# Patient Record
Sex: MALE | Race: AMERICAN INDIAN OR ALASKA NATIVE | NOT HISPANIC OR LATINO | Employment: OTHER | URBAN - METROPOLITAN AREA
[De-identification: names, ages, dates, MRNs, and addresses within clinical notes are randomized per-mention and may not be internally consistent; named-entity substitution may affect disease eponyms.]

---

## 2017-05-01 ENCOUNTER — RECORDS - HEALTHEAST (OUTPATIENT)
Dept: LAB | Facility: CLINIC | Age: 67
End: 2017-05-01

## 2017-05-01 LAB
CHOLEST SERPL-MCNC: 160 MG/DL
FASTING STATUS PATIENT QL REPORTED: NORMAL
HDLC SERPL-MCNC: 50 MG/DL
LDLC SERPL CALC-MCNC: 90 MG/DL
TRIGL SERPL-MCNC: 101 MG/DL

## 2019-07-03 ENCOUNTER — RECORDS - HEALTHEAST (OUTPATIENT)
Dept: LAB | Facility: CLINIC | Age: 69
End: 2019-07-03

## 2019-07-03 LAB
ANION GAP SERPL CALCULATED.3IONS-SCNC: 9 MMOL/L (ref 5–18)
BUN SERPL-MCNC: 10 MG/DL (ref 8–22)
CALCIUM SERPL-MCNC: 9.8 MG/DL (ref 8.5–10.5)
CHLORIDE BLD-SCNC: 104 MMOL/L (ref 98–107)
CHOLEST SERPL-MCNC: 148 MG/DL
CO2 SERPL-SCNC: 23 MMOL/L (ref 22–31)
CREAT SERPL-MCNC: 0.77 MG/DL (ref 0.7–1.3)
FASTING STATUS PATIENT QL REPORTED: YES
GFR SERPL CREATININE-BSD FRML MDRD: >60 ML/MIN/1.73M2
GLUCOSE BLD-MCNC: 101 MG/DL (ref 70–125)
HDLC SERPL-MCNC: 47 MG/DL
LDLC SERPL CALC-MCNC: 86 MG/DL
POTASSIUM BLD-SCNC: 4.6 MMOL/L (ref 3.5–5)
PSA SERPL-MCNC: 1.5 NG/ML (ref 0–4.5)
SODIUM SERPL-SCNC: 136 MMOL/L (ref 136–145)
TRIGL SERPL-MCNC: 75 MG/DL

## 2020-08-24 ENCOUNTER — RECORDS - HEALTHEAST (OUTPATIENT)
Dept: LAB | Facility: CLINIC | Age: 70
End: 2020-08-24

## 2020-08-24 LAB
ANION GAP SERPL CALCULATED.3IONS-SCNC: 10 MMOL/L (ref 5–18)
BUN SERPL-MCNC: 15 MG/DL (ref 8–22)
CALCIUM SERPL-MCNC: 9.3 MG/DL (ref 8.5–10.5)
CHLORIDE BLD-SCNC: 104 MMOL/L (ref 98–107)
CHOLEST SERPL-MCNC: 183 MG/DL
CO2 SERPL-SCNC: 23 MMOL/L (ref 22–31)
CREAT SERPL-MCNC: 0.88 MG/DL (ref 0.7–1.3)
FASTING STATUS PATIENT QL REPORTED: YES
GFR SERPL CREATININE-BSD FRML MDRD: >60 ML/MIN/1.73M2
GLUCOSE BLD-MCNC: 78 MG/DL (ref 70–125)
HDLC SERPL-MCNC: 60 MG/DL
LDLC SERPL CALC-MCNC: 101 MG/DL
POTASSIUM BLD-SCNC: 4.4 MMOL/L (ref 3.5–5)
PSA SERPL-MCNC: 1.2 NG/ML (ref 0–4.5)
SODIUM SERPL-SCNC: 137 MMOL/L (ref 136–145)
TRIGL SERPL-MCNC: 111 MG/DL

## 2020-09-09 ENCOUNTER — PATIENT OUTREACH (OUTPATIENT)
Dept: CARE COORDINATION | Facility: CLINIC | Age: 70
End: 2020-09-09

## 2020-09-09 DIAGNOSIS — Z65.9 PSYCHOSOCIAL PROBLEM: Primary | ICD-10-CM

## 2021-04-19 ENCOUNTER — RECORDS - HEALTHEAST (OUTPATIENT)
Dept: LAB | Facility: CLINIC | Age: 71
End: 2021-04-19

## 2021-04-19 LAB
ANION GAP SERPL CALCULATED.3IONS-SCNC: 13 MMOL/L (ref 5–18)
BUN SERPL-MCNC: 15 MG/DL (ref 8–22)
CALCIUM SERPL-MCNC: 8.3 MG/DL (ref 8.5–10.5)
CHLORIDE BLD-SCNC: 101 MMOL/L (ref 98–107)
CHOLEST SERPL-MCNC: 170 MG/DL
CO2 SERPL-SCNC: 21 MMOL/L (ref 22–31)
CREAT SERPL-MCNC: 0.83 MG/DL (ref 0.7–1.3)
FASTING STATUS PATIENT QL REPORTED: YES
GFR SERPL CREATININE-BSD FRML MDRD: >60 ML/MIN/1.73M2
GLUCOSE BLD-MCNC: 96 MG/DL (ref 70–125)
HDLC SERPL-MCNC: 55 MG/DL
LDLC SERPL CALC-MCNC: 92 MG/DL
POTASSIUM BLD-SCNC: 4.1 MMOL/L (ref 3.5–5)
SODIUM SERPL-SCNC: 135 MMOL/L (ref 136–145)
TRIGL SERPL-MCNC: 115 MG/DL

## 2021-05-13 ENCOUNTER — COMMUNICATION - HEALTHEAST (OUTPATIENT)
Dept: SCHEDULING | Facility: CLINIC | Age: 71
End: 2021-05-13

## 2021-06-16 PROBLEM — F10.139 ALCOHOL ABUSE WITH WITHDRAWAL (H): Status: ACTIVE | Noted: 2021-05-12

## 2021-11-01 ENCOUNTER — TRANSFERRED RECORDS (OUTPATIENT)
Dept: HEALTH INFORMATION MANAGEMENT | Facility: CLINIC | Age: 71
End: 2021-11-01
Payer: MEDICARE

## 2022-01-25 ENCOUNTER — APPOINTMENT (OUTPATIENT)
Dept: CT IMAGING | Facility: CLINIC | Age: 72
DRG: 167 | End: 2022-01-25
Attending: EMERGENCY MEDICINE
Payer: MEDICARE

## 2022-01-25 ENCOUNTER — HOSPITAL ENCOUNTER (INPATIENT)
Facility: CLINIC | Age: 72
LOS: 3 days | Discharge: HOME HEALTH | DRG: 167 | End: 2022-01-29
Attending: EMERGENCY MEDICINE | Admitting: INTERNAL MEDICINE
Payer: MEDICARE

## 2022-01-25 DIAGNOSIS — D64.9 ANEMIA, UNSPECIFIED TYPE: ICD-10-CM

## 2022-01-25 DIAGNOSIS — E83.42 HYPOMAGNESEMIA: ICD-10-CM

## 2022-01-25 DIAGNOSIS — M79.10 MYALGIA: ICD-10-CM

## 2022-01-25 DIAGNOSIS — R91.8 LUNG MASS: ICD-10-CM

## 2022-01-25 DIAGNOSIS — E87.1 HYPONATREMIA: Primary | ICD-10-CM

## 2022-01-25 DIAGNOSIS — J98.4 CAVITATING MASS OF UPPER LOBE OF LEFT LUNG: ICD-10-CM

## 2022-01-25 LAB
ALBUMIN SERPL-MCNC: 2.3 G/DL (ref 3.5–5)
ALBUMIN UR-MCNC: 20 MG/DL
ALP SERPL-CCNC: 151 U/L (ref 45–120)
ALT SERPL W P-5'-P-CCNC: 75 U/L (ref 0–45)
ANION GAP SERPL CALCULATED.3IONS-SCNC: 12 MMOL/L (ref 5–18)
APPEARANCE UR: ABNORMAL
AST SERPL W P-5'-P-CCNC: 56 U/L (ref 0–40)
BILIRUB DIRECT SERPL-MCNC: 0.4 MG/DL
BILIRUB SERPL-MCNC: 0.8 MG/DL (ref 0–1)
BILIRUB UR QL STRIP: NEGATIVE
BNP SERPL-MCNC: 22 PG/ML (ref 0–67)
BUN SERPL-MCNC: 11 MG/DL (ref 8–28)
CALCIUM SERPL-MCNC: 9 MG/DL (ref 8.5–10.5)
CHLORIDE BLD-SCNC: 94 MMOL/L (ref 98–107)
CK SERPL-CCNC: 50 U/L (ref 30–190)
CO2 SERPL-SCNC: 20 MMOL/L (ref 22–31)
COLOR UR AUTO: YELLOW
CREAT SERPL-MCNC: 0.65 MG/DL (ref 0.7–1.3)
ERYTHROCYTE [DISTWIDTH] IN BLOOD BY AUTOMATED COUNT: 13.9 % (ref 10–15)
FLUAV RNA SPEC QL NAA+PROBE: NEGATIVE
FLUBV RNA RESP QL NAA+PROBE: NEGATIVE
GFR SERPL CREATININE-BSD FRML MDRD: >90 ML/MIN/1.73M2
GLUCOSE BLD-MCNC: 94 MG/DL (ref 70–125)
GLUCOSE UR STRIP-MCNC: NEGATIVE MG/DL
HCT VFR BLD AUTO: 27.1 % (ref 40–53)
HGB BLD-MCNC: 8.8 G/DL (ref 13.3–17.7)
HGB UR QL STRIP: NEGATIVE
HOLD SPECIMEN: NORMAL
HYALINE CASTS: 1 /LPF
INR PPP: 1.16 (ref 0.85–1.15)
KETONES UR STRIP-MCNC: NEGATIVE MG/DL
LEUKOCYTE ESTERASE UR QL STRIP: NEGATIVE
MAGNESIUM SERPL-MCNC: 1.7 MG/DL (ref 1.8–2.6)
MAGNESIUM SERPL-MCNC: 1.7 MG/DL (ref 1.8–2.6)
MCH RBC QN AUTO: 26.9 PG (ref 26.5–33)
MCHC RBC AUTO-ENTMCNC: 32.5 G/DL (ref 31.5–36.5)
MCV RBC AUTO: 83 FL (ref 78–100)
MUCOUS THREADS #/AREA URNS LPF: PRESENT /LPF
NITRATE UR QL: NEGATIVE
OSMOLALITY UR: 341 MOSM/KG (ref 300–900)
PH UR STRIP: 5.5 [PH] (ref 5–7)
PHOSPHATE SERPL-MCNC: 3.5 MG/DL (ref 2.5–4.5)
PLATELET # BLD AUTO: 520 10E3/UL (ref 150–450)
POTASSIUM BLD-SCNC: 4.4 MMOL/L (ref 3.5–5)
PROCALCITONIN SERPL-MCNC: 0.12 NG/ML (ref 0–0.49)
PROT SERPL-MCNC: 7.2 G/DL (ref 6–8)
RBC # BLD AUTO: 3.27 10E6/UL (ref 4.4–5.9)
RBC URINE: 2 /HPF
SARS-COV-2 RNA RESP QL NAA+PROBE: NEGATIVE
SODIUM SERPL-SCNC: 126 MMOL/L (ref 136–145)
SP GR UR STRIP: 1.01 (ref 1–1.03)
SQUAMOUS EPITHELIAL: <1 /HPF
TROPONIN I SERPL-MCNC: <0.01 NG/ML (ref 0–0.29)
TSH SERPL DL<=0.005 MIU/L-ACNC: 1.26 UIU/ML (ref 0.3–5)
UROBILINOGEN UR STRIP-MCNC: <2 MG/DL
WBC # BLD AUTO: 11.6 10E3/UL (ref 4–11)
WBC URINE: 2 /HPF

## 2022-01-25 PROCEDURE — 84443 ASSAY THYROID STIM HORMONE: CPT | Performed by: EMERGENCY MEDICINE

## 2022-01-25 PROCEDURE — 81001 URINALYSIS AUTO W/SCOPE: CPT | Performed by: EMERGENCY MEDICINE

## 2022-01-25 PROCEDURE — 99220 PR INITIAL OBSERVATION CARE,LEVEL III: CPT | Performed by: INTERNAL MEDICINE

## 2022-01-25 PROCEDURE — 94640 AIRWAY INHALATION TREATMENT: CPT

## 2022-01-25 PROCEDURE — 96365 THER/PROPH/DIAG IV INF INIT: CPT | Mod: 59

## 2022-01-25 PROCEDURE — 83880 ASSAY OF NATRIURETIC PEPTIDE: CPT | Performed by: EMERGENCY MEDICINE

## 2022-01-25 PROCEDURE — G0378 HOSPITAL OBSERVATION PER HR: HCPCS

## 2022-01-25 PROCEDURE — 250N000013 HC RX MED GY IP 250 OP 250 PS 637: Performed by: INTERNAL MEDICINE

## 2022-01-25 PROCEDURE — 82310 ASSAY OF CALCIUM: CPT | Performed by: EMERGENCY MEDICINE

## 2022-01-25 PROCEDURE — 83735 ASSAY OF MAGNESIUM: CPT | Performed by: EMERGENCY MEDICINE

## 2022-01-25 PROCEDURE — 74177 CT ABD & PELVIS W/CONTRAST: CPT

## 2022-01-25 PROCEDURE — 96367 TX/PROPH/DG ADDL SEQ IV INF: CPT

## 2022-01-25 PROCEDURE — 80076 HEPATIC FUNCTION PANEL: CPT | Performed by: EMERGENCY MEDICINE

## 2022-01-25 PROCEDURE — 83935 ASSAY OF URINE OSMOLALITY: CPT | Performed by: INTERNAL MEDICINE

## 2022-01-25 PROCEDURE — 85610 PROTHROMBIN TIME: CPT | Performed by: EMERGENCY MEDICINE

## 2022-01-25 PROCEDURE — 99285 EMERGENCY DEPT VISIT HI MDM: CPT | Mod: 25

## 2022-01-25 PROCEDURE — 96372 THER/PROPH/DIAG INJ SC/IM: CPT | Performed by: INTERNAL MEDICINE

## 2022-01-25 PROCEDURE — 93005 ELECTROCARDIOGRAM TRACING: CPT | Performed by: EMERGENCY MEDICINE

## 2022-01-25 PROCEDURE — 84484 ASSAY OF TROPONIN QUANT: CPT | Performed by: EMERGENCY MEDICINE

## 2022-01-25 PROCEDURE — 82550 ASSAY OF CK (CPK): CPT | Performed by: EMERGENCY MEDICINE

## 2022-01-25 PROCEDURE — 250N000011 HC RX IP 250 OP 636: Performed by: EMERGENCY MEDICINE

## 2022-01-25 PROCEDURE — 96361 HYDRATE IV INFUSION ADD-ON: CPT

## 2022-01-25 PROCEDURE — 83735 ASSAY OF MAGNESIUM: CPT | Performed by: INTERNAL MEDICINE

## 2022-01-25 PROCEDURE — 250N000009 HC RX 250

## 2022-01-25 PROCEDURE — 258N000003 HC RX IP 258 OP 636: Performed by: EMERGENCY MEDICINE

## 2022-01-25 PROCEDURE — 36415 COLL VENOUS BLD VENIPUNCTURE: CPT | Performed by: EMERGENCY MEDICINE

## 2022-01-25 PROCEDURE — 250N000011 HC RX IP 250 OP 636: Performed by: INTERNAL MEDICINE

## 2022-01-25 PROCEDURE — 71275 CT ANGIOGRAPHY CHEST: CPT

## 2022-01-25 PROCEDURE — 85027 COMPLETE CBC AUTOMATED: CPT | Performed by: EMERGENCY MEDICINE

## 2022-01-25 PROCEDURE — 84145 PROCALCITONIN (PCT): CPT | Performed by: EMERGENCY MEDICINE

## 2022-01-25 PROCEDURE — 84100 ASSAY OF PHOSPHORUS: CPT | Performed by: EMERGENCY MEDICINE

## 2022-01-25 PROCEDURE — C9803 HOPD COVID-19 SPEC COLLECT: HCPCS

## 2022-01-25 PROCEDURE — 87636 SARSCOV2 & INF A&B AMP PRB: CPT | Performed by: EMERGENCY MEDICINE

## 2022-01-25 RX ORDER — IPRATROPIUM BROMIDE AND ALBUTEROL SULFATE 2.5; .5 MG/3ML; MG/3ML
3 SOLUTION RESPIRATORY (INHALATION) ONCE
Status: COMPLETED | OUTPATIENT
Start: 2022-01-25 | End: 2022-01-25

## 2022-01-25 RX ORDER — ONDANSETRON 4 MG/1
4 TABLET, ORALLY DISINTEGRATING ORAL EVERY 6 HOURS PRN
Status: DISCONTINUED | OUTPATIENT
Start: 2022-01-25 | End: 2022-01-29 | Stop reason: HOSPADM

## 2022-01-25 RX ORDER — ACETAMINOPHEN 325 MG/1
650 TABLET ORAL EVERY 6 HOURS PRN
Status: DISCONTINUED | OUTPATIENT
Start: 2022-01-25 | End: 2022-01-27

## 2022-01-25 RX ORDER — TERAZOSIN 1 MG/1
2 CAPSULE ORAL AT BEDTIME
Status: DISCONTINUED | OUTPATIENT
Start: 2022-01-25 | End: 2022-01-29 | Stop reason: HOSPADM

## 2022-01-25 RX ORDER — MAGNESIUM OXIDE 400 MG/1
400 TABLET ORAL DAILY
Status: DISCONTINUED | OUTPATIENT
Start: 2022-01-26 | End: 2022-01-29 | Stop reason: HOSPADM

## 2022-01-25 RX ORDER — ATENOLOL 25 MG/1
100 TABLET ORAL DAILY
Status: DISCONTINUED | OUTPATIENT
Start: 2022-01-26 | End: 2022-01-29 | Stop reason: HOSPADM

## 2022-01-25 RX ORDER — VITAMIN B COMPLEX
25 TABLET ORAL DAILY
Status: DISCONTINUED | OUTPATIENT
Start: 2022-01-26 | End: 2022-01-29 | Stop reason: HOSPADM

## 2022-01-25 RX ORDER — OXYCODONE HYDROCHLORIDE 5 MG/1
5 TABLET ORAL EVERY 6 HOURS PRN
Status: DISCONTINUED | OUTPATIENT
Start: 2022-01-25 | End: 2022-01-26

## 2022-01-25 RX ORDER — VITAMIN B COMPLEX
25 TABLET ORAL DAILY
COMMUNITY

## 2022-01-25 RX ORDER — MAGNESIUM OXIDE 400 MG/1
400 TABLET ORAL DAILY
COMMUNITY
End: 2023-06-19

## 2022-01-25 RX ORDER — TRAZODONE HYDROCHLORIDE 50 MG/1
50 TABLET, FILM COATED ORAL
Status: DISCONTINUED | OUTPATIENT
Start: 2022-01-25 | End: 2022-01-29 | Stop reason: HOSPADM

## 2022-01-25 RX ORDER — ATORVASTATIN CALCIUM 40 MG/1
40 TABLET, FILM COATED ORAL AT BEDTIME
Status: DISCONTINUED | OUTPATIENT
Start: 2022-01-25 | End: 2022-01-29 | Stop reason: HOSPADM

## 2022-01-25 RX ORDER — LANOLIN ALCOHOL/MO/W.PET/CERES
1000 CREAM (GRAM) TOPICAL DAILY
COMMUNITY

## 2022-01-25 RX ORDER — TERAZOSIN 2 MG/1
2 CAPSULE ORAL AT BEDTIME
COMMUNITY
End: 2022-04-08

## 2022-01-25 RX ORDER — ATORVASTATIN CALCIUM 40 MG/1
40 TABLET, FILM COATED ORAL AT BEDTIME
COMMUNITY

## 2022-01-25 RX ORDER — PIPERACILLIN SODIUM, TAZOBACTAM SODIUM 3; .375 G/15ML; G/15ML
3.38 INJECTION, POWDER, LYOPHILIZED, FOR SOLUTION INTRAVENOUS EVERY 8 HOURS
Status: DISCONTINUED | OUTPATIENT
Start: 2022-01-26 | End: 2022-01-28

## 2022-01-25 RX ORDER — MAGNESIUM SULFATE HEPTAHYDRATE 40 MG/ML
2 INJECTION, SOLUTION INTRAVENOUS ONCE
Status: COMPLETED | OUTPATIENT
Start: 2022-01-25 | End: 2022-01-25

## 2022-01-25 RX ORDER — SODIUM CHLORIDE 9 MG/ML
INJECTION, SOLUTION INTRAVENOUS CONTINUOUS
Status: DISCONTINUED | OUTPATIENT
Start: 2022-01-25 | End: 2022-01-26

## 2022-01-25 RX ORDER — OXYCODONE HYDROCHLORIDE 10 MG/1
5 TABLET ORAL EVERY 6 HOURS PRN
Status: ON HOLD | COMMUNITY
End: 2022-01-29

## 2022-01-25 RX ORDER — PIPERACILLIN SODIUM, TAZOBACTAM SODIUM 3; .375 G/15ML; G/15ML
3.38 INJECTION, POWDER, LYOPHILIZED, FOR SOLUTION INTRAVENOUS ONCE
Status: COMPLETED | OUTPATIENT
Start: 2022-01-25 | End: 2022-01-25

## 2022-01-25 RX ORDER — ONDANSETRON 2 MG/ML
4 INJECTION INTRAMUSCULAR; INTRAVENOUS EVERY 6 HOURS PRN
Status: DISCONTINUED | OUTPATIENT
Start: 2022-01-25 | End: 2022-01-29 | Stop reason: HOSPADM

## 2022-01-25 RX ORDER — ACETAMINOPHEN 650 MG/1
650 SUPPOSITORY RECTAL EVERY 6 HOURS PRN
Status: DISCONTINUED | OUTPATIENT
Start: 2022-01-25 | End: 2022-01-27

## 2022-01-25 RX ORDER — ATENOLOL 100 MG/1
100 TABLET ORAL DAILY
COMMUNITY
End: 2022-04-08

## 2022-01-25 RX ORDER — HYDROXYZINE HYDROCHLORIDE 25 MG/1
50 TABLET, FILM COATED ORAL 4 TIMES DAILY PRN
Status: DISCONTINUED | OUTPATIENT
Start: 2022-01-25 | End: 2022-01-27

## 2022-01-25 RX ORDER — PIPERACILLIN SODIUM, TAZOBACTAM SODIUM 3; .375 G/15ML; G/15ML
3.38 INJECTION, POWDER, LYOPHILIZED, FOR SOLUTION INTRAVENOUS EVERY 8 HOURS
Status: DISCONTINUED | OUTPATIENT
Start: 2022-01-25 | End: 2022-01-25 | Stop reason: DRUGHIGH

## 2022-01-25 RX ORDER — LIDOCAINE 40 MG/G
CREAM TOPICAL
Status: DISCONTINUED | OUTPATIENT
Start: 2022-01-25 | End: 2022-01-29 | Stop reason: HOSPADM

## 2022-01-25 RX ORDER — IOPAMIDOL 755 MG/ML
100 INJECTION, SOLUTION INTRAVASCULAR ONCE
Status: COMPLETED | OUTPATIENT
Start: 2022-01-25 | End: 2022-01-25

## 2022-01-25 RX ORDER — SODIUM CHLORIDE 9 MG/ML
INJECTION, SOLUTION INTRAVENOUS ONCE
Status: COMPLETED | OUTPATIENT
Start: 2022-01-25 | End: 2022-01-25

## 2022-01-25 RX ORDER — LANOLIN ALCOHOL/MO/W.PET/CERES
1000 CREAM (GRAM) TOPICAL DAILY
Status: DISCONTINUED | OUTPATIENT
Start: 2022-01-26 | End: 2022-01-29 | Stop reason: HOSPADM

## 2022-01-25 RX ORDER — TRAZODONE HYDROCHLORIDE 50 MG/1
50 TABLET, FILM COATED ORAL
COMMUNITY
Start: 2022-11-28 | End: 2023-04-20

## 2022-01-25 RX ORDER — HYDROXYZINE PAMOATE 50 MG/1
50 CAPSULE ORAL 4 TIMES DAILY PRN
COMMUNITY
Start: 2022-11-28 | End: 2022-12-08

## 2022-01-25 RX ORDER — DOCUSATE SODIUM 100 MG/1
100 CAPSULE, LIQUID FILLED ORAL 2 TIMES DAILY
Status: DISCONTINUED | OUTPATIENT
Start: 2022-01-25 | End: 2022-01-27

## 2022-01-25 RX ADMIN — IOPAMIDOL 80 ML: 755 INJECTION, SOLUTION INTRAVENOUS at 16:57

## 2022-01-25 RX ADMIN — MAGNESIUM SULFATE HEPTAHYDRATE 2 G: 40 INJECTION, SOLUTION INTRAVENOUS at 15:20

## 2022-01-25 RX ADMIN — TERAZOSIN HYDROCHLORIDE 2 MG: 1 CAPSULE ORAL at 21:16

## 2022-01-25 RX ADMIN — ENOXAPARIN SODIUM 40 MG: 100 INJECTION SUBCUTANEOUS at 19:48

## 2022-01-25 RX ADMIN — PIPERACILLIN AND TAZOBACTAM 3.38 G: 3; .375 INJECTION, POWDER, LYOPHILIZED, FOR SOLUTION INTRAVENOUS at 19:48

## 2022-01-25 RX ADMIN — IPRATROPIUM BROMIDE AND ALBUTEROL SULFATE 3 ML: .5; 2.5 SOLUTION RESPIRATORY (INHALATION) at 15:23

## 2022-01-25 RX ADMIN — SODIUM CHLORIDE: 9 INJECTION, SOLUTION INTRAVENOUS at 15:23

## 2022-01-25 RX ADMIN — ATORVASTATIN CALCIUM 40 MG: 40 TABLET, FILM COATED ORAL at 21:16

## 2022-01-25 RX ADMIN — DOCUSATE SODIUM 100 MG: 100 CAPSULE, LIQUID FILLED ORAL at 21:16

## 2022-01-25 ASSESSMENT — ENCOUNTER SYMPTOMS
DIARRHEA: 0
DYSURIA: 0
NAUSEA: 0
ABDOMINAL PAIN: 0
SHORTNESS OF BREATH: 1
COUGH: 1
FEVER: 1
UNEXPECTED WEIGHT CHANGE: 1
VOMITING: 0
APPETITE CHANGE: 1

## 2022-01-25 ASSESSMENT — ACTIVITIES OF DAILY LIVING (ADL): DEPENDENT_IADLS:: INDEPENDENT

## 2022-01-25 ASSESSMENT — MIFFLIN-ST. JEOR: SCORE: 1316.01

## 2022-01-25 NOTE — ED PROVIDER NOTES
EMERGENCY DEPARTMENT ENCOUNTER      NAME: Familia Watson  AGE: 71 year old male  YOB: 1950  MRN: [id number]  EVALUATION DATE & TIME: 2022  2:14 PM    PCP: Daniel Martinez    ED PROVIDER: Cassandra Rosa M.D.        Chief Complaint   Patient presents with     Cough     Shortness of Breath     Fatigue         FINAL IMPRESSION:    1. Hyponatremia    2. Hypomagnesemia    3. Lung mass    4. Anemia, unspecified type            MEDICAL DECISION MAKIN year old male with history of tobacco abuse who presents to emergency department with ongoing subjective fevers, cough, fatigue, unexpected weight loss.  Covid test negative today.  Found to have hyponatremia and large cavitary lesion on CT scan concerning for malignancy.  Patient quite cachectic.  Plan at this time is admission to the hospitalist for management of the hyponatremia.  Ultimately patient will require evaluation for this presumed malignancy.  No signs for true infection at this time.  Patient accepted to the hospital by the hospitalist will go to [facility].  Patient aware of our concerns for lung malignancy.  He agrees with the plan for admission.        ED COURSE:  3:02 PM   I met with the patient to gather history and perform my exam. ED course and treatment discussed.  I expressed my concerns to the patient of possible lung cancer as a cause of his symptoms.  He is still a current smoker.  He understands that our work-up will include imaging to help see if there is a lung cancer.    5:54 PM   The resident updated the patient on all results, including the lung lesion concerning for malignancy..  Patient agrees with the plan for admission.    6:16 PM  Patient has been accepted to the hospital by the hospitalist, Dr. Sorensen, and will go to [facility].  No respiratory distress.  At this time I do suspect that all symptoms are related to this lung mass which will likely be malignancy causing hyponatremia, weight loss, overall  fatigue.  He is hemodynamically stable at this time.    I do not think that this represents rib fractures, allergic reaction, COPD exacerbation, asthma exacerbation, bacterial pneumonia, CHF, myocarditis, pericarditis, endocarditis, ACS, PE, ruptured AAA, pneumothorax, aortic dissection, bowel obstruction, or other such etiologies at this time.    COVID-19 PPE worn during patient evaluation:  Mask: n95 and homemade masks   Eye Protection: goggles   Gown: none  Hair cover: yes  Face shield: yes   Patient wearing a mask: yes      At the conclusion of the encounter the results of all of the tests and the disposition were discussed. Their questions were answered. The patient (and any family present) acknowledged understanding and were agreeable with the care plan.        CONSULTANTS:  none      MEDICATIONS GIVEN IN THE EMERGENCY:  Medications   ipratropium - albuterol 0.5 mg/2.5 mg/3 mL (DUONEB) neb solution 3 mL (3 mLs Nebulization Given 1/25/22 1523)   magnesium sulfate 2 g in water intermittent infusion (0 g Intravenous Stopped 1/25/22 1611)   sodium chloride 0.9% infusion ( Intravenous New Bag 1/25/22 1523)   iopamidol (ISOVUE-370) solution 100 mL (80 mLs Intravenous Given 1/25/22 1657)         NEW PRESCRIPTIONS STARTED AT TODAY'S ER VISIT     Medication List      There are no discharge medications for this visit.             CONDITION:  stable        DISPOSITION:  Med surg as accepted by Dr. Sorensen, hospitalist         =================================================================  =================================================================    I am seeing this patient along with Dr. Mary Loja MD Resident    I, Cassandra Rosa MD have reviewed the documentation, personally taken the patient's history, performed an exam and agree with the physical findings, diagnosis and management plan.      HPI    Patient information was obtained from: patient    Use of Suniblereter: N/A     Familia wesley a  71 year old male with history of tobacco abuse who presents to the ER with complaints of cough.     The patient reports fevers, muscle aches, cough, and fatigue since November (~3 months). Fever peaked at 102 F and has since resolved.  He reports that his last fever was several weeks ago.  He denies any recent Covid test and states his last Covid test was negative when it was checked in August. The patient continues to have a cough productive of milky white sputum improved with antihistamines. The patient has waxing and waning congestion with post-nasal drip. He also has constipation and unexpected weight loss of 23 lbs since November 1st. Patient has been sober from alcohol since October but continues to smoke.    Otherwise denies any chest pain, abdominal pain, vomiting or diarrhea.  He does have decreased appetite and really has not been eating or drinking much in general.    REVIEW OF SYSTEMS  Review of Systems   Constitutional: Positive for appetite change, fever (last fever several weeks ago) and unexpected weight change.   Respiratory: Positive for cough and shortness of breath.    Cardiovascular: Negative for chest pain.   Gastrointestinal: Negative for abdominal pain, diarrhea, nausea and vomiting.   Genitourinary: Negative for dysuria.   Allergic/Immunologic: Negative for immunocompromised state.   All other systems reviewed and are negative.          PAST MEDICAL HISTORY:  Past Medical History:   Diagnosis Date     Alcohol abuse      Hypertension          PAST SURGICAL HISTORY:  No past surgical history on file.      CURRENT MEDICATIONS:    Prior to Admission medications    Not on File         ALLERGIES:  No Known Allergies      FAMILY HISTORY:  No family history on file.      SOCIAL HISTORY:  Social History     Socioeconomic History     Marital status:      Spouse name: Not on file     Number of children: Not on file     Years of education: Not on file     Highest education level: Not on file    Occupational History     Not on file   Tobacco Use     Smoking status: Current Every Day Smoker     Packs/day: 1.00     Smokeless tobacco: Not on file   Substance and Sexual Activity     Alcohol use: No     Comment: Alcoholic Drinks/day: Sober for 2 months as of 09/2018.     Drug use: Not on file     Sexual activity: Not on file   Other Topics Concern     Not on file   Social History Narrative     Not on file     Social Determinants of Health     Financial Resource Strain: Not on file   Food Insecurity: Not on file   Transportation Needs: Not on file   Physical Activity: Not on file   Stress: Not on file   Social Connections: Not on file   Intimate Partner Violence: Not on file   Housing Stability: Not on file         VITALS:  Patient Vitals for the past 24 hrs:   BP Temp Temp src Pulse Resp SpO2 Weight   01/25/22 1815 122/67 -- -- 83 20 100 % --   01/25/22 1800 136/71 -- -- 82 30 99 % --   01/25/22 1745 123/67 -- -- 80 20 100 % --   01/25/22 1730 124/62 -- -- 80 20 100 % --   01/25/22 1715 129/60 -- -- 84 20 100 % --   01/25/22 1700 137/61 -- -- 88 20 100 % --   01/25/22 1630 113/60 -- -- 80 20 99 % --   01/25/22 1600 109/60 -- -- 86 20 99 % --   01/25/22 1545 113/55 -- -- 80 22 93 % --   01/25/22 1530 119/59 -- -- 78 22 100 % --   01/25/22 1500 119/67 -- -- 75 17 100 % --   01/25/22 1445 117/67 -- -- 77 19 100 % --   01/25/22 1430 124/61 -- -- 81 29 100 % --   01/25/22 1420 -- -- -- -- -- -- 60.8 kg (134 lb)   01/25/22 1417 130/71 98.3  F (36.8  C) Oral 79 24 100 % --       Wt Readings from Last 3 Encounters:   01/25/22 60.8 kg (134 lb)         PHYSICAL EXAM    Constitutional:  Well developed, cachectic, NAD, GCS 15  HENT:  Normocephalic, Atraumatic, Bilateral external ears normal, Oropharynx normal, mucous membranes moist, Nose normal. Neck- Normal range of motion, No tenderness, Supple, No stridor.   Eyes:  PERRL, EOMI, Conjunctiva normal, No discharge.  Respiratory:  Normal breath sounds, No respiratory  distress, No wheezing, Speaks full sentences easily. No cough.   Cardiovascular:  Normal heart rate, Regular rhythm, No murmurs, No rubs, No gallops. Chest wall nontender.   GI:  No excessive obesity.  Bowel sounds normal, Soft, No tenderness, No masses, No flank tenderness. No rebound or guarding.  : deferred  Musculoskeletal: No cyanosis, No clubbing. Good range of motion in all major joints. No major deformities noted.   Integument:  Warm, Dry, No erythema, No rash.  No petechiae.  Neurologic:  Alert & oriented x 3, Normal motor function, Normal sensory function, No focal deficits noted.   Psychiatric:  Affect normal, Cooperative         LAB:  All pertinent labs reviewed and interpreted.  Recent Results (from the past 24 hour(s))   CBC (+ platelets, no diff)    Collection Time: 01/25/22  2:29 PM   Result Value Ref Range    WBC Count 11.6 (H) 4.0 - 11.0 10e3/uL    RBC Count 3.27 (L) 4.40 - 5.90 10e6/uL    Hemoglobin 8.8 (L) 13.3 - 17.7 g/dL    Hematocrit 27.1 (L) 40.0 - 53.0 %    MCV 83 78 - 100 fL    MCH 26.9 26.5 - 33.0 pg    MCHC 32.5 31.5 - 36.5 g/dL    RDW 13.9 10.0 - 15.0 %    Platelet Count 520 (H) 150 - 450 10e3/uL   Basic metabolic panel    Collection Time: 01/25/22  2:29 PM   Result Value Ref Range    Sodium 126 (L) 136 - 145 mmol/L    Potassium 4.4 3.5 - 5.0 mmol/L    Chloride 94 (L) 98 - 107 mmol/L    Carbon Dioxide (CO2) 20 (L) 22 - 31 mmol/L    Anion Gap 12 5 - 18 mmol/L    Urea Nitrogen 11 8 - 28 mg/dL    Creatinine 0.65 (L) 0.70 - 1.30 mg/dL    Calcium 9.0 8.5 - 10.5 mg/dL    Glucose 94 70 - 125 mg/dL    GFR Estimate >90 >60 mL/min/1.73m2   Troponin I (now)    Collection Time: 01/25/22  2:29 PM   Result Value Ref Range    Troponin I <0.01 0.00 - 0.29 ng/mL   B-Type Natriuretic Peptide ([facility] Only)    Collection Time: 01/25/22  2:29 PM   Result Value Ref Range    BNP 22 0 - 67 pg/mL   Procalcitonin    Collection Time: 01/25/22  2:29 PM   Result Value Ref Range    Procalcitonin 0.12 0.00 - 0.49  ng/mL   Magnesium    Collection Time: 01/25/22  2:29 PM   Result Value Ref Range    Magnesium 1.7 (L) 1.8 - 2.6 mg/dL   TSH with free T4 reflex    Collection Time: 01/25/22  2:29 PM   Result Value Ref Range    TSH 1.26 0.30 - 5.00 uIU/mL   Extra Red Top Tube    Collection Time: 01/25/22  2:29 PM   Result Value Ref Range    Hold Specimen JIC    Extra Green Top (Lithium Heparin) Tube    Collection Time: 01/25/22  2:29 PM   Result Value Ref Range    Hold Specimen JIC    Extra Purple Top Tube    Collection Time: 01/25/22  2:29 PM   Result Value Ref Range    Hold Specimen JIC    Extra Blue Top Tube    Collection Time: 01/25/22  2:29 PM   Result Value Ref Range    Hold Specimen JIC    CK total    Collection Time: 01/25/22  2:29 PM   Result Value Ref Range    CK 50 30 - 190 U/L   Hepatic function panel    Collection Time: 01/25/22  2:29 PM   Result Value Ref Range    Bilirubin Total 0.8 0.0 - 1.0 mg/dL    Bilirubin Direct 0.4 <=0.5 mg/dL    Protein Total 7.2 6.0 - 8.0 g/dL    Albumin 2.3 (L) 3.5 - 5.0 g/dL    Alkaline Phosphatase 151 (H) 45 - 120 U/L    AST 56 (H) 0 - 40 U/L    ALT 75 (H) 0 - 45 U/L   INR    Collection Time: 01/25/22  2:29 PM   Result Value Ref Range    INR 1.16 (H) 0.85 - 1.15   Phosphorus    Collection Time: 01/25/22  2:29 PM   Result Value Ref Range    Phosphorus 3.5 2.5 - 4.5 mg/dL   Symptomatic; Unknown Influenza A/B & SARS-CoV2 (COVID-19) Virus PCR Multiplex Nasopharyngeal    Collection Time: 01/25/22  2:53 PM    Specimen: Nasopharyngeal; Swab   Result Value Ref Range    Influenza A PCR Negative Negative    Influenza B PCR Negative Negative    SARS CoV2 PCR Negative Negative   UA with Microscopic reflex to Culture    Collection Time: 01/25/22  3:25 PM    Specimen: Urine, Midstream   Result Value Ref Range    Color Urine Yellow Colorless, Straw, Light Yellow, Yellow    Appearance Urine Cloudy (A) Clear    Glucose Urine Negative Negative mg/dL    Bilirubin Urine Negative Negative    Ketones Urine  Negative Negative mg/dL    Specific Gravity Urine 1.013 1.001 - 1.030    Blood Urine Negative Negative    pH Urine 5.5 5.0 - 7.0    Protein Albumin Urine 20  (A) Negative mg/dL    Urobilinogen Urine <2.0 <2.0 mg/dL    Nitrite Urine Negative Negative    Leukocyte Esterase Urine Negative Negative    Mucus Urine Present (A) None Seen /LPF    RBC Urine 2 <=2 /HPF    WBC Urine 2 <=5 /HPF    Squamous Epithelials Urine <1 <=1 /HPF    Hyaline Casts Urine 1 <=2 /LPF       No results found for: ABORH        RADIOLOGY:  Reviewed all pertinent imaging. Please see official radiology report.    CT Abdomen Pelvis w Contrast   Final Result   IMPRESSION:    1.  Fibrotic appearing subpleural infiltrates in the visualized lower lobes.      2.  Vascular calcification.      3.  No appendicitis.      4.  Mild constipation.      CT Chest Pulmonary Embolism w Contrast   Final Result   IMPRESSION:   1.  No pulmonary embolism.      2.  Large subpleural 9.3 x 7.7 x 5.5 cm irregular thick-walled cavitary mass within the left upper lobe which is malignant until proven otherwise. AP window lymphadenopathy.      3.  Coronary artery calcification.      4.  Emphysematous change.      5.  Subpleural interstitial and cystic change in the lower lobes likely fibrosis.            EKG:    Performed at: 14:17p    Impression: Sinus rhythm at 78 bpm.  Flipped T waves noted in lead aVR, aVL, and V1-V2.  NE interval 176 ms, QRS 96 ms,  ms.  Nonspecific ST changes compared to EKG from May 12, 2021.        I have independently reviewed and interpreted the EKG(s) documented above.        PROCEDURES:  none          I personally saw the patient and performed a substantive portion of the visit including all aspects of the medical decision making.      I, Christine Sanderson, am serving as a scribe to document services personally performed by Dr. Cassandra Rosa based on my observation and the provider's statements to me. I, Dr. Cassandra Rosa MD attest that  Christine Sanderson is acting in a scribe capacity, has observed my performance of the services and has documented them in accordance with my direction.        Cassandra Rosa M.D. [facility]  Emergency Medicine and Medical Toxicology  Formerly [facility] EMERGENCY ROOM  [address] [address]  [phone]  Dept: [phone]         Cassandra Rosa MD  01/25/22 5451

## 2022-01-25 NOTE — PHARMACY-ADMISSION MEDICATION HISTORY
Pharmacy Note - Admission Medication History    Pertinent Provider Information:      ______________________________________________________________________    Prior To Admission (PTA) med list completed and updated in EMR.       PTA Med List   Medication Sig Last Dose     aspirin (ASA) 325 MG EC tablet Take 325 mg by mouth daily 1/25/2022 at Unknown time     atenolol (TENORMIN) 100 MG tablet Take 100 mg by mouth daily 1/25/2022 at Unknown time     atorvastatin (LIPITOR) 40 MG tablet Take 40 mg by mouth At Bedtime 1/24/2022 at Unknown time     cyanocobalamin (VITAMIN B-12) 1000 MCG tablet Take 1,000 mcg by mouth daily 1/25/2022 at Unknown time     hydrOXYzine (VISTARIL) 50 MG capsule Take 50 mg by mouth 4 times daily as needed for itching or anxiety Past Week at Unknown time     magnesium oxide (MAG-OX) 400 MG tablet Take 400 mg by mouth daily 1/25/2022 at Unknown time     oxyCODONE IR (ROXICODONE) 10 MG tablet Take 5 mg by mouth every 6 hours as needed for severe pain Past Week at Unknown time     terazosin (HYTRIN) 2 MG capsule Take 2 mg by mouth At Bedtime 1/24/2022 at Unknown time     traZODone (DESYREL) 50 MG tablet Take 50 mg by mouth nightly as needed for sleep Past Week at Unknown time     Vitamin D3 (CHOLECALCIFEROL) 25 mcg (1000 units) tablet Take 1 tablet by mouth daily 1/25/2022 at Unknown time       Information source(s): Patient and CareEverywhere/[facility]  Method of interview communication: phone    Summary of Changes to PTA Med List  All PTA medications are new    Patient was asked about OTC/herbal products specifically.  PTA med list reflects this.    In the past week, patient estimated taking medication this percent of the time:  greater than 90%.    Allergies were reviewed, assessed, and updated with the patient.      Patient does not use any multi-dose medications prior to admission.    The information provided in this note is only as accurate as the sources available at the time of the  update(s).    Thank you for the opportunity to participate in the care of this patient.    Fabricio Ott RPH  1/25/2022 4:06 PM

## 2022-01-25 NOTE — ED PROVIDER NOTES
"EMERGENCY DEPARTMENT ENCOUNTER      CHIEF COMPLAINT      Chief Complaint   Patient presents with     Cough     Shortness of Breath     Fatigue       RISHI Barbosa is a 71 year old male with a past medical history of alcohol abuse, tobacco dependence, hypertension, and hyperlipidemia. He presents to the [facility] emergency room for a cough, dyspnea on exertion, muscle aches, and fatigue.    Patient states that in November he developed a fever, muscle aches, cough, and fatigue. The fever reached a maximum of 102 but has since resolved. He was tested for COVID around this time and found to be negative. He has continued to have a productive cough with production of milky white sputum. He states if he takes an antihistamine, it improves. He has waxing and waning congestion which he will feel fall come down the back of his throat. He also continues to have muscle aches throughout his whole body. He has not started any new medications recently and has been on his cholesterol medication for \"a long time.\" He endorses constipation and weight loss. Since November 1, 2021 the patient has lost 23 pounds without trying. He denies chest pain and abdominal pain.    He states his last drink was in October. He has been smoking one pack a day of cigarettes since he was 15 years old (56 pack years).      REVIEW OF SYSTEMS      Constitutional: Negative for fever and chills. Endorses fatigue.     HENT: Negative for neck pain.      Eyes: Negative for visual disturbance.     Respiratory: Negative for chest tightness. Positive for shortness of breath which is worse with moving.      Cardiovascular: Negative for chest pain.     Gastrointestinal: Negative for nausea, vomiting, abdominal pain and diarrhea. Positive for constipation.    Genitourinary: Negative for dysuria.     Musculoskeletal: Negative for back pain. Positive for diffuse muscle pain.    Skin: Negative for color change.     Neurological: Negative for dizziness. Positive for " weakness.     All other systems reviewed and are negative.      PAST MEDICAL HISTORY      Past Medical History:   Diagnosis Date     Alcohol abuse      Hypertension     and   Patient Active Problem List   Diagnosis     Alcohol abuse with withdrawal (H)     Lactic acidosis     Essential hypertension, benign     Migraine without aura and without status migrainosus, not intractable         SURGICAL HISTORY    Non-contributory      CURRENT MEDICATIONS      Patient's Medications    No medications on file         ALLERGIES      No Known Allergies      FAMILY HISTORY      No family history on file.      SOCIAL HISTORY      Social History     Socioeconomic History     Marital status:      Spouse name: Not on file     Number of children: Not on file     Years of education: Not on file     Highest education level: Not on file   Occupational History     Not on file   Tobacco Use     Smoking status: Current Every Day Smoker     Packs/day: 1.00     Smokeless tobacco: Not on file   Substance and Sexual Activity     Alcohol use: No     Comment: Alcoholic Drinks/day: Sober for 2 months as of 09/2018.     Drug use: Not on file     Sexual activity: Not on file   Other Topics Concern     Not on file   Social History Narrative     Not on file     Social Determinants of Health     Financial Resource Strain: Not on file   Food Insecurity: Not on file   Transportation Needs: Not on file   Physical Activity: Not on file   Stress: Not on file   Social Connections: Not on file   Intimate Partner Violence: Not on file   Housing Stability: Not on file         PHYSICAL EXAM      VITAL SIGNS: /67   Pulse 77   Temp 98.3  F (36.8  C) (Oral)   Resp 19   Wt 60.8 kg (134 lb)   SpO2 100%   BMI 20.99 kg/m         Constitutional:  Thin/cachexic, no acute distress     EYES: Sclera hazy, EOMI    HENT:  Atraumatic, external ears normal, nose normal, oropharynx moist, poor dentition with multiple cavities    Respiratory:  Decreased breath  sound on right lower base, no use of accessory muscles, no wheezes, no crackles, no rhonchi     Cardiovascular:  Normal rate, normal rhythm, no murmurs.  No chest tenderness    GI:  Soft, nondistended, nontender, no palpable masses, no rebound, no guarding, normal bowel sounds    Musculoskeletal:  No edema.  Range of motion major extremities intact. No tenderness to palpation or major deformities noted. Strength 5/5 in upper and lower extremities bilaterally    Integument: Warm, Dry, No erythema, No rash. Tan skin.    Neurologic:  Alert & oriented, no focal deficits noted    Psych: Affect normal, Mood normal.    LABS:  UA with Microscopic reflex to Culture:  Color Urine Yellow   Appearance Urine Cloudy   Glucose Urine Negative   Bilirubin Urine Negative   Ketones Urine Negative   Specific Gravity Urine 1.013   Blood Urine Negative   pH Urine 5.5   Protein Albumin Urine 20    Urobilinogen mg/dL <2.0   Nitrite Urine Negative   Leukocyte Esterase Urine Negative   Mucus Urine Present   RBC Urine 2   WBC Urine 2   Squamous Epithelial /HPF Urine <1   Hyaline Casts 1     Symptomatic; Unknown Influenza A/B & SARS-CoV2 (COVID-19) Virus PCR Multiplex Nasopharyngeal   Influenza A and B: Negative  SARS CoV2 PCR: Negative    Procalcitonin: 0.12    Hepatic function panel   Bilirubin Total 0.8   Bilirubin Direct 0.4   Protein Total 7.2   Albumin 2.3   Alkaline Phosphatase 151   AST 56   ALT 75     Basic metabolic panel   Sodium 126   Potassium 4.4   Chloride 94   Carbon Dioxide 20   Anion Gap 12   Urea Nitrogen 11   Creatinine 0.65   Calcium 9.0   Glucose 94   GFR >90     [place] Draw   In process    Phosphorus 3.5     INR 1.16     Troponin I <0.01     Magnesium: 1.7    TSH: 1.26    CK: 50    BNP: 22    CBC (+ platelets, no diff)   WBC 11.6   RBC Count 3.27   Hemoglobin 8.8   Hematocrit 27.1   MCV 83   MCH 26.9   MCHC 32.5   RDW 13.9   Platelet Count 520       IMAGING:  CT Chest Pulmonary Embolism with Contrast  IMPRESSION:  1.   "No pulmonary embolism.  2.  Large subpleural 9.3 x 7.7 x 5.5 cm irregular thick-walled cavitary mass within the left upper lobe which is malignant until proven otherwise. AP window lymphadenopathy.  3.  Coronary artery calcification.  4.  Emphysematous change.  5.  Subpleural interstitial and cystic change in the lower lobes likely fibrosis.    CT Abdomen Pelvis with Contrast  IMPRESSION:   1.  Fibrotic appearing subpleural infiltrates in the visualized lower lobes.  2.  Vascular calcification.  3.  No appendicitis.  4.  Mild constipation.    EKG: Reviewed      ED COURSE & MEDICAL DECISION MAKING    1425: I met the patient and performed a history and physical.  1505: Dr. Rosa saw and evaluated patient. The discussion of admission for hyponatremia was discussed and patient is agreeable. COPD and possible cancer workup was discussed as well with patient.  1700: Discussed with the patient admission and high likelihood of cancer diagnosis. He states, \"I have smoked almost my whole life. I can't say I am surprised.\" His support system includes his wife.  1710: Discussed with admitting physician, Dr. Sorensen, about admission and she accepted.        Mary Loja MD PGY1  [facility] Family Medicine Resident       Mary Loja MD  Resident  01/25/22 1815    "

## 2022-01-25 NOTE — ED TRIAGE NOTES
Pt arrives by EMS with c/o cough, shortness of breath, fever weakness, fatigue, and rapid weight loss since about November. No fever here now. Denies chest pain. Alert and oriented. 100% on room air. Hx of hypertension.

## 2022-01-26 PROBLEM — J98.4 CAVITATING MASS OF UPPER LOBE OF LEFT LUNG: Status: ACTIVE | Noted: 2022-01-25

## 2022-01-26 LAB
ALBUMIN SERPL-MCNC: 1.9 G/DL (ref 3.5–5)
ALP SERPL-CCNC: 126 U/L (ref 45–120)
ALT SERPL W P-5'-P-CCNC: 57 U/L (ref 0–45)
ANION GAP SERPL CALCULATED.3IONS-SCNC: 9 MMOL/L (ref 5–18)
AST SERPL W P-5'-P-CCNC: 45 U/L (ref 0–40)
BILIRUB SERPL-MCNC: 0.6 MG/DL (ref 0–1)
BUN SERPL-MCNC: 7 MG/DL (ref 8–28)
CALCIUM SERPL-MCNC: 7.9 MG/DL (ref 8.5–10.5)
CHLORIDE BLD-SCNC: 99 MMOL/L (ref 98–107)
CO2 SERPL-SCNC: 18 MMOL/L (ref 22–31)
CREAT SERPL-MCNC: 0.67 MG/DL (ref 0.7–1.3)
ERYTHROCYTE [DISTWIDTH] IN BLOOD BY AUTOMATED COUNT: 14.1 % (ref 10–15)
FERRITIN SERPL-MCNC: 2474 NG/ML (ref 27–300)
GFR SERPL CREATININE-BSD FRML MDRD: >90 ML/MIN/1.73M2
GLUCOSE BLD-MCNC: 122 MG/DL (ref 70–125)
HCT VFR BLD AUTO: 22.8 % (ref 40–53)
HGB BLD-MCNC: 7.4 G/DL (ref 13.3–17.7)
IRON SATN MFR SERPL: 12 % (ref 20–50)
IRON SERPL-MCNC: 15 UG/DL (ref 42–175)
MAGNESIUM SERPL-MCNC: 1.8 MG/DL (ref 1.8–2.6)
MCH RBC QN AUTO: 27.3 PG (ref 26.5–33)
MCHC RBC AUTO-ENTMCNC: 32.5 G/DL (ref 31.5–36.5)
MCV RBC AUTO: 84 FL (ref 78–100)
PLATELET # BLD AUTO: 404 10E3/UL (ref 150–450)
POTASSIUM BLD-SCNC: 3.4 MMOL/L (ref 3.5–5)
PROT SERPL-MCNC: 6 G/DL (ref 6–8)
RBC # BLD AUTO: 2.71 10E6/UL (ref 4.4–5.9)
SODIUM SERPL-SCNC: 126 MMOL/L (ref 136–145)
TIBC SERPL-MCNC: 123 UG/DL (ref 313–563)
TRANSFERRIN SERPL-MCNC: 98 MG/DL (ref 212–360)
WBC # BLD AUTO: 9.8 10E3/UL (ref 4–11)

## 2022-01-26 PROCEDURE — G0378 HOSPITAL OBSERVATION PER HR: HCPCS

## 2022-01-26 PROCEDURE — 250N000013 HC RX MED GY IP 250 OP 250 PS 637: Performed by: INTERNAL MEDICINE

## 2022-01-26 PROCEDURE — 99205 OFFICE O/P NEW HI 60 MIN: CPT | Performed by: INTERNAL MEDICINE

## 2022-01-26 PROCEDURE — 36415 COLL VENOUS BLD VENIPUNCTURE: CPT | Performed by: INTERNAL MEDICINE

## 2022-01-26 PROCEDURE — 258N000003 HC RX IP 258 OP 636: Performed by: INTERNAL MEDICINE

## 2022-01-26 PROCEDURE — 83735 ASSAY OF MAGNESIUM: CPT | Performed by: INTERNAL MEDICINE

## 2022-01-26 PROCEDURE — 99207 PR CONSULT E&M CHANGED TO INITIAL LEVEL: CPT | Performed by: INTERNAL MEDICINE

## 2022-01-26 PROCEDURE — 96376 TX/PRO/DX INJ SAME DRUG ADON: CPT

## 2022-01-26 PROCEDURE — 99233 SBSQ HOSP IP/OBS HIGH 50: CPT | Mod: GC | Performed by: INTERNAL MEDICINE

## 2022-01-26 PROCEDURE — 250N000011 HC RX IP 250 OP 636: Performed by: FAMILY MEDICINE

## 2022-01-26 PROCEDURE — 85027 COMPLETE CBC AUTOMATED: CPT | Performed by: INTERNAL MEDICINE

## 2022-01-26 PROCEDURE — 83540 ASSAY OF IRON: CPT | Performed by: INTERNAL MEDICINE

## 2022-01-26 PROCEDURE — 250N000011 HC RX IP 250 OP 636: Performed by: INTERNAL MEDICINE

## 2022-01-26 PROCEDURE — 82728 ASSAY OF FERRITIN: CPT | Performed by: INTERNAL MEDICINE

## 2022-01-26 PROCEDURE — 120N000001 HC R&B MED SURG/OB

## 2022-01-26 PROCEDURE — 80053 COMPREHEN METABOLIC PANEL: CPT | Performed by: INTERNAL MEDICINE

## 2022-01-26 PROCEDURE — 99223 1ST HOSP IP/OBS HIGH 75: CPT | Performed by: INTERNAL MEDICINE

## 2022-01-26 RX ORDER — NALOXONE HYDROCHLORIDE 0.4 MG/ML
0.4 INJECTION, SOLUTION INTRAMUSCULAR; INTRAVENOUS; SUBCUTANEOUS
Status: DISCONTINUED | OUTPATIENT
Start: 2022-01-26 | End: 2022-01-29 | Stop reason: HOSPADM

## 2022-01-26 RX ORDER — BENZONATATE 100 MG/1
100 CAPSULE ORAL 3 TIMES DAILY PRN
Status: DISCONTINUED | OUTPATIENT
Start: 2022-01-26 | End: 2022-01-29 | Stop reason: HOSPADM

## 2022-01-26 RX ORDER — POTASSIUM CHLORIDE 1.5 G/1.58G
40 POWDER, FOR SOLUTION ORAL ONCE
Status: COMPLETED | OUTPATIENT
Start: 2022-01-26 | End: 2022-01-26

## 2022-01-26 RX ORDER — NALOXONE HYDROCHLORIDE 0.4 MG/ML
0.2 INJECTION, SOLUTION INTRAMUSCULAR; INTRAVENOUS; SUBCUTANEOUS
Status: DISCONTINUED | OUTPATIENT
Start: 2022-01-26 | End: 2022-01-29 | Stop reason: HOSPADM

## 2022-01-26 RX ORDER — SODIUM CHLORIDE AND POTASSIUM CHLORIDE 150; 900 MG/100ML; MG/100ML
INJECTION, SOLUTION INTRAVENOUS CONTINUOUS
Status: DISCONTINUED | OUTPATIENT
Start: 2022-01-26 | End: 2022-01-27

## 2022-01-26 RX ORDER — HYDROCODONE BITARTRATE AND ACETAMINOPHEN 5; 325 MG/1; MG/1
1-2 TABLET ORAL EVERY 4 HOURS PRN
Status: DISCONTINUED | OUTPATIENT
Start: 2022-01-26 | End: 2022-01-27

## 2022-01-26 RX ADMIN — ASPIRIN 325 MG: 325 TABLET ORAL at 08:54

## 2022-01-26 RX ADMIN — OXYCODONE HYDROCHLORIDE 5 MG: 5 TABLET ORAL at 00:25

## 2022-01-26 RX ADMIN — POTASSIUM CHLORIDE AND SODIUM CHLORIDE: 900; 150 INJECTION, SOLUTION INTRAVENOUS at 23:09

## 2022-01-26 RX ADMIN — POTASSIUM CHLORIDE 40 MEQ: 1.5 POWDER, FOR SOLUTION ORAL at 08:53

## 2022-01-26 RX ADMIN — PIPERACILLIN AND TAZOBACTAM 3.38 G: 3; .375 INJECTION, POWDER, LYOPHILIZED, FOR SOLUTION INTRAVENOUS at 00:30

## 2022-01-26 RX ADMIN — Medication 25 MCG: at 08:54

## 2022-01-26 RX ADMIN — OXYCODONE HYDROCHLORIDE 5 MG: 5 TABLET ORAL at 09:06

## 2022-01-26 RX ADMIN — GUAIFENESIN 10 ML: 100 SOLUTION ORAL at 17:22

## 2022-01-26 RX ADMIN — DOCUSATE SODIUM 100 MG: 100 CAPSULE, LIQUID FILLED ORAL at 08:54

## 2022-01-26 RX ADMIN — ATORVASTATIN CALCIUM 40 MG: 40 TABLET, FILM COATED ORAL at 21:49

## 2022-01-26 RX ADMIN — MAGNESIUM OXIDE TAB 400 MG (241.3 MG ELEMENTAL MG) 400 MG: 400 (241.3 MG) TAB at 08:54

## 2022-01-26 RX ADMIN — PIPERACILLIN AND TAZOBACTAM 3.38 G: 3; .375 INJECTION, POWDER, LYOPHILIZED, FOR SOLUTION INTRAVENOUS at 17:23

## 2022-01-26 RX ADMIN — ACETAMINOPHEN 650 MG: 325 TABLET, FILM COATED ORAL at 04:04

## 2022-01-26 RX ADMIN — HYDROCODONE BITARTRATE AND ACETAMINOPHEN 2 TABLET: 5; 325 TABLET ORAL at 17:22

## 2022-01-26 RX ADMIN — GUAIFENESIN 10 ML: 100 SOLUTION ORAL at 05:38

## 2022-01-26 RX ADMIN — HYDROCODONE BITARTRATE AND ACETAMINOPHEN 2 TABLET: 5; 325 TABLET ORAL at 13:06

## 2022-01-26 RX ADMIN — CYANOCOBALAMIN TAB 1000 MCG 1000 MCG: 1000 TAB at 09:07

## 2022-01-26 RX ADMIN — HYDROCODONE BITARTRATE AND ACETAMINOPHEN 2 TABLET: 5; 325 TABLET ORAL at 21:48

## 2022-01-26 RX ADMIN — DOCUSATE SODIUM 100 MG: 100 CAPSULE, LIQUID FILLED ORAL at 20:39

## 2022-01-26 RX ADMIN — BENZONATATE 100 MG: 100 CAPSULE ORAL at 09:06

## 2022-01-26 RX ADMIN — PIPERACILLIN AND TAZOBACTAM 3.38 G: 3; .375 INJECTION, POWDER, LYOPHILIZED, FOR SOLUTION INTRAVENOUS at 08:53

## 2022-01-26 RX ADMIN — TERAZOSIN HYDROCHLORIDE 2 MG: 1 CAPSULE ORAL at 21:49

## 2022-01-26 RX ADMIN — SODIUM CHLORIDE: 9 INJECTION, SOLUTION INTRAVENOUS at 07:26

## 2022-01-26 RX ADMIN — ATENOLOL 100 MG: 25 TABLET ORAL at 08:54

## 2022-01-26 RX ADMIN — BENZONATATE 100 MG: 100 CAPSULE ORAL at 04:04

## 2022-01-26 ASSESSMENT — ACTIVITIES OF DAILY LIVING (ADL)
EQUIPMENT_CURRENTLY_USED_AT_HOME: SHOWER CHAIR
WERE_AUXILIARY_AIDS_OFFERED?: NO
ADLS_ACUITY_SCORE: 13
ADLS_ACUITY_SCORE: 10
DIFFICULTY_EATING/SWALLOWING: NO
PATIENT'S_PREFERRED_MEANS_OF_COMMUNICATION: OTHER
ADLS_ACUITY_SCORE: 13
ADLS_ACUITY_SCORE: 13
ADLS_ACUITY_SCORE: 10
WEAR_GLASSES_OR_BLIND: NO
DIFFICULTY_COMMUNICATING: NO
ADLS_ACUITY_SCORE: 13
DESCRIBE_HEARING_LOSS: BILATERAL HEARING LOSS
ADLS_ACUITY_SCORE: 13
ADLS_ACUITY_SCORE: 13
DOING_ERRANDS_INDEPENDENTLY_DIFFICULTY: YES
TOILETING_ISSUES: OTHER (SEE COMMENTS)
WALKING_OR_CLIMBING_STAIRS_DIFFICULTY: OTHER (SEE COMMENTS)
ADLS_ACUITY_SCORE: 10
HEARING_DIFFICULTY_OR_DEAF: YES
ADLS_ACUITY_SCORE: 10
CONCENTRATING,_REMEMBERING_OR_MAKING_DECISIONS_DIFFICULTY: NO

## 2022-01-26 NOTE — PLAN OF CARE
PRIMARY DIAGNOSIS: ACUTE PAIN  OUTPATIENT/OBSERVATION GOALS TO BE MET BEFORE DISCHARGE:  1. Pain Status: No improvement noted. Consider adjustment in pain regimen.    2. Return to near baseline physical activity: No-refuses to get out of bed    3. Cleared for discharge by consultants (if involved): No    Discharge Planner Nurse   Safe discharge environment identified: Yes  Barriers to discharge: Yes-pain control and bronchoscopy tomorrow as well as Onc and Pulm consults.       Entered by: Jenn Finnegan 01/26/2022 1:16 PM     Please review provider order for any additional goals.   Nurse to notify provider when observation goals have been met and patient is ready for discharge.
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
## 2022-01-26 NOTE — PLAN OF CARE
"PRIMARY DIAGNOSIS: \"GENERIC\" NURSING  OUTPATIENT/OBSERVATION GOALS TO BE MET BEFORE DISCHARGE:  ADLs back to baseline: No    Activity and level of assistance: Up with standby assistance.    Pain status: Improved-controlled with oral pain medications.    Return to near baseline physical activity: Yes     Discharge Planner Nurse   Safe discharge environment identified: No  Barriers to discharge: Yes pending clinical progress.        Entered by: Lexx Lancaster 01/26/2022 2:29 AM     Please review provider order for any additional goals.   Nurse to notify provider when observation goals have been met and patient is ready for discharge.  "

## 2022-01-26 NOTE — CONSULTS
Care Management Initial Consult    General Information  Assessment completed with: Patient,    Type of CM/SW Visit: CM Role Introduction (Initial Assessment as well.)    Primary Care Provider verified and updated as needed: Yes   Readmission within the last 30 days: no previous admission in last 30 days      Reason for Consult: discharge planning,transportation,health care directive  Advance Care Planning: Advance Care Planning Reviewed: no concerns identified        Communication Assessment  Patient's communication style: spoken language (English or Bilingual)    Hearing Difficulty or Deaf: no   Wear Glasses or Blind: no    Cognitive  Cognitive/Neuro/Behavioral: orientation          Orientation: oriented x 4             Living Environment:   People in home: spouse     Current living Arrangements: apartment      Able to return to prior arrangements: yes     Family/Social Support:  Care provided by: self  Provides care for: spouse (As needed.)  Marital Status:   None,Other (specify) (Pt stated no support available.)          Description of Support System: Other (see comments) (Pt stated no support available.)    Support Assessment: Limited social contact and support    Current Resources:   Patient receiving home care services: No     Community Resources: None  Equipment currently used at home: shower chair  Supplies currently used at home: None    Employment/Financial:  Employment Status: retired        Financial Concerns: No concerns identified   Referral to Financial Counselor: No     Lifestyle & Psychosocial Needs:  Social Determinants of Health     Tobacco Use: High Risk     Smoking Tobacco Use: Current Every Day Smoker     Smokeless Tobacco Use: Unknown   Alcohol Use: Not on file   Financial Resource Strain: Not on file   Food Insecurity: Not on file   Transportation Needs: Not on file   Physical Activity: Not on file   Stress: Not on file   Social Connections: Not on file   Intimate Partner Violence: Not  on file   Depression: Not on file   Housing Stability: Not on file     Functional Status:  Prior to admission patient needed assistance:   Dependent ADLs:: Independent  Dependent IADLs:: Independent  Assesssment of Functional Status: Not at  functional baseline    Mental Health Status:  Mental Health Status: No Current Concerns       Chemical Dependency Status:  Chemical Dependency Status: No Current Concerns           Values/Beliefs:  Spiritual, Cultural Beliefs, Shinto Practices, Values that affect care: no             Additional Information:  Writer spoke to pt by phone to introduce Care Management service(CM) and obtain an initial assessment. Pt stated he is totally independent with I/ADLs and cares for his wife as well. No in-home services or DME utilized. Pt stated he intends on getting better and returning home at discharge.    71 year old old male with history of hypertension, hyperlipidemia, > 50 pack years tobacco abuse who presented with cough and fatigue, weight loss anorexia x3 months. admitted for hyponatremia 126, lung mass CT chest showing large cavitary lesion in left upper lobe, likely malignant. Also shows emphysematous changes.    Hem/Onc consult pending. Anticipate return home at discharge. May need in-home service and/or assistance with transport at discharge. No therapy evals ordered as of this time. CM to follow and assist with discharge service coordination as needed/able.    Morro Emerson RN
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
## 2022-01-26 NOTE — H&P
[facility] MEDICINE ADMISSION HISTORY AND PHYSICAL     Assessment & Plan       Familia Watson is a 71 year old old male with history of hypertension, hyperlipidemia, > 50 pack years tobacco abuse who presented with cough and fatigue, weight loss anorexia x3 months. admitted for hyponatremia 126, lung mass CT chest showing large cavitary lesion in left upper lobe, likely malignant. Also shows emphysematous changes.    Left upper lobe cavitary lesion, likely malignant  Emphysematous changes  Afebrile. Mild leukocytosis  Consult Oncology regarding further work-up.  Duonebs   Empiric Zosyn given fever, mild leukocytosis at home.    Anemia, thrombocytopenia  Check iron studies.    Hyponatremia: Check Urine Osm. IV normal saline. BMP in morning.  Hypertension: continue home medication  Hyperlipidemia    Weight loss  Malnutrition  Supplements    DVTP: Lovenox prophylactic dose   Code Status: DNR per patient   Disposition: Inpatient   Expected LOS: 1 day  Goals for the hospitalization: correct hyponatremia    Chief Complaint Muscle aches, cough x3 months      HISTORY     Familia Watson is a 71 year old old male with h/o hypertension, hyperlipidemia, alcohol abuse and tobacco abuse p/w cough and fatigue x3 months. He had increased fatigue and muscle aches in upper and lower extremities which caused him to present to ED today. Patient has had intermittent fevers, muscle aches, cough and fatigue since November. Fever max 102, resolved a few weeks ago. Patient has cough productive of phlegm. No hemoptysis. He attributes his cough to congestion and post-nasal drip. He has had approximately 25 lb weight loss since November, endorses decreased appetite and fluid intake. His wife has been very concerned about his weight loss. Endorses shortness of breath with activity, improved with rest. No chest pain, N/V, abdominal pain changes in urination. He does have intermittent constipation. Patient is a  current smoker, 1 ppd smoker for 56 years. Denies recent alcohol use.     Past Medical History     Past Medical History:  No date: Alcohol abuse  No date: Hypertension     Surgical History   No past surgical history on file.  Family History    Reviewed, and No family history on file.     Sister: Lung cancer  Social History      Social History     Tobacco Use     Smoking status: Current Every Day Smoker     Packs/day: 1.00     Smokeless tobacco: Not on file   Substance Use Topics     Alcohol use: No     Comment: Alcoholic Drinks/day: Sober for 2 months as of 09/2018.     Drug use: Not on file        Allergies   No Known Allergies  Prior to Admission Medications      Prior to Admission Medications   Prescriptions Last Dose Informant Patient Reported? Taking?   Vitamin D3 (CHOLECALCIFEROL) 25 mcg (1000 units) tablet 1/25/2022 at Unknown time  Yes Yes   Sig: Take 1 tablet by mouth daily   aspirin (ASA) 325 MG EC tablet 1/25/2022 at Unknown time  Yes Yes   Sig: Take 325 mg by mouth daily   atenolol (TENORMIN) 100 MG tablet 1/25/2022 at Unknown time  Yes Yes   Sig: Take 100 mg by mouth daily   atorvastatin (LIPITOR) 40 MG tablet 1/24/2022 at Unknown time  Yes Yes   Sig: Take 40 mg by mouth At Bedtime   cyanocobalamin (VITAMIN B-12) 1000 MCG tablet 1/25/2022 at Unknown time  Yes Yes   Sig: Take 1,000 mcg by mouth daily   hydrOXYzine (VISTARIL) 50 MG capsule Past Week at Unknown time  Yes Yes   Sig: Take 50 mg by mouth 4 times daily as needed for itching or anxiety   magnesium oxide (MAG-OX) 400 MG tablet 1/25/2022 at Unknown time  Yes Yes   Sig: Take 400 mg by mouth daily   oxyCODONE IR (ROXICODONE) 10 MG tablet Past Week at Unknown time  Yes Yes   Sig: Take 5 mg by mouth every 6 hours as needed for severe pain   terazosin (HYTRIN) 2 MG capsule 1/24/2022 at Unknown time  Yes Yes   Sig: Take 2 mg by mouth At Bedtime   traZODone (DESYREL) 50 MG tablet Past Week at Unknown time  Yes Yes   Sig: Take 50 mg by mouth nightly as  needed for sleep      Facility-Administered Medications: None      Review of Systems     A 12 point comprehensive review of systems was negative except as noted above in HPI.    PHYSICAL EXAMINATION       Vitals      Temp:  [98.3  F (36.8  C)] 98.3  F (36.8  C)  Pulse:  [75-88] 80  Resp:  [17-29] 20  BP: (109-137)/(55-71) 123/67  SpO2:  [93 %-100 %] 100 %    Examination     GENERAL:  Alert, appears comfortable, in no acute distress. Cachectic, frail gentleman.   HEAD:  Normocephalic, without obvious abnormality, atraumatic   EYES:  PERRL, conjunctiva/corneas clear, no scleral icterus, EOM's intact   NOSE: Nares normal, septum midline, mucosa normal, no drainage   NECK: Supple, symmetrical, trachea midline   BACK:   Symmetric, no curvature, ROM normal   LUNGS:   Crackles in bilateral lung bases, symmetric chest rise on inhalation, respirations unlabored    CHEST WALL:  No tenderness or deformity   HEART:  Regular rate and rhythm, S1 and S2 normal, no murmur, rub, or gallop    ABDOMEN:   Soft, non-tender, bowel sounds active all four quadrants, no masses, no organomegaly, no rebound or guarding   EXTREMITIES: Extremities normal, atraumatic, no cyanosis or edema    SKIN: Dry to touch, no exanthems in the visualized areas   NEURO: Alert, oriented x 4, moves all four extremities freely, non-focal   PSYCH: Cooperative, behavior is appropriate      Pertinent Lab     Most Recent 3 CBC's:Recent Labs   Lab Test 01/25/22  1429 05/15/21  0653 05/14/21  0635   WBC 11.6* 5.8 5.2   HGB 8.8* 11.7* 11.1*   MCV 83 92 91   * 222 195     Most Recent 3 BMP's:Recent Labs   Lab Test 01/25/22  1429 05/15/21  0653 05/14/21  0635 05/13/21  0610   *  --  137 136   POTASSIUM 4.4 3.8 3.7 4.0   CHLORIDE 94*  --  108* 106   CO2 20*  --  24 25   BUN 11  --  7* 13   CR 0.65*  --  0.74 0.77   ANIONGAP 12  --  5 5   MELLISA 9.0  --  7.8* 8.0*   GLC 94  --  136* 107     Most Recent 2 LFT's:Recent Labs   Lab Test 01/25/22  1429 05/14/21  0635    AST 56* 23   ALT 75* 22   ALKPHOS 151* 86   BILITOTAL 0.8 0.6         Pertinent Radiology     Radiology Results:   Recent Results (from the past 24 hour(s))   CT Chest Pulmonary Embolism w Contrast    Narrative    EXAM: CT CHEST PULMONARY EMBOLISM W CONTRAST  LOCATION: [facility]  DATE/TIME: 1/25/2022 4:50 PM    INDICATION: Fatigue with cough and weight loss.  COMPARISON: None.  TECHNIQUE: CT chest pulmonary angiogram during arterial phase injection of IV contrast. Multiplanar reformats and MIP reconstructions were performed. Dose reduction techniques were used.   CONTRAST: Isovue-370 80mL    FINDINGS:  ANGIOGRAM CHEST: Pulmonary arteries are normal caliber and negative for pulmonary emboli. Thoracic aorta is negative for dissection. No CT evidence of right heart strain.    LUNGS AND PLEURA: Large irregular thick-walled 9.3 x 7.7 x 5.5 cm cavitary mass within the left upper lobe extending to the pleural surface and abutting the mediastinum which is cavitary malignancy until proven otherwise. Multiple air debris levels   present within the mass and there is mild surrounding groundglass infiltrate. Subpleural regions of presumed fibrosis in the lower lobes. Emphysematous change.    MEDIASTINUM/AXILLAE: There is mild AP window lymphadenopathy.    CORONARY ARTERY CALCIFICATION: Severe.    UPPER ABDOMEN: Normal.    MUSCULOSKELETAL: Normal.      Impression    IMPRESSION:  1.  No pulmonary embolism.    2.  Large subpleural 9.3 x 7.7 x 5.5 cm irregular thick-walled cavitary mass within the left upper lobe which is malignant until proven otherwise. AP window lymphadenopathy.    3.  Coronary artery calcification.    4.  Emphysematous change.    5.  Subpleural interstitial and cystic change in the lower lobes likely fibrosis.   CT Abdomen Pelvis w Contrast    Narrative    EXAM: CT ABDOMEN PELVIS W CONTRAST  LOCATION: [facility]  DATE/TIME: 1/25/2022 4:50 PM    INDICATION:  Fatigue and weight loss.  COMPARISON: None.  TECHNIQUE: CT scan of the abdomen and pelvis was performed following injection of IV contrast. Multiplanar reformats were obtained. Dose reduction techniques were used.  CONTRAST: Isovue-370, 80 mL.    FINDINGS:   LOWER CHEST: Subpleural fibrotic changes in the visualized lower lungs.    HEPATOBILIARY: Normal.    PANCREAS: Normal.    SPLEEN: Normal.    ADRENAL GLANDS: Normal.    KIDNEYS/BLADDER: Normal.    BOWEL: Normal appendix. Mild constipation.    LYMPH NODES: Normal.    VASCULATURE: Unremarkable.    PELVIC ORGANS: Normal.    MUSCULOSKELETAL: Lumbar degenerative change.      Impression    IMPRESSION:   1.  Fibrotic appearing subpleural infiltrates in the visualized lower lobes.    2.  Vascular calcification.    3.  No appendicitis.    4.  Mild constipation.                CHAPARRO DraperS2  Hospitalist  Hospital Medicine  [facility]   Phone: #[phone]    I agree with the documentation and findings as written by Cesilia BATES and our discussed and formulated assessment and plan. I was present with her who participated in the service and documentation of the note. I have also personally verified the history and personally performed the physical exam and medical decision-making. I agree with the assessment and plan of care as documented in the note.     Shelli Sorensen MD  Internal Medicine  Hospitalist  [facility]  Phone: #[phone]

## 2022-01-26 NOTE — PLAN OF CARE
"PRIMARY DIAGNOSIS: \"GENERIC\" NURSING  OUTPATIENT/OBSERVATION GOALS TO BE MET BEFORE DISCHARGE:  1. ADLs back to baseline: No    2. Activity and level of assistance: Up with standby assistance.    3. Pain status: Improved-controlled with oral pain medications.    4. Return to near baseline physical activity: Yes     Discharge Planner Nurse   Safe discharge environment identified: No  Barriers to discharge: Yes       Entered by: Lexx Lancaster 01/26/2022 2:31 AM     Pt is stable at this time but still has some generalized weakness and is able to make needs known. Still having a frequent dry cough and prn Robitussin and Tessalon was given . C/o pain and prn Oxycodone given and was effective. Prn Tylenol was also given for slight fever. SBA with mobility and receiving IV NS at 100ml/hr and Zosyn for abx. C/o SOB with exertion but remains on r/a at this time and sating above 90%. Will continue to monitor.   "

## 2022-01-26 NOTE — UTILIZATION REVIEW
Admission Status; Secondary Review Determination   Under the authority of the Utilization Management Committee, the utilization review process indicated a secondary review on Familia Watson. The review outcome is based on review of the medical records, discussions with staff, and applying clinical experience noted on the date of the review.   (x) Inpatient Status Appropriate - This patient's medical care is consistent with medical management for inpatient care and reasonable inpatient medical practice.     RATIONALE FOR DETERMINATION   71 yr old male with tobacco and alcohol dependence, HTN, emphysema presented with cough, fatigue and anorexia.  Noted large cavitary left lung mass.  Unclear if abscess vs necrotizing cancer.  Pulmonary and Oncology consulting.  IV zosyn ongoing.  Working at replacing sodium--found to have hyponatremia.  Planning bronchoscopy in AM.  May need CT guided biopsy.  Not medically stable for discharge.      At the time of admission with the information available to the attending physician more than 2 nights Hospital complex care was anticipated, based on patient risk of adverse outcome if treated as outpatient and complex care required. Inpatient admission is appropriate based on the Medicare guidelines.   The information on this document is developed by the utilization review team in order for the business office to ensure compliance. This only denotes the appropriateness of proper admission status and does not reflect the quality of care rendered.   The definitions of Inpatient Status and Observation Status used in making the determination above are those provided in the CMS Coverage Manual, Chapter 1 and Chapter 6, section 70.4.   Sincerely,   Jo-Ann Castaneda MD  Utilization Review  Physician Advisor  [facility]

## 2022-01-26 NOTE — PROGRESS NOTES
[facility] MEDICINE PROGRESS NOTE      Identification/Summary: Familia Watson is a 71 year old male with history of hypertension, hyperlipidemia, > 50 pack years tobacco abuse who presented with cough and fatigue, weight loss anorexia x3 months. Admitted for hyponatremia 126, lung mass CT chest showing large cavitary lesion in left upper lobe, likely malignant. Also shows emphysematous changes. Hospital course is notable for continued hyponatremia, 126 and hypomagnesemia 1.8. Mg replacement, repeat lab drawn. Continue IV fluids for hyponatremia. Oncology consult, felt mass could represent lung abscess vs malignancy. Pulmonology consulted, bronchoscopy with biopsy scheduled 1/27. Pain team consult.    Assessment and Plan:    Left upper lobe cavitary lesion, likely malignant rule out lung abscess  Emphysematous changes  Afebrile. Mild leukocytosis.   Empiric Zosyn given fever at home, mild leukocytosis.  Consult Oncology - felt could be lung abscess vs malignancy  Consult Pulmonology - see note.  Hold anticoagulation, Aspirin for bronchoscopy with biopsy 1/27.   Duonebs.  Benzonatate, guiafenesin PRN for cough.  Pain team consult.    Anemia, thrombocytopenia  Low Iron, Tranferrin, TIBC - anemia of chronic disease?     Hyponatremia: Urine Osm 341, borderline - suspect ADH response. TSH 1.26.  S/p IV fluids BMP in morning.  Start fluid restriction    Hypertension: continue home medication  Hyperlipidemia    Tobacco dependence:  Patient denies need for nicotine replacement at this time. Will continue to monitor.     Weight loss  Malnutrition  Ensure supplement.  PT/OT eval for weakness.    Diet: Combination Diet Regular Diet Adult  Snacks/Supplements Adult: Ensure Enlive; Between Meals  Fluid restriction 1500 ML FLUID  NPO for Medical/Clinical Reasons Except for: Meds  DVT Prophylaxis:  Hold anticoagulation  Code Status: No CPR- Do NOT Intubate    Anticipated possible discharge in 1-2 days  "to home once hyponatremia resolved and plan from oncology/pulmonology.    RICKEY Draper-S2  [facility]  [facility]  Phone: #[phone]    Interval History/Subjective:  Patient asleep when I entered room. No events overnight, vitally stable. Continued cough, patient would like something as it's \"getting annoying\". Shortness of breath with movement, patient has not been out of bed since admission. He feels weak and endorses muscles aches in upper and lower extremities. Was agreeable to working with PT/OT to work on strength. No fever/chills, chest pain, n/v, abdominal pain. No appetite, ordered supplements. Adequate urine output. He is anticipating a visit from oncology today.     Physical Exam/Objective:  Temp:  [97.8  F (36.6  C)-99.1  F (37.3  C)] 98.1  F (36.7  C)  Pulse:  [70-94] 70  Resp:  [17-30] 17  BP: (109-137)/(55-71) 122/63  SpO2:  [93 %-100 %] 98 %  Body mass index is 19.49 kg/m .    GENERAL:  Alert, appears comfortable, in no acute distress, cachectic frail gentleman   HEAD:  Normocephalic, without obvious abnormality, atraumatic   EYES:  PERRL, conjunctiva/corneas clear, no scleral icterus, EOM's intact   NOSE: Nares normal, septum midline, mucosa normal, no drainage   NECK: Supple, symmetrical, trachea midline   BACK:   Symmetric, no curvature, ROM normal   LUNGS:   Crackles in bilateral lung bases, symmetric chest rise on inhalation, respirations unlabored   CHEST WALL:  No tenderness or deformity   HEART:  Regular rate and rhythm, S1 and S2 normal, no murmur, rub, or gallop    ABDOMEN:   Soft, non-tender, bowel sounds active all four quadrants, no masses, no organomegaly, no rebound or guarding   EXTREMITIES: Extremities normal, atraumatic, no cyanosis or edema    SKIN: Dry to touch, no exanthems in the visualized areas   NEURO: Alert, oriented x3, moves all four extremities freely   PSYCH: Cooperative, behavior is appropriate      Data reviewed " today: I personally reviewed all new medications, labs, imaging/diagnostics reports over the past 24 hours. Pertinent findings include:    Imaging:   Recent Results (from the past 24 hour(s))   CT Chest Pulmonary Embolism w Contrast    Narrative    EXAM: CT CHEST PULMONARY EMBOLISM W CONTRAST  LOCATION: [facility]  DATE/TIME: 1/25/2022 4:50 PM    INDICATION: Fatigue with cough and weight loss.  COMPARISON: None.  TECHNIQUE: CT chest pulmonary angiogram during arterial phase injection of IV contrast. Multiplanar reformats and MIP reconstructions were performed. Dose reduction techniques were used.   CONTRAST: Isovue-370 80mL    FINDINGS:  ANGIOGRAM CHEST: Pulmonary arteries are normal caliber and negative for pulmonary emboli. Thoracic aorta is negative for dissection. No CT evidence of right heart strain.    LUNGS AND PLEURA: Large irregular thick-walled 9.3 x 7.7 x 5.5 cm cavitary mass within the left upper lobe extending to the pleural surface and abutting the mediastinum which is cavitary malignancy until proven otherwise. Multiple air debris levels   present within the mass and there is mild surrounding groundglass infiltrate. Subpleural regions of presumed fibrosis in the lower lobes. Emphysematous change.    MEDIASTINUM/AXILLAE: There is mild AP window lymphadenopathy.    CORONARY ARTERY CALCIFICATION: Severe.    UPPER ABDOMEN: Normal.    MUSCULOSKELETAL: Normal.      Impression    IMPRESSION:  1.  No pulmonary embolism.    2.  Large subpleural 9.3 x 7.7 x 5.5 cm irregular thick-walled cavitary mass within the left upper lobe which is malignant until proven otherwise. AP window lymphadenopathy.    3.  Coronary artery calcification.    4.  Emphysematous change.    5.  Subpleural interstitial and cystic change in the lower lobes likely fibrosis.   CT Abdomen Pelvis w Contrast    Narrative    EXAM: CT ABDOMEN PELVIS W CONTRAST  LOCATION: [facility]  DATE/TIME:  1/25/2022 4:50 PM    INDICATION: Fatigue and weight loss.  COMPARISON: None.  TECHNIQUE: CT scan of the abdomen and pelvis was performed following injection of IV contrast. Multiplanar reformats were obtained. Dose reduction techniques were used.  CONTRAST: Isovue-370, 80 mL.    FINDINGS:   LOWER CHEST: Subpleural fibrotic changes in the visualized lower lungs.    HEPATOBILIARY: Normal.    PANCREAS: Normal.    SPLEEN: Normal.    ADRENAL GLANDS: Normal.    KIDNEYS/BLADDER: Normal.    BOWEL: Normal appendix. Mild constipation.    LYMPH NODES: Normal.    VASCULATURE: Unremarkable.    PELVIC ORGANS: Normal.    MUSCULOSKELETAL: Lumbar degenerative change.      Impression    IMPRESSION:   1.  Fibrotic appearing subpleural infiltrates in the visualized lower lobes.    2.  Vascular calcification.    3.  No appendicitis.    4.  Mild constipation.       Labs:  Most Recent 3 CBC's:  Recent Labs   Lab Test 01/26/22  0612 01/25/22  1429 05/15/21  0653   WBC 9.8 11.6* 5.8   HGB 7.4* 8.8* 11.7*   MCV 84 83 92    520* 222     Most Recent 3 BMP's:  Recent Labs   Lab Test 01/26/22  0612 01/25/22  1429 05/15/21  0653 05/14/21  0635   * 126*  --  137   POTASSIUM 3.4* 4.4 3.8 3.7   CHLORIDE 99 94*  --  108*   CO2 18* 20*  --  24   BUN 7* 11  --  7*   CR 0.67* 0.65*  --  0.74   ANIONGAP 9 12  --  5   MELLISA 7.9* 9.0  --  7.8*    94  --  136*     Most Recent 2 LFT's:  Recent Labs   Lab Test 01/26/22  0612 01/25/22  1429   AST 45* 56*   ALT 57* 75*   ALKPHOS 126* 151*   BILITOTAL 0.6 0.8       Medications:   Personally Reviewed.  Medications       [Held by provider] aspirin  325 mg Oral Daily     atenolol  100 mg Oral Daily     atorvastatin  40 mg Oral At Bedtime     cyanocobalamin  1,000 mcg Oral Daily     docusate sodium  100 mg Oral BID     [Held by provider] enoxaparin ANTICOAGULANT  40 mg Subcutaneous Q24H     magnesium oxide  400 mg Oral Daily     piperacillin-tazobactam  3.375 g Intravenous Q8H     sodium chloride  (PF)  3 mL Intracatheter Q8H     terazosin  2 mg Oral At Bedtime     Vitamin D3  25 mcg Oral Daily       I agree with the documentation and findings as written by Cesilia LANG-S2 and our discussed and formulated assessment and plan. I was present with her who participated in the service and documentation of the note. I have also personally verified the history and personally performed the physical exam and medical decision-making. I agree with the assessment and plan of care as documented in the note.     Shelli Sorensen MD  Internal Medicine  Hospitalist  [facility]  Phone: #[phone]

## 2022-01-26 NOTE — PLAN OF CARE
Pt eating, drinking (placed on a 1500 fluid restriction this afternoon), voiding and has bowel sounds.    Problem: Adult Inpatient Plan of Care  Goal: Plan of Care Review  Outcome: No Change   Pt seems to understand his care plan and why he is in the hospital.    Problem: Pain Acute  Goal: Acceptable Pain Control and Functional Ability  Intervention: Develop Pain Management Plan  Recent Flowsheet Documentation  Taken 1/26/2022 1306 by Jenn Finnegan RN  Pain Management Interventions: medication (see MAR)  Taken 1/26/2022 1200 by Jenn Finnegan RN  Pain Management Interventions: MD notified (comment)  Taken 1/26/2022 0906 by Jenn Finnegan RN  Pain Management Interventions: medication (see MAR)   Pt struggling with pain today.  He states it's a generalized arm and leg pain. 8/10      Problem: Adult Inpatient Plan of Care  Goal: Absence of Hospital-Acquired Illness or Injury  Intervention: Prevent and Manage VTE (Venous Thromboembolism) Risk  Recent Flowsheet Documentation  Taken 1/26/2022 1230 by Jenn Finnegan RN  VTE Prevention/Management: dorsiflexion/plantar flexion performed  Taken 1/26/2022 0830 by Jenn Finnegan RN  VTE Prevention/Management: dorsiflexion/plantar flexion performed   Pt does wiggle around in bed frequently but refuses to get up and cleaned up.  Skin currently intact at this time.    Continue to monitor VS, labs, skin integrity, respiratory status and activity tolerance.

## 2022-01-27 ENCOUNTER — APPOINTMENT (OUTPATIENT)
Dept: CARDIOLOGY | Facility: CLINIC | Age: 72
DRG: 167 | End: 2022-01-27
Attending: INTERNAL MEDICINE
Payer: MEDICARE

## 2022-01-27 ENCOUNTER — APPOINTMENT (OUTPATIENT)
Dept: RADIOLOGY | Facility: CLINIC | Age: 72
DRG: 167 | End: 2022-01-27
Attending: INTERNAL MEDICINE
Payer: MEDICARE

## 2022-01-27 LAB
ANION GAP SERPL CALCULATED.3IONS-SCNC: 5 MMOL/L (ref 5–18)
APPEARANCE FLD: ABNORMAL
BUN SERPL-MCNC: 7 MG/DL (ref 8–28)
CALCIUM SERPL-MCNC: 7.9 MG/DL (ref 8.5–10.5)
CHLORIDE BLD-SCNC: 98 MMOL/L (ref 98–107)
CO2 SERPL-SCNC: 24 MMOL/L (ref 22–31)
COLOR FLD: COLORLESS
CREAT SERPL-MCNC: 0.68 MG/DL (ref 0.7–1.3)
ERYTHROCYTE [DISTWIDTH] IN BLOOD BY AUTOMATED COUNT: 14.1 % (ref 10–15)
GFR SERPL CREATININE-BSD FRML MDRD: >90 ML/MIN/1.73M2
GLUCOSE BLD-MCNC: 121 MG/DL (ref 70–125)
GRAM STAIN RESULT: NORMAL
GRAM STAIN RESULT: NORMAL
HCT VFR BLD AUTO: 24.8 % (ref 40–53)
HGB BLD-MCNC: 7.9 G/DL (ref 13.3–17.7)
KOH PREPARATION: NORMAL
KOH PREPARATION: NORMAL
LYMPHOCYTES NFR FLD MANUAL: 4 %
MAGNESIUM SERPL-MCNC: 1.7 MG/DL (ref 1.8–2.6)
MCH RBC QN AUTO: 26.8 PG (ref 26.5–33)
MCHC RBC AUTO-ENTMCNC: 31.9 G/DL (ref 31.5–36.5)
MCV RBC AUTO: 84 FL (ref 78–100)
MONOS+MACROS NFR FLD MANUAL: 4 %
NEUTS BAND NFR FLD MANUAL: 92 %
PLATELET # BLD AUTO: 488 10E3/UL (ref 150–450)
POTASSIUM BLD-SCNC: 4.1 MMOL/L (ref 3.5–5)
RBC # BLD AUTO: 2.95 10E6/UL (ref 4.4–5.9)
SODIUM SERPL-SCNC: 127 MMOL/L (ref 136–145)
WBC # BLD AUTO: 9 10E3/UL (ref 4–11)
WBC # FLD AUTO: ABNORMAL /UL

## 2022-01-27 PROCEDURE — 71045 X-RAY EXAM CHEST 1 VIEW: CPT

## 2022-01-27 PROCEDURE — 99153 MOD SED SAME PHYS/QHP EA: CPT | Performed by: INTERNAL MEDICINE

## 2022-01-27 PROCEDURE — 31628 BRONCHOSCOPY/LUNG BX EACH: CPT | Performed by: INTERNAL MEDICINE

## 2022-01-27 PROCEDURE — 0BBG8ZX EXCISION OF LEFT UPPER LUNG LOBE, VIA NATURAL OR ARTIFICIAL OPENING ENDOSCOPIC, DIAGNOSTIC: ICD-10-PCS | Performed by: INTERNAL MEDICINE

## 2022-01-27 PROCEDURE — 31624 DX BRONCHOSCOPE/LAVAGE: CPT | Mod: 59 | Performed by: INTERNAL MEDICINE

## 2022-01-27 PROCEDURE — 85027 COMPLETE CBC AUTOMATED: CPT | Performed by: INTERNAL MEDICINE

## 2022-01-27 PROCEDURE — 87081 CULTURE SCREEN ONLY: CPT | Performed by: INTERNAL MEDICINE

## 2022-01-27 PROCEDURE — 99152 MOD SED SAME PHYS/QHP 5/>YRS: CPT | Performed by: INTERNAL MEDICINE

## 2022-01-27 PROCEDURE — 87206 SMEAR FLUORESCENT/ACID STAI: CPT | Performed by: INTERNAL MEDICINE

## 2022-01-27 PROCEDURE — 82310 ASSAY OF CALCIUM: CPT | Performed by: FAMILY MEDICINE

## 2022-01-27 PROCEDURE — 88312 SPECIAL STAINS GROUP 1: CPT | Mod: TC | Performed by: INTERNAL MEDICINE

## 2022-01-27 PROCEDURE — 120N000001 HC R&B MED SURG/OB

## 2022-01-27 PROCEDURE — 31624 DX BRONCHOSCOPE/LAVAGE: CPT

## 2022-01-27 PROCEDURE — 250N000013 HC RX MED GY IP 250 OP 250 PS 637: Performed by: INTERNAL MEDICINE

## 2022-01-27 PROCEDURE — 87106 FUNGI IDENTIFICATION YEAST: CPT | Performed by: INTERNAL MEDICINE

## 2022-01-27 PROCEDURE — 83735 ASSAY OF MAGNESIUM: CPT | Performed by: INTERNAL MEDICINE

## 2022-01-27 PROCEDURE — 36415 COLL VENOUS BLD VENIPUNCTURE: CPT | Performed by: INTERNAL MEDICINE

## 2022-01-27 PROCEDURE — 31628 BRONCHOSCOPY/LUNG BX EACH: CPT

## 2022-01-27 PROCEDURE — 87210 SMEAR WET MOUNT SALINE/INK: CPT | Performed by: INTERNAL MEDICINE

## 2022-01-27 PROCEDURE — 87070 CULTURE OTHR SPECIMN AEROBIC: CPT | Performed by: INTERNAL MEDICINE

## 2022-01-27 PROCEDURE — 99233 SBSQ HOSP IP/OBS HIGH 50: CPT | Mod: 25 | Performed by: INTERNAL MEDICINE

## 2022-01-27 PROCEDURE — 36415 COLL VENOUS BLD VENIPUNCTURE: CPT | Performed by: FAMILY MEDICINE

## 2022-01-27 PROCEDURE — 87205 SMEAR GRAM STAIN: CPT | Performed by: INTERNAL MEDICINE

## 2022-01-27 PROCEDURE — 0B9G8ZX DRAINAGE OF LEFT UPPER LUNG LOBE, VIA NATURAL OR ARTIFICIAL OPENING ENDOSCOPIC, DIAGNOSTIC: ICD-10-PCS | Performed by: INTERNAL MEDICINE

## 2022-01-27 PROCEDURE — 250N000009 HC RX 250: Performed by: INTERNAL MEDICINE

## 2022-01-27 PROCEDURE — 999N000180 XR SURGERY CARM FLUORO LESS THAN 5 MIN

## 2022-01-27 PROCEDURE — 999N000157 HC STATISTIC RCP TIME EA 10 MIN

## 2022-01-27 PROCEDURE — 88305 TISSUE EXAM BY PATHOLOGIST: CPT | Mod: TC | Performed by: INTERNAL MEDICINE

## 2022-01-27 PROCEDURE — 99207 PR CDG-CUT & PASTE-POTENTIAL IMPACT ON LEVEL: CPT | Performed by: INTERNAL MEDICINE

## 2022-01-27 PROCEDURE — 250N000011 HC RX IP 250 OP 636: Performed by: INTERNAL MEDICINE

## 2022-01-27 PROCEDURE — 87305 ASPERGILLUS AG IA: CPT | Performed by: INTERNAL MEDICINE

## 2022-01-27 PROCEDURE — 99233 SBSQ HOSP IP/OBS HIGH 50: CPT | Mod: GC | Performed by: INTERNAL MEDICINE

## 2022-01-27 PROCEDURE — 258N000003 HC RX IP 258 OP 636: Performed by: INTERNAL MEDICINE

## 2022-01-27 PROCEDURE — 99223 1ST HOSP IP/OBS HIGH 75: CPT | Performed by: PAIN MEDICINE

## 2022-01-27 PROCEDURE — 87102 FUNGUS ISOLATION CULTURE: CPT | Performed by: INTERNAL MEDICINE

## 2022-01-27 PROCEDURE — 31625 BRONCHOSCOPY W/BIOPSY(S): CPT

## 2022-01-27 PROCEDURE — 250N000013 HC RX MED GY IP 250 OP 250 PS 637: Performed by: PAIN MEDICINE

## 2022-01-27 PROCEDURE — 89051 BODY FLUID CELL COUNT: CPT | Performed by: INTERNAL MEDICINE

## 2022-01-27 PROCEDURE — 87116 MYCOBACTERIA CULTURE: CPT | Performed by: INTERNAL MEDICINE

## 2022-01-27 PROCEDURE — 0BDG8ZX EXTRACTION OF LEFT UPPER LUNG LOBE, VIA NATURAL OR ARTIFICIAL OPENING ENDOSCOPIC, DIAGNOSTIC: ICD-10-PCS | Performed by: INTERNAL MEDICINE

## 2022-01-27 PROCEDURE — 31622 DX BRONCHOSCOPE/WASH: CPT

## 2022-01-27 RX ORDER — AMOXICILLIN 250 MG
3 CAPSULE ORAL 2 TIMES DAILY
Status: DISCONTINUED | OUTPATIENT
Start: 2022-01-27 | End: 2022-01-29 | Stop reason: HOSPADM

## 2022-01-27 RX ORDER — ONDANSETRON 4 MG/1
4 TABLET, ORALLY DISINTEGRATING ORAL EVERY 6 HOURS PRN
Status: DISCONTINUED | OUTPATIENT
Start: 2022-01-27 | End: 2022-01-27

## 2022-01-27 RX ORDER — POLYETHYLENE GLYCOL 3350 17 G/17G
17 POWDER, FOR SOLUTION ORAL DAILY
Status: DISCONTINUED | OUTPATIENT
Start: 2022-01-27 | End: 2022-01-29 | Stop reason: HOSPADM

## 2022-01-27 RX ORDER — LIDOCAINE 50 MG/G
OINTMENT TOPICAL 2 TIMES DAILY
Status: DISCONTINUED | OUTPATIENT
Start: 2022-01-27 | End: 2022-01-29 | Stop reason: HOSPADM

## 2022-01-27 RX ORDER — FENTANYL CITRATE 50 UG/ML
50-250 INJECTION, SOLUTION INTRAMUSCULAR; INTRAVENOUS
Status: DISCONTINUED | OUTPATIENT
Start: 2022-01-27 | End: 2022-01-27

## 2022-01-27 RX ORDER — ONDANSETRON 2 MG/ML
4 INJECTION INTRAMUSCULAR; INTRAVENOUS EVERY 6 HOURS PRN
Status: DISCONTINUED | OUTPATIENT
Start: 2022-01-27 | End: 2022-01-27

## 2022-01-27 RX ORDER — SODIUM CHLORIDE 9 MG/ML
INJECTION, SOLUTION INTRAVENOUS CONTINUOUS PRN
Status: COMPLETED | OUTPATIENT
Start: 2022-01-27 | End: 2022-01-27

## 2022-01-27 RX ORDER — GABAPENTIN 300 MG/1
300 CAPSULE ORAL AT BEDTIME
Status: DISCONTINUED | OUTPATIENT
Start: 2022-01-27 | End: 2022-01-29 | Stop reason: HOSPADM

## 2022-01-27 RX ORDER — LIDOCAINE HYDROCHLORIDE 40 MG/ML
INJECTION, SOLUTION RETROBULBAR
Status: COMPLETED | OUTPATIENT
Start: 2022-01-27 | End: 2022-01-27

## 2022-01-27 RX ORDER — LIDOCAINE HYDROCHLORIDE 20 MG/ML
SOLUTION OROPHARYNGEAL
Status: COMPLETED | OUTPATIENT
Start: 2022-01-27 | End: 2022-01-27

## 2022-01-27 RX ORDER — LIDOCAINE 40 MG/G
CREAM TOPICAL
Status: DISCONTINUED | OUTPATIENT
Start: 2022-01-27 | End: 2022-01-29 | Stop reason: HOSPADM

## 2022-01-27 RX ORDER — OXYCODONE HYDROCHLORIDE 5 MG/1
10 TABLET ORAL EVERY 4 HOURS PRN
Status: DISCONTINUED | OUTPATIENT
Start: 2022-01-27 | End: 2022-01-29 | Stop reason: HOSPADM

## 2022-01-27 RX ORDER — FENTANYL CITRATE 50 UG/ML
INJECTION, SOLUTION INTRAMUSCULAR; INTRAVENOUS
Status: COMPLETED | OUTPATIENT
Start: 2022-01-27 | End: 2022-01-27

## 2022-01-27 RX ORDER — ACETAMINOPHEN 650 MG/20.3ML
650 LIQUID ORAL 3 TIMES DAILY
Status: DISCONTINUED | OUTPATIENT
Start: 2022-01-27 | End: 2022-01-29 | Stop reason: HOSPADM

## 2022-01-27 RX ORDER — FLUMAZENIL 0.1 MG/ML
0.2 INJECTION, SOLUTION INTRAVENOUS
Status: ACTIVE | OUTPATIENT
Start: 2022-01-27 | End: 2022-01-27

## 2022-01-27 RX ORDER — HYDROXYZINE HYDROCHLORIDE 25 MG/1
50 TABLET, FILM COATED ORAL EVERY 4 HOURS PRN
Status: DISCONTINUED | OUTPATIENT
Start: 2022-01-27 | End: 2022-01-29 | Stop reason: HOSPADM

## 2022-01-27 RX ADMIN — OXYCODONE HYDROCHLORIDE 10 MG: 5 TABLET ORAL at 22:25

## 2022-01-27 RX ADMIN — SENNOSIDES AND DOCUSATE SODIUM 3 TABLET: 50; 8.6 TABLET ORAL at 22:14

## 2022-01-27 RX ADMIN — ACETAMINOPHEN ORAL SOLUTION 650 MG: 650 SOLUTION ORAL at 22:13

## 2022-01-27 RX ADMIN — GABAPENTIN 300 MG: 300 CAPSULE ORAL at 22:13

## 2022-01-27 RX ADMIN — MIDAZOLAM 1 MG: 1 INJECTION INTRAMUSCULAR; INTRAVENOUS at 08:44

## 2022-01-27 RX ADMIN — LIDOCAINE HYDROCHLORIDE 2 ML: 10 INJECTION, SOLUTION INFILTRATION; PERINEURAL at 08:52

## 2022-01-27 RX ADMIN — BENZOCAINE 4 SPRAY: 220 SPRAY, METERED PERIODONTAL at 08:39

## 2022-01-27 RX ADMIN — LIDOCAINE HYDROCHLORIDE 2 ML: 10 INJECTION, SOLUTION INFILTRATION; PERINEURAL at 08:53

## 2022-01-27 RX ADMIN — DOCUSATE SODIUM 100 MG: 100 CAPSULE, LIQUID FILLED ORAL at 12:00

## 2022-01-27 RX ADMIN — Medication 1 MG: at 22:25

## 2022-01-27 RX ADMIN — TERAZOSIN HYDROCHLORIDE 2 MG: 1 CAPSULE ORAL at 22:12

## 2022-01-27 RX ADMIN — MIDAZOLAM 0.5 MG: 1 INJECTION INTRAMUSCULAR; INTRAVENOUS at 08:47

## 2022-01-27 RX ADMIN — PIPERACILLIN AND TAZOBACTAM 3.38 G: 3; .375 INJECTION, POWDER, LYOPHILIZED, FOR SOLUTION INTRAVENOUS at 01:13

## 2022-01-27 RX ADMIN — BENZONATATE 100 MG: 100 CAPSULE ORAL at 17:23

## 2022-01-27 RX ADMIN — ENOXAPARIN SODIUM 40 MG: 100 INJECTION SUBCUTANEOUS at 20:01

## 2022-01-27 RX ADMIN — MIDAZOLAM 1 MG: 1 INJECTION INTRAMUSCULAR; INTRAVENOUS at 08:41

## 2022-01-27 RX ADMIN — POLYETHYLENE GLYCOL 3350 17 G: 17 POWDER, FOR SOLUTION ORAL at 14:39

## 2022-01-27 RX ADMIN — FENTANYL CITRATE 25 MCG: 50 INJECTION, SOLUTION INTRAMUSCULAR; INTRAVENOUS at 08:55

## 2022-01-27 RX ADMIN — HYDROCODONE BITARTRATE AND ACETAMINOPHEN 2 TABLET: 5; 325 TABLET ORAL at 11:59

## 2022-01-27 RX ADMIN — FENTANYL CITRATE 50 MCG: 50 INJECTION, SOLUTION INTRAMUSCULAR; INTRAVENOUS at 08:42

## 2022-01-27 RX ADMIN — MAGNESIUM OXIDE TAB 400 MG (241.3 MG ELEMENTAL MG) 400 MG: 400 (241.3 MG) TAB at 12:01

## 2022-01-27 RX ADMIN — Medication 25 MCG: at 12:01

## 2022-01-27 RX ADMIN — SENNOSIDES AND DOCUSATE SODIUM 3 TABLET: 50; 8.6 TABLET ORAL at 14:39

## 2022-01-27 RX ADMIN — ATENOLOL 100 MG: 25 TABLET ORAL at 12:00

## 2022-01-27 RX ADMIN — LIDOCAINE HYDROCHLORIDE 2 ML: 10 INJECTION, SOLUTION INFILTRATION; PERINEURAL at 08:51

## 2022-01-27 RX ADMIN — ATORVASTATIN CALCIUM 40 MG: 40 TABLET, FILM COATED ORAL at 22:13

## 2022-01-27 RX ADMIN — HYDROXYZINE HYDROCHLORIDE 50 MG: 25 TABLET, FILM COATED ORAL at 12:01

## 2022-01-27 RX ADMIN — LIDOCAINE HYDROCHLORIDE 15 ML: 20 SOLUTION ORAL; TOPICAL at 08:35

## 2022-01-27 RX ADMIN — PIPERACILLIN AND TAZOBACTAM 3.38 G: 3; .375 INJECTION, POWDER, LYOPHILIZED, FOR SOLUTION INTRAVENOUS at 11:12

## 2022-01-27 RX ADMIN — BENZOCAINE 10 SPRAY: 220 SPRAY, METERED PERIODONTAL at 08:38

## 2022-01-27 RX ADMIN — LIDOCAINE HYDROCHLORIDE 2 ML: 40 INJECTION, SOLUTION RETROBULBAR; TOPICAL at 08:50

## 2022-01-27 RX ADMIN — BENZONATATE 100 MG: 100 CAPSULE ORAL at 12:00

## 2022-01-27 RX ADMIN — SODIUM CHLORIDE 100 ML/HR: 900 INJECTION, SOLUTION INTRAVENOUS at 08:19

## 2022-01-27 RX ADMIN — MIDAZOLAM 0.5 MG: 1 INJECTION INTRAMUSCULAR; INTRAVENOUS at 08:54

## 2022-01-27 RX ADMIN — MIDAZOLAM 0.5 MG: 1 INJECTION INTRAMUSCULAR; INTRAVENOUS at 08:59

## 2022-01-27 RX ADMIN — OXYCODONE HYDROCHLORIDE 10 MG: 5 TABLET ORAL at 17:24

## 2022-01-27 RX ADMIN — FENTANYL CITRATE 25 MCG: 50 INJECTION, SOLUTION INTRAMUSCULAR; INTRAVENOUS at 08:45

## 2022-01-27 RX ADMIN — FENTANYL CITRATE 25 MCG: 50 INJECTION, SOLUTION INTRAMUSCULAR; INTRAVENOUS at 08:58

## 2022-01-27 RX ADMIN — HYDROXYZINE HYDROCHLORIDE 50 MG: 25 TABLET ORAL at 22:25

## 2022-01-27 RX ADMIN — MIDAZOLAM 0.5 MG: 1 INJECTION INTRAMUSCULAR; INTRAVENOUS at 08:57

## 2022-01-27 RX ADMIN — LIDOCAINE HYDROCHLORIDE 2 ML: 10 INJECTION, SOLUTION INFILTRATION; PERINEURAL at 08:50

## 2022-01-27 RX ADMIN — HYDROCODONE BITARTRATE AND ACETAMINOPHEN 2 TABLET: 5; 325 TABLET ORAL at 02:47

## 2022-01-27 RX ADMIN — PIPERACILLIN AND TAZOBACTAM 3.38 G: 3; .375 INJECTION, POWDER, LYOPHILIZED, FOR SOLUTION INTRAVENOUS at 17:18

## 2022-01-27 RX ADMIN — LIDOCAINE HYDROCHLORIDE 15 ML: 20 SOLUTION ORAL; TOPICAL at 08:33

## 2022-01-27 RX ADMIN — CYANOCOBALAMIN TAB 1000 MCG 1000 MCG: 1000 TAB at 11:59

## 2022-01-27 RX ADMIN — GUAIFENESIN 10 ML: 100 SOLUTION ORAL at 20:01

## 2022-01-27 RX ADMIN — LIDOCAINE HYDROCHLORIDE 5 ML: 40 INJECTION, SOLUTION RETROBULBAR; TOPICAL at 08:20

## 2022-01-27 RX ADMIN — FENTANYL CITRATE 25 MCG: 50 INJECTION, SOLUTION INTRAMUSCULAR; INTRAVENOUS at 08:48

## 2022-01-27 ASSESSMENT — ACTIVITIES OF DAILY LIVING (ADL)
ADLS_ACUITY_SCORE: 13
ADLS_ACUITY_SCORE: 11
ADLS_ACUITY_SCORE: 13
ADLS_ACUITY_SCORE: 13
ADLS_ACUITY_SCORE: 11
ADLS_ACUITY_SCORE: 13
ADLS_ACUITY_SCORE: 11
ADLS_ACUITY_SCORE: 13
ADLS_ACUITY_SCORE: 11
ADLS_ACUITY_SCORE: 13
ADLS_ACUITY_SCORE: 11
ADLS_ACUITY_SCORE: 13
ADLS_ACUITY_SCORE: 11
ADLS_ACUITY_SCORE: 11

## 2022-01-27 NOTE — PROGRESS NOTES
[facility] MEDICINE PROGRESS NOTE      Identification/Summary: Familia Watson is a 71 year old male with a past medical history of hypertension, hyperlipidemia, > 50 pack years tobacco abuse who who was admitted on 1/25/2022 with hyponatremia, hypomagnesemia, and cavitating left upper lobe mass. Lung mass CT chest showing large cavitary lesion in left upper lobe, malignant vs lung abscess. Also shows emphysematous changes. Hospital course is notable for sodium 127. Magnesium replaced, now 1.7. Bronchoscopy with biopsy today, no complications. Waiting on biopsy results to guide care.    Assessment and Plan:  Left upper lobe cavitary lesion, likely malignant rule out lung abscess  Emphysematous changes  Afebrile. Mild leukocytosis.   Empiric Zosyn  Consult Oncology - felt could be lung abscess vs malignancy  Appreciate Pulmonology input.  Anticoagulation, Aspirin held  Duonebs.  Benzonatate, guiafenesin PRN for cough.  Pain team consulted .     Anemia, thrombocytopenia  Low Iron, Tranferrin, TIBC - anemia of chronic disease?     Hyponatremia: Urine Osm 341, borderline - suspect ADH response. TSH 1.26.  S/p IV fluids   BMP in morning.  Start fluid restriction     Hypertension: continue home medication  Hyperlipidemia     Tobacco dependence:  Patient denies need for nicotine replacement at this time. Will continue to monitor.      Weight loss  Malnutrition  Ensure supplement.  PT/OT eval for weakness.    Diet: Snacks/Supplements Adult: Ensure Enlive; Between Meals  Fluid restriction 1500 ML FLUID  NPO per Anesthesia Guidelines for Procedure/Surgery Except for: Meds  DVT Prophylaxis:   Code Status: No CPR- Do NOT Intubate    Anticipated possible discharge in 2 days to home once pending pathology results..     RICKEY Draper-S2  [facility]ist  [facility] Medicine  [facility]  Phone: #[phone]    Interval History/Subjective:  Saw patient after bronchoscopy, some  post-sedation confusion. Patient endorses constant achy pain in arms and legs. Chronic cough with phlegm. No fever/chills, chest pain, shortness of breath, n/v, or abdominal pain.    Physical Exam/Objective:  Temp:  [97.5  F (36.4  C)-98.1  F (36.7  C)] 97.7  F (36.5  C)  Pulse:  [70-85] 85  Resp:  [11-30] 30  BP: (120-149)/(62-82) 145/82  SpO2:  [81 %-100 %] 99 %  Body mass index is 19.49 kg/m .    GENERAL:  Alert, appears comfortable, in no acute distress, cachectic frail gentleman   HEAD:  Normocephalic, without obvious abnormality, atraumatic   EYES:  PERRL, conjunctiva/corneas clear, no scleral icterus, EOM's intact   NOSE: Nares normal, septum midline, mucosa normal, no drainage   THROAT: Lips, mucosa, and tongue normal; teeth and gums normal, mouth moist   NECK: Supple, symmetrical, trachea midline   LUNGS:   Crackles in bilateral lung bases, symmetric chest rise on inhalation, respirations unlabored   CHEST WALL:  No tenderness or deformity   HEART:  Regular rate and rhythm, S1 and S2 normal, no murmur, rub, or gallop    ABDOMEN:   Soft, non-tender, bowel sounds active all four quadrants, no masses, no organomegaly, no rebound or guarding   EXTREMITIES: Extremities normal, atraumatic, no cyanosis or edema    SKIN: Dry to touch, no exanthems in the visualized areas   NEURO: Alert, oriented x3, moves all four extremities freely   PSYCH: Cooperative, behavior is appropriate      Data reviewed today: I personally reviewed all new medications, labs, imaging/diagnostics reports over the past 24 hours. Pertinent findings include:    Imaging:   No results found for this or any previous visit (from the past 24 hour(s)).    Labs:  Most Recent 3 CBC's:Recent Labs   Lab Test 01/27/22  0717 01/26/22  0612 01/25/22  1429   WBC 9.0 9.8 11.6*   HGB 7.9* 7.4* 8.8*   MCV 84 84 83   * 404 520*     Most Recent 3 BMP's:Recent Labs   Lab Test 01/27/22  0457 01/26/22  0612 01/25/22  1429   * 126* 126*   POTASSIUM 4.1  3.4* 4.4   CHLORIDE 98 99 94*   CO2 24 18* 20*   BUN 7* 7* 11   CR 0.68* 0.67* 0.65*   ANIONGAP 5 9 12   MELLISA 7.9* 7.9* 9.0    122 94     Most Recent 2 LFT's:Recent Labs   Lab Test 01/26/22  0612 01/25/22  1429   AST 45* 56*   ALT 57* 75*   ALKPHOS 126* 151*   BILITOTAL 0.6 0.8     Most Recent 3 INR's:Recent Labs   Lab Test 01/25/22  1429 05/12/21  1121 05/30/18  1644   INR 1.16* 1.07 0.99       Medications:   Personally Reviewed.  Medications     0.9% sodium chloride + KCl 20 mEq/L 50 mL/hr at 01/26/22 2309     sodium chloride 100 mL/hr (01/27/22 0819)       [Held by provider] aspirin  325 mg Oral Daily     atenolol  100 mg Oral Daily     atorvastatin  40 mg Oral At Bedtime     cyanocobalamin  1,000 mcg Oral Daily     docusate sodium  100 mg Oral BID     [Held by provider] enoxaparin ANTICOAGULANT  40 mg Subcutaneous Q24H     magnesium oxide  400 mg Oral Daily     piperacillin-tazobactam  3.375 g Intravenous Q8H     sodium chloride (PF)  3 mL Intracatheter Q8H     sodium chloride (PF)  3 mL Intracatheter Q8H     terazosin  2 mg Oral At Bedtime     Vitamin D3  25 mcg Oral Daily     I agree with the documentation and findings as written by Cesilia BATES and our discussed and formulated assessment and plan. I was present with her who participated in the service and documentation of the note. I have also personally verified the history and personally performed the physical exam and medical decision-making. I agree with the assessment and plan of care as documented in the note.     Shelli Sorensen MD  Internal Medicine  Hospitalist  [facility]  Phone: #[phone]

## 2022-01-27 NOTE — PROGRESS NOTES
PULMONARY MEDICINE PROGRESS NOTE  1/27/2022      Admit Date: 1/25/2022  CODE: No CPR- Do NOT Intubate    Reason for Consult: cavitary lung mass     Assessment/Plan:   71 year old male with a history of tobacco dependence, alcohol dependence, poor dentition, dyslipidemia, HTN, migraine, h/o traumatic SDH, radiographic emphysema, admitted on 1/25 with subacute cough, fatigue, anorexia, weight loss, found to have large cavitary left upper lobe mass.     Cavitary left lung mass: DDx includes lung abscess and necrotizing lung cancer. High risk of malignancy. Elevated PLT seen with malignancy or abscess. Aching arms/legs for 1.5 months concerning for paraneoplastic pain. He also quit smoking suddenly when his symptoms started; malignancy can lead to abrupt tobacco cessaion. Also alcohol dependence and very poor dentition; risk of lung abscess. Constitutional symptoms and other history unable to differentiate between these possibilities. Unfortunately there is high likelihood of sampling error with either transbronchial or transthoracic biopsy. Underwent bronchoscopy today with anterior BUSTER BAL, transbronchial biopsies, and post-biopsy wash for additional cytology. If no malignancy, consider PET-CT followed by CT-guided transthoracic needle biopsy as outpatient. Continue antibiotics in the meantime.     Plan:  - follow up BUSTER BAL cell differential, microbiologic studies, cytology, transbronchial biopsy pathology, post-biopsy wash cytology  - if negative for malignancy, plan PET-CT, possible CT-guided transthoracic needle biopsy  - if no diagnosis aside from lung abscess, continue piperacillin-tazobactam; likely transition to amoxicillin-clavulanate tomorrow, then continue for 6 weeks followed by repeat chest CT and pulmonary clinic follow-up  - tobacco and alcohol cessation crucial  - DNR/DNI  - will follow with you; may be cruzito to discharge within 1-2 days with planned follow-up    Eris Mckeon MD  Pulmonary and Critical  "[facility]  Office [phone]  Pager [phone]  (he/him/his)                                                                                                                                                        SUBJECTIVE/INTERVAL HISTORY   Denies chest pain, dyspnea. Feels fatigued.                                                                                                                                                      Exam/Data:     Vitals  /58 (BP Location: Left arm)   Pulse 95   Temp 97.4  F (36.3  C) (Axillary)   Resp 20   Ht 1.727 m (5' 8\")   Wt 58.2 kg (128 lb 3.2 oz)   SpO2 95%   BMI 19.49 kg/m       I/O last 3 completed shifts:  In: 310 [P.O.:310]  Out: 1625 [Urine:1625]  Weight change:   [unfilled]  EXAM:  /58 (BP Location: Left arm)   Pulse 95   Temp 97.4  F (36.3  C) (Axillary)   Resp 20   Ht 1.727 m (5' 8\")   Wt 58.2 kg (128 lb 3.2 oz)   SpO2 95%   BMI 19.49 kg/m      Intake/Output last 3 shifts:  I/O last 3 completed shifts:  In: 310 [P.O.:310]  Out: 1625 [Urine:1625]  Intake/Output this shift:  I/O this shift:  In: -   Out: 200 [Urine:200]    Physical Exam  Gen: alert, oriented, no distress, appears fatigued  HEENT: NT, no TOVA, very poor dentition  CV: tachy, regular, no m/g/r  Resp: diminished on left  Abd: soft, nontender, BS+  Skin: no rashes or lesions  Ext: no edema  Neuro: PERRL, nonfocal exam    ROS: A complete 10-system review of systems was obtained and is negative with the exception of what is noted in the history of present illness.    Medications:       0.9% sodium chloride + KCl 20 mEq/L 50 mL/hr at 01/27/22 1346       acetaminophen  650 mg Oral TID     aspirin  325 mg Oral Daily     atenolol  100 mg Oral Daily     atorvastatin  40 mg Oral At Bedtime     cyanocobalamin  1,000 mcg Oral Daily     diclofenac  2 g Topical 4x Daily     enoxaparin ANTICOAGULANT  40 mg Subcutaneous Q24H     gabapentin  300 mg Oral At " Bedtime     lidocaine   Topical BID     magnesium oxide  400 mg Oral Daily     piperacillin-tazobactam  3.375 g Intravenous Q8H     polyethylene glycol  17 g Oral Daily     senna-docusate  3 tablet Oral BID     sodium chloride (PF)  3 mL Intracatheter Q8H     sodium chloride (PF)  3 mL Intracatheter Q8H     terazosin  2 mg Oral At Bedtime     Vitamin D3  25 mcg Oral Daily         DATA  All laboratory and radiology has been personally reviewed by myself today.  Recent Labs   Lab 01/27/22  0717   WBC 9.0   HGB 7.9*   HCT 24.8*   *     Recent Labs   Lab 01/27/22  0457 01/26/22  0612 01/25/22  1429   * 126* 126*   CO2 24 18* 20*   BUN 7* 7* 11   ALKPHOS  --  126* 151*   ALT  --  57* 75*   AST  --  45* 56*     IMAGING:  Chest CTA (1/25/22):  - images directly reviewed, formal interpretation follows:  FINDINGS:  ANGIOGRAM CHEST: Pulmonary arteries are normal caliber and negative for pulmonary emboli. Thoracic aorta is negative for dissection. No CT evidence of right heart strain.     LUNGS AND PLEURA: Large irregular thick-walled 9.3 x 7.7 x 5.5 cm cavitary mass within the left upper lobe extending to the pleural surface and abutting the mediastinum which is cavitary malignancy until proven otherwise. Multiple air debris levels   present within the mass and there is mild surrounding groundglass infiltrate. Subpleural regions of presumed fibrosis in the lower lobes. Emphysematous change.     MEDIASTINUM/AXILLAE: There is mild AP window lymphadenopathy.     CORONARY ARTERY CALCIFICATION: Severe.     UPPER ABDOMEN: Normal.     MUSCULOSKELETAL: Normal.                                                                      IMPRESSION:  1.  No pulmonary embolism.     2.  Large subpleural 9.3 x 7.7 x 5.5 cm irregular thick-walled cavitary mass within the left upper lobe which is malignant until proven otherwise. AP window lymphadenopathy.     3.  Coronary artery calcification.     4.  Emphysematous change.     5.   Subpleural interstitial and cystic change in the lower lobes likely fibrosis.

## 2022-01-27 NOTE — PROCEDURES
FIBEROPTIC BRONCHOSCOPY PROCEDURE NOTE (NON-OR)  Date of Procedure: 1/27/2022  Performing Physician: Eris Mckeon MD  Pre-Procedure Diagnosis: cavitary left upper lobe lung mass  Post-Procedure Diagnosis: same as pre-procedure diagnosis  Procedure: diagnostic flexible fiberoptic bronchoscopy   Indications: cavitary left upper lobe lung mass  Preop evaluation:  Procedural Sedation: intravenous sedation   Expected level: moderate sedation  ASA Class: ASA 4 - Patient with severe systemic disease that is a constant threat to life  Mallampati: I (soft palate, uvula, fauces, and tonsillar pillars visible)  Respiratory Precautions: The patient was evaluated for TB risk prior to the procedure. The risk was judged to be low, but special respiratory isolation was observed according to pandemic protocol.  Anesthesia:  Lidocaine 4%, 5 mL nebulized  viscous 2% lidocaine 30 mL oropharyngeal  midazolam 4 mg IV  fentanyl 150 mcg IV  lidocaine 4% 6 mL on the vocal cords  lidocaine 1% 12 mL on tracheobronchial mucosa    Specimen:  1. bronchoalveolar lavage, left upper lobe: 80 mL instilled, 34 mL opaque tan return  2. Transbronchial biopsies, left upper lobe, 3 passes, 4 tiny tissue samples obtained  3. Post-biopsy bronchial wash, left upper lobe, 20 mL wash, 13 mL dark pink, cloudy return  Estimated Blood Loss: minimal  Complications: none immediate     Procedure Details and Findings:   The patient was seen and the risks, benefits, complications, treatment options, and expected outcomes were discussed with the patient. The risks and potential complications of their problem and proposed treatment include but are not limited to infection, bleeding, pain, adverse drug reaction, pulmonary aspiration, the need for additional procedures, failure to diagnose a condition, creating a complication requiring transfusion or operation, complication secondary to the anesthetic, and death. The patient concurred with the proposed plan,  giving informed consent. The patient was identified as Familia Watson with date of birth 1950 and the procedure verified as diagnostic flexible fiberoptic bronchoscopy. A time out was held and the above information confirmed.    After application of anesthesia, the patient was placed in the supine position and the bronchoscope was passed through the mouth. The trachea bilateral bronchi showed normal anatomy. The anterior left upper lobe had a large a mount of purulent secretions. Bronchoalveolar lavage was performed in the anterior left upper lobe as detailed above. Fluoroscopy-guided transbronchial biopsies were performed in the anterior left upper lobe in the area of radiopaque large mass. Three passes were made, with 4 tiny pieces of tissue obtained. A post-biopsy wash of the left upper lobe was then performed, as detailed above. The procedure then concluded.    Treatment Plan Based on Findings:  - continue piperacillin-tazobactam  - await results of bronchoalveolar lavage microbiologic studies and cytology, transbronchial biopsy results, and post-biopsy washing cytology    Eris Mckeon MD  Pulmonary and Critical Care Medicine  [facility]  Office [phone]

## 2022-01-27 NOTE — PRE-PROCEDURE
GENERAL PRE-PROCEDURE:     Risks and benefits: Risks, benefits and alternatives were discussed    Patient states understanding of procedure being performed: Yes    Patient's understanding of procedure matches consent: Yes    Procedure consent matches procedure scheduled: Yes    Appropriately NPO:  Yes  Lungs:  Other (comment)  Lung exam comment:  Decreased bilaterally due to emphysema  Heart:  Normal heart sounds and rate  History & Physical reviewed:  History and physical reviewed and no updates needed  Statement of review:  I have reviewed the lab findings, diagnostic data, medications, and the plan for sedation    Eris Mckeon MD  Pulmonary and Critical Care Medicine  [facility]  Pager [phone]  Office [phone]  (he/him/his)

## 2022-01-27 NOTE — PROGRESS NOTES
Cross cover hospitalist note    RN paged because patient is now going to be n.p.o. at midnight and IV fluids have been stopped but patient was receiving IV normal saline at 50 mL/h    Patient will have bronchoscopy in the morning    Potassium low at 3.4    Plan: IV fluids with normal saline with 20 mEq of KCl at 50 mL overnight and then regular hospitalist can reevaluate in the morning    Roberto Yang MD  Cross cover hospitalist  [facility] Medicine Service
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
## 2022-01-27 NOTE — PROVIDER NOTIFICATION
MD notified:  pt having trouble voiding. bladder scan shows 513. Can we get an order for straight cath or thayer.

## 2022-01-27 NOTE — PLAN OF CARE
Problem: Pain Acute  Goal: Acceptable Pain Control and Functional Ability  Outcome: No Change     Alert and oriented x 4, on RA, able to make needs known, [place], uses urinal, generalized pain in the arms and legs controlled with NORCO x2, pulled out IV on left wrist, successfully inserted another IV on left hand, discharge plan depends on consults with pulm and onc

## 2022-01-27 NOTE — PROGRESS NOTES
Bronchoscopy with biopsy complete. Pt will be NPO until 1110am and no hot foods/liquids until 1510. Conscious sedation received, see MAR for doses. Patient tolerated procedure well, VSS. Report given to MECCA Barajas.

## 2022-01-27 NOTE — PROGRESS NOTES
Respiratory Therapy Procedure Note  Familia Watson is a 71 year old male     Pre-Procedure Vitals:  SpO2: 100% on RA  HR: 71 RR: 16 BS: diminished  BP:139/77 ETCO2: 32    Summary of Care during Procedure:   Bronchoscopy done today at 0830 am with regular bronchoscope  5 ml of 4% lido neb given  Viscous lido x 2 vials given  Benzocaine spray 20% 14 sprays given  2ml 4% lido given x 3 above cords  2ml 1% lido given x 6 below cords    Bronchoscopy procedure was done successfully without any complication.  BAL and biopsy were done from [facility], samples sent. PT also had a Post Bronchial Wash for cytology sample sent.  PT did not desaturate below 89%. Pt O2 did need to be increased to 6 lpm NC during bronch procedure.Pt had some coughing episodes but otherwise tolerated well. Patient on RA pre-procedure, patient left the procedure room on 2 lpm NC.    Post-Procedure Vitals:  SpO2: 95% on 2 lpm  HR: 105 RR: 20   BS: no change  BP:105/58 ETCO2: 27     Suzette James, RT 1/27/2022 10:01 AM

## 2022-01-28 ENCOUNTER — APPOINTMENT (OUTPATIENT)
Dept: PHYSICAL THERAPY | Facility: CLINIC | Age: 72
DRG: 167 | End: 2022-01-28
Attending: INTERNAL MEDICINE
Payer: MEDICARE

## 2022-01-28 DIAGNOSIS — J98.4 CAVITATING MASS OF UPPER LOBE OF LEFT LUNG: Primary | ICD-10-CM

## 2022-01-28 LAB
ANION GAP SERPL CALCULATED.3IONS-SCNC: 8 MMOL/L (ref 5–18)
BUN SERPL-MCNC: 6 MG/DL (ref 8–28)
CALCIUM SERPL-MCNC: 8.5 MG/DL (ref 8.5–10.5)
CHLORIDE BLD-SCNC: 98 MMOL/L (ref 98–107)
CO2 SERPL-SCNC: 22 MMOL/L (ref 22–31)
CREAT SERPL-MCNC: 0.63 MG/DL (ref 0.7–1.3)
ERYTHROCYTE [DISTWIDTH] IN BLOOD BY AUTOMATED COUNT: 14.1 % (ref 10–15)
GALACTOMANNAN AG BAL QL: NEGATIVE
GALACTOMANNAN AG SPEC IA-ACNC: 0.13
GFR SERPL CREATININE-BSD FRML MDRD: >90 ML/MIN/1.73M2
GLUCOSE BLD-MCNC: 104 MG/DL (ref 70–125)
HCT VFR BLD AUTO: 22.5 % (ref 40–53)
HGB BLD-MCNC: 7.3 G/DL (ref 13.3–17.7)
MAGNESIUM SERPL-MCNC: 1.6 MG/DL (ref 1.8–2.6)
MCH RBC QN AUTO: 27.2 PG (ref 26.5–33)
MCHC RBC AUTO-ENTMCNC: 32.4 G/DL (ref 31.5–36.5)
MCV RBC AUTO: 84 FL (ref 78–100)
PLATELET # BLD AUTO: 427 10E3/UL (ref 150–450)
POTASSIUM BLD-SCNC: 4 MMOL/L (ref 3.5–5)
RBC # BLD AUTO: 2.68 10E6/UL (ref 4.4–5.9)
SODIUM SERPL-SCNC: 128 MMOL/L (ref 136–145)
WBC # BLD AUTO: 8.8 10E3/UL (ref 4–11)

## 2022-01-28 PROCEDURE — 99232 SBSQ HOSP IP/OBS MODERATE 35: CPT | Mod: GC | Performed by: INTERNAL MEDICINE

## 2022-01-28 PROCEDURE — 250N000011 HC RX IP 250 OP 636: Performed by: INTERNAL MEDICINE

## 2022-01-28 PROCEDURE — 97162 PT EVAL MOD COMPLEX 30 MIN: CPT | Mod: GP

## 2022-01-28 PROCEDURE — 250N000013 HC RX MED GY IP 250 OP 250 PS 637: Performed by: INTERNAL MEDICINE

## 2022-01-28 PROCEDURE — 250N000013 HC RX MED GY IP 250 OP 250 PS 637: Performed by: PAIN MEDICINE

## 2022-01-28 PROCEDURE — 99232 SBSQ HOSP IP/OBS MODERATE 35: CPT | Performed by: PAIN MEDICINE

## 2022-01-28 PROCEDURE — 82310 ASSAY OF CALCIUM: CPT | Performed by: INTERNAL MEDICINE

## 2022-01-28 PROCEDURE — 36415 COLL VENOUS BLD VENIPUNCTURE: CPT | Performed by: INTERNAL MEDICINE

## 2022-01-28 PROCEDURE — 97116 GAIT TRAINING THERAPY: CPT | Mod: GP

## 2022-01-28 PROCEDURE — 120N000001 HC R&B MED SURG/OB

## 2022-01-28 PROCEDURE — 99233 SBSQ HOSP IP/OBS HIGH 50: CPT | Performed by: INTERNAL MEDICINE

## 2022-01-28 PROCEDURE — 99207 PR CDG-MDM COMPONENT: MEETS MODERATE - DOWN CODED: CPT | Performed by: INTERNAL MEDICINE

## 2022-01-28 PROCEDURE — 85027 COMPLETE CBC AUTOMATED: CPT | Performed by: INTERNAL MEDICINE

## 2022-01-28 PROCEDURE — 83735 ASSAY OF MAGNESIUM: CPT | Performed by: INTERNAL MEDICINE

## 2022-01-28 RX ORDER — FERROUS SULFATE 325(65) MG
325 TABLET ORAL DAILY
Status: DISCONTINUED | OUTPATIENT
Start: 2022-01-28 | End: 2022-01-29 | Stop reason: HOSPADM

## 2022-01-28 RX ADMIN — HYDROXYZINE HYDROCHLORIDE 50 MG: 25 TABLET ORAL at 09:41

## 2022-01-28 RX ADMIN — FERROUS SULFATE TAB 325 MG (65 MG ELEMENTAL FE) 325 MG: 325 (65 FE) TAB at 09:42

## 2022-01-28 RX ADMIN — HYDROXYZINE HYDROCHLORIDE 50 MG: 25 TABLET ORAL at 14:07

## 2022-01-28 RX ADMIN — ACETAMINOPHEN ORAL SOLUTION 650 MG: 650 SOLUTION ORAL at 21:17

## 2022-01-28 RX ADMIN — OXYCODONE HYDROCHLORIDE 10 MG: 5 TABLET ORAL at 05:29

## 2022-01-28 RX ADMIN — ACETAMINOPHEN ORAL SOLUTION 650 MG: 650 SOLUTION ORAL at 09:41

## 2022-01-28 RX ADMIN — ATORVASTATIN CALCIUM 40 MG: 40 TABLET, FILM COATED ORAL at 21:17

## 2022-01-28 RX ADMIN — POLYETHYLENE GLYCOL 3350 17 G: 17 POWDER, FOR SOLUTION ORAL at 09:41

## 2022-01-28 RX ADMIN — Medication 25 MCG: at 09:51

## 2022-01-28 RX ADMIN — SENNOSIDES AND DOCUSATE SODIUM 3 TABLET: 50; 8.6 TABLET ORAL at 20:18

## 2022-01-28 RX ADMIN — HYDROXYZINE HYDROCHLORIDE 50 MG: 25 TABLET ORAL at 22:38

## 2022-01-28 RX ADMIN — ENOXAPARIN SODIUM 40 MG: 100 INJECTION SUBCUTANEOUS at 20:17

## 2022-01-28 RX ADMIN — OXYCODONE HYDROCHLORIDE 10 MG: 5 TABLET ORAL at 09:41

## 2022-01-28 RX ADMIN — PIPERACILLIN AND TAZOBACTAM 3.38 G: 3; .375 INJECTION, POWDER, LYOPHILIZED, FOR SOLUTION INTRAVENOUS at 01:13

## 2022-01-28 RX ADMIN — PIPERACILLIN AND TAZOBACTAM 3.38 G: 3; .375 INJECTION, POWDER, LYOPHILIZED, FOR SOLUTION INTRAVENOUS at 10:48

## 2022-01-28 RX ADMIN — ASPIRIN 325 MG: 325 TABLET ORAL at 09:42

## 2022-01-28 RX ADMIN — ATENOLOL 100 MG: 25 TABLET ORAL at 09:42

## 2022-01-28 RX ADMIN — OXYCODONE HYDROCHLORIDE 10 MG: 5 TABLET ORAL at 14:07

## 2022-01-28 RX ADMIN — MAGNESIUM OXIDE TAB 400 MG (241.3 MG ELEMENTAL MG) 400 MG: 400 (241.3 MG) TAB at 09:42

## 2022-01-28 RX ADMIN — GABAPENTIN 300 MG: 300 CAPSULE ORAL at 20:18

## 2022-01-28 RX ADMIN — BENZONATATE 100 MG: 100 CAPSULE ORAL at 23:54

## 2022-01-28 RX ADMIN — AMOXICILLIN AND CLAVULANATE POTASSIUM 1 TABLET: 875; 125 TABLET, FILM COATED ORAL at 20:18

## 2022-01-28 RX ADMIN — HYDROXYZINE HYDROCHLORIDE 50 MG: 25 TABLET ORAL at 18:22

## 2022-01-28 RX ADMIN — HYDROXYZINE HYDROCHLORIDE 50 MG: 25 TABLET ORAL at 05:30

## 2022-01-28 RX ADMIN — SENNOSIDES AND DOCUSATE SODIUM 3 TABLET: 50; 8.6 TABLET ORAL at 09:42

## 2022-01-28 RX ADMIN — CYANOCOBALAMIN TAB 1000 MCG 1000 MCG: 1000 TAB at 09:43

## 2022-01-28 RX ADMIN — TERAZOSIN HYDROCHLORIDE 2 MG: 1 CAPSULE ORAL at 21:17

## 2022-01-28 RX ADMIN — OXYCODONE HYDROCHLORIDE 10 MG: 5 TABLET ORAL at 22:38

## 2022-01-28 RX ADMIN — OXYCODONE HYDROCHLORIDE 10 MG: 5 TABLET ORAL at 18:28

## 2022-01-28 ASSESSMENT — ACTIVITIES OF DAILY LIVING (ADL)
ADLS_ACUITY_SCORE: 13
ADLS_ACUITY_SCORE: 11
ADLS_ACUITY_SCORE: 13

## 2022-01-28 NOTE — PLAN OF CARE
Pt was very confused after his bronchoscopy and his mentation started to clear around 1800 but would still have brief episodes of confusion.    He ate dinner and has been drinking small amounts.  He has voided and has active bowel sounds.    Problem: Adult Inpatient Plan of Care  Goal: Plan of Care Review  Outcome: Improving   After mentally clearing from his fent/versed he was able to understand his plan of care.   Problem: Pain Acute  Goal: Acceptable Pain Control and Functional Ability  Outcome: Improving  Intervention: Develop Pain Management Plan  Recent Flowsheet Documentation  Taken 1/27/2022 1159 by Jenn Finnegan, RN  Pain Management Interventions: medication (see MAR)   Pain seems to much more tolerable to day then yesterday.  Norco, atarax, oxy given throughout this shift.    Pt receive a warm wipe bath and a shower cap and that seemed to make him feel better.     Pt had an episode after his bronchoscopy that he couldn't void.  Bladder scan showed 513.  RN obtained an order for a straight cath and before needing to cath patient he voided 200.  Order in place incase he has the problem again.     Continue to monitor VS, labs, pain level, activity tolerance, skin integrity and respiratory status.

## 2022-01-28 NOTE — PROGRESS NOTES
Care Management Follow Up    Length of Stay (days): 2    Expected Discharge Date: TBD   Concerns to be Addressed: waiting for pathology results, feels weak and would be interested in PT/OT at home    Patient plan of care discussed at interdisciplinary rounds: Yes    Anticipated Discharge Disposition: Home    Anticipated Discharge Services: None    Anticipated Discharge DME: None    Education Provided on the Discharge Plan:  CM met with patient. AVS will be per bedside RN.    Patient/Family in Agreement with the Plan: yes      Additional Information:  Chart reviewed. CM spoke with patient. Patient reports he was independent will all ADL's including driving at baseline. He currently feels weak and is interested in possible home PT/OT at home. CM requested PT and OT eval from MD. Patient states he may need a WC ride set up for discharge.CM will continue to follow.       Collette Osei RN

## 2022-01-28 NOTE — PROGRESS NOTES
[facility] MEDICINE PROGRESS NOTE      Identification/Summary: Familia Watson is a 71 year old male with a past medical history of  a past medical history of hypertension, hyperlipidemia, > 50 pack years tobacco abuse who who was admitted on 1/25/2022 with hyponatremia, hypomagnesemia, and cavitating left upper lobe mass. Lung mass CT chest showing large cavitary lesion in left upper lobe, malignant vs lung abscess. Also shows emphysematous changes. Hospital course is notable for sodium 128. Magnesium replaced, now 1.6. Bronchoscopy with biopsy today, no complications. Waiting on biopsy results to guide care. Anemia 7.3, starting iron supplements.    Assessment and Plan:  Left upper lobe cavitary lesion, likely malignant rule out lung abscess  Emphysematous changes  Afebrile. Mild leukocytosis.   Empiric Zosyn  Consult Oncology - felt could be lung abscess vs malignancy  Appreciate Pulmonology input.  Anticoagulation, Aspirin restarted.  Duonebs.  Benzonatate, guiafenesin PRN for cough.  Pain team consulted - added gabapentin diclofenac gel.     Anemia, thrombocytopenia  Low Iron, Tranferrin, TIBC  Iron supplementation.     Hyponatremia: Urine Osm 341, borderline - suspect ADH response. TSH 1.26.  S/p IV fluids   BMP.  fluid restriction - 1500ml     Hypertension: continue home medication  Hyperlipidemia     Tobacco dependence:  Patient denies need for nicotine replacement at this time. Will continue to monitor.      Weight loss  Malnutrition  Ensure supplement.  PT/OT eval for weakness.    Diet: Snacks/Supplements Adult: Ensure Enlive; Between Meals  Fluid restriction 1500 ML FLUID  Regular Diet Adult  DVT Prophylaxis:  DOAC  Code Status: No CPR- Do NOT Intubate    Anticipated possible discharge in 1-2 days to home once biopsy results.    RICKEY Draper-S2  Hospitalist  [facility]  [facility]  Phone: #[phone]    Interval  History/Subjective:  Helped patient to the bathroom, steady on his feet. Still with upper and lower extremity pain, pain team consult added gabapentin. Continued dry cough. Shortness of breath only with activity, recovers with rest. Was mildly confused after sedation yesterday, feels back to his baseline today. No fever/chills, chest pain, n/v, urine or bowel concerns. Decreased appetite, encouraged protein shakes. Bladder scanned for 500, patient was able to spontaneously void before straight cath needed. Good urine output.    Physical Exam/Objective:  Temp:  [97.4  F (36.3  C)-98.6  F (37  C)] 98.6  F (37  C)  Pulse:  [] 86  Resp:  [18-33] 18  BP: (105-129)/(56-69) 128/69  SpO2:  [91 %-98 %] 97 %  Body mass index is 19.49 kg/m .    GENERAL:  Alert, appears comfortable, in no acute distress, cachectic frail gentleman   HEAD:  Normocephalic, without obvious abnormality, atraumatic   EYES:  PERRL, conjunctiva/corneas clear, no scleral icterus, EOM's intact   NECK: Supple, symmetrical, trachea midline   BACK:   Symmetric, no curvature, ROM normal   LUNGS:   Crackle in bilateral lung bases, coughing with deep inhale, symmetric chest rise on inhalation, respirations unlabored   CHEST WALL:  No tenderness or deformity   HEART:  Regular rate and rhythm, S1 and S2 normal, no murmur, rub, or gallop    ABDOMEN:   Soft, non-tender, bowel sounds active all four quadrants, no masses, no organomegaly, no rebound or guarding   EXTREMITIES: Extremities normal, atraumatic, no cyanosis or edema    SKIN: Dry to touch, no exanthems in the visualized areas   NEURO: Alert, oriented x3, moves all four extremities freely   PSYCH: Cooperative, behavior is appropriate      Data reviewed today: I personally reviewed all new medications, labs, imaging/diagnostics reports over the past 24 hours. Pertinent findings include:    Imaging:   Recent Results (from the past 24 hour(s))   XR Chest Port 1 View    Narrative    EXAM: XR CHEST PORT 1  VIEW  LOCATION: [facility]  DATE/TIME: 1/27/2022 9:37 AM    INDICATION: Status post transbronchial biopsy. Assess for pneumothorax.  COMPARISON: CT 01/25/2022.      Impression    IMPRESSION: Heart size and vascularity are normal. Right upper lobe cavitary opacity with air-fluid level redemonstrated. Patchy bibasilar interstitial opacities unchanged. No pneumothorax nor pleural effusion.   XR Surgery THERESA Fluoro L/T 5 Min    Narrative    This exam was marked as non-reportable because it will not be read by a   radiologist or a [facility] non-radiologist provider.             Labs:  Most Recent 3 CBC's:Recent Labs   Lab Test 01/28/22  0637 01/27/22  0717 01/26/22  0612   WBC 8.8 9.0 9.8   HGB 7.3* 7.9* 7.4*   MCV 84 84 84    488* 404     Most Recent 3 BMP's:Recent Labs   Lab Test 01/28/22  0637 01/27/22  0457 01/26/22  0612   * 127* 126*   POTASSIUM 4.0 4.1 3.4*   CHLORIDE 98 98 99   CO2 22 24 18*   BUN 6* 7* 7*   CR 0.63* 0.68* 0.67*   ANIONGAP 8 5 9   MELLISA 8.5 7.9* 7.9*    121 122     Most Recent 2 LFT's:Recent Labs   Lab Test 01/26/22  0612 01/25/22  1429   AST 45* 56*   ALT 57* 75*   ALKPHOS 126* 151*   BILITOTAL 0.6 0.8       Medications:   Personally Reviewed.  Medications       acetaminophen  650 mg Oral TID     aspirin  325 mg Oral Daily     atenolol  100 mg Oral Daily     atorvastatin  40 mg Oral At Bedtime     cyanocobalamin  1,000 mcg Oral Daily     diclofenac  2 g Topical 4x Daily     enoxaparin ANTICOAGULANT  40 mg Subcutaneous Q24H     ferrous sulfate  325 mg Oral Daily     gabapentin  300 mg Oral At Bedtime     lidocaine   Topical BID     magnesium oxide  400 mg Oral Daily     piperacillin-tazobactam  3.375 g Intravenous Q8H     polyethylene glycol  17 g Oral Daily     senna-docusate  3 tablet Oral BID     sodium chloride (PF)  3 mL Intracatheter Q8H     sodium chloride (PF)  3 mL Intracatheter Q8H     terazosin  2 mg Oral At Bedtime     Vitamin D3  25 mcg Oral  Daily       I agree with the documentation and findings as written by Cesilia [email or web address] and our discussed and formulated assessment and plan. I was present with her who participated in the service and documentation of the note. I have also personally verified the history and personally performed the physical exam and medical decision-making. I agree with the assessment and plan of care as documented in the note.     Shelli Sorensen MD  Internal Medicine  Hospitalist  [facility]  Phone: #[phone]

## 2022-01-28 NOTE — PROGRESS NOTES
PULMONARY MEDICINE PROGRESS NOTE  1/27/2022      Admit Date: 1/25/2022  CODE: No CPR- Do NOT Intubate    Reason for Consult: cavitary lung mass     Assessment/Plan:   71 year old male with a history of tobacco dependence, alcohol dependence, poor dentition, dyslipidemia, HTN, migraine, h/o traumatic SDH, radiographic emphysema, admitted on 1/25 with subacute cough, fatigue, anorexia, weight loss, found to have large cavitary left upper lobe mass.     Cavitary left lung mass: DDx includes lung abscess and necrotizing lung cancer. High risk of malignancy. Elevated PLT seen with malignancy or abscess. Aching arms/legs for 1.5 months concerning for paraneoplastic pain. He also quit smoking suddenly when his symptoms started; malignancy can lead to abrupt tobacco cessaion. Also alcohol dependence and very poor dentition; risk of lung abscess. Constitutional symptoms and other history unable to differentiate between these possibilities. Unfortunately there is high likelihood of sampling error with either transbronchial or transthoracic biopsy. Underwent bronchoscopy on 1/27 with anterior BUSTER BAL, transbronchial biopsies, and post-biopsy wash for additional cytology. Highly cellular, neutrophilic lavage, as expected (the lavage had a frankly purulent appearance). If no malignancy, consider PET-CT followed by CT-guided transthoracic needle biopsy as outpatient. Continue antibiotics in the meantime.     Plan:  - follow up BUSTER BAL cell differential, microbiologic studies, cytology, transbronchial biopsy pathology, post-biopsy wash cytology  - if negative for malignancy, plan PET-CT, possible CT-guided transthoracic needle biopsy  - transitioned to amoxicillin-clavulanate for 6 weeks; encouraged patient to eat one serving of yogurt with active cultures daily to decrease risk of antibiotic-associated diarrhea  - tobacco and alcohol cessation crucial  - encouraged adequate protein and calorie intake; significant protein-calorie  "malnutrition brought on by pulmonary process  - DNR/DNI per patient preference  - ordered repeat chest CT in 4 weeks and sent a message to pulmonary clinic schedulers ([phone]) to arrange follow-up with me  - I will personally follow up on pathology/cytology and cultures results and call the patient with findings  - okay to discharge today or tomorrow from my standpoint  - will sign off but please call any time if needed    Eris Mckeon MD  Pulmonary and Critical Care Medicine  [facility]  Office [phone]  Pager [phone]  (he/him/his)                                                                                                                                                        SUBJECTIVE/INTERVAL HISTORY   Denies chest pain, dyspnea. Feels fatigued but energy and appetite improved.                                                                                                                                                      Exam/Data:     Vitals  /69 (BP Location: Left arm)   Pulse 86   Temp 98.6  F (37  C) (Oral)   Resp 18   Ht 1.727 m (5' 8\")   Wt 58.2 kg (128 lb 3.2 oz)   SpO2 97%   BMI 19.49 kg/m       I/O last 3 completed shifts:  In: 975 [P.O.:600; I.V.:375]  Out: 1250 [Urine:1250]  Weight change:   [unfilled]  EXAM:  /69 (BP Location: Left arm)   Pulse 86   Temp 98.6  F (37  C) (Oral)   Resp 18   Ht 1.727 m (5' 8\")   Wt 58.2 kg (128 lb 3.2 oz)   SpO2 97%   BMI 19.49 kg/m      Intake/Output last 3 shifts:  I/O last 3 completed shifts:  In: 975 [P.O.:600; I.V.:375]  Out: 1250 [Urine:1250]  Intake/Output this shift:  No intake/output data recorded.    Physical Exam  Gen: alert, oriented, no distress, appears to have a bit more energy today  HEENT: NT, no TOVA, very poor dentition  CV: RRR, no m/g/r  Resp: diminished on left  Abd: soft, nontender, BS+  Skin: no rashes or lesions, dirt-encrusted fingernails  Ext: no edema  Neuro: PERRL, " nonfocal exam    ROS: A complete 10-system review of systems was obtained and is negative with the exception of what is noted in the history of present illness.    Medications:         acetaminophen  650 mg Oral TID     amoxicillin-clavulanate  1 tablet Oral Q12H IGNACIA     aspirin  325 mg Oral Daily     atenolol  100 mg Oral Daily     atorvastatin  40 mg Oral At Bedtime     cyanocobalamin  1,000 mcg Oral Daily     diclofenac  2 g Topical 4x Daily     enoxaparin ANTICOAGULANT  40 mg Subcutaneous Q24H     ferrous sulfate  325 mg Oral Daily     gabapentin  300 mg Oral At Bedtime     lidocaine   Topical BID     magnesium oxide  400 mg Oral Daily     polyethylene glycol  17 g Oral Daily     senna-docusate  3 tablet Oral BID     sodium chloride (PF)  3 mL Intracatheter Q8H     sodium chloride (PF)  3 mL Intracatheter Q8H     terazosin  2 mg Oral At Bedtime     Vitamin D3  25 mcg Oral Daily         DATA    LABS:  BAL (1/27/22):  - WBC 38,472 cells/uL, 92% PMNs  Cultures in process  BAL and post-biopsy wash cytology in process  TBBx pathology in process    IMAGING:  Chest CTA (1/25/22):  - images directly reviewed, formal interpretation follows:  FINDINGS:  ANGIOGRAM CHEST: Pulmonary arteries are normal caliber and negative for pulmonary emboli. Thoracic aorta is negative for dissection. No CT evidence of right heart strain.     LUNGS AND PLEURA: Large irregular thick-walled 9.3 x 7.7 x 5.5 cm cavitary mass within the left upper lobe extending to the pleural surface and abutting the mediastinum which is cavitary malignancy until proven otherwise. Multiple air debris levels   present within the mass and there is mild surrounding groundglass infiltrate. Subpleural regions of presumed fibrosis in the lower lobes. Emphysematous change.     MEDIASTINUM/AXILLAE: There is mild AP window lymphadenopathy.     CORONARY ARTERY CALCIFICATION: Severe.     UPPER ABDOMEN: Normal.     MUSCULOSKELETAL:  Normal.                                                                      IMPRESSION:  1.  No pulmonary embolism.     2.  Large subpleural 9.3 x 7.7 x 5.5 cm irregular thick-walled cavitary mass within the left upper lobe which is malignant until proven otherwise. AP window lymphadenopathy.     3.  Coronary artery calcification.     4.  Emphysematous change.     5.  Subpleural interstitial and cystic change in the lower lobes likely fibrosis.

## 2022-01-28 NOTE — PROGRESS NOTES
[facility] ACUTE PAIN SERVICE    ([facility], [facility], [facility])  Daily PAIN Progress Note    Assessment/Plan:  Familia Watson is a 71 year old male who was admitted on 1/25/2022.  I was asked to see the patient for arm/leg pain. Admitted for cough and found to have left cavitary lung mass.  Underwent bronchoscopy yesterday.  Pain remains in the arms and legs.  He is opioid tolerant at baseline taking oxycodone for headaches chronically..  In the last 24 hours he has utilized 30 mg of oxycodone.    PLAN:   1) Upper and lower extremity myalgias secondary to current illness vs infection vs malignancy? .  Home MME is 40 mg.  Consider neurology referral at the time of discharge with EMG.  Added gabapentin yesterday for arm and leg pain but may also be beneficial for cough.  2)Multimodal Medication Therapy  Topical: add diclofenac and lidocaine to extremities   Adjuvants: Atarax 50mg qidprn, trazodone 50mg qhsprn & added gabapentin at HS 300mg increase to 600mg tomorrow   Antidepressants/anxiolytics: none (x 1 for bronch procedure)  Opioids: Cont  oxycodone at increase dose/freq  3)Non-medication interventions- Ice, Heat, physical therapy: not ordered yet  4)Constipation Prophylaxis- senna and miralax ; docusate BID   5) Follow up   -Opioid prescriber has been Daniel Martinez. PCP is Daniel Martinez  -Discharge Recommendations - We recommend prescribing the following at the time of discharge: small supply of oxycodone and gabapentin.               Subjective:    Patient notes that his pain in his arms and legs is the same.  We talked about different differentials for this arm and leg pain.  We discussed follow-up and consideration of nerve conduction study.  He denies lower extremity edema.  We talked about gabapentin in great detail including mechanism of action as well as side effects.     Discussed pain plan with Dr. Sorensen.  We reviewed differentials for this arm and leg pain and potential  "follow-up plans.      <principal problem not specified>   Patient Active Problem List   Diagnosis     Alcohol abuse with withdrawal (H)     Lactic acidosis     Essential hypertension, benign     Migraine without aura and without status migrainosus, not intractable     Hypomagnesemia     Hyponatremia     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     Anemia, unspecified type        History   Drug Use Unknown         Tobacco Use      Smoking status: Current Every Day Smoker        Packs/day: 1.00        Years: 57.00        Pack years: 57      Smokeless tobacco: Never Used          acetaminophen  650 mg Oral TID     aspirin  325 mg Oral Daily     atenolol  100 mg Oral Daily     atorvastatin  40 mg Oral At Bedtime     cyanocobalamin  1,000 mcg Oral Daily     diclofenac  2 g Topical 4x Daily     enoxaparin ANTICOAGULANT  40 mg Subcutaneous Q24H     ferrous sulfate  325 mg Oral Daily     gabapentin  300 mg Oral At Bedtime     lidocaine   Topical BID     magnesium oxide  400 mg Oral Daily     piperacillin-tazobactam  3.375 g Intravenous Q8H     polyethylene glycol  17 g Oral Daily     senna-docusate  3 tablet Oral BID     sodium chloride (PF)  3 mL Intracatheter Q8H     sodium chloride (PF)  3 mL Intracatheter Q8H     terazosin  2 mg Oral At Bedtime     Vitamin D3  25 mcg Oral Daily       Objective:  Vital signs in last 24 hours:  B/P: 128/69, T: 98.6, P: 86, R: 18   Blood pressure 128/69, pulse 86, temperature 98.6  F (37  C), temperature source Oral, resp. rate 18, height 1.727 m (5' 8\"), weight 58.2 kg (128 lb 3.2 oz), SpO2 97 %.      Weight:   Wt Readings from Last 2 Encounters:   01/25/22 58.2 kg (128 lb 3.2 oz)           Intake/Output:    Intake/Output Summary (Last 24 hours) at 1/28/2022 0853  Last data filed at 1/28/2022 0500  Gross per 24 hour   Intake 975 ml   Output 1250 ml   Net -275 ml        Review of Systems:   As per subjective, all others negative.    Physical Exam:     General Appearance:  Alert, " cooperative, no distress, appears stated age   Patient is disheveled    Head:  Normocephalic, without obvious abnormality, atraumatic   Eyes:  PERRL, conjunctiva/corneas clear, EOM's intact   ENT/Throat: Lips  Normal   Missing teeth     Lymph/Neck: Supple, symmetrical, trachea midline, no adenopathy, thyroid: not enlarged, symmetric    Lungs:   Clear to auscultation bilaterally, respirations unlabored  But severe dry cough present    Chest Wall:  No tenderness or deformity   Cardiovascular/Heart:  Regular rate and rhythm, S1, S2 normal,no murmur, rub or gallop.     Abdomen:   Soft, non-tender, bowel sounds active all four quadrants,  no masses, no organomegaly   Musculoskeletal: Extremities normal, atraumatic      Skin: Skin unclean    Neurologic: Alert and oriented X 3, Moves all 4 extremities        Psych: Affect is pleasant   Grooming is poor  Dirt under nail               Lab Results:  Personally Reviewed.   Last Comprehensive Metabolic Panel:  Sodium   Date Value Ref Range Status   01/28/2022 128 (L) 136 - 145 mmol/L Final     Potassium   Date Value Ref Range Status   01/28/2022 4.0 3.5 - 5.0 mmol/L Final     Chloride   Date Value Ref Range Status   01/28/2022 98 98 - 107 mmol/L Final     Carbon Dioxide (CO2)   Date Value Ref Range Status   01/28/2022 22 22 - 31 mmol/L Final     Anion Gap   Date Value Ref Range Status   01/28/2022 8 5 - 18 mmol/L Final     Glucose   Date Value Ref Range Status   01/28/2022 104 70 - 125 mg/dL Final     Urea Nitrogen   Date Value Ref Range Status   01/28/2022 6 (L) 8 - 28 mg/dL Final     Creatinine   Date Value Ref Range Status   01/28/2022 0.63 (L) 0.70 - 1.30 mg/dL Final     GFR Estimate   Date Value Ref Range Status   01/28/2022 >90 >60 mL/min/1.73m2 Final     Comment:     Effective December 21, 2021 eGFRcr in adults is calculated using the 2021 CKD-EPI creatinine equation which includes age and gender (Moo yee al., [place], DOI: 10.1056/GKUGum4159973)   05/14/2021 >60 >60  mL/min/1.73m2 Final     Calcium   Date Value Ref Range Status   01/28/2022 8.5 8.5 - 10.5 mg/dL Final        UA: No results found for: UAMP, UBARB, BENZODIAZEUR, UCANN, UCOC, OPIT, UPCP       Total unit/floor time 25  minutes, time consisted of the following, examination of patient, reviewing the record and lab results, and completing documentation. Coordination of care time with Dr. Sorensen.       Nay JEONG, CNS-BC, CNP, ACHPN  Acute Care Pain Management Program Hour 7a-1700  [facility] ([facility], [place], [place] [facility])   Page via [facility]- Click HERE to page Nay or call [phone]

## 2022-01-28 NOTE — PLAN OF CARE
Problem: Adult Inpatient Plan of Care  Goal: Optimal Comfort and Wellbeing  Outcome: Improving     Problem: Pain Acute  Goal: Acceptable Pain Control and Functional Ability  Outcome: Improving  Intervention: Develop Pain Management Plan  Recent Flowsheet Documentation  Taken 1/28/2022 0569 by Ghazal Haque RN  Pain Management Interventions: medication (see MAR)  Taken 1/27/2022 2225 by Ghazal Haque RN  Pain Management Interventions: medication (see MAR)     VSS. Voiding adequately. 600 mL oral fluid intake this shift. Pain decreased with Oxycodone. Saline locked. Patient was initially a little confused and forgetful, but was aware of where he is. Patient most recently is A&O x4    Ghazal Haque RN

## 2022-01-28 NOTE — PROGRESS NOTES
"   01/28/22 1345   Quick Adds   Type of Visit Initial PT Evaluation   Living Environment   People in home spouse   Current Living Arrangements apartment   Home Accessibility stairs to enter home   Number of Stairs, Main Entrance 6   Stair Railings, Main Entrance railings on both sides of stairs  (can't reach both at the same time)   Self-Care   Usual Activity Tolerance good   Current Activity Tolerance moderate   Disability/Function   Fall history within last six months no   General Information   Onset of Illness/Injury or Date of Surgery 01/25/22   Referring Physician Shelli Sorensen MD   Patient/Family Therapy Goals Statement (PT) home, \"I need to get stronger\"   Pertinent History of Current Problem (include personal factors and/or comorbidities that impact the POC) hypomagnesemia, hyponatremia, cavitating mass of upper L lung, anemia   Existing Precautions/Restrictions no known precautions/restrictions   Weight-Bearing Status - LLE full weight-bearing   Weight-Bearing Status - RLE full weight-bearing   Bed Mobility   Bed Mobility supine-sit;sit-supine   Supine-Sit [place] (Bed Mobility) supervision;verbal cues   Sit-Supine [place] (Bed Mobility) supervision;verbal cues   Transfers   Transfers sit-stand transfer   Sit-Stand Transfer   Sit-Stand [place] (Transfers) contact guard;verbal cues   Assistive Device (Sit-Stand Transfers) other (see comments)  (None)   Gait/Stairs (Locomotion)   [place] Level (Gait) contact guard;verbal cues   Assistive Device (Gait) other (see comments)  (None)   Distance in Feet (Required for LE Total Joints) 150   Pattern (Gait) step-through   Deviations/Abnormal Patterns (Gait) andrea decreased;gait speed decreased   Clinical Impression   Criteria for Skilled Therapeutic Intervention yes, treatment indicated   PT Diagnosis (PT) impaired functional mobility   Influenced by the following impairments LE pain, impaired balance, weakness   Functional limitations due to " impairments gait, stairs, transfers, endurance   Clinical Presentation Evolving/Changing   Clinical Presentation Rationale pt presents as diagnosed   Clinical Decision Making (Complexity) moderate complexity   Therapy Frequency (PT) Daily   Predicted Duration of Therapy Intervention (days/wks) 4 days   Planned Therapy Interventions (PT) balance training;gait training;home exercise program;patient/family education;stair training;strengthening;transfer training   Anticipated Equipment Needs at Discharge (PT) walker, rolling   Risk & Benefits of therapy have been explained care plan/treatment goals reviewed;patient   PT Discharge Planning    PT Discharge Recommendation (DC Rec) home with assist;home with home care physical therapy;home with outpatient physical therapy   PT Rationale for DC Rec home with assist as needed, home vs. OP PT depending on stair trial and pt progress   Total Evaluation Time   Total Evaluation Time (Minutes) 5

## 2022-01-28 NOTE — PLAN OF CARE
Problem: Pain Acute  Goal: Acceptable Pain Control and Functional Ability  Outcome: No Change  Intervention: Develop Pain Management Plan  Flowsheets (Taken 1/28/2022 8652)  Pain Management Interventions:   medication (see MAR)   ambulation/increased activity   repositioned   rest   emotional support     RANDA KOENIG RN

## 2022-01-29 ENCOUNTER — APPOINTMENT (OUTPATIENT)
Dept: PHYSICAL THERAPY | Facility: CLINIC | Age: 72
DRG: 167 | End: 2022-01-29
Payer: MEDICARE

## 2022-01-29 ENCOUNTER — APPOINTMENT (OUTPATIENT)
Dept: OCCUPATIONAL THERAPY | Facility: CLINIC | Age: 72
DRG: 167 | End: 2022-01-29
Attending: INTERNAL MEDICINE
Payer: MEDICARE

## 2022-01-29 VITALS
DIASTOLIC BLOOD PRESSURE: 72 MMHG | BODY MASS INDEX: 19.43 KG/M2 | HEIGHT: 68 IN | HEART RATE: 81 BPM | RESPIRATION RATE: 18 BRPM | WEIGHT: 128.2 LBS | TEMPERATURE: 98 F | SYSTOLIC BLOOD PRESSURE: 135 MMHG | OXYGEN SATURATION: 96 %

## 2022-01-29 LAB
BACTERIA BRONCH: NORMAL
ERYTHROCYTE [DISTWIDTH] IN BLOOD BY AUTOMATED COUNT: 14.4 % (ref 10–15)
HCT VFR BLD AUTO: 25.1 % (ref 40–53)
HGB BLD-MCNC: 7.9 G/DL (ref 13.3–17.7)
MAGNESIUM SERPL-MCNC: 1.6 MG/DL (ref 1.8–2.6)
MCH RBC QN AUTO: 26.9 PG (ref 26.5–33)
MCHC RBC AUTO-ENTMCNC: 31.5 G/DL (ref 31.5–36.5)
MCV RBC AUTO: 85 FL (ref 78–100)
PLATELET # BLD AUTO: 445 10E3/UL (ref 150–450)
RBC # BLD AUTO: 2.94 10E6/UL (ref 4.4–5.9)
SODIUM SERPL-SCNC: 127 MMOL/L (ref 136–145)
WBC # BLD AUTO: 9.8 10E3/UL (ref 4–11)

## 2022-01-29 PROCEDURE — 36415 COLL VENOUS BLD VENIPUNCTURE: CPT | Performed by: INTERNAL MEDICINE

## 2022-01-29 PROCEDURE — 250N000013 HC RX MED GY IP 250 OP 250 PS 637: Performed by: INTERNAL MEDICINE

## 2022-01-29 PROCEDURE — 97535 SELF CARE MNGMENT TRAINING: CPT | Mod: GO

## 2022-01-29 PROCEDURE — 85027 COMPLETE CBC AUTOMATED: CPT | Performed by: INTERNAL MEDICINE

## 2022-01-29 PROCEDURE — 84295 ASSAY OF SERUM SODIUM: CPT | Performed by: INTERNAL MEDICINE

## 2022-01-29 PROCEDURE — 97116 GAIT TRAINING THERAPY: CPT | Mod: GP

## 2022-01-29 PROCEDURE — 97166 OT EVAL MOD COMPLEX 45 MIN: CPT | Mod: GO

## 2022-01-29 PROCEDURE — 83735 ASSAY OF MAGNESIUM: CPT | Performed by: INTERNAL MEDICINE

## 2022-01-29 PROCEDURE — 250N000013 HC RX MED GY IP 250 OP 250 PS 637: Performed by: PAIN MEDICINE

## 2022-01-29 PROCEDURE — 99239 HOSP IP/OBS DSCHRG MGMT >30: CPT | Performed by: INTERNAL MEDICINE

## 2022-01-29 RX ORDER — FERROUS SULFATE 325(65) MG
325 TABLET ORAL DAILY
Qty: 60 TABLET | Refills: 0 | Status: SHIPPED | OUTPATIENT
Start: 2022-01-30 | End: 2022-04-08

## 2022-01-29 RX ORDER — GABAPENTIN 300 MG/1
300 CAPSULE ORAL AT BEDTIME
Qty: 30 CAPSULE | Refills: 0 | Status: SHIPPED | OUTPATIENT
Start: 2022-01-29 | End: 2022-04-08

## 2022-01-29 RX ORDER — OXYCODONE HYDROCHLORIDE 10 MG/1
5-10 TABLET ORAL EVERY 6 HOURS PRN
Qty: 15 TABLET | Refills: 0 | Status: SHIPPED | OUTPATIENT
Start: 2022-01-29 | End: 2022-04-08

## 2022-01-29 RX ORDER — L. ACIDOPHILUS/L.BULGARICUS 1MM CELL
1 TABLET ORAL DAILY
Qty: 50 TABLET | Refills: 0 | Status: ON HOLD | OUTPATIENT
Start: 2022-01-29 | End: 2022-11-20

## 2022-01-29 RX ADMIN — ASPIRIN 325 MG: 325 TABLET ORAL at 08:19

## 2022-01-29 RX ADMIN — HYDROXYZINE HYDROCHLORIDE 50 MG: 25 TABLET ORAL at 07:05

## 2022-01-29 RX ADMIN — POLYETHYLENE GLYCOL 3350 17 G: 17 POWDER, FOR SOLUTION ORAL at 08:18

## 2022-01-29 RX ADMIN — Medication 25 MCG: at 08:20

## 2022-01-29 RX ADMIN — MAGNESIUM OXIDE TAB 400 MG (241.3 MG ELEMENTAL MG) 400 MG: 400 (241.3 MG) TAB at 08:24

## 2022-01-29 RX ADMIN — SENNOSIDES AND DOCUSATE SODIUM 3 TABLET: 50; 8.6 TABLET ORAL at 08:19

## 2022-01-29 RX ADMIN — ATENOLOL 100 MG: 25 TABLET ORAL at 08:19

## 2022-01-29 RX ADMIN — HYDROXYZINE HYDROCHLORIDE 50 MG: 25 TABLET ORAL at 02:39

## 2022-01-29 RX ADMIN — HYDROXYZINE HYDROCHLORIDE 50 MG: 25 TABLET ORAL at 11:25

## 2022-01-29 RX ADMIN — CYANOCOBALAMIN TAB 1000 MCG 1000 MCG: 1000 TAB at 08:19

## 2022-01-29 RX ADMIN — OXYCODONE HYDROCHLORIDE 10 MG: 5 TABLET ORAL at 02:39

## 2022-01-29 RX ADMIN — FERROUS SULFATE TAB 325 MG (65 MG ELEMENTAL FE) 325 MG: 325 (65 FE) TAB at 08:20

## 2022-01-29 RX ADMIN — OXYCODONE HYDROCHLORIDE 10 MG: 5 TABLET ORAL at 11:25

## 2022-01-29 RX ADMIN — OXYCODONE HYDROCHLORIDE 10 MG: 5 TABLET ORAL at 07:05

## 2022-01-29 RX ADMIN — AMOXICILLIN AND CLAVULANATE POTASSIUM 1 TABLET: 875; 125 TABLET, FILM COATED ORAL at 08:20

## 2022-01-29 ASSESSMENT — ACTIVITIES OF DAILY LIVING (ADL)
ADLS_ACUITY_SCORE: 9
ADLS_ACUITY_SCORE: 11
ADLS_ACUITY_SCORE: 9
ADLS_ACUITY_SCORE: 11
ADLS_ACUITY_SCORE: 11
ADLS_ACUITY_SCORE: 9

## 2022-01-29 NOTE — DISCHARGE SUMMARY
[facility] MEDICINE  DISCHARGE SUMMARY     Primary Care Physician: Daniel Martinez  Admission Date: 1/25/2022   Discharge Provider: Shelli Sorensen MD Discharge Date: 1/29/2022   Diet: Orders Placed This Encounter      Regular Diet Adult      Diet      Code Status: No CPR- Do NOT Intubate   Activity: activity as tolerated   [facility]      Condition at Discharge: Stable      REASON FOR PRESENTATION(See Admission Note for Details)   Cough, body aches    PRINCIPAL & ACTIVE DISCHARGE DIAGNOSES     Active problems:  Cavitary left upper lobe lung mass/abscess status post bronchoscopy biopsy result pending  Hyponatremia likely excess ADH from pulmonary process  Emphysematous changes on CT  Tobacco abuse  History of alcohol dependence  Weight loss anorexia, hand and foot pain  Iron deficiency anemia  Hypomagnesemia    SIGNIFICANT FINDINGS (Imaging, labs):   CT chest PE run  IMPRESSION:  1.  No pulmonary embolism.   2.  Large subpleural 9.3 x 7.7 x 5.5 cm irregular thick-walled cavitary mass within the left upper lobe which is malignant until proven otherwise. AP window lymphadenopathy.   3.  Coronary artery calcification.   4.  Emphysematous change.   5.  Subpleural interstitial and cystic change in the lower lobes likely fibrosis.    Sodium on admission 126 at discharge 127  Hemoglobin at discharge 7.9    Iron studies serum iron 15, transferrin saturation 12%, TIBC 123, ferritin 2474    Alkaline phosphatase 151, ALT 75, AST 56    PENDING LABS         PROCEDURES ( this hospitalization only)      Bronchoscopy with fluoroscopy guided biopsies    RECOMMENDATION FOR F/U VISIT   Follow-up on biopsy results  Recheck hemoglobin, CMP follow-up on sodium, LFTs    DISPOSITION     Home    SUMMARY OF HOSPITAL COURSE:    71 year old male with a past medical history of hypertension, hyperlipidemia, > 50 pack year tobacco abuse, alcohol abuse, quit few months ago, poor dentition presented to the  emergency room with complaints of cough, weight loss, anorexia body aches for 3 months who was admitted on 1/25/2022 with hyponatremia, hypomagnesemia, and cavitating left upper lobe mass.  He was seen by oncology, pulmonary.  Started on Zosyn.  He had bronchoscopy with biopsies.  Biopsy result is pending at the time of discharge.  He has electrolyte disturbances hyponatremia felt most likely related to pulmonary process.  Sodium stable at 127 at discharge.  He has significant hand and foot pain was seen by pain team.  Started on gabapentin.  He was also given prescription of oxycodone.  Discharging on Augmentin for 6 weeks and pulmonary follow-up for repeat CT scan pending biopsy results.  He has anemia, iron studies were consistent with iron deficiency and started on iron supplements.    Discharge Medications with Med changes:        Review of your medicines      START taking      Dose / Directions   amoxicillin-clavulanate 875-125 MG tablet  Commonly known as: AUGMENTIN  Indication: Abscess  Notes to patient: Take until all tabs gone      Dose: 1 tablet  Take 1 tablet by mouth every 12 hours  Quantity: 84 tablet  Refills: 0     diclofenac 1 % topical gel  Commonly known as: VOLTAREN      Dose: 2 g  Apply 2 g topically 4 times daily  Quantity: 100 g  Refills: 0     ferrous sulfate 325 (65 Fe) MG tablet  Commonly known as: FEROSUL      Dose: 325 mg  Start taking on: January 30, 2022  Take 1 tablet (325 mg) by mouth daily  Quantity: 60 tablet  Refills: 0     gabapentin 300 MG capsule  Commonly known as: NEURONTIN      Dose: 300 mg  Take 1 capsule (300 mg) by mouth At Bedtime  Quantity: 30 capsule  Refills: 0     guaiFENesin 20 mg/mL Soln solution  Commonly known as: ROBITUSSIN      Dose: 10 mL  Take 10 mLs by mouth every 4 hours as needed for cough  Quantity: 180 mL  Refills: 0     Lactobacillus Probiotic Tabs      Dose: 1 tablet  Take 1 tablet by mouth daily  Quantity: 50 tablet  Refills: 0        CONTINUE these  medicines which may have CHANGED, or have new prescriptions. If we are uncertain of the size of tablets/capsules you have at home, strength may be listed as something that might have changed.      Dose / Directions   oxyCODONE IR 10 MG tablet  Commonly known as: ROXICODONE  This may have changed: how much to take      Dose: 5-10 mg  Take 0.5-1 tablets (5-10 mg) by mouth every 6 hours as needed for severe pain  Quantity: 15 tablet  Refills: 0        CONTINUE these medicines which have NOT CHANGED      Dose / Directions   aspirin 325 MG EC tablet  Commonly known as: ASA      Dose: 325 mg  Take 325 mg by mouth daily  Refills: 0     atenolol 100 MG tablet  Commonly known as: TENORMIN      Dose: 100 mg  Take 100 mg by mouth daily  Refills: 0     atorvastatin 40 MG tablet  Commonly known as: LIPITOR      Dose: 40 mg  Take 40 mg by mouth At Bedtime  Refills: 0     cyanocobalamin 1000 MCG tablet  Commonly known as: VITAMIN B-12      Dose: 1,000 mcg  Take 1,000 mcg by mouth daily  Refills: 0     hydrOXYzine 50 MG capsule  Commonly known as: VISTARIL      Dose: 50 mg  Take 50 mg by mouth 4 times daily as needed for itching or anxiety  Refills: 0     magnesium oxide 400 MG tablet  Commonly known as: MAG-OX      Dose: 400 mg  Take 400 mg by mouth daily  Refills: 0     terazosin 2 MG capsule  Commonly known as: HYTRIN      Dose: 2 mg  Take 2 mg by mouth At Bedtime  Refills: 0     traZODone 50 MG tablet  Commonly known as: DESYREL      Dose: 50 mg  Take 50 mg by mouth nightly as needed for sleep  Refills: 0     Vitamin D3 25 mcg (1000 units) tablet  Commonly known as: CHOLECALCIFEROL      Dose: 1 tablet  Take 1 tablet by mouth daily  Refills: 0           Where to get your medicines      These medications were sent to [facility] [id number] [facility] - [place], [place] - [address] [facility]  [address], [place] [address]    Phone: [phone]     amoxicillin-clavulanate 875-125 MG tablet    diclofenac 1 % topical gel    ferrous sulfate  325 (65 Fe) MG tablet    gabapentin 300 MG capsule    guaiFENesin 20 mg/mL Soln solution    Lactobacillus Probiotic Tabs    oxyCODONE IR 10 MG tablet              Rationale for medication changes:    Augmentin for lung mass lesion versus abscess  Iron for iron deficiency anemia  Oxycodone, diclofenac gel, gabapentin for pain        Consults   Oncology, pulmonary      Immunizations given this encounter         Discharge Procedure Orders   Adult Neurology  Referral   Standing Status: Future   Referral Priority: Routine: Next available opening Referral Type: Consultation   Number of Visits Requested: 1     Home Care PT Referral for Hospital Discharge   Referral Priority: Routine: Next available opening Referral Type: Home Health Therapies & Aides   Number of Visits Requested: 1     Home Care OT Referral for Hospital Discharge   Referral Priority: Routine: Next available opening Referral Type: Consultation   Number of Visits Requested: 1     Reason for your hospital stay   Order Comments: Lung mass s/p bronchoscopy     Follow-up and recommended labs and tests   Order Comments: Follow up with primary care provider, Daniel Martinez, within 7 days for hospital follow- up.  Follow up with pulmonary as scheduled  Follow up with oncology as needed with biopsy results     Activity   Order Comments: Your activity upon discharge: activity as tolerated     Order Specific Question Answer Comments   Is discharge order? Yes      MD face to face encounter   Order Comments: Documentation of Face to Face and Certification for Home Health Services    I certify that patient: Familia Watson is under my care and that I, or a nurse practitioner or physician's assistant working with me, had a face-to-face encounter that meets the physician face-to-face encounter requirements with this patient on: 1/29/2022.    This encounter with the patient was in whole, or in part, for the following medical condition, which is the primary  reason for home health care: Cavitary lung mass s/p bronchoscopy, weight loss, bilateral hand and leg pain.    I certify that, based on my findings, the following services are medically necessary home health services: Occupational Therapy and Physical Therapy.    My clinical findings support the need for the above services because: Occupational Therapy Services are needed to assess and treat cognitive ability and address ADL safety due to impairment in daily activities of living. and Physical Therapy Services are needed to assess and treat the following functional impairments: Strengthening, range of motion, improving gait,    Further, I certify that my clinical findings support that this patient is homebound (i.e. absences from home require considerable and taxing effort and are for medical reasons or [employer] services or infrequently or of short duration when for other reasons) because: Generalized weakness, gait instability.    Based on the above findings. I certify that this patient is confined to the home and needs intermittent skilled nursing care, physical therapy and/or speech therapy.  The patient is under my care, and I have initiated the establishment of the plan of care.  This patient will be followed by a physician who will periodically review the plan of care.  Physician/Provider to provide follow up care: Daniel Martinez    Attending hospital physician (the Medicare certified [place] provider): Shelli Sorensen MD  Physician Signature: See electronic signature associated with these discharge orders.  Date: 1/29/2022     Diet   Order Comments: Follow this diet upon discharge: Orders Placed This Encounter      Snacks/Supplements Adult: Ensure Enlive; Between Meals      Fluid restriction 1500 ML FLUID      Regular Diet Adult       Order Specific Question Answer Comments   Is discharge order? Yes      Examination     Vital Signs in last 24 hours:   Vital signs:  Temp: 98  F (36.7  C) Temp src: Oral BP:  "135/72 Pulse: 81   Resp: 18 SpO2: 96 % O2 Device: None (Room air) Oxygen Delivery: 2 LPM Height: 172.7 cm (5' 8\") Weight: 58.2 kg (128 lb 3.2 oz)  Estimated body mass index is 19.49 kg/m  as calculated from the following:    Height as of this encounter: 1.727 m (5' 8\").    Weight as of this encounter: 58.2 kg (128 lb 3.2 oz).       Pertinent positives on exam:  Lungs: Decreased breath sounds bilaterally    Please see EMR for more detailed significant labs, imaging, consultant notes etc.  Total time spent on discharge: > 35 minutes    Shelli Sorensen MD   [facility] Hospitalist Service: Ph:[phone]    CC:Daniel Martinez      "

## 2022-01-29 NOTE — DISCHARGE INSTRUCTIONS
Pain Plan:     Please continue on gabapentin this can be increased over time.  Please meet with your doctor at [facility] to review dosing of gabapentin..    Please follow-up with neurology regarding arm and leg pain.  Please obtain EMG study.            Home Care has been set up with:  [facility]  [phone]  For home PT and OT  Home Care will contact you prior to first visit

## 2022-01-29 NOTE — PROGRESS NOTES
01/29/22 0925   Quick Adds   Type of Visit Initial Occupational Therapy Evaluation   Living Environment   People in home spouse   Current Living Arrangements apartment   Self-Care   Usual Activity Tolerance good   Current Activity Tolerance fair   Activity/Exercise/Self-Care Comment Pt reports that he just doesn't have the stamina that he tpically does   Instrumental Activities of Daily Living (IADL)   IADL Comments Pt reports I with all IADLs   General Information   Onset of Illness/Injury or Date of Surgery 01/29/22   Referring Physician Franchesca   Patient/Family Therapy Goal Statement (OT) I just need to get my prescriptions and go home   Cognitive Status Examination   Orientation Status orientation to person, place and time   Follows Commands follows two-step commands   Cognitive Status Comments Recommend further cognitive testing upon discharge, pt declined SLUMs at this time.   Pain Assessment   Patient Currently in Pain Yes, see Vital Sign flowsheet   Strength Comprehensive (MMT)   Comment, General Manual Muscle Testing (MMT) Assessment Generalized weakness   Muscle Tone Assessment   Muscle Tone Comments Tends to flex elbows, hands and wrists in sitting. Maintaing different wrist/elbow flexion/extension patterns walking   Coordination   Upper Extremity Coordination No deficits were identified   Bed Mobility   Bed Mobility supine-sit   Supine-Sit [place] (Bed Mobility) modified independence   Transfers   Transfer Comments Pt SBA w/toilet, shower chair, bed and chair transfers   Activities of Daily Living   BADL Assessment lower body dressing   Lower Body Dressing Assessment   Comment (Lower Body Dressing) I w/doffing/donning socks   Clinical Impression   Criteria for Skilled Therapeutic Interventions Met (OT) yes   OT Diagnosis Decreased ADL independence   OT Problem List-Impairments impacting ADL activity tolerance impaired;cognition;fear & anxiety;hearing   Assessment of Occupational Performance 1-3  Performance Deficits   Planned Therapy Interventions (OT) ADL retraining   Clinical Decision Making Complexity (OT) moderate complexity   Therapy Frequency (OT) Daily   Predicted Duration of Therapy 3   Risk & Benefits of therapy have been explained evaluation/treatment results reviewed   OT Discharge Planning    OT Discharge Recommendation (DC Rec) home with home care occupational therapy   OT Rationale for DC Rec Further OT  for activity tolerance and assess cognition once rapport is built, pt declined today   OT Brief overview of current status  Rec assist w/medication management.   Total Evaluation Time (Minutes)   Total Evaluation Time (Minutes) 10

## 2022-01-29 NOTE — PLAN OF CARE
Nurse teaching given on 1/29/2022 and the patient expresses understanding and acceptance of instructions.     Marya Mclaughlin RN 1/29/2022 1:50 PM

## 2022-01-29 NOTE — PROGRESS NOTES
Care Management Discharge Note    Discharge Date: 01/29/2022       Discharge Disposition: Home Care    Discharge Services: None    Discharge DME: None    Discharge Transportation: agency    Private pay costs discussed: Not applicable    PAS Confirmation Code:    Patient/family educated on Medicare website which has current facility and service quality ratings: no    Education Provided on the Discharge Plan:    Persons Notified of Discharge Plans: Pt and intake at [facility]  Patient/Family in Agreement with the Plan: yes    Handoff Referral Completed: No    Additional Information:  Pt to discharge home today.  Discussed home care with pt and pt is agreeable.  Referral made and accepted by [facility].  Pt planning to arrange taxi for transport home.        DRAKE Young

## 2022-01-29 NOTE — PLAN OF CARE
Physical Therapy Discharge Summary    Reason for therapy discharge:    Discharged to home with home therapy.    Progress towards therapy goal(s). See goals on Care Plan in [facility] electronic health record for goal details.  Goals met    Therapy recommendation(s):    Continued therapy is recommended.  Rationale/Recommendations:  home safety assessment, strengthening/conditioning to dec fall risk.

## 2022-01-29 NOTE — PLAN OF CARE
Problem: Risk for Delirium  Goal: Improved Sleep  Outcome: No Change     Problem: Pain Acute  Goal: Acceptable Pain Control and Functional Ability  Outcome: No Change    Patient is A&Ox4 and is currently resting. Patient stated he is still experiencing pain at 7-9/10 throughout the overnight, and was given PRN medications to intervene when available. VSS and patient stated he is voiding/stooling/passing flatus well. Plan of care to continue today per providers and care teams, with discharge possible pending continued improvement and authorization.    Celsa Garduno RN

## 2022-01-29 NOTE — PLAN OF CARE
Problem: Pain Acute  Goal: Acceptable Pain Control and Functional Ability  Outcome: No Change  Intervention: Develop Pain Management Plan  Recent Flowsheet Documentation  Taken 1/28/2022 2202 by Karina Yoo, RN  Pain Management Interventions: repositioned  Taken 1/28/2022 2115 by Karina Yoo, RN  Pain Management Interventions: medication (see MAR)  Taken 1/28/2022 1913 by Karina Yoo, RN  Pain Management Interventions: rest  Taken 1/28/2022 1630 by Karina Yoo, RN  Pain Management Interventions: medication offered but refused       Pt c/o moderate/severe generalized pain throughout bilateral arms & legs. Managed w/ Atarax, Oxycodone & APAP. Frequent, non-productive cough noted w/ coarse crackles. Continuing on Mg+ protocol; recheck in the AM. Fluid restriction of 1500 ml per day in place; had 560 ml this shift. Up w/ SBA. Saline locked. Regular diet. Alarms activated for safety. Awaiting bronchoscopy culture results.

## 2022-01-29 NOTE — PROGRESS NOTES
[facility] ACUTE PAIN SERVICE    ([facility], [facility], [facility])  Daily PAIN Progress Note    Assessment/Plan:  Familia Watson is a 71 year old male who was admitted on 1/25/2022.  I was asked to see the patient for arm/leg pain. Admitted for cough and found to have left cavitary lung mass.  Underwent bronchoscopy yesterday.  Pain remains in the arms and legs.  He is opioid tolerant at baseline taking oxycodone for headaches chronically..  In the last 24 hours he has utilized 50 mg of oxycodone.  Patient to discharge today, 15 tab of oxycodone per Dr. Gandhi.    PLAN:   1) Upper and lower extremity myalgias secondary to current illness vs infection vs malignancy? .  Home MME is 40 mg.  Consider neurology referral at the time of discharge with EMG.  Added gabapentin yesterday for arm and leg pain but may also be beneficial for cough.  2)Multimodal Medication Therapy  Topical: add diclofenac and lidocaine to extremities   Adjuvants: Atarax 50mg qidprn, trazodone 50mg qhsprn & added gabapentin at HS 300mg increase to 600mg tomorrow   Antidepressants/anxiolytics: none (x 1 for bronch procedure)  Opioids: Cont  oxycodone at increase dose/freq  3)Non-medication interventions- Ice, Heat, physical therapy: not ordered yet  4)Constipation Prophylaxis- senna and miralax ; docusate BID   5) Follow up   -Opioid prescriber has been Daniel Al. PCP is Daniel Al  -Discharge Recommendations - We recommend prescribing the following at the time of discharge: small supply of oxycodone and gabapentin.         <principal problem not specified>   Patient Active Problem List   Diagnosis     Alcohol abuse with withdrawal (H)     Lactic acidosis     Essential hypertension, benign     Migraine without aura and without status migrainosus, not intractable     Hypomagnesemia     Hyponatremia     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     Anemia, unspecified type        History   Drug Use Unknown          Tobacco Use      Smoking status: Current Every Day Smoker        Packs/day: 1.00        Years: 57.00        Pack years: 57      Smokeless tobacco: Never Used      Nicole Sarmiento, PharmD  Acute Care Pain Management Program Hour 7a-1700  [facility] ([place] [place] [place])   Page via [employer]- Click HERE to page Nay or call [phone]

## 2022-01-31 PROCEDURE — 88342 IMHCHEM/IMCYTCHM 1ST ANTB: CPT | Mod: 26 | Performed by: PATHOLOGY

## 2022-01-31 PROCEDURE — 88305 TISSUE EXAM BY PATHOLOGIST: CPT | Mod: 26 | Performed by: PATHOLOGY

## 2022-01-31 PROCEDURE — 88341 IMHCHEM/IMCYTCHM EA ADD ANTB: CPT | Mod: 26 | Performed by: PATHOLOGY

## 2022-02-02 ENCOUNTER — LAB REQUISITION (OUTPATIENT)
Dept: LAB | Facility: CLINIC | Age: 72
End: 2022-02-02
Payer: MEDICARE

## 2022-02-02 ENCOUNTER — NURSE TRIAGE (OUTPATIENT)
Dept: NURSING | Facility: CLINIC | Age: 72
End: 2022-02-02
Payer: MEDICARE

## 2022-02-02 ENCOUNTER — MEDICAL CORRESPONDENCE (OUTPATIENT)
Dept: HEALTH INFORMATION MANAGEMENT | Facility: CLINIC | Age: 72
End: 2022-02-02

## 2022-02-02 ENCOUNTER — TRANSFERRED RECORDS (OUTPATIENT)
Dept: HEALTH INFORMATION MANAGEMENT | Facility: CLINIC | Age: 72
End: 2022-02-02

## 2022-02-02 DIAGNOSIS — R91.8 OTHER NONSPECIFIC ABNORMAL FINDING OF LUNG FIELD: ICD-10-CM

## 2022-02-02 DIAGNOSIS — D50.8 OTHER IRON DEFICIENCY ANEMIAS: ICD-10-CM

## 2022-02-02 LAB
ALBUMIN SERPL-MCNC: 2.6 G/DL (ref 3.5–5)
ALP SERPL-CCNC: 141 U/L (ref 45–120)
ALT SERPL W P-5'-P-CCNC: 60 U/L (ref 0–45)
ANION GAP SERPL CALCULATED.3IONS-SCNC: 13 MMOL/L (ref 5–18)
AST SERPL W P-5'-P-CCNC: 45 U/L (ref 0–40)
BASOPHILS # BLD MANUAL: 0 10E3/UL (ref 0–0.2)
BASOPHILS NFR BLD MANUAL: 0 %
BILIRUB SERPL-MCNC: 0.3 MG/DL (ref 0–1)
BUN SERPL-MCNC: 8 MG/DL (ref 8–28)
CALCIUM SERPL-MCNC: 9 MG/DL (ref 8.5–10.5)
CHLORIDE BLD-SCNC: 99 MMOL/L (ref 98–107)
CO2 SERPL-SCNC: 21 MMOL/L (ref 22–31)
CREAT SERPL-MCNC: 0.72 MG/DL (ref 0.7–1.3)
DACRYOCYTES BLD QL SMEAR: SLIGHT
ELLIPTOCYTES BLD QL SMEAR: SLIGHT
EOSINOPHIL # BLD MANUAL: 0.5 10E3/UL (ref 0–0.7)
EOSINOPHIL NFR BLD MANUAL: 4 %
ERYTHROCYTE [DISTWIDTH] IN BLOOD BY AUTOMATED COUNT: 15.3 % (ref 10–15)
GFR SERPL CREATININE-BSD FRML MDRD: >90 ML/MIN/1.73M2
GLUCOSE BLD-MCNC: 106 MG/DL (ref 70–125)
HCT VFR BLD AUTO: 28 % (ref 40–53)
HGB BLD-MCNC: 9.1 G/DL (ref 13.3–17.7)
LYMPHOCYTES # BLD MANUAL: 1.7 10E3/UL (ref 0.8–5.3)
LYMPHOCYTES NFR BLD MANUAL: 15 %
MCH RBC QN AUTO: 27.5 PG (ref 26.5–33)
MCHC RBC AUTO-ENTMCNC: 32.5 G/DL (ref 31.5–36.5)
MCV RBC AUTO: 85 FL (ref 78–100)
MONOCYTES # BLD MANUAL: 0.1 10E3/UL (ref 0–1.3)
MONOCYTES NFR BLD MANUAL: 1 %
MYELOCYTES # BLD MANUAL: 0.1 10E3/UL
MYELOCYTES NFR BLD MANUAL: 1 %
NEUTROPHILS # BLD MANUAL: 8.9 10E3/UL (ref 1.6–8.3)
NEUTROPHILS NFR BLD MANUAL: 79 %
PLAT MORPH BLD: ABNORMAL
PLATELET # BLD AUTO: 807 10E3/UL (ref 150–450)
POLYCHROMASIA BLD QL SMEAR: SLIGHT
POTASSIUM BLD-SCNC: 5 MMOL/L (ref 3.5–5)
PROT SERPL-MCNC: 6.8 G/DL (ref 6–8)
RBC # BLD AUTO: 3.31 10E6/UL (ref 4.4–5.9)
RBC MORPH BLD: ABNORMAL
SODIUM SERPL-SCNC: 133 MMOL/L (ref 136–145)
WBC # BLD AUTO: 11.3 10E3/UL (ref 4–11)

## 2022-02-02 PROCEDURE — 85027 COMPLETE CBC AUTOMATED: CPT | Mod: ORL | Performed by: STUDENT IN AN ORGANIZED HEALTH CARE EDUCATION/TRAINING PROGRAM

## 2022-02-02 PROCEDURE — 80053 COMPREHEN METABOLIC PANEL: CPT | Mod: ORL | Performed by: STUDENT IN AN ORGANIZED HEALTH CARE EDUCATION/TRAINING PROGRAM

## 2022-02-02 NOTE — TELEPHONE ENCOUNTER
Patient calling because he missed a phone call from the hospital and there was no message left.     Unable to see any notes or messages for him in the chart.     Sangeetha Duke RN  02/02/22 2:30 PM  [facility] Nurse Advisor    Additional Information    Information only question and nurse able to answer    Protocols used: INFORMATION ONLY CALL - NO TRIAGE-A-OH

## 2022-02-07 LAB
PATH REPORT.ADDENDUM SPEC: NORMAL
PATH REPORT.ADDENDUM SPEC: NORMAL
PATH REPORT.COMMENTS IMP SPEC: NORMAL
PATH REPORT.FINAL DX SPEC: NORMAL
PATH REPORT.GROSS SPEC: NORMAL
PATH REPORT.MICROSCOPIC SPEC OTHER STN: NORMAL
PATH REPORT.RELEVANT HX SPEC: NORMAL
PHOTO IMAGE: NORMAL

## 2022-02-09 ENCOUNTER — TELEPHONE (OUTPATIENT)
Dept: PULMONOLOGY | Facility: OTHER | Age: 72
End: 2022-02-09
Payer: MEDICARE

## 2022-02-09 NOTE — LETTER
2022    Dear Dr. Martinez,    See below for documentation regarding Familia Watson ( 1950). Please feel free to call me at any time if needed.    Sincerely,    Eris Mckeon MD  Pulmonary and Critical Care Medicine  [facility]  Cell [phone]  Office [phone]  Pager [phone]  (he/him/his)    Pulmonary Telephone Note    Path came back with acute inflammation, some necrosis but no malignancy. Likely lung abscess. Talked to Familia by telephone. Cough has resolved. Feeling a lot better. More energy. Appetite is improved, eating a lot. Emphasizing protein in his diet. Gained 4 lbs since hospitalization. Continues the amoxicillin-clavulanate. No diarrhea. Taking probiotic. Will see what the follow-up CT shows on  and I have follow-up with him on . He is in agreement.

## 2022-02-10 LAB — BACTERIA BRONCH: NORMAL

## 2022-02-10 NOTE — CONFIDENTIAL NOTE
Pulmonary Telephone Note    Path came back with acute inflammation, some necrosis but no malignancy. Likely lung abscess. Talked to Familia by telephone. Cough has resolved. Feeling a lot better. More energy. Appetite is improved, eating a lot. Emphasizing protein in his diet. Gained 4 lbs since hospitalization. Continues the amoxicillin-clavulanate. No diarrhea. Taking probiotic. Will see what the follow-up CT shows on February 25 and I have follow-up with him on March 2. He is in agreement.    Eris Mckeon MD  Pulmonary and Critical Care Medicine  [facility]  Office [phone]  Pager [phone]  (he/him/his)

## 2022-02-16 LAB
PATH REPORT.ADDENDUM SPEC: ABNORMAL
PATH REPORT.ADDENDUM SPEC: ABNORMAL
PATH REPORT.COMMENTS IMP SPEC: ABNORMAL
PATH REPORT.COMMENTS IMP SPEC: ABNORMAL
PATH REPORT.COMMENTS IMP SPEC: YES
PATH REPORT.COMMENTS IMP SPEC: YES
PATH REPORT.FINAL DX SPEC: ABNORMAL
PATH REPORT.GROSS SPEC: ABNORMAL
PATH REPORT.RELEVANT HX SPEC: ABNORMAL

## 2022-02-24 LAB
BACTERIA BRONCH: ABNORMAL
BACTERIA BRONCH: NO GROWTH

## 2022-02-25 ENCOUNTER — HOSPITAL ENCOUNTER (OUTPATIENT)
Dept: CT IMAGING | Facility: CLINIC | Age: 72
Discharge: HOME | End: 2022-02-25
Attending: INTERNAL MEDICINE | Admitting: INTERNAL MEDICINE
Payer: MEDICARE

## 2022-02-25 DIAGNOSIS — J98.4 CAVITATING MASS OF UPPER LOBE OF LEFT LUNG: ICD-10-CM

## 2022-02-25 PROCEDURE — 71260 CT THORAX DX C+: CPT

## 2022-02-25 PROCEDURE — 250N000011 HC RX IP 250 OP 636: Performed by: INTERNAL MEDICINE

## 2022-02-25 RX ORDER — IOPAMIDOL 755 MG/ML
100 INJECTION, SOLUTION INTRAVASCULAR ONCE
Status: COMPLETED | OUTPATIENT
Start: 2022-02-25 | End: 2022-02-25

## 2022-02-25 RX ADMIN — IOPAMIDOL 100 ML: 755 INJECTION, SOLUTION INTRAVENOUS at 12:24

## 2022-03-02 ENCOUNTER — OFFICE VISIT (OUTPATIENT)
Dept: PULMONOLOGY | Facility: OTHER | Age: 72
End: 2022-03-02
Payer: MEDICARE

## 2022-03-02 VITALS
SYSTOLIC BLOOD PRESSURE: 132 MMHG | WEIGHT: 137 LBS | RESPIRATION RATE: 16 BRPM | OXYGEN SATURATION: 98 % | DIASTOLIC BLOOD PRESSURE: 72 MMHG | BODY MASS INDEX: 20.83 KG/M2 | HEART RATE: 104 BPM

## 2022-03-02 DIAGNOSIS — J98.4 CAVITATING MASS OF UPPER LOBE OF LEFT LUNG: Primary | ICD-10-CM

## 2022-03-02 PROCEDURE — 99214 OFFICE O/P EST MOD 30 MIN: CPT | Performed by: INTERNAL MEDICINE

## 2022-03-02 NOTE — PATIENT INSTRUCTIONS
It was good to see you in clinic today. This is what we discussed:    1. The area of inflammation in the top of the left lung has dramatically improved, which is consistent with a lung abscess.  2. We still need to monitor it with a chest CT in 3 months to make sure that it continues to heal.  3. Finish the rest of the antibiotic.  4. I will call you with the repeat chest CT result.  5. Work on quitting smoking.  6. Contact me with questions or concerning symptoms.    Eris Mckeon MD  Pulmonary and Critical Care Medicine  [facility]  Office [phone]
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
## 2022-03-24 LAB
ACID FAST STAIN (ARUP): NORMAL
ACID FAST STAIN (ARUP): NORMAL

## 2022-04-07 ENCOUNTER — APPOINTMENT (OUTPATIENT)
Dept: RADIOLOGY | Facility: CLINIC | Age: 72
End: 2022-04-07
Attending: EMERGENCY MEDICINE
Payer: MEDICARE

## 2022-04-07 ENCOUNTER — HOSPITAL ENCOUNTER (OUTPATIENT)
Facility: CLINIC | Age: 72
Setting detail: OBSERVATION
Discharge: HOME | End: 2022-04-08
Attending: EMERGENCY MEDICINE | Admitting: INTERNAL MEDICINE
Payer: MEDICARE

## 2022-04-07 DIAGNOSIS — F10.930 ALCOHOL WITHDRAWAL SYNDROME WITHOUT COMPLICATION (H): ICD-10-CM

## 2022-04-07 DIAGNOSIS — F10.139 ALCOHOL ABUSE WITH WITHDRAWAL (H): ICD-10-CM

## 2022-04-07 DIAGNOSIS — R06.00 DYSPNEA, UNSPECIFIED TYPE: ICD-10-CM

## 2022-04-07 DIAGNOSIS — I10 ESSENTIAL HYPERTENSION, BENIGN: Primary | ICD-10-CM

## 2022-04-07 LAB
ALBUMIN SERPL-MCNC: 3.7 G/DL (ref 3.5–5)
ALP SERPL-CCNC: 68 U/L (ref 45–120)
ALT SERPL W P-5'-P-CCNC: 13 U/L (ref 0–45)
ANION GAP SERPL CALCULATED.3IONS-SCNC: 13 MMOL/L (ref 5–18)
AST SERPL W P-5'-P-CCNC: 19 U/L (ref 0–40)
BILIRUB SERPL-MCNC: 0.2 MG/DL (ref 0–1)
BUN SERPL-MCNC: 10 MG/DL (ref 8–28)
CALCIUM SERPL-MCNC: 9.1 MG/DL (ref 8.5–10.5)
CHLORIDE BLD-SCNC: 105 MMOL/L (ref 98–107)
CO2 SERPL-SCNC: 21 MMOL/L (ref 22–31)
CREAT SERPL-MCNC: 0.74 MG/DL (ref 0.7–1.3)
ERYTHROCYTE [DISTWIDTH] IN BLOOD BY AUTOMATED COUNT: 16.8 % (ref 10–15)
ETHANOL SERPL-MCNC: 155 MG/DL
GFR SERPL CREATININE-BSD FRML MDRD: >90 ML/MIN/1.73M2
GLUCOSE BLD-MCNC: 80 MG/DL (ref 70–125)
HCT VFR BLD AUTO: 38.3 % (ref 40–53)
HGB BLD-MCNC: 12.8 G/DL (ref 13.3–17.7)
MAGNESIUM SERPL-MCNC: 2.2 MG/DL (ref 1.8–2.6)
MCH RBC QN AUTO: 29.2 PG (ref 26.5–33)
MCHC RBC AUTO-ENTMCNC: 33.4 G/DL (ref 31.5–36.5)
MCV RBC AUTO: 87 FL (ref 78–100)
PHOSPHATE SERPL-MCNC: 3.9 MG/DL (ref 2.5–4.5)
PLATELET # BLD AUTO: 304 10E3/UL (ref 150–450)
POTASSIUM BLD-SCNC: 3.9 MMOL/L (ref 3.5–5)
PROT SERPL-MCNC: 7 G/DL (ref 6–8)
RBC # BLD AUTO: 4.38 10E6/UL (ref 4.4–5.9)
SODIUM SERPL-SCNC: 139 MMOL/L (ref 136–145)
WBC # BLD AUTO: 8.9 10E3/UL (ref 4–11)

## 2022-04-07 PROCEDURE — 36415 COLL VENOUS BLD VENIPUNCTURE: CPT | Performed by: EMERGENCY MEDICINE

## 2022-04-07 PROCEDURE — 99285 EMERGENCY DEPT VISIT HI MDM: CPT | Mod: 25

## 2022-04-07 PROCEDURE — 80053 COMPREHEN METABOLIC PANEL: CPT | Performed by: EMERGENCY MEDICINE

## 2022-04-07 PROCEDURE — 83735 ASSAY OF MAGNESIUM: CPT | Performed by: EMERGENCY MEDICINE

## 2022-04-07 PROCEDURE — 71046 X-RAY EXAM CHEST 2 VIEWS: CPT

## 2022-04-07 PROCEDURE — 84100 ASSAY OF PHOSPHORUS: CPT | Performed by: EMERGENCY MEDICINE

## 2022-04-07 PROCEDURE — 85027 COMPLETE CBC AUTOMATED: CPT | Performed by: EMERGENCY MEDICINE

## 2022-04-07 PROCEDURE — 82077 ASSAY SPEC XCP UR&BREATH IA: CPT | Performed by: EMERGENCY MEDICINE

## 2022-04-07 RX ORDER — DIAZEPAM 5 MG
10 TABLET ORAL EVERY 30 MIN PRN
Status: DISCONTINUED | OUTPATIENT
Start: 2022-04-07 | End: 2022-04-08

## 2022-04-07 RX ORDER — FOLIC ACID 1 MG/1
1 TABLET ORAL DAILY
Status: DISCONTINUED | OUTPATIENT
Start: 2022-04-08 | End: 2022-04-08

## 2022-04-07 RX ORDER — MULTIPLE VITAMINS W/ MINERALS TAB 9MG-400MCG
1 TAB ORAL DAILY
Status: DISCONTINUED | OUTPATIENT
Start: 2022-04-08 | End: 2022-04-08

## 2022-04-07 RX ORDER — DIAZEPAM 10 MG/2ML
5-10 INJECTION, SOLUTION INTRAMUSCULAR; INTRAVENOUS EVERY 30 MIN PRN
Status: DISCONTINUED | OUTPATIENT
Start: 2022-04-07 | End: 2022-04-08

## 2022-04-07 RX ORDER — HALOPERIDOL 5 MG/ML
2.5-5 INJECTION INTRAMUSCULAR EVERY 6 HOURS PRN
Status: DISCONTINUED | OUTPATIENT
Start: 2022-04-07 | End: 2022-04-08

## 2022-04-07 RX ORDER — OLANZAPINE 5 MG/1
5-10 TABLET, ORALLY DISINTEGRATING ORAL EVERY 6 HOURS PRN
Status: DISCONTINUED | OUTPATIENT
Start: 2022-04-07 | End: 2022-04-08

## 2022-04-07 RX ORDER — FLUMAZENIL 0.1 MG/ML
0.2 INJECTION, SOLUTION INTRAVENOUS
Status: DISCONTINUED | OUTPATIENT
Start: 2022-04-07 | End: 2022-04-08

## 2022-04-07 ASSESSMENT — ENCOUNTER SYMPTOMS
SHORTNESS OF BREATH: 0
FEVER: 0
VOMITING: 0
ABDOMINAL PAIN: 0
TREMORS: 1

## 2022-04-08 ENCOUNTER — APPOINTMENT (OUTPATIENT)
Dept: CT IMAGING | Facility: CLINIC | Age: 72
End: 2022-04-08
Attending: INTERNAL MEDICINE
Payer: MEDICARE

## 2022-04-08 VITALS
BODY MASS INDEX: 19.77 KG/M2 | TEMPERATURE: 97.6 F | DIASTOLIC BLOOD PRESSURE: 91 MMHG | WEIGHT: 130 LBS | SYSTOLIC BLOOD PRESSURE: 182 MMHG | OXYGEN SATURATION: 100 % | HEART RATE: 78 BPM | RESPIRATION RATE: 18 BRPM

## 2022-04-08 PROBLEM — R06.00 DYSPNEA, UNSPECIFIED TYPE: Status: ACTIVE | Noted: 2022-04-08

## 2022-04-08 PROBLEM — F10.930 ALCOHOL WITHDRAWAL SYNDROME WITHOUT COMPLICATION (H): Status: ACTIVE | Noted: 2022-04-08

## 2022-04-08 LAB
ALBUMIN SERPL-MCNC: 3.5 G/DL (ref 3.5–5)
ALP SERPL-CCNC: 68 U/L (ref 45–120)
ALT SERPL W P-5'-P-CCNC: 13 U/L (ref 0–45)
ANION GAP SERPL CALCULATED.3IONS-SCNC: 11 MMOL/L (ref 5–18)
AST SERPL W P-5'-P-CCNC: 19 U/L (ref 0–40)
BASOPHILS # BLD AUTO: 0.1 10E3/UL (ref 0–0.2)
BASOPHILS NFR BLD AUTO: 1 %
BILIRUB SERPL-MCNC: 0.3 MG/DL (ref 0–1)
BUN SERPL-MCNC: 9 MG/DL (ref 8–28)
CALCIUM SERPL-MCNC: 8.9 MG/DL (ref 8.5–10.5)
CHLORIDE BLD-SCNC: 105 MMOL/L (ref 98–107)
CO2 SERPL-SCNC: 22 MMOL/L (ref 22–31)
CREAT SERPL-MCNC: 0.72 MG/DL (ref 0.7–1.3)
EOSINOPHIL # BLD AUTO: 0.8 10E3/UL (ref 0–0.7)
EOSINOPHIL NFR BLD AUTO: 12 %
ERYTHROCYTE [DISTWIDTH] IN BLOOD BY AUTOMATED COUNT: 16.7 % (ref 10–15)
GFR SERPL CREATININE-BSD FRML MDRD: >90 ML/MIN/1.73M2
GLUCOSE BLD-MCNC: 81 MG/DL (ref 70–125)
HCT VFR BLD AUTO: 37.5 % (ref 40–53)
HGB BLD-MCNC: 12.4 G/DL (ref 13.3–17.7)
IMM GRANULOCYTES # BLD: 0 10E3/UL
IMM GRANULOCYTES NFR BLD: 0 %
LYMPHOCYTES # BLD AUTO: 2.4 10E3/UL (ref 0.8–5.3)
LYMPHOCYTES NFR BLD AUTO: 34 %
MAGNESIUM SERPL-MCNC: 2 MG/DL (ref 1.8–2.6)
MCH RBC QN AUTO: 29.7 PG (ref 26.5–33)
MCHC RBC AUTO-ENTMCNC: 33.1 G/DL (ref 31.5–36.5)
MCV RBC AUTO: 90 FL (ref 78–100)
MONOCYTES # BLD AUTO: 0.5 10E3/UL (ref 0–1.3)
MONOCYTES NFR BLD AUTO: 8 %
NEUTROPHILS # BLD AUTO: 3.1 10E3/UL (ref 1.6–8.3)
NEUTROPHILS NFR BLD AUTO: 45 %
NRBC # BLD AUTO: 0 10E3/UL
NRBC BLD AUTO-RTO: 0 /100
PHOSPHATE SERPL-MCNC: 3.6 MG/DL (ref 2.5–4.5)
PLATELET # BLD AUTO: 284 10E3/UL (ref 150–450)
POTASSIUM BLD-SCNC: 4.3 MMOL/L (ref 3.5–5)
PROT SERPL-MCNC: 6.9 G/DL (ref 6–8)
RBC # BLD AUTO: 4.18 10E6/UL (ref 4.4–5.9)
SARS-COV-2 RNA RESP QL NAA+PROBE: NEGATIVE
SODIUM SERPL-SCNC: 138 MMOL/L (ref 136–145)
WBC # BLD AUTO: 7 10E3/UL (ref 4–11)

## 2022-04-08 PROCEDURE — 96361 HYDRATE IV INFUSION ADD-ON: CPT

## 2022-04-08 PROCEDURE — 250N000013 HC RX MED GY IP 250 OP 250 PS 637: Performed by: INTERNAL MEDICINE

## 2022-04-08 PROCEDURE — 99220 PR INITIAL OBSERVATION CARE,LEVEL III: CPT | Performed by: INTERNAL MEDICINE

## 2022-04-08 PROCEDURE — 85025 COMPLETE CBC W/AUTO DIFF WBC: CPT | Performed by: INTERNAL MEDICINE

## 2022-04-08 PROCEDURE — G0378 HOSPITAL OBSERVATION PER HR: HCPCS

## 2022-04-08 PROCEDURE — 83735 ASSAY OF MAGNESIUM: CPT | Performed by: INTERNAL MEDICINE

## 2022-04-08 PROCEDURE — 84100 ASSAY OF PHOSPHORUS: CPT | Performed by: INTERNAL MEDICINE

## 2022-04-08 PROCEDURE — 87635 SARS-COV-2 COVID-19 AMP PRB: CPT | Performed by: EMERGENCY MEDICINE

## 2022-04-08 PROCEDURE — 96360 HYDRATION IV INFUSION INIT: CPT

## 2022-04-08 PROCEDURE — 70450 CT HEAD/BRAIN W/O DYE: CPT

## 2022-04-08 PROCEDURE — 80053 COMPREHEN METABOLIC PANEL: CPT | Performed by: INTERNAL MEDICINE

## 2022-04-08 PROCEDURE — 36415 COLL VENOUS BLD VENIPUNCTURE: CPT | Performed by: INTERNAL MEDICINE

## 2022-04-08 PROCEDURE — C9803 HOPD COVID-19 SPEC COLLECT: HCPCS

## 2022-04-08 PROCEDURE — 250N000013 HC RX MED GY IP 250 OP 250 PS 637: Performed by: EMERGENCY MEDICINE

## 2022-04-08 PROCEDURE — 258N000003 HC RX IP 258 OP 636: Performed by: INTERNAL MEDICINE

## 2022-04-08 RX ORDER — ATORVASTATIN CALCIUM 40 MG/1
40 TABLET, FILM COATED ORAL AT BEDTIME
Status: DISCONTINUED | OUTPATIENT
Start: 2022-04-08 | End: 2022-04-08 | Stop reason: HOSPADM

## 2022-04-08 RX ORDER — HYDROCODONE BITARTRATE AND ACETAMINOPHEN 5; 325 MG/1; MG/1
1 TABLET ORAL EVERY 4 HOURS PRN
Status: DISCONTINUED | OUTPATIENT
Start: 2022-04-08 | End: 2022-04-08 | Stop reason: HOSPADM

## 2022-04-08 RX ORDER — GABAPENTIN 100 MG/1
100 CAPSULE ORAL 2 TIMES DAILY
Qty: 60 CAPSULE | Refills: 0 | Status: ON HOLD | OUTPATIENT
Start: 2022-04-08 | End: 2022-11-20

## 2022-04-08 RX ORDER — GABAPENTIN 100 MG/1
100 CAPSULE ORAL EVERY 8 HOURS
Status: DISCONTINUED | OUTPATIENT
Start: 2022-04-10 | End: 2022-04-08 | Stop reason: HOSPADM

## 2022-04-08 RX ORDER — LIDOCAINE 40 MG/G
CREAM TOPICAL
Status: DISCONTINUED | OUTPATIENT
Start: 2022-04-08 | End: 2022-04-08 | Stop reason: HOSPADM

## 2022-04-08 RX ORDER — SODIUM CHLORIDE 9 MG/ML
INJECTION, SOLUTION INTRAVENOUS CONTINUOUS
Status: DISCONTINUED | OUTPATIENT
Start: 2022-04-08 | End: 2022-04-08

## 2022-04-08 RX ORDER — OLANZAPINE 5 MG/1
5-10 TABLET, ORALLY DISINTEGRATING ORAL EVERY 6 HOURS PRN
Status: DISCONTINUED | OUTPATIENT
Start: 2022-04-08 | End: 2022-04-08 | Stop reason: HOSPADM

## 2022-04-08 RX ORDER — ACETAMINOPHEN 325 MG/1
650 TABLET ORAL EVERY 4 HOURS PRN
Status: DISCONTINUED | OUTPATIENT
Start: 2022-04-08 | End: 2022-04-08 | Stop reason: HOSPADM

## 2022-04-08 RX ORDER — GABAPENTIN 300 MG/1
300 CAPSULE ORAL EVERY 8 HOURS
Status: DISCONTINUED | OUTPATIENT
Start: 2022-04-13 | End: 2022-04-08

## 2022-04-08 RX ORDER — FLUMAZENIL 0.1 MG/ML
0.2 INJECTION, SOLUTION INTRAVENOUS
Status: DISCONTINUED | OUTPATIENT
Start: 2022-04-08 | End: 2022-04-08 | Stop reason: HOSPADM

## 2022-04-08 RX ORDER — GABAPENTIN 100 MG/1
200 CAPSULE ORAL EVERY 8 HOURS
Status: DISCONTINUED | OUTPATIENT
Start: 2022-04-08 | End: 2022-04-08

## 2022-04-08 RX ORDER — LANOLIN ALCOHOL/MO/W.PET/CERES
100 CREAM (GRAM) TOPICAL DAILY
Qty: 30 TABLET | Refills: 0 | Status: SHIPPED | OUTPATIENT
Start: 2022-04-08 | End: 2022-05-08

## 2022-04-08 RX ORDER — LANOLIN ALCOHOL/MO/W.PET/CERES
1000 CREAM (GRAM) TOPICAL DAILY
Status: DISCONTINUED | OUTPATIENT
Start: 2022-04-08 | End: 2022-04-08 | Stop reason: HOSPADM

## 2022-04-08 RX ORDER — LORAZEPAM 2 MG/ML
1-2 INJECTION INTRAMUSCULAR EVERY 30 MIN PRN
Status: DISCONTINUED | OUTPATIENT
Start: 2022-04-08 | End: 2022-04-08 | Stop reason: HOSPADM

## 2022-04-08 RX ORDER — PROCHLORPERAZINE MALEATE 10 MG
10 TABLET ORAL 2 TIMES DAILY PRN
COMMUNITY
Start: 2022-11-28 | End: 2022-12-12

## 2022-04-08 RX ORDER — AMLODIPINE BESYLATE 10 MG/1
10 TABLET ORAL DAILY
Qty: 30 TABLET | Refills: 0 | Status: ON HOLD | OUTPATIENT
Start: 2022-04-08 | End: 2022-11-20

## 2022-04-08 RX ORDER — MULTIPLE VITAMINS W/ MINERALS TAB 9MG-400MCG
1 TAB ORAL DAILY
Status: DISCONTINUED | OUTPATIENT
Start: 2022-04-08 | End: 2022-04-08 | Stop reason: HOSPADM

## 2022-04-08 RX ORDER — LORAZEPAM 1 MG/1
1-2 TABLET ORAL EVERY 30 MIN PRN
Status: DISCONTINUED | OUTPATIENT
Start: 2022-04-08 | End: 2022-04-08 | Stop reason: HOSPADM

## 2022-04-08 RX ORDER — CLONIDINE HYDROCHLORIDE 0.1 MG/1
0.1 TABLET ORAL EVERY 8 HOURS
Status: DISCONTINUED | OUTPATIENT
Start: 2022-04-08 | End: 2022-04-08 | Stop reason: HOSPADM

## 2022-04-08 RX ORDER — FOLIC ACID 1 MG/1
1 TABLET ORAL DAILY
Status: DISCONTINUED | OUTPATIENT
Start: 2022-04-08 | End: 2022-04-08 | Stop reason: HOSPADM

## 2022-04-08 RX ORDER — HALOPERIDOL 5 MG/ML
2.5-5 INJECTION INTRAMUSCULAR EVERY 6 HOURS PRN
Status: DISCONTINUED | OUTPATIENT
Start: 2022-04-08 | End: 2022-04-08 | Stop reason: HOSPADM

## 2022-04-08 RX ORDER — HYDROCODONE BITARTRATE AND ACETAMINOPHEN 5; 325 MG/1; MG/1
1 TABLET ORAL EVERY 4 HOURS PRN
Status: COMPLETED | OUTPATIENT
Start: 2022-04-08 | End: 2022-04-08

## 2022-04-08 RX ADMIN — DIAZEPAM 10 MG: 5 TABLET ORAL at 00:16

## 2022-04-08 RX ADMIN — HYDROCODONE BITARTRATE AND ACETAMINOPHEN 1 TABLET: 5; 325 TABLET ORAL at 08:57

## 2022-04-08 RX ADMIN — Medication 100 MG: at 08:56

## 2022-04-08 RX ADMIN — DIAZEPAM 10 MG: 5 TABLET ORAL at 01:36

## 2022-04-08 RX ADMIN — LORAZEPAM 1 MG: 1 TABLET ORAL at 04:57

## 2022-04-08 RX ADMIN — HYDROCODONE BITARTRATE AND ACETAMINOPHEN 1 TABLET: 5; 325 TABLET ORAL at 02:51

## 2022-04-08 RX ADMIN — CLONIDINE HYDROCHLORIDE 0.1 MG: 0.1 TABLET ORAL at 10:48

## 2022-04-08 RX ADMIN — GABAPENTIN 200 MG: 100 CAPSULE ORAL at 02:18

## 2022-04-08 RX ADMIN — LORAZEPAM 1 MG: 1 TABLET ORAL at 02:18

## 2022-04-08 RX ADMIN — GABAPENTIN 100 MG: 100 CAPSULE ORAL at 10:48

## 2022-04-08 RX ADMIN — SODIUM CHLORIDE: 9 INJECTION, SOLUTION INTRAVENOUS at 04:48

## 2022-04-08 RX ADMIN — MULTIPLE VITAMINS W/ MINERALS TAB 1 TABLET: TAB at 08:56

## 2022-04-08 RX ADMIN — FOLIC ACID 1 MG: 1 TABLET ORAL at 08:56

## 2022-04-08 RX ADMIN — CLONIDINE HYDROCHLORIDE 0.1 MG: 0.1 TABLET ORAL at 02:18

## 2022-04-08 RX ADMIN — CYANOCOBALAMIN TAB 1000 MCG 1000 MCG: 1000 TAB at 12:26

## 2022-04-08 ASSESSMENT — ACTIVITIES OF DAILY LIVING (ADL)
ADLS_ACUITY_SCORE: 9
ADLS_ACUITY_SCORE: 7
ADLS_ACUITY_SCORE: 9
ADLS_ACUITY_SCORE: 9
ADLS_ACUITY_SCORE: 7
ADLS_ACUITY_SCORE: 9
ADLS_ACUITY_SCORE: 7

## 2022-04-08 NOTE — PROVIDER NOTIFICATION
Notified provider (Dr. Rose) pt continues to complain of headache rated 7-8/10. Provider ordered head CT w/out contrast; if head CT clear then provider stated prn pain medication will be ordered.

## 2022-04-08 NOTE — DISCHARGE SUMMARY
[facility]  Hospitalist Discharge Summary      Date of Admission:  4/7/2022  Date of Discharge:  4/8/2022  Discharging Provider: Rosangela Isbell MD, [facility]  Discharge Service: Hospitalist Service    Discharge Diagnoses   Chronic alcohol abuse  Untreated hypertension  Chronic tobacco abuse  Failure to thrive  Concerns with medication compliance    Follow-ups Needed After Discharge   Follow-up Appointments     Follow-up and recommended labs and tests       Follow up with primary care provider, Daniel Martinez, within 7 days   for hypertension.  No follow up labs or test are needed.  It should be helpful to have advanced care directives in place for this patient    Please go to the [facility] for enrolling in addiction recovery   services. These are free for you.             Discharge Disposition   Discharged to home  Condition at discharge: Good      Hospital Course   71-year-old man with past medical history of chronic alcohol and tobacco dependence, no alcohol withdrawal seizures or DTs, poor dental hygiene, pulmonary abscess in February 2022, presented to the emergency department by private transportation with concerns of alcohol withdrawal despite patient having consumed alcohol about 10 hours prior to presentation his blood alcohol was elevated at 155, there was no evidence of PALAK, acidosis, electrolyte derangement or elevated LFTs.  Patient was admitted was treated with IV fluids and CIWA protocol did not require any significant amount of benzodiazepines.  The morning following his admission the patient expressed desire to get outpatient treatment.  On further questioning he told me he has never really been through detox and does not want to go to a community detox.  He told me that he is a   and [facility]  At that point in time we advised the patient to [facility] [place] which [facility] to [facility].  Patient was not  desirous of staying in the hospital anymore, he was not interested in seeing  or addiction medicine.  In light of all of the above with no acute medical care issues remaining and patient having a safe disposition which is his private home he is being discharged  Patient does have untreated hypertension likely related to his chronic alcohol abuse hence in light of the above he is being started on amlodipine which can be titrated by his primary care provider as deemed appropriate    Consultations This Hospital Stay   PHARMACY IP CONSULT  PHYSICAL THERAPY ADULT IP CONSULT  OCCUPATIONAL THERAPY ADULT IP CONSULT    Code Status   Full Code    Time Spent on this Encounter   I, Rosangela Isbell MD, MARIANNEA, personally saw the patient today and spent greater than 30 minutes discharging this patient.       Rosangela Isbell MD, LILLIE  [facility] EMERGENCY ROOM  [address] [place] [id number]  Phone: [phone]  Fax: [phone]  ______________________________________________________________________    Physical Exam   Vital Signs: Temp: 97.6  F (36.4  C) Temp src: Oral BP: (!) 182/91 (pt tremulous) Pulse: 78   Resp: 18 SpO2: 100 % O2 Device: None (Room air)    Weight: 130 lbs 0 oz  General Appearance: Alert awake oriented x3 appears older than stated age smelling of tobacco  Respiratory: Occasional expiratory wheezing  Cardiovascular: S1-S2 regular rate and rhythm  GI: Soft nontender nondistended bowel sounds are present  Skin: No bruises or lesions  Other: Tremors are noted but according to the patient these are constant  No obvious neurological deficits       Primary Care Physician   Daniel Martinez    Discharge Orders      Reason for your hospital stay    Alcohol intoxication     Follow-up and recommended labs and tests     Follow up with primary care provider, Daniel Martinez, within 7 days for hypertension.  No follow up labs or test are needed.    Please go to the  [facility] for enrolling in addiction recovery services. These are free for you.     Activity    Your activity upon discharge: activity as tolerated     Full Code     Diet    Follow this diet upon discharge: Orders Placed This Encounter      Regular Diet Adult       Significant Results and Procedures   Most Recent 3 CBC's:Recent Labs   Lab Test 04/08/22  0608 04/07/22  2311 02/02/22  1212   WBC 7.0 8.9 11.3*   HGB 12.4* 12.8* 9.1*   MCV 90 87 85    304 807*     Most Recent 3 BMP's:Recent Labs   Lab Test 04/08/22  0608 04/07/22  2311 02/02/22  1212    139 133*   POTASSIUM 4.3 3.9 5.0   CHLORIDE 105 105 99   CO2 22 21* 21*   BUN 9 10 8   CR 0.72 0.74 0.72   ANIONGAP 11 13 13   MELLISA 8.9 9.1 9.0   GLC 81 80 106     Most Recent 2 LFT's:Recent Labs   Lab Test 04/08/22 0608 04/07/22  2311   AST 19 19   ALT 13 13   ALKPHOS 68 68   BILITOTAL 0.3 0.2   ,   Results for orders placed or performed during the hospital encounter of 04/07/22   Chest XR,  PA & LAT    Narrative    EXAM: XR CHEST 2 VW  LOCATION: [facility]  DATE/TIME: 4/7/2022 11:35 PM    INDICATION: hx of lung mass, dyspnea  COMPARISON: 02/25/2022      Impression    IMPRESSION: Stable cardiomediastinal silhouette. Persistent infiltrate left upper lobe with lucency medial likely residual cavitary process seen on prior CT. Follow-up CT can be obtained to assess for interval change. No vascular congestion or pleural   effusion.   CT Head w/o Contrast    Narrative    EXAM: CT HEAD W/O CONTRAST  LOCATION: [facility]  DATE/TIME: 4/8/2022 5:25 AM    INDICATION: Headache, chronic, new features or increased frequency  COMPARISON: CT head 05/13/2021  TECHNIQUE: Routine CT Head without IV contrast. Multiplanar reformats. Dose reduction techniques were used.    FINDINGS:  INTRACRANIAL CONTENTS: Left anterior craniotomy. No intracranial hemorrhage, extraaxial collection, or mass effect.  No CT evidence of acute  infarct. Mild to moderate presumed chronic small vessel ischemic changes. Mild to moderate generalized volume   loss. No hydrocephalus. 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    VISUALIZED ORBITS/SINUSES/MASTOIDS: No intraorbital abnormality. Mild to moderate mucosal thickening scattered about the paranasal sinuses. Left sphenoid sinus air-fluid level. Please correlate for acute sinusitis. No middle ear or mastoid effusion.    BONES/SOFT TISSUES: No acute abnormality. Vascular calcifications.      Impression    IMPRESSION:  1.  No acute intracranial process.  2.  Age-related changes described above.  3.  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  4.  Left sphenoid sinus air-fluid level. Please correlate for acute sinusitis.       Discharge Medications   Current Discharge Medication List      START taking these medications    Details   amLODIPine (NORVASC) 10 MG tablet Take 1 tablet (10 mg) by mouth daily  Qty: 30 tablet, Refills: 0    Associated Diagnoses: Essential hypertension, benign      gabapentin (NEURONTIN) 100 MG capsule Take 1 capsule (100 mg) by mouth 2 times daily  Qty: 60 capsule, Refills: 0    Associated Diagnoses: Alcohol abuse with withdrawal (H)      thiamine (B-1) 100 MG tablet Take 1 tablet (100 mg) by mouth daily  Qty: 30 tablet, Refills: 0    Associated Diagnoses: Alcohol abuse with withdrawal (H)         CONTINUE these medications which have NOT CHANGED    Details   atorvastatin (LIPITOR) 40 MG tablet Take 40 mg by mouth At Bedtime      cyanocobalamin (VITAMIN B-12) 1000 MCG tablet Take 1,000 mcg by mouth daily      guaiFENesin (ROBITUSSIN) 20 mg/mL SOLN solution Take 10 mLs by mouth every 4 hours as needed for cough  Qty: 180 mL, Refills: 0    Associated Diagnoses: 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      hydrOXYzine (VISTARIL) 50 MG capsule Take 50 mg by mouth 4 times daily as needed for itching  or anxiety      Lactobacillus Probiotic TABS Take 1 tablet by mouth daily  Qty: 50 tablet, Refills: 0    Associated Diagnoses: 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

## 2022-04-08 NOTE — PROGRESS NOTES
Reviewed medications, appointments, and discharge instructions with patient and patient verbalized understanding. Patient left with all personal belongings. Pt states will take a cab home.

## 2022-04-08 NOTE — PLAN OF CARE
Received report on pt around 0130.     Problem: Alcohol Withdrawal  Goal: Alcohol Withdrawal Symptom Control  Outcome: Ongoing, Progressing          Alert and oriented but forgetful at times. Hard of hearing. CIWA scored between 7-13; prn medication given per orders. C/o headache rated 7-8/10; heat CT obtained per orders and prn Norco administered x1. IV fluids infusing. Up to bathroom with SBA. Call light within reach.

## 2022-04-08 NOTE — PHARMACY-ADMISSION MEDICATION HISTORY
"Pharmacy Note - Admission Medication History    Pertinent Provider Information: Patient reported taking \"only one pill a day\" which may be the atorvastatin, but also has several as-needed meds that he takes every few days. He reported taking the vitamins every morning.    ______________________________________________________________________    Prior To Admission (PTA) med list completed and updated in EMR.       PTA Med List   Medication Sig Last Dose     atorvastatin (LIPITOR) 40 MG tablet Take 40 mg by mouth At Bedtime Past Week at Unknown time     cyanocobalamin (VITAMIN B-12) 1000 MCG tablet Take 1,000 mcg by mouth daily 4/7/2022 at Unknown time     guaiFENesin (ROBITUSSIN) 20 mg/mL SOLN solution Take 10 mLs by mouth every 4 hours as needed for cough Past Week at Unknown time     hydrOXYzine (VISTARIL) 50 MG capsule Take 50 mg by mouth 4 times daily as needed for itching or anxiety Past Week at Unknown time     Lactobacillus Probiotic TABS Take 1 tablet by mouth daily Past Month at Unknown time     magnesium oxide (MAG-OX) 400 MG tablet Take 400 mg by mouth daily 4/7/2022 at Unknown time     oxyCODONE IR (ROXICODONE) 10 MG tablet Take 0.5-1 tablets (5-10 mg) by mouth every 6 hours as needed for severe pain 4/7/2022 at Unknown time     prochlorperazine (COMPAZINE) 10 MG tablet Take 10 mg by mouth 2 times daily as needed for nausea or vomiting Past Month at Unknown time     traZODone (DESYREL) 50 MG tablet Take 50 mg by mouth nightly as needed for sleep Past Week at Unknown time     Vitamin D3 (CHOLECALCIFEROL) 25 mcg (1000 units) tablet Take 1 tablet by mouth daily 4/7/2022 at Unknown time       Information source(s): Patient and CareEverywhere/SureScripts  Method of interview communication: in-person    Summary of Changes to PTA Med List  New: prochlorperazine  Discontinued: aspirin, atenolol, diclofenac, ferrous sulfate, gabapentin, terazosin  Changed: none    Patient was asked about OTC/herbal products " specifically.  PTA med list reflects this.    Allergies were reviewed, assessed, and updated with the patient.      Patient does not use any multi-dose medications prior to admission.    The information provided in this note is only as accurate as the sources available at the time of the update(s).    Thank you for the opportunity to participate in the care of this patient.    HERMANN PAEZ  4/8/2022 8:10 AM
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
## 2022-04-08 NOTE — ED TRIAGE NOTES
Patient reports that he has been on a 6 day binge of drinking about one pint of alcohol/day, last drink @2000 tonight.  He wants to detox.  Lauren Tillman RN.......4/7/2022 10:08 PM

## 2022-04-09 DIAGNOSIS — Z71.89 OTHER SPECIFIED COUNSELING: ICD-10-CM

## 2022-04-10 ENCOUNTER — PATIENT OUTREACH (OUTPATIENT)
Dept: CARE COORDINATION | Facility: CLINIC | Age: 72
End: 2022-04-10
Payer: MEDICARE

## 2022-04-12 ENCOUNTER — PATIENT OUTREACH (OUTPATIENT)
Dept: CARE COORDINATION | Facility: CLINIC | Age: 72
End: 2022-04-12
Payer: MEDICARE

## 2022-04-12 NOTE — PROGRESS NOTES
Clinic Care Coordination Contact  Care Team Conversations    Patient identified for care management outreach, however patient is not on a value based contract so cannot complete outreach. Will escalate to clinic staff if specific needs or resources are indicated.    DRAKE Jara  Social Work Care Coordinator - [facility]  Care Coordination  [email or web address]  Cell Phone: [phone]  Gender pronouns: she/her  Employed by [employer]

## 2022-05-12 ENCOUNTER — HOSPITAL ENCOUNTER (EMERGENCY)
Facility: CLINIC | Age: 72
Discharge: HOME | End: 2022-05-12
Attending: EMERGENCY MEDICINE | Admitting: EMERGENCY MEDICINE
Payer: MEDICARE

## 2022-05-12 VITALS
SYSTOLIC BLOOD PRESSURE: 112 MMHG | TEMPERATURE: 98.1 F | OXYGEN SATURATION: 97 % | HEART RATE: 92 BPM | HEIGHT: 67 IN | WEIGHT: 140 LBS | BODY MASS INDEX: 21.97 KG/M2 | DIASTOLIC BLOOD PRESSURE: 76 MMHG | RESPIRATION RATE: 20 BRPM

## 2022-05-12 DIAGNOSIS — G43.009 MIGRAINE WITHOUT AURA AND WITHOUT STATUS MIGRAINOSUS, NOT INTRACTABLE: ICD-10-CM

## 2022-05-12 PROCEDURE — 258N000003 HC RX IP 258 OP 636: Performed by: EMERGENCY MEDICINE

## 2022-05-12 PROCEDURE — 250N000013 HC RX MED GY IP 250 OP 250 PS 637: Performed by: EMERGENCY MEDICINE

## 2022-05-12 PROCEDURE — 96375 TX/PRO/DX INJ NEW DRUG ADDON: CPT

## 2022-05-12 PROCEDURE — 96374 THER/PROPH/DIAG INJ IV PUSH: CPT

## 2022-05-12 PROCEDURE — 96361 HYDRATE IV INFUSION ADD-ON: CPT

## 2022-05-12 PROCEDURE — 250N000011 HC RX IP 250 OP 636: Performed by: EMERGENCY MEDICINE

## 2022-05-12 PROCEDURE — 99284 EMERGENCY DEPT VISIT MOD MDM: CPT | Mod: 25

## 2022-05-12 RX ORDER — DIPHENHYDRAMINE HYDROCHLORIDE 50 MG/ML
12.5 INJECTION INTRAMUSCULAR; INTRAVENOUS ONCE
Status: COMPLETED | OUTPATIENT
Start: 2022-05-12 | End: 2022-05-12

## 2022-05-12 RX ORDER — OXYCODONE HYDROCHLORIDE 5 MG/1
5 TABLET ORAL ONCE
Status: COMPLETED | OUTPATIENT
Start: 2022-05-12 | End: 2022-05-12

## 2022-05-12 RX ORDER — METOCLOPRAMIDE HYDROCHLORIDE 5 MG/ML
10 INJECTION INTRAMUSCULAR; INTRAVENOUS ONCE
Status: COMPLETED | OUTPATIENT
Start: 2022-05-12 | End: 2022-05-12

## 2022-05-12 RX ORDER — METOCLOPRAMIDE 5 MG/1
5 TABLET ORAL 3 TIMES DAILY PRN
Qty: 9 TABLET | Refills: 0 | Status: SHIPPED | OUTPATIENT
Start: 2022-05-12 | End: 2022-05-15

## 2022-05-12 RX ADMIN — DIPHENHYDRAMINE HYDROCHLORIDE 12.5 MG: 50 INJECTION, SOLUTION INTRAMUSCULAR; INTRAVENOUS at 19:54

## 2022-05-12 RX ADMIN — SODIUM CHLORIDE 1000 ML: 9 INJECTION, SOLUTION INTRAVENOUS at 19:51

## 2022-05-12 RX ADMIN — METOCLOPRAMIDE HYDROCHLORIDE 10 MG: 5 INJECTION INTRAMUSCULAR; INTRAVENOUS at 19:51

## 2022-05-12 RX ADMIN — OXYCODONE HYDROCHLORIDE 5 MG: 5 TABLET ORAL at 19:47

## 2022-05-12 NOTE — ED TRIAGE NOTES
2-3 hour onset of migraine.  States usually takes oxycodone but has been out of medication. Has tried aspirin, tylenol and ibuprofen without relief. nausea     Triage Assessment     Row Name 05/12/22 6650       Triage Assessment (Adult)    Airway WDL WDL       Respiratory WDL    Respiratory WDL WDL       Skin Circulation/Temperature WDL    Skin Circulation/Temperature WDL WDL       Cardiac WDL    Cardiac WDL WDL       Peripheral/Neurovascular WDL    Peripheral Neurovascular WDL WDL       Cognitive/Neuro/Behavioral WDL    Cognitive/Neuro/Behavioral WDL WDL
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
## 2022-07-17 ENCOUNTER — HOSPITAL ENCOUNTER (EMERGENCY)
Facility: CLINIC | Age: 72
Discharge: HOME | End: 2022-07-17
Attending: FAMILY MEDICINE | Admitting: FAMILY MEDICINE
Payer: MEDICARE

## 2022-07-17 VITALS
OXYGEN SATURATION: 98 % | RESPIRATION RATE: 16 BRPM | WEIGHT: 130 LBS | DIASTOLIC BLOOD PRESSURE: 101 MMHG | SYSTOLIC BLOOD PRESSURE: 165 MMHG | HEART RATE: 80 BPM | BODY MASS INDEX: 20.36 KG/M2 | TEMPERATURE: 98.1 F

## 2022-07-17 DIAGNOSIS — G43.009 MIGRAINE WITHOUT AURA AND WITHOUT STATUS MIGRAINOSUS, NOT INTRACTABLE: ICD-10-CM

## 2022-07-17 PROCEDURE — 250N000011 HC RX IP 250 OP 636: Performed by: FAMILY MEDICINE

## 2022-07-17 PROCEDURE — 258N000003 HC RX IP 258 OP 636: Performed by: FAMILY MEDICINE

## 2022-07-17 PROCEDURE — 96374 THER/PROPH/DIAG INJ IV PUSH: CPT

## 2022-07-17 PROCEDURE — C9803 HOPD COVID-19 SPEC COLLECT: HCPCS

## 2022-07-17 PROCEDURE — 96375 TX/PRO/DX INJ NEW DRUG ADDON: CPT

## 2022-07-17 PROCEDURE — 96376 TX/PRO/DX INJ SAME DRUG ADON: CPT

## 2022-07-17 PROCEDURE — 99285 EMERGENCY DEPT VISIT HI MDM: CPT | Mod: 25

## 2022-07-17 RX ORDER — ONDANSETRON 2 MG/ML
4 INJECTION INTRAMUSCULAR; INTRAVENOUS ONCE
Status: COMPLETED | OUTPATIENT
Start: 2022-07-17 | End: 2022-07-17

## 2022-07-17 RX ORDER — HYDROMORPHONE HYDROCHLORIDE 1 MG/ML
0.5 INJECTION, SOLUTION INTRAMUSCULAR; INTRAVENOUS; SUBCUTANEOUS ONCE
Status: COMPLETED | OUTPATIENT
Start: 2022-07-17 | End: 2022-07-17

## 2022-07-17 RX ORDER — KETOROLAC TROMETHAMINE 15 MG/ML
15 INJECTION, SOLUTION INTRAMUSCULAR; INTRAVENOUS ONCE
Status: COMPLETED | OUTPATIENT
Start: 2022-07-17 | End: 2022-07-17

## 2022-07-17 RX ORDER — DEXAMETHASONE SODIUM PHOSPHATE 10 MG/ML
6 INJECTION, SOLUTION INTRAMUSCULAR; INTRAVENOUS ONCE
Status: COMPLETED | OUTPATIENT
Start: 2022-07-17 | End: 2022-07-17

## 2022-07-17 RX ADMIN — DEXAMETHASONE SODIUM PHOSPHATE 6 MG: 10 INJECTION INTRAMUSCULAR; INTRAVENOUS at 11:05

## 2022-07-17 RX ADMIN — HYDROMORPHONE HYDROCHLORIDE 0.5 MG: 1 INJECTION, SOLUTION INTRAMUSCULAR; INTRAVENOUS; SUBCUTANEOUS at 13:06

## 2022-07-17 RX ADMIN — SODIUM CHLORIDE 1000 ML: 9 INJECTION, SOLUTION INTRAVENOUS at 10:56

## 2022-07-17 RX ADMIN — ONDANSETRON 4 MG: 2 INJECTION INTRAMUSCULAR; INTRAVENOUS at 11:08

## 2022-07-17 RX ADMIN — HYDROMORPHONE HYDROCHLORIDE 0.5 MG: 1 INJECTION, SOLUTION INTRAMUSCULAR; INTRAVENOUS; SUBCUTANEOUS at 11:58

## 2022-07-17 RX ADMIN — KETOROLAC TROMETHAMINE 15 MG: 15 INJECTION, SOLUTION INTRAMUSCULAR; INTRAVENOUS at 11:13

## 2022-07-17 ASSESSMENT — ENCOUNTER SYMPTOMS
HEADACHES: 1
NAUSEA: 1
VOMITING: 1
PHOTOPHOBIA: 1

## 2022-07-17 NOTE — ED TRIAGE NOTES
Patient reports that he has had a L sided headache for the past 5 days, taking Tylenol, Ibuprofen and Compazine without relief.  Lauren Tillman RN.......7/17/2022 10:24 AM     Triage Assessment     Row Name 07/17/22 1023       Triage Assessment (Adult)    Airway WDL WDL       Respiratory WDL    Respiratory WDL WDL       Skin Circulation/Temperature WDL    Skin Circulation/Temperature WDL WDL       Cardiac WDL    Cardiac WDL WDL       Peripheral/Neurovascular WDL    Peripheral Neurovascular WDL WDL       Cognitive/Neuro/Behavioral WDL    Cognitive/Neuro/Behavioral WDL WDL       [place] Coma Scale    Best Eye Response 4-->(E4) spontaneous    Best Motor Response 6-->(M6) obeys commands    Best Verbal Response 5-->(V5) oriented    [place] Coma Scale Score 15
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
## 2022-08-09 ENCOUNTER — HOSPITAL ENCOUNTER (EMERGENCY)
Facility: CLINIC | Age: 72
Discharge: HOME | End: 2022-08-10
Attending: EMERGENCY MEDICINE | Admitting: EMERGENCY MEDICINE
Payer: MEDICARE

## 2022-08-09 DIAGNOSIS — H10.31 ACUTE CONJUNCTIVITIS OF RIGHT EYE, UNSPECIFIED ACUTE CONJUNCTIVITIS TYPE: ICD-10-CM

## 2022-08-09 DIAGNOSIS — G43.909 MIGRAINE WITHOUT STATUS MIGRAINOSUS, NOT INTRACTABLE, UNSPECIFIED MIGRAINE TYPE: ICD-10-CM

## 2022-08-09 PROCEDURE — 96361 HYDRATE IV INFUSION ADD-ON: CPT

## 2022-08-09 PROCEDURE — 99284 EMERGENCY DEPT VISIT MOD MDM: CPT | Mod: 25

## 2022-08-09 PROCEDURE — 96375 TX/PRO/DX INJ NEW DRUG ADDON: CPT

## 2022-08-09 PROCEDURE — 250N000009 HC RX 250: Performed by: EMERGENCY MEDICINE

## 2022-08-09 PROCEDURE — 250N000011 HC RX IP 250 OP 636: Performed by: EMERGENCY MEDICINE

## 2022-08-09 PROCEDURE — 258N000003 HC RX IP 258 OP 636: Performed by: EMERGENCY MEDICINE

## 2022-08-09 PROCEDURE — 96374 THER/PROPH/DIAG INJ IV PUSH: CPT

## 2022-08-09 RX ORDER — SODIUM CHLORIDE 9 MG/ML
INJECTION, SOLUTION INTRAVENOUS CONTINUOUS
Status: DISCONTINUED | OUTPATIENT
Start: 2022-08-09 | End: 2022-08-10 | Stop reason: HOSPADM

## 2022-08-09 RX ORDER — KETOROLAC TROMETHAMINE 15 MG/ML
15 INJECTION, SOLUTION INTRAMUSCULAR; INTRAVENOUS ONCE
Status: COMPLETED | OUTPATIENT
Start: 2022-08-09 | End: 2022-08-09

## 2022-08-09 RX ORDER — TETRACAINE HYDROCHLORIDE 5 MG/ML
1-2 SOLUTION OPHTHALMIC ONCE
Status: COMPLETED | OUTPATIENT
Start: 2022-08-09 | End: 2022-08-09

## 2022-08-09 RX ADMIN — KETOROLAC TROMETHAMINE 15 MG: 15 INJECTION, SOLUTION INTRAMUSCULAR; INTRAVENOUS at 23:25

## 2022-08-09 RX ADMIN — SODIUM CHLORIDE 1000 ML: 9 INJECTION, SOLUTION INTRAVENOUS at 23:25

## 2022-08-09 RX ADMIN — TETRACAINE HYDROCHLORIDE 1 DROP: 5 SOLUTION OPHTHALMIC at 23:26

## 2022-08-09 RX ADMIN — FLUORESCEIN SODIUM 1 STRIP: 1 STRIP OPHTHALMIC at 23:25

## 2022-08-09 RX ADMIN — PROCHLORPERAZINE EDISYLATE 5 MG: 5 INJECTION INTRAMUSCULAR; INTRAVENOUS at 23:25

## 2022-08-09 ASSESSMENT — ACTIVITIES OF DAILY LIVING (ADL): ADLS_ACUITY_SCORE: 35

## 2022-08-10 VITALS
SYSTOLIC BLOOD PRESSURE: 135 MMHG | DIASTOLIC BLOOD PRESSURE: 78 MMHG | RESPIRATION RATE: 18 BRPM | WEIGHT: 135 LBS | BODY MASS INDEX: 21.19 KG/M2 | HEIGHT: 67 IN | OXYGEN SATURATION: 96 % | HEART RATE: 79 BPM | TEMPERATURE: 98.1 F

## 2022-08-10 RX ORDER — ERYTHROMYCIN 5 MG/G
0.5 OINTMENT OPHTHALMIC AT BEDTIME
Qty: 1 G | Refills: 0 | Status: ON HOLD | OUTPATIENT
Start: 2022-08-10 | End: 2022-11-20

## 2022-08-10 ASSESSMENT — ACTIVITIES OF DAILY LIVING (ADL): ADLS_ACUITY_SCORE: 35

## 2022-08-10 NOTE — ED TRIAGE NOTES
B/B EMS from home for rt eye pain, bp was elevated on ems arrival at 206/110, eye was irrigated by ems  Reports rt eye pain/irritation after waking up at 7 pm tonight, eye was normal at 5 pm when layed down  Reporting change in vision but no double vision  Headache x 3 days  Denies any weakness  Alert and oriented in triage, following commands and moving all extremities  Evaluated for stroke code based on eye symptoms & reported change in vision  Evaluated by Dr. Lopez, no stroke code called       Triage Assessment     Row Name 08/09/22 2110       Triage Assessment (Adult)    Airway WDL WDL       Respiratory WDL    Respiratory WDL WDL       Skin Circulation/Temperature WDL    Skin Circulation/Temperature WDL WDL       Cardiac WDL    Cardiac WDL WDL       Peripheral/Neurovascular WDL    Peripheral Neurovascular WDL WDL       Cognitive/Neuro/Behavioral WDL    Cognitive/Neuro/Behavioral WDL WDL
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
## 2022-09-15 ENCOUNTER — HOSPITAL ENCOUNTER (EMERGENCY)
Facility: CLINIC | Age: 72
Discharge: HOME | End: 2022-09-15
Attending: STUDENT IN AN ORGANIZED HEALTH CARE EDUCATION/TRAINING PROGRAM | Admitting: STUDENT IN AN ORGANIZED HEALTH CARE EDUCATION/TRAINING PROGRAM
Payer: MEDICARE

## 2022-09-15 VITALS
RESPIRATION RATE: 20 BRPM | DIASTOLIC BLOOD PRESSURE: 73 MMHG | HEART RATE: 101 BPM | TEMPERATURE: 97.7 F | BODY MASS INDEX: 21.19 KG/M2 | WEIGHT: 135 LBS | OXYGEN SATURATION: 96 % | HEIGHT: 67 IN | SYSTOLIC BLOOD PRESSURE: 97 MMHG

## 2022-09-15 DIAGNOSIS — F10.10 ETOH ABUSE: ICD-10-CM

## 2022-09-15 PROCEDURE — 99283 EMERGENCY DEPT VISIT LOW MDM: CPT

## 2022-09-15 NOTE — ED PROVIDER NOTES
"  Emergency Department Encounter         FINAL IMPRESSION:  etoh abuse        ED COURSE AND MEDICAL DECISION MAKING   1:40 PM I met with the patient to gather history and to perform my initial exam. We discussed plans for the ED course, including diagnostic testing and treatment.    1:45 PM I discussed plans for discharge with the patient, which they were agreeable to. We discussed supportive cares at home and reasons for return to the ER including new or worsening symptoms. All questions and concerns were addressed. Patient to be discharged by RN.        Patient is a 71-year-old male with history of EtOH abuse remotely, here stating that he would like to be seen for detox.   has been drinking consistently after his wife was admitted here at [facility].  Patient states he would like to be here and would like to \"be seen for detox.\"  I offered patient transfer to a detox in the [facility] area and he declined.  Has no other complaints at including falls or head injury, chest pain or trouble breathing.  No hematemesis or abdominal pain.    Denies any alcohol withdrawal symptoms.  Vitals are stable on arrival.  He is calm and cooperative.  Steady on his feet.  Looks well clinically.  Plan for discharge home via taxi.  He is steady on his feet I do not think he is a fall risk at this time.            At the conclusion of the encounter I discussed the results of all the tests and the disposition. The questions were answered. The patient or family acknowledged understanding and was agreeable with the care plan.              MEDICATIONS GIVEN IN THE EMERGENCY DEPARTMENT:  Medications - No data to display    NEW PRESCRIPTIONS STARTED AT TODAY'S ED VISIT:  New Prescriptions    No medications on file       HPI       Patient is a 71-year-old male here from home requesting detox.   has been drinking.  States he not been sleeping well since his wife has been admitted here.  Denies any black or bloody stools.  No " hematemesis.  No chest pain trouble breathing.  No headache or head injury.                REVIEW OF SYSTEMS:  Review of Systems   Constitutional: Negative for fever, malaise  HEENT: Negative runny nose, sore throat, ear pain, neck pain  Respiratory: Negative for shortness of breath, cough, congestion  Cardiovascular: Negative for chest pain, leg edema  Gastrointestinal: Negative for abdominal distention, abdominal pain, constipation, vomiting, nausea, diarrhea  Genitourinary: Negative for dysuria and hematuria.   Integument: Negative for rash, skin breakdown  Neurological: Negative for paresthesias, weakness, headache.  Musculoskeletal: Negative for joint pain, joint swelling      All other systems reviewed and are negative.          MEDICAL HISTORY     Past Medical History:   Diagnosis Date     Alcohol abuse      Emphysema lung (H) 05/12/2021     Hyperlipidemia      Hypertension      Migraine      Subdural hematoma (H) 1989       Past Surgical History:   Procedure Laterality Date     CATARACT EXTRACTION Left      LEFT VITRECTOMY POSTERIOR 25 GAUGE SYSTEM, MEMBRANE PEEL, AIR FLUID EXCHANGE  Left 08/31/2016     SUBDURAL HEMATOMA EVACUATION VIA CRANIOTOMY  1989       Social History     Tobacco Use     Smoking status: Current Every Day Smoker     Packs/day: 1.00     Years: 57.00     Pack years: 57.00     Smokeless tobacco: Never Used   Substance Use Topics     Alcohol use: Not Currently     Comment: Np alcohol since October 2021.     Drug use: Never       amLODIPine (NORVASC) 10 MG tablet  atorvastatin (LIPITOR) 40 MG tablet  cyanocobalamin (VITAMIN B-12) 1000 MCG tablet  erythromycin (ROMYCIN) 5 MG/GM ophthalmic ointment  gabapentin (NEURONTIN) 100 MG capsule  guaiFENesin (ROBITUSSIN) 20 mg/mL SOLN solution  hydrOXYzine (VISTARIL) 50 MG capsule  Lactobacillus Probiotic TABS  magnesium oxide (MAG-OX) 400 MG tablet  prochlorperazine (COMPAZINE) 10 MG tablet  traZODone (DESYREL) 50 MG tablet  Vitamin D3  "(CHOLECALCIFEROL) 25 mcg (1000 units) tablet        PHYSICAL EXAM     BP 97/73   Pulse 101   Temp 97.7  F (36.5  C) (Oral)   Resp 20   Ht 1.702 m (5' 7\")   Wt 61.2 kg (135 lb)   SpO2 96%   BMI 21.14 kg/m        PHYSICAL EXAM:     General: Patient appears well, nontoxic, comfortable  HEENT: Moist mucous membranes,  No head trauma.  No midline neck pain.  Cardiovascular: Normal rate, normal rhythm, no extremity edema.  No appreciable murmur.  Respiratory: No signs of respiratory distress, lungs are clear to auscultation bilaterally with no wheezes rhonchi or rales.  Abdominal: Soft, nontender, nondistended, no palpable masses, no guarding, no rebound  Musculoskeletal: Full range of motion of joints, no deformities appreciated.  Neurological: Alert and oriented, grossly neurologically intact.  Psychological: Normal affect and mood.  Integument: No rashes appreciated        RESULTS       Labs Ordered and Resulted from Time of ED Arrival to Time of ED Departure - No data to display    No orders to display           PROCEDURES:  Procedures:  Procedures       I, Jhoan Rosenbaum am serving as a scribe to document services personally performed by Barrett Traylor DO, based on my observations and the provider's statements to me.  I, Barrett Traylor DO, attest that Jhoan Rosenbaum is acting in a scribe capacity, has observed my performance of the services and has documented them in accordance with my direction.    Barrett Traylor DO  Emergency Medicine  [facility] EMERGENCY ROOM       Barrett Traylor DO  09/15/22 1349    "

## 2022-09-15 NOTE — ED TRIAGE NOTES
Pt arrives via EMS for ETOH intoxication and refusal of detox.  Pt's wife is here at [facility] and pt wants to be close to her.  Pt has been drinking 100 proof vodka with last drink 5 minutes prior to arrival.    EMS vitals: 112/60, HR 74, blood sugar 74

## 2022-10-06 NOTE — ED NOTES
"Patient is very concerned about his wife and states began drinking 3 days ago because \"I am very worried about her\"  Has been sober 10 years now. Called to [facility] and spoke with primary RN and relayed that she is doing fine upstairs and that he was here in hospital  "
Wants to go home and would like a cab called. ED provider to triage to evaluate patient. Agreeable to discharge home. Cab called  
Female

## 2022-11-20 ENCOUNTER — HOSPITAL ENCOUNTER (INPATIENT)
Facility: CLINIC | Age: 72
LOS: 3 days | Discharge: NURSING, REHAB OR LONG-TERM CARE | DRG: 643 | End: 2022-11-23
Attending: EMERGENCY MEDICINE | Admitting: STUDENT IN AN ORGANIZED HEALTH CARE EDUCATION/TRAINING PROGRAM
Payer: MEDICARE

## 2022-11-20 ENCOUNTER — APPOINTMENT (OUTPATIENT)
Dept: CT IMAGING | Facility: CLINIC | Age: 72
DRG: 643 | End: 2022-11-20
Attending: EMERGENCY MEDICINE
Payer: MEDICARE

## 2022-11-20 DIAGNOSIS — F10.20 ALCOHOL DEPENDENCE, UNCOMPLICATED (H): ICD-10-CM

## 2022-11-20 DIAGNOSIS — G44.209 TENSION HEADACHE: Primary | ICD-10-CM

## 2022-11-20 DIAGNOSIS — M62.81 GENERALIZED MUSCLE WEAKNESS: ICD-10-CM

## 2022-11-20 DIAGNOSIS — E87.1 HYPONATREMIA: ICD-10-CM

## 2022-11-20 PROBLEM — F32.A DEPRESSION, UNSPECIFIED: Status: ACTIVE | Noted: 2022-11-20

## 2022-11-20 LAB
ALBUMIN SERPL-MCNC: 3.8 G/DL (ref 3.5–5)
ALP SERPL-CCNC: 119 U/L (ref 45–120)
ALT SERPL W P-5'-P-CCNC: 111 U/L (ref 0–45)
ANION GAP SERPL CALCULATED.3IONS-SCNC: 23 MMOL/L (ref 5–18)
AST SERPL W P-5'-P-CCNC: 134 U/L (ref 0–40)
ATRIAL RATE - MUSE: 79 BPM
BILIRUB DIRECT SERPL-MCNC: 0.4 MG/DL
BILIRUB SERPL-MCNC: 1 MG/DL (ref 0–1)
BUN SERPL-MCNC: 11 MG/DL (ref 8–28)
CALCIUM SERPL-MCNC: 8.4 MG/DL (ref 8.5–10.5)
CHLORIDE BLD-SCNC: 93 MMOL/L (ref 98–107)
CO2 SERPL-SCNC: 11 MMOL/L (ref 22–31)
CREAT SERPL-MCNC: 0.78 MG/DL (ref 0.7–1.3)
D DIMER PPP FEU-MCNC: 2.01 UG/ML FEU (ref 0–0.5)
DIASTOLIC BLOOD PRESSURE - MUSE: 75 MMHG
ERYTHROCYTE [DISTWIDTH] IN BLOOD BY AUTOMATED COUNT: 15.4 % (ref 10–15)
ETHANOL SERPL-MCNC: 88 MG/DL
FLUAV RNA SPEC QL NAA+PROBE: NEGATIVE
FLUBV RNA RESP QL NAA+PROBE: NEGATIVE
GFR SERPL CREATININE-BSD FRML MDRD: >90 ML/MIN/1.73M2
GLUCOSE BLD-MCNC: 123 MG/DL (ref 70–125)
GLUCOSE BLDC GLUCOMTR-MCNC: 196 MG/DL (ref 70–99)
GLUCOSE BLDC GLUCOMTR-MCNC: 199 MG/DL (ref 70–99)
GLUCOSE BLDC GLUCOMTR-MCNC: 52 MG/DL (ref 70–99)
GLUCOSE BLDC GLUCOMTR-MCNC: 57 MG/DL (ref 70–99)
HCT VFR BLD AUTO: 37 % (ref 40–53)
HGB BLD-MCNC: 12.7 G/DL (ref 13.3–17.7)
HOLD SPECIMEN: NORMAL
INTERPRETATION ECG - MUSE: NORMAL
LACTATE SERPL-SCNC: 1.3 MMOL/L (ref 0.7–2)
LACTATE SERPL-SCNC: 4.2 MMOL/L (ref 0.7–2)
LIPASE SERPL-CCNC: 173 U/L (ref 0–52)
MAGNESIUM SERPL-MCNC: 1.8 MG/DL (ref 1.8–2.6)
MAGNESIUM SERPL-MCNC: 1.8 MG/DL (ref 1.8–2.6)
MCH RBC QN AUTO: 31.9 PG (ref 26.5–33)
MCHC RBC AUTO-ENTMCNC: 34.3 G/DL (ref 31.5–36.5)
MCV RBC AUTO: 93 FL (ref 78–100)
P AXIS - MUSE: 78 DEGREES
PHOSPHATE SERPL-MCNC: 2.3 MG/DL (ref 2.5–4.5)
PLATELET # BLD AUTO: 135 10E3/UL (ref 150–450)
POTASSIUM BLD-SCNC: 3.9 MMOL/L (ref 3.5–5)
PR INTERVAL - MUSE: 174 MS
PROT SERPL-MCNC: 7.1 G/DL (ref 6–8)
QRS DURATION - MUSE: 92 MS
QT - MUSE: 382 MS
QTC - MUSE: 438 MS
R AXIS - MUSE: 94 DEGREES
RBC # BLD AUTO: 3.98 10E6/UL (ref 4.4–5.9)
RSV RNA SPEC NAA+PROBE: NEGATIVE
SARS-COV-2 RNA RESP QL NAA+PROBE: NEGATIVE
SODIUM SERPL-SCNC: 127 MMOL/L (ref 136–145)
SYSTOLIC BLOOD PRESSURE - MUSE: 125 MMHG
T AXIS - MUSE: 85 DEGREES
TROPONIN I SERPL-MCNC: 0.02 NG/ML (ref 0–0.29)
VENTRICULAR RATE- MUSE: 79 BPM
WBC # BLD AUTO: 4.9 10E3/UL (ref 4–11)

## 2022-11-20 PROCEDURE — 83735 ASSAY OF MAGNESIUM: CPT | Performed by: PHYSICIAN ASSISTANT

## 2022-11-20 PROCEDURE — G1010 CDSM STANSON: HCPCS

## 2022-11-20 PROCEDURE — 250N000011 HC RX IP 250 OP 636: Performed by: STUDENT IN AN ORGANIZED HEALTH CARE EDUCATION/TRAINING PROGRAM

## 2022-11-20 PROCEDURE — 36415 COLL VENOUS BLD VENIPUNCTURE: CPT | Performed by: EMERGENCY MEDICINE

## 2022-11-20 PROCEDURE — 250N000013 HC RX MED GY IP 250 OP 250 PS 637: Performed by: INTERNAL MEDICINE

## 2022-11-20 PROCEDURE — 250N000013 HC RX MED GY IP 250 OP 250 PS 637: Performed by: STUDENT IN AN ORGANIZED HEALTH CARE EDUCATION/TRAINING PROGRAM

## 2022-11-20 PROCEDURE — 93005 ELECTROCARDIOGRAM TRACING: CPT | Performed by: PHYSICIAN ASSISTANT

## 2022-11-20 PROCEDURE — 83930 ASSAY OF BLOOD OSMOLALITY: CPT | Performed by: STUDENT IN AN ORGANIZED HEALTH CARE EDUCATION/TRAINING PROGRAM

## 2022-11-20 PROCEDURE — 250N000011 HC RX IP 250 OP 636: Performed by: EMERGENCY MEDICINE

## 2022-11-20 PROCEDURE — 87637 SARSCOV2&INF A&B&RSV AMP PRB: CPT | Performed by: PHYSICIAN ASSISTANT

## 2022-11-20 PROCEDURE — 99223 1ST HOSP IP/OBS HIGH 75: CPT | Performed by: STUDENT IN AN ORGANIZED HEALTH CARE EDUCATION/TRAINING PROGRAM

## 2022-11-20 PROCEDURE — 83690 ASSAY OF LIPASE: CPT | Performed by: EMERGENCY MEDICINE

## 2022-11-20 PROCEDURE — 82077 ASSAY SPEC XCP UR&BREATH IA: CPT | Performed by: EMERGENCY MEDICINE

## 2022-11-20 PROCEDURE — 36415 COLL VENOUS BLD VENIPUNCTURE: CPT | Performed by: STUDENT IN AN ORGANIZED HEALTH CARE EDUCATION/TRAINING PROGRAM

## 2022-11-20 PROCEDURE — HZ2ZZZZ DETOXIFICATION SERVICES FOR SUBSTANCE ABUSE TREATMENT: ICD-10-PCS | Performed by: EMERGENCY MEDICINE

## 2022-11-20 PROCEDURE — 84100 ASSAY OF PHOSPHORUS: CPT | Performed by: STUDENT IN AN ORGANIZED HEALTH CARE EDUCATION/TRAINING PROGRAM

## 2022-11-20 PROCEDURE — 83735 ASSAY OF MAGNESIUM: CPT | Performed by: STUDENT IN AN ORGANIZED HEALTH CARE EDUCATION/TRAINING PROGRAM

## 2022-11-20 PROCEDURE — 258N000003 HC RX IP 258 OP 636: Performed by: STUDENT IN AN ORGANIZED HEALTH CARE EDUCATION/TRAINING PROGRAM

## 2022-11-20 PROCEDURE — 120N000001 HC R&B MED SURG/OB

## 2022-11-20 PROCEDURE — 84484 ASSAY OF TROPONIN QUANT: CPT | Performed by: PHYSICIAN ASSISTANT

## 2022-11-20 PROCEDURE — 85379 FIBRIN DEGRADATION QUANT: CPT | Performed by: PHYSICIAN ASSISTANT

## 2022-11-20 PROCEDURE — 258N000001 HC RX 258: Performed by: STUDENT IN AN ORGANIZED HEALTH CARE EDUCATION/TRAINING PROGRAM

## 2022-11-20 PROCEDURE — 80053 COMPREHEN METABOLIC PANEL: CPT | Performed by: PHYSICIAN ASSISTANT

## 2022-11-20 PROCEDURE — 99285 EMERGENCY DEPT VISIT HI MDM: CPT | Mod: 25

## 2022-11-20 PROCEDURE — 85027 COMPLETE CBC AUTOMATED: CPT | Performed by: PHYSICIAN ASSISTANT

## 2022-11-20 PROCEDURE — 82248 BILIRUBIN DIRECT: CPT | Performed by: EMERGENCY MEDICINE

## 2022-11-20 PROCEDURE — C9803 HOPD COVID-19 SPEC COLLECT: HCPCS

## 2022-11-20 PROCEDURE — 83605 ASSAY OF LACTIC ACID: CPT | Performed by: STUDENT IN AN ORGANIZED HEALTH CARE EDUCATION/TRAINING PROGRAM

## 2022-11-20 RX ORDER — FOLIC ACID 1 MG/1
1 TABLET ORAL DAILY
Status: DISCONTINUED | OUTPATIENT
Start: 2022-11-20 | End: 2022-11-23 | Stop reason: HOSPADM

## 2022-11-20 RX ORDER — HALOPERIDOL 5 MG/ML
2.5-5 INJECTION INTRAMUSCULAR EVERY 6 HOURS PRN
Status: DISCONTINUED | OUTPATIENT
Start: 2022-11-20 | End: 2022-11-23 | Stop reason: HOSPADM

## 2022-11-20 RX ORDER — DEXTROSE MONOHYDRATE 25 G/50ML
25 INJECTION, SOLUTION INTRAVENOUS ONCE
Status: COMPLETED | OUTPATIENT
Start: 2022-11-20 | End: 2022-11-20

## 2022-11-20 RX ORDER — MAGNESIUM OXIDE 400 MG/1
400 TABLET ORAL DAILY
Status: DISCONTINUED | OUTPATIENT
Start: 2022-11-20 | End: 2022-11-23 | Stop reason: HOSPADM

## 2022-11-20 RX ORDER — LANOLIN ALCOHOL/MO/W.PET/CERES
1000 CREAM (GRAM) TOPICAL DAILY
Status: DISCONTINUED | OUTPATIENT
Start: 2022-11-21 | End: 2022-11-23 | Stop reason: HOSPADM

## 2022-11-20 RX ORDER — GABAPENTIN 100 MG/1
100 CAPSULE ORAL EVERY 8 HOURS
Status: DISCONTINUED | OUTPATIENT
Start: 2022-11-27 | End: 2022-11-22

## 2022-11-20 RX ORDER — ONDANSETRON 2 MG/ML
4 INJECTION INTRAMUSCULAR; INTRAVENOUS EVERY 6 HOURS PRN
Status: DISCONTINUED | OUTPATIENT
Start: 2022-11-20 | End: 2022-11-23 | Stop reason: HOSPADM

## 2022-11-20 RX ORDER — DIAZEPAM 5 MG
10 TABLET ORAL EVERY 30 MIN PRN
Status: DISCONTINUED | OUTPATIENT
Start: 2022-11-20 | End: 2022-11-23

## 2022-11-20 RX ORDER — IOPAMIDOL 755 MG/ML
100 INJECTION, SOLUTION INTRAVASCULAR ONCE
Status: COMPLETED | OUTPATIENT
Start: 2022-11-20 | End: 2022-11-20

## 2022-11-20 RX ORDER — LIDOCAINE 40 MG/G
CREAM TOPICAL
Status: DISCONTINUED | OUTPATIENT
Start: 2022-11-20 | End: 2022-11-23 | Stop reason: HOSPADM

## 2022-11-20 RX ORDER — MULTIPLE VITAMINS W/ MINERALS TAB 9MG-400MCG
1 TAB ORAL DAILY
Status: DISCONTINUED | OUTPATIENT
Start: 2022-11-20 | End: 2022-11-23 | Stop reason: HOSPADM

## 2022-11-20 RX ORDER — TRAZODONE HYDROCHLORIDE 50 MG/1
50 TABLET, FILM COATED ORAL
Status: DISCONTINUED | OUTPATIENT
Start: 2022-11-20 | End: 2022-11-23 | Stop reason: HOSPADM

## 2022-11-20 RX ORDER — DIAZEPAM 10 MG/2ML
5-10 INJECTION, SOLUTION INTRAMUSCULAR; INTRAVENOUS EVERY 30 MIN PRN
Status: DISCONTINUED | OUTPATIENT
Start: 2022-11-20 | End: 2022-11-23

## 2022-11-20 RX ORDER — GABAPENTIN 300 MG/1
900 CAPSULE ORAL EVERY 8 HOURS
Status: DISCONTINUED | OUTPATIENT
Start: 2022-11-20 | End: 2022-11-22

## 2022-11-20 RX ORDER — GABAPENTIN 600 MG/1
1200 TABLET ORAL ONCE
Status: COMPLETED | OUTPATIENT
Start: 2022-11-20 | End: 2022-11-20

## 2022-11-20 RX ORDER — SODIUM CHLORIDE 9 MG/ML
INJECTION, SOLUTION INTRAVENOUS CONTINUOUS
Status: DISCONTINUED | OUTPATIENT
Start: 2022-11-20 | End: 2022-11-20

## 2022-11-20 RX ORDER — CLONIDINE HYDROCHLORIDE 0.1 MG/1
0.1 TABLET ORAL EVERY 8 HOURS
Status: DISCONTINUED | OUTPATIENT
Start: 2022-11-20 | End: 2022-11-20

## 2022-11-20 RX ORDER — GABAPENTIN 300 MG/1
300 CAPSULE ORAL 2 TIMES DAILY
COMMUNITY
Start: 2022-07-12 | End: 2022-12-08

## 2022-11-20 RX ORDER — GABAPENTIN 300 MG/1
300 CAPSULE ORAL EVERY 8 HOURS
Status: DISCONTINUED | OUTPATIENT
Start: 2022-11-25 | End: 2022-11-22

## 2022-11-20 RX ORDER — ENOXAPARIN SODIUM 100 MG/ML
40 INJECTION SUBCUTANEOUS EVERY 24 HOURS
Status: DISCONTINUED | OUTPATIENT
Start: 2022-11-20 | End: 2022-11-23 | Stop reason: HOSPADM

## 2022-11-20 RX ORDER — AMLODIPINE BESYLATE 10 MG/1
10 TABLET ORAL DAILY
Status: DISCONTINUED | OUTPATIENT
Start: 2022-11-20 | End: 2022-11-20

## 2022-11-20 RX ORDER — NICOTINE 21 MG/24HR
1 PATCH, TRANSDERMAL 24 HOURS TRANSDERMAL DAILY
Status: DISCONTINUED | OUTPATIENT
Start: 2022-11-20 | End: 2022-11-23 | Stop reason: HOSPADM

## 2022-11-20 RX ORDER — FLUMAZENIL 0.1 MG/ML
0.2 INJECTION, SOLUTION INTRAVENOUS
Status: DISCONTINUED | OUTPATIENT
Start: 2022-11-20 | End: 2022-11-23 | Stop reason: HOSPADM

## 2022-11-20 RX ORDER — HYDROXYZINE HYDROCHLORIDE 25 MG/1
50 TABLET, FILM COATED ORAL 4 TIMES DAILY PRN
Status: DISCONTINUED | OUTPATIENT
Start: 2022-11-20 | End: 2022-11-23 | Stop reason: HOSPADM

## 2022-11-20 RX ORDER — ATORVASTATIN CALCIUM 40 MG/1
40 TABLET, FILM COATED ORAL AT BEDTIME
Status: DISCONTINUED | OUTPATIENT
Start: 2022-11-20 | End: 2022-11-23 | Stop reason: HOSPADM

## 2022-11-20 RX ORDER — L. ACIDOPHILUS/L.BULGARICUS 1MM CELL
1 TABLET ORAL DAILY
Status: DISCONTINUED | OUTPATIENT
Start: 2022-11-20 | End: 2022-11-20

## 2022-11-20 RX ORDER — GABAPENTIN 300 MG/1
600 CAPSULE ORAL EVERY 8 HOURS
Status: DISCONTINUED | OUTPATIENT
Start: 2022-11-23 | End: 2022-11-22

## 2022-11-20 RX ORDER — METOPROLOL TARTRATE 25 MG/1
25 TABLET, FILM COATED ORAL 2 TIMES DAILY
Status: DISCONTINUED | OUTPATIENT
Start: 2022-11-20 | End: 2022-11-23 | Stop reason: HOSPADM

## 2022-11-20 RX ORDER — OLANZAPINE 5 MG/1
5-10 TABLET, ORALLY DISINTEGRATING ORAL EVERY 6 HOURS PRN
Status: DISCONTINUED | OUTPATIENT
Start: 2022-11-20 | End: 2022-11-23 | Stop reason: HOSPADM

## 2022-11-20 RX ADMIN — DIAZEPAM 5 MG: 5 INJECTION, SOLUTION INTRAMUSCULAR; INTRAVENOUS at 19:11

## 2022-11-20 RX ADMIN — ONDANSETRON 4 MG: 2 INJECTION INTRAMUSCULAR; INTRAVENOUS at 16:13

## 2022-11-20 RX ADMIN — GABAPENTIN 1200 MG: 600 TABLET, FILM COATED ORAL at 14:59

## 2022-11-20 RX ADMIN — FOLIC ACID 1 MG: 1 TABLET ORAL at 14:59

## 2022-11-20 RX ADMIN — GABAPENTIN 900 MG: 300 CAPSULE ORAL at 22:28

## 2022-11-20 RX ADMIN — ENOXAPARIN SODIUM 40 MG: 100 INJECTION SUBCUTANEOUS at 17:05

## 2022-11-20 RX ADMIN — DIAZEPAM 5 MG: 5 INJECTION, SOLUTION INTRAMUSCULAR; INTRAVENOUS at 16:13

## 2022-11-20 RX ADMIN — METOPROLOL TARTRATE 25 MG: 25 TABLET, FILM COATED ORAL at 20:42

## 2022-11-20 RX ADMIN — DEXTROSE AND SODIUM CHLORIDE: 5; 900 INJECTION, SOLUTION INTRAVENOUS at 15:46

## 2022-11-20 RX ADMIN — MULTIPLE VITAMINS W/ MINERALS TAB 1 TABLET: TAB at 14:59

## 2022-11-20 RX ADMIN — ATORVASTATIN CALCIUM 40 MG: 40 TABLET, FILM COATED ORAL at 20:42

## 2022-11-20 RX ADMIN — Medication 100 MG: at 14:59

## 2022-11-20 RX ADMIN — IOPAMIDOL 100 ML: 755 INJECTION, SOLUTION INTRAVENOUS at 12:31

## 2022-11-20 RX ADMIN — DIAZEPAM 5 MG: 5 INJECTION, SOLUTION INTRAMUSCULAR; INTRAVENOUS at 17:05

## 2022-11-20 RX ADMIN — MAGNESIUM OXIDE TAB 400 MG (241.3 MG ELEMENTAL MG) 400 MG: 400 (241.3 MG) TAB at 20:42

## 2022-11-20 RX ADMIN — DIAZEPAM 5 MG: 5 INJECTION, SOLUTION INTRAMUSCULAR; INTRAVENOUS at 15:48

## 2022-11-20 RX ADMIN — SODIUM CHLORIDE, PRESERVATIVE FREE: 5 INJECTION INTRAVENOUS at 14:58

## 2022-11-20 RX ADMIN — NICOTINE 1 PATCH: 21 PATCH, EXTENDED RELEASE TRANSDERMAL at 14:59

## 2022-11-20 RX ADMIN — DEXTROSE MONOHYDRATE 25 ML: 25 INJECTION, SOLUTION INTRAVENOUS at 15:55

## 2022-11-20 ASSESSMENT — ACTIVITIES OF DAILY LIVING (ADL)
FALL_HISTORY_WITHIN_LAST_SIX_MONTHS: NO
DIFFICULTY_COMMUNICATING: NO
CHANGE_IN_FUNCTIONAL_STATUS_SINCE_ONSET_OF_CURRENT_ILLNESS/INJURY: YES
TOILETING_MANAGEMENT: CONTINENT
WALKING_OR_CLIMBING_STAIRS_DIFFICULTY: YES
DOING_ERRANDS_INDEPENDENTLY_DIFFICULTY: YES
DRESS: 1-->ASSISTANCE (EQUIPMENT/PERSON) NEEDED
ADLS_ACUITY_SCORE: 35
TOILETING_ISSUES: NO
ADLS_ACUITY_SCORE: 37
WALKING_OR_CLIMBING_STAIRS: OTHER (SEE COMMENTS)
ADLS_ACUITY_SCORE: 37
DRESS: 1-->ASSISTANCE (EQUIPMENT/PERSON) NEEDED (NOT DEVELOPMENTALLY APPROPRIATE)
ADLS_ACUITY_SCORE: 35
DRESSING/BATHING: BATHING DIFFICULTY, REQUIRES EQUIPMENT
DIFFICULTY_EATING/SWALLOWING: NO
HEARING_DIFFICULTY_OR_DEAF: NO
DRESSING/BATHING_DIFFICULTY: YES
CONCENTRATING,_REMEMBERING_OR_MAKING_DECISIONS_DIFFICULTY: NO
DEPENDENT_IADLS:: INDEPENDENT
WEAR_GLASSES_OR_BLIND: NO
ADLS_ACUITY_SCORE: 35
DRESSING/BATHING_MANAGEMENT: ASSIST OF 1
ADLS_ACUITY_SCORE: 37
BATHING: 1-->ASSISTANCE NEEDED

## 2022-11-20 NOTE — CONSULTS
"Care Management Initial Consult      General Information  Assessment completed with: Patient, Spouse or significant other,    Type of CM/SW Visit: Initial Assessment  Primary Care Provider verified and updated as needed: Yes   Readmission within the last 30 days: no previous admission in last 30 days   Reason for Consult: discharge planning, health care directive, transportation  Advance Care Planning: Advance Care Planning Reviewed: no concerns identified        Communication Assessment  Patient's communication style: spoken language (English or Bilingual)        Cognitive  Cognitive/Neuro/Behavioral: WDL                      Living Environment:   People in home: spouse     Current living Arrangements: apartment      Able to return to prior arrangements: no - Insufficient support currently available    Family/Social Support:  Care provided by: self, spouse/significant other (Pt and wife reportedly assist each other stefano needed to maintain independence when at baseline)  Provides care for: spouse, other (see comments) (Intermittently. Currently unable to do so.)  Marital Status:   Wife  Catharina \"Akilah\"       Description of Support System: Supportive, Involved (At baseline. Currently unable to assist as needing assist from pt.)    Support Assessment: Lacks adequate physical care, Lacks necessary supervision and assistance    Current Resources:   Patient receiving home care services: No  Community Resources: None  Equipment currently used at home: grab bar, tub/shower  Supplies currently used at home: None    Employment/Financial:  Employment Status: retired     Financial Concerns: other (see comments) (Pt does not care for himself appropriately in the abscence of his wife.)   Referral to Financial Worker: No     Lifestyle & Psychosocial Needs:  Social Determinants of Health     Tobacco Use: High Risk     Smoking Tobacco Use: Every Day     Smokeless Tobacco Use: Never     Passive Exposure: Not on file   Alcohol " Use: Not on file   Financial Resource Strain: Not on file   Food Insecurity: Not on file   Transportation Needs: Not on file   Physical Activity: Not on file   Stress: Not on file   Social Connections: Not on file   Intimate Partner Violence: Not on file   Depression: Not on file   Housing Stability: Not on file     Functional Status:  Prior to admission patient needed assistance:   Dependent ADLs:: Independent (Pt states independence at baseline. Pt stated he could have cared for himself better over the last couple of weeks if he wanted to. Pt states he just didn't feel like it.)  Dependent IADLs:: Independent (Pt states independence at baseline. Pt stated he could have fed himself over the last couple of weeks if he wanted to. Pt states he just didn't feel like it.)  Assesssment of Functional Status: Not at  functional baseline    Mental Health Status:  Mental Health Status: No Current Concerns       Chemical Dependency Status:  Chemical Dependency Status: Current Concern  Chemical Dependency Management: Other (see comment) (Undetermined)        Values/Beliefs:  Spiritual, Cultural Beliefs, Methodist Practices, Values that affect care: no (None identified)             Additional Information:  Writer met with pt and his wife separately to introduce Care Management(CM) and obtain an initial assessment. Pt shares an apartment with his wife Akilah and they provide each other any needed assistance to maintain living in their home independently. Pt has just been home alone for a little over two weeks. Pt has not been caring for himself appropriately in that time. Akilah keeps him from overindulging in alcohol when she is at baseline and in the home. In her absence, pt states he didn't feel like eating yet, could have fed himself if he wanted to. Pt endorses operating a motor vehicle during this time.    Pt states he is agreeable to TCU placement if needed. Being able to smoke cigarettes at the facility is important to pt.  "Writer has placed TCU referrals to facilities known to allow smoking per pt direction. Medicare website declined. Ultimately, pt and his wife would like to be placed in the same TCU if possible. They separately stated they have hardly seen each other since the beginning of September.    11/20 per JAMAL Boo.-\"72 year old male with a pertinent history of hypertension, hyperlipidemia, anemia, emphysema, and alcohol abuse, who presents to this ED via EMS for evaluation of shortness of breath and weakness. The patient reports he has been generally weak short of breath for about two weeks. He notes he is unable to walk more than five steps before he needs to sit down. He also reports bilateral leg soreness and some mild left-sided chest discomfort. He's had a bit of a cough recently as well. The patient states he has not eaten anything for over a week because he has no appetite. However, he has been drinking about 1 pint of alcohol daily, most recently earlier today. No other reported complaints or concerns at this time. Of note, his wife is currently in the ED as well for weakness and failure to thrive.\"     PT, OT, and NUTR Consults pending. Anticipate TCU placement vs. Home care arrangement at time of discharge. [facility] is arranged at the home for pt's wife currently. CM to follow-up on consult results and proceed with service coordination needs as appropriate. Transport will need to be coordinated by CM as pt and wife state they have no contacts to utilize or place on their facesheet.    Morro Emerson RN        "
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
## 2022-11-20 NOTE — PHARMACY-ADMISSION MEDICATION HISTORY
Pharmacy Note - Admission Medication History    Pertinent Provider Information: patient has not been taking his medications for at least a week due to illness.      ______________________________________________________________________    Prior To Admission (PTA) med list completed and updated in EMR.       PTA Med List   Medication Sig Last Dose     atorvastatin (LIPITOR) 40 MG tablet Take 40 mg by mouth At Bedtime Past Week     cyanocobalamin (VITAMIN B-12) 1000 MCG tablet Take 1,000 mcg by mouth daily Past Week     gabapentin (NEURONTIN) 300 MG capsule Take 300 mg by mouth 2 times daily Past Week     guaiFENesin (ROBITUSSIN) 20 mg/mL SOLN solution Take 10 mLs by mouth every 4 hours as needed for cough Unknown at prn     hydrOXYzine (VISTARIL) 50 MG capsule Take 50 mg by mouth 4 times daily as needed for itching or anxiety Unknown at prn     magnesium oxide (MAG-OX) 400 MG tablet Take 400 mg by mouth daily Past Week     prochlorperazine (COMPAZINE) 10 MG tablet Take 10 mg by mouth 2 times daily as needed for nausea or vomiting Unknown at prn     traZODone (DESYREL) 50 MG tablet Take 50 mg by mouth nightly as needed for sleep Unknown at prn     Vitamin D3 (CHOLECALCIFEROL) 25 mcg (1000 units) tablet Take 1 tablet by mouth daily Past Week       [facility](s[facility]  Method of interview communication: in-person    Summary of Changes to PTA Med List  New: none  Discontinued: probiotic, amlodipine, erythromycin eye oint  Changed: gabapentin     Patient was asked about OTC/herbal products specifically.  PTA med list reflects this.    In the past week, patient estimated taking medication this percent of the time:  less than 50% due to illness.    Allergies were reviewed, assessed, and updated with the patient.      Patient does not use any multi-dose medications prior to admission.    The information provided in this note is only as accurate as the sources available at the time of  the update(s).    Thank you for the opportunity to participate in the care of this patient.    Judy Jameson RPH  11/20/2022 2:34 PM

## 2022-11-20 NOTE — ED TRIAGE NOTES
Patient present to the ED with complaints of shortness of breat, fatigue, and body aches. He reports that his symptoms started about 2 weeks ago and he finally decided to come to the ED for help. EMS  Reports that the patient has lung cancer but the patient denies this, patient is still smoking cigarettes. EMS report BS=40, IV started and given 1 amp of D10w with good response, EMS report OO=[id number] upon arrival to ED.

## 2022-11-21 ENCOUNTER — APPOINTMENT (OUTPATIENT)
Dept: PHYSICAL THERAPY | Facility: CLINIC | Age: 72
DRG: 643 | End: 2022-11-21
Attending: STUDENT IN AN ORGANIZED HEALTH CARE EDUCATION/TRAINING PROGRAM
Payer: MEDICARE

## 2022-11-21 ENCOUNTER — APPOINTMENT (OUTPATIENT)
Dept: OCCUPATIONAL THERAPY | Facility: CLINIC | Age: 72
DRG: 643 | End: 2022-11-21
Attending: STUDENT IN AN ORGANIZED HEALTH CARE EDUCATION/TRAINING PROGRAM
Payer: MEDICARE

## 2022-11-21 PROBLEM — E83.39 HYPOPHOSPHATEMIA: Status: ACTIVE | Noted: 2022-11-21

## 2022-11-21 LAB
ALBUMIN SERPL-MCNC: 3.2 G/DL (ref 3.5–5)
ALBUMIN UR-MCNC: 50 MG/DL
ALP SERPL-CCNC: 96 U/L (ref 45–120)
ALT SERPL W P-5'-P-CCNC: 72 U/L (ref 0–45)
ANION GAP SERPL CALCULATED.3IONS-SCNC: 9 MMOL/L (ref 5–18)
APPEARANCE UR: CLEAR
AST SERPL W P-5'-P-CCNC: 88 U/L (ref 0–40)
BASOPHILS # BLD AUTO: 0 10E3/UL (ref 0–0.2)
BASOPHILS NFR BLD AUTO: 1 %
BILIRUB SERPL-MCNC: 1.2 MG/DL (ref 0–1)
BILIRUB UR QL STRIP: NEGATIVE
BUN SERPL-MCNC: 10 MG/DL (ref 8–28)
CALCIUM SERPL-MCNC: 8.2 MG/DL (ref 8.5–10.5)
CHLORIDE BLD-SCNC: 100 MMOL/L (ref 98–107)
CO2 SERPL-SCNC: 20 MMOL/L (ref 22–31)
COLOR UR AUTO: ABNORMAL
CREAT SERPL-MCNC: 0.69 MG/DL (ref 0.7–1.3)
EOSINOPHIL # BLD AUTO: 0.1 10E3/UL (ref 0–0.7)
EOSINOPHIL NFR BLD AUTO: 2 %
ERYTHROCYTE [DISTWIDTH] IN BLOOD BY AUTOMATED COUNT: 15.2 % (ref 10–15)
GFR SERPL CREATININE-BSD FRML MDRD: >90 ML/MIN/1.73M2
GLUCOSE BLD-MCNC: 171 MG/DL (ref 70–125)
GLUCOSE BLDC GLUCOMTR-MCNC: 169 MG/DL (ref 70–99)
GLUCOSE BLDC GLUCOMTR-MCNC: 174 MG/DL (ref 70–99)
GLUCOSE UR STRIP-MCNC: 1000 MG/DL
HCT VFR BLD AUTO: 33.7 % (ref 40–53)
HGB BLD-MCNC: 11.7 G/DL (ref 13.3–17.7)
HGB UR QL STRIP: NEGATIVE
IMM GRANULOCYTES # BLD: 0 10E3/UL
IMM GRANULOCYTES NFR BLD: 0 %
KETONES UR STRIP-MCNC: 100 MG/DL
LACTATE SERPL-SCNC: 1.9 MMOL/L (ref 0.7–2)
LEUKOCYTE ESTERASE UR QL STRIP: NEGATIVE
LYMPHOCYTES # BLD AUTO: 1 10E3/UL (ref 0.8–5.3)
LYMPHOCYTES NFR BLD AUTO: 26 %
MAGNESIUM SERPL-MCNC: 1.7 MG/DL (ref 1.8–2.6)
MCH RBC QN AUTO: 31.9 PG (ref 26.5–33)
MCHC RBC AUTO-ENTMCNC: 34.7 G/DL (ref 31.5–36.5)
MCV RBC AUTO: 92 FL (ref 78–100)
MONOCYTES # BLD AUTO: 0.5 10E3/UL (ref 0–1.3)
MONOCYTES NFR BLD AUTO: 12 %
MUCOUS THREADS #/AREA URNS LPF: PRESENT /LPF
NEUTROPHILS # BLD AUTO: 2.2 10E3/UL (ref 1.6–8.3)
NEUTROPHILS NFR BLD AUTO: 59 %
NITRATE UR QL: NEGATIVE
NRBC # BLD AUTO: 0 10E3/UL
NRBC BLD AUTO-RTO: 0 /100
OSMOLALITY SERPL: 292 MMOL/KG (ref 280–301)
OSMOLALITY UR: 667 MMOL/KG (ref 100–1200)
PH UR STRIP: 6.5 [PH] (ref 5–7)
PLATELET # BLD AUTO: 102 10E3/UL (ref 150–450)
POTASSIUM BLD-SCNC: 3.5 MMOL/L (ref 3.5–5)
PROT SERPL-MCNC: 5.8 G/DL (ref 6–8)
RBC # BLD AUTO: 3.67 10E6/UL (ref 4.4–5.9)
RBC URINE: 1 /HPF
SODIUM SERPL-SCNC: 129 MMOL/L (ref 136–145)
SODIUM SERPL-SCNC: 132 MMOL/L (ref 136–145)
SODIUM UR-SCNC: 33 MMOL/L
SP GR UR STRIP: 1.05 (ref 1–1.03)
UROBILINOGEN UR STRIP-MCNC: 3 MG/DL
WBC # BLD AUTO: 3.8 10E3/UL (ref 4–11)
WBC URINE: 1 /HPF

## 2022-11-21 PROCEDURE — 84300 ASSAY OF URINE SODIUM: CPT | Performed by: STUDENT IN AN ORGANIZED HEALTH CARE EDUCATION/TRAINING PROGRAM

## 2022-11-21 PROCEDURE — 97116 GAIT TRAINING THERAPY: CPT | Mod: GP

## 2022-11-21 PROCEDURE — 80053 COMPREHEN METABOLIC PANEL: CPT | Performed by: STUDENT IN AN ORGANIZED HEALTH CARE EDUCATION/TRAINING PROGRAM

## 2022-11-21 PROCEDURE — 250N000013 HC RX MED GY IP 250 OP 250 PS 637: Performed by: INTERNAL MEDICINE

## 2022-11-21 PROCEDURE — 81001 URINALYSIS AUTO W/SCOPE: CPT | Performed by: STUDENT IN AN ORGANIZED HEALTH CARE EDUCATION/TRAINING PROGRAM

## 2022-11-21 PROCEDURE — 36415 COLL VENOUS BLD VENIPUNCTURE: CPT | Performed by: STUDENT IN AN ORGANIZED HEALTH CARE EDUCATION/TRAINING PROGRAM

## 2022-11-21 PROCEDURE — 250N000013 HC RX MED GY IP 250 OP 250 PS 637: Performed by: STUDENT IN AN ORGANIZED HEALTH CARE EDUCATION/TRAINING PROGRAM

## 2022-11-21 PROCEDURE — 120N000001 HC R&B MED SURG/OB

## 2022-11-21 PROCEDURE — 250N000011 HC RX IP 250 OP 636: Performed by: STUDENT IN AN ORGANIZED HEALTH CARE EDUCATION/TRAINING PROGRAM

## 2022-11-21 PROCEDURE — 97166 OT EVAL MOD COMPLEX 45 MIN: CPT | Mod: GO

## 2022-11-21 PROCEDURE — 97162 PT EVAL MOD COMPLEX 30 MIN: CPT | Mod: GP

## 2022-11-21 PROCEDURE — 83735 ASSAY OF MAGNESIUM: CPT | Performed by: STUDENT IN AN ORGANIZED HEALTH CARE EDUCATION/TRAINING PROGRAM

## 2022-11-21 PROCEDURE — 84295 ASSAY OF SERUM SODIUM: CPT | Performed by: STUDENT IN AN ORGANIZED HEALTH CARE EDUCATION/TRAINING PROGRAM

## 2022-11-21 PROCEDURE — 85025 COMPLETE CBC W/AUTO DIFF WBC: CPT | Performed by: STUDENT IN AN ORGANIZED HEALTH CARE EDUCATION/TRAINING PROGRAM

## 2022-11-21 PROCEDURE — 83605 ASSAY OF LACTIC ACID: CPT | Performed by: STUDENT IN AN ORGANIZED HEALTH CARE EDUCATION/TRAINING PROGRAM

## 2022-11-21 PROCEDURE — 83935 ASSAY OF URINE OSMOLALITY: CPT | Performed by: STUDENT IN AN ORGANIZED HEALTH CARE EDUCATION/TRAINING PROGRAM

## 2022-11-21 PROCEDURE — 258N000003 HC RX IP 258 OP 636: Performed by: STUDENT IN AN ORGANIZED HEALTH CARE EDUCATION/TRAINING PROGRAM

## 2022-11-21 PROCEDURE — 97535 SELF CARE MNGMENT TRAINING: CPT | Mod: GO

## 2022-11-21 PROCEDURE — 99233 SBSQ HOSP IP/OBS HIGH 50: CPT | Performed by: STUDENT IN AN ORGANIZED HEALTH CARE EDUCATION/TRAINING PROGRAM

## 2022-11-21 RX ADMIN — MULTIPLE VITAMINS W/ MINERALS TAB 1 TABLET: TAB at 09:09

## 2022-11-21 RX ADMIN — GABAPENTIN 900 MG: 300 CAPSULE ORAL at 06:37

## 2022-11-21 RX ADMIN — CYANOCOBALAMIN TAB 1000 MCG 1000 MCG: 1000 TAB at 09:09

## 2022-11-21 RX ADMIN — Medication 100 MG: at 09:08

## 2022-11-21 RX ADMIN — DEXTROSE AND SODIUM CHLORIDE: 5; 900 INJECTION, SOLUTION INTRAVENOUS at 11:24

## 2022-11-21 RX ADMIN — DEXTROSE AND SODIUM CHLORIDE: 5; 900 INJECTION, SOLUTION INTRAVENOUS at 01:00

## 2022-11-21 RX ADMIN — ONDANSETRON 4 MG: 2 INJECTION INTRAMUSCULAR; INTRAVENOUS at 01:00

## 2022-11-21 RX ADMIN — HYDROXYZINE HYDROCHLORIDE 50 MG: 25 TABLET, FILM COATED ORAL at 00:53

## 2022-11-21 RX ADMIN — ENOXAPARIN SODIUM 40 MG: 100 INJECTION SUBCUTANEOUS at 16:17

## 2022-11-21 RX ADMIN — METOPROLOL TARTRATE 25 MG: 25 TABLET, FILM COATED ORAL at 20:28

## 2022-11-21 RX ADMIN — DIAZEPAM 10 MG: 5 TABLET ORAL at 22:11

## 2022-11-21 RX ADMIN — GABAPENTIN 900 MG: 300 CAPSULE ORAL at 13:59

## 2022-11-21 RX ADMIN — DIAZEPAM 5 MG: 5 INJECTION, SOLUTION INTRAMUSCULAR; INTRAVENOUS at 00:53

## 2022-11-21 RX ADMIN — DIAZEPAM 10 MG: 5 TABLET ORAL at 10:15

## 2022-11-21 RX ADMIN — FOLIC ACID 1 MG: 1 TABLET ORAL at 09:08

## 2022-11-21 RX ADMIN — DIAZEPAM 10 MG: 5 TABLET ORAL at 16:17

## 2022-11-21 RX ADMIN — HYDROXYZINE HYDROCHLORIDE 50 MG: 25 TABLET, FILM COATED ORAL at 09:09

## 2022-11-21 RX ADMIN — NICOTINE 1 PATCH: 21 PATCH, EXTENDED RELEASE TRANSDERMAL at 09:11

## 2022-11-21 RX ADMIN — ATORVASTATIN CALCIUM 40 MG: 40 TABLET, FILM COATED ORAL at 20:28

## 2022-11-21 RX ADMIN — GABAPENTIN 900 MG: 300 CAPSULE ORAL at 22:10

## 2022-11-21 RX ADMIN — METOPROLOL TARTRATE 25 MG: 25 TABLET, FILM COATED ORAL at 09:09

## 2022-11-21 RX ADMIN — MAGNESIUM OXIDE TAB 400 MG (241.3 MG ELEMENTAL MG) 400 MG: 400 (241.3 MG) TAB at 09:09

## 2022-11-21 ASSESSMENT — ACTIVITIES OF DAILY LIVING (ADL)
ADLS_ACUITY_SCORE: 34

## 2022-11-21 NOTE — PROGRESS NOTES
Care Management Follow Up    Length of Stay (days): 1    Expected Discharge Date: 11/22/2022     Concerns to be Addressed: care coordination/care conferences, discharge planning, home safety     Patient plan of care discussed at interdisciplinary rounds: Yes    Anticipated Discharge Disposition: Transitional Care     Anticipated Discharge Services: None  Anticipated Discharge DME: None    Patient/family educated on Medicare website which has current facility and service quality ratings: yes (Declined)  Education Provided on the Discharge Plan:    Patient/Family in Agreement with the Plan: yes    Referrals Placed by CM/SAMARIA: Post Acute Facilities  Private pay costs discussed: transportation costs    Additional Information:    PAM talked with Cassandra in admissions at [facility]  [phone] and is assessing both Pt and his wife for possible admission tomorrow.       DRAKE Spencer

## 2022-11-21 NOTE — PLAN OF CARE
Report at 1520 that BGL was 57. Familia had significant bilateral UE tremors and nausea. 120mL OJ given, BGL rechecked 1535 it was 52. Nausea continued. MD paged for orders as Familia felt he would vomit. IV Dextrose 5% NS started, dextrose 50% 25mL injection given. Score 13 CIWA, gave 5mg Valium IV. VSS at this time. BGL rechecked at 1600, 199. CIWA repeat after Valium and score 12, another 5mg Valium IV given. Tremor continued. CIWA 30 minutes later score 10, 5mg Valium IV given and IV Zofran given. CIWA repeated at 1900 (2 hours later), score 9, no PRNs given.   He ate bites of grilled cheese, 2 bowls of soup. Drank broth and juice. Wife visited room, brightened his spirits. BGL at 2200, 196.   CIWA 2300, score 5, no PRN given. He is looking forward to getting cleaned up.     Problem: Plan of Care - These are the overarching goals to be used throughout the patient stay.    Goal: Optimal Comfort and Wellbeing  Outcome: Progressing     Problem: Risk for Delirium  Goal: Improved Sleep  Outcome: Progressing     Problem: Alcohol Withdrawal  Goal: Alcohol Withdrawal Symptom Control  Outcome: Progressing  Intervention: Minimize or Manage Alcohol Withdrawal Symptoms  Recent Flowsheet Documentation  Taken 11/20/2022 1652 by Ness Solorzano RN  Sensory Stimulation Regulation: television on  Goal: Optimal Neurologic Function  Outcome: Progressing  Intervention: Minimize or Manage Acute Neurologic Symptoms  Recent Flowsheet Documentation  Taken 11/20/2022 1652 by Ness Solorzano, RN  Sensory Stimulation Regulation: television on     Problem: Oral Intake Inadequate  Goal: Improved Oral Intake  Outcome: Progressing   Goal Outcome Evaluation:

## 2022-11-21 NOTE — UTILIZATION REVIEW
Admission Status; Secondary Review Determination   Under the authority of the Utilization Management Committee, the utilization review process indicated a secondary review on Familia Watson. The review outcome is based on review of the medical records, discussions with staff, and applying clinical experience noted on the date of the review.   (x) Inpatient Status Appropriate - This patient's medical care is consistent with medical management for inpatient care and reasonable inpatient medical practice.     RATIONALE FOR DETERMINATION   72 yr old male with HTN, HLD, tobacco use presented with weakness and fatigue.  Na 127 and after IVF only 129.   Alcohol use with intoxication on presentation and now developing withdrawal with need for valium.   High AG metabolic acidosis with either starvation or alcoholic ketosis.  IVF continue with close monitoring of electrolytes for refeeding syndrome.    At the time of admission with the information available to the attending physician more than 2 nights Hospital complex care was anticipated, based on patient risk of adverse outcome if treated as outpatient and complex care required. Inpatient admission is appropriate based on the Medicare guidelines.   The information on this document is developed by the utilization review team in order for the business office to ensure compliance. This only denotes the appropriateness of proper admission status and does not reflect the quality of care rendered.   The definitions of Inpatient Status and Observation Status used in making the determination above are those provided in the CMS Coverage Manual, Chapter 1 and Chapter 6, section 70.4.   Sincerely,   Jo-Ann Castaneda MD  Utilization Review  Physician Advisor  [facility]

## 2022-11-21 NOTE — PROGRESS NOTES
[facility]    Medicine Progress Note - Hospitalist Service    Date of Admission:  11/20/2022    Assessment & Plan      Familia Watson is a 72 year old man with past medical history significant for hypertension, dyslipidemia, tobacco use, alcohol use disorder, poor living condition who presented to the hospital on 11/20/2022 with a few weeks of decreased p.o. intake, generalized weakness and fatigue. Treating alcoholic withdrawal and monitoring for potential refeeding syndrome. Therapies recommend TCU or HHC.     #Failure to thrive  #Poor living condition.  Living with his wife who was recently diagnosed with cancer. Unable to take care of himself. Has not been eating for over a week PTA.  Plan:  - following  -Watch for refeeding syndrome during this hospitalization.  -11/21- pt is now agreeable or at least open to possibly a facility (on admit wanted to go home)    #Alcohol withdrawal  Drinks around half pints of alcohol a day. Last alcohol drink AM of admit on 11/20. Reported alcohol withdrawal symptoms in the past but no seizures. HD1 with tremors.   Plan:  -CIWA protocol  -Folic acid and thiamine.  Thiamine before any glucose.  -Gabapentin tapering dose.    #Hyponatremia   Sodium on presentation 127. No clear neurological symptoms. HD1 improving slowly appropriately at 129.  - ddx of hypovolemic hyponatremia in the setting of decreased p.o. intake, vomiting vsdecreased solute intake, beer proteinemia. Reducing rate of fluids HD1.  Plan:  -REDUCE rate of NS from 100cc/hr to 75cc/hr.   -recheck sodium PM and in AM  -Continue to monitor.  -pending urine sodium and urine osmolarity (regarding siadh)     # High anion gap metabolic acidosis  # Hypophosphatemia  # hypomagnesemia  Anion gap 23; Possible etiologies include starvation ketoacidosis versus alcoholic ketoacidosis. Based on labs, more likely starvation ketoacidosis.  Plan:  - lactic acid 4.2 on admit to 1.3 night of  admit  -Urinalysis demonstrated ketones  - replace phosphorus, magnesium     #Hypertension  -At home on amlodipine 10 mg daily.  Plan:  -Hold antihypertensive for now. Can likely resume 11/22    #Insomnia  -Trazodone as needed at night.  Plan:  -Continue trazodone as needed.    #Dyslipidemia  -Mild transaminitis  Plan:  -Resume home atorvastatin on discharge.    #Tobacco use  Plan:  -Nicotine patch.  -encourage cessation     DVTP: Lovenox prophylactic dose   Code Status: Prior  Disposition: Inpatient   Goals for the hospitalization: Resolution of alcohol withdrawal, resolution of metabolic acidosis, safe discharge planning (likely TCU or HHC)        Diet: Combination Diet Regular Diet Adult    DVT Prophylaxis: Enoxaparin (Lovenox) SQ  Alberto Catheter: Not present  Central Lines: None  Cardiac Monitoring: None  Code Status: Full Code      Disposition Plan      Expected Discharge Date: 11/22/2022      Destination: inpatient rehabilitation facility;other (comment) (Transitional care)  Discharge Comments: pending [facility]        The patient's care was discussed with the Bedside Nurse and Patient.    Amaury Berger MD  Hospitalist Service  [facility]  Securely message with the Vocera Web Console (learn more here)  Text page via [employer] Paging/Directory     Clinically Significant Risk Factors Present on Admission          # Hyponatremia: Lowest Na = 127 mmol/L (Ref range: 136-145) in last 2 days, will monitor as appropriate  # Hypocalcemia: Lowest Ca = 8.2 mg/dL (Ref range: 8.5 - 10.1 mg/dL) in last 2 days, will monitor and replace as appropriate    # Anion Gap Metabolic Acidosis: Highest Anion Gap = 23 mmol/L (Ref range: 7-15) in last 2 days, will monitor and treat as appropriate  # Hypoalbuminemia: Lowest albumin = 3.2 g/dL (Ref range: 3.5-5.2) at 11/21/2022  8:15 AM, will monitor as appropriate   # Thrombocytopenia: Lowest platelets = 102 (Ref range: 150-450) in last 2 days, will monitor for bleeding   "     # Cachexia: Estimated body mass index is 17.94 kg/m  as calculated from the following:    Height as of this encounter: 1.727 m (5' 8\").    Weight as of this encounter: 53.5 kg (118 lb).           ______________________________________________________________________    Interval History   - no acute interval events  - this AM pt is feeling tremulous and his leg pain is bothering him  - no new swelling or breathing issues  - we discussed CIWA protocol and medications (he was asking for prn valium, which discussed not appropriate)  - he has no new concerns other than the tremors; confirmed no prior seizure hx from etoh withdrawal  - he is open to TCU or Home cares today after we discussed    Data reviewed today: I reviewed all medications, new labs and imaging results over the last 24 hours. I personally reviewed no images or EKG's today.    Physical Exam   Vital Signs: Temp: 98.2  F (36.8  C) Temp src: Oral BP: 104/56 Pulse: 78   Resp: 18 SpO2: 97 % O2 Device: None (Room air)    Weight: 118 lbs 0 oz    General: alert, oriented, and in no acute distress, tremulous  Pulmonary: clear to auscultation bilaterally, normal respiratory effort, on room air, no rales or wheezes or evidence of accessory muscle use  CVS: regular rate and rhythm, no murmurs, rubs, or gallops; no blatant jugular venous distention; no extremity edema and extremities are warm to the touch  GI: soft, nontender, BS+, no rebound or guarding, no conspicuous organomegaly   Neuro: tremulous throughout, moves all extremities of own volition; demonstrates some asterixis but is oriented x4  Psych: appropriate and cooperative     Data   Recent Labs   Lab 11/21/22  0815 11/20/22  2236 11/20/22  1606 11/20/22  1517 11/20/22  1149   WBC 3.8*  --   --   --  4.9   HGB 11.7*  --   --   --  12.7*   MCV 92  --   --   --  93   *  --   --   --  135*   *  --   --   --  127*   POTASSIUM 3.5  --   --   --  3.9   CHLORIDE 100  --   --   --  93*   CO2 20*  " --   --   --  11*   BUN 10  --   --   --  11   CR 0.69*  --   --   --  0.78   ANIONGAP 9  --   --   --  23*   MELLISA 8.2*  --   --   --  8.4*   * 196* 199*   < > 123   ALBUMIN 3.2*  --   --   --  3.8   PROTTOTAL 5.8*  --   --   --  7.1   BILITOTAL 1.2*  --   --   --  1.0   ALKPHOS 96  --   --   --  119   ALT 72*  --   --   --  111*   AST 88*  --   --   --  134*   LIPASE  --   --   --   --  173*    < > = values in this interval not displayed.     No results found for this or any previous visit (from the past 24 hour(s)).  Medications     dextrose 5% and 0.9% NaCl 100 mL/hr at 11/21/22 1124     - MEDICATION INSTRUCTIONS -         atorvastatin  40 mg Oral At Bedtime     cyanocobalamin  1,000 mcg Oral Daily     enoxaparin ANTICOAGULANT  40 mg Subcutaneous Q24H     folic acid  1 mg Oral Daily     [START ON 11/27/2022] gabapentin  100 mg Oral Q8H     [START ON 11/25/2022] gabapentin  300 mg Oral Q8H     [START ON 11/23/2022] gabapentin  600 mg Oral Q8H     gabapentin  900 mg Oral Q8H     magnesium oxide  400 mg Oral Daily     metoprolol tartrate  25 mg Oral BID     multivitamin w/minerals  1 tablet Oral Daily     nicotine  1 patch Transdermal Daily     nicotine   Transdermal Q8H     sodium chloride (PF)  3 mL Intracatheter Q8H     thiamine  100 mg Oral Daily

## 2022-11-21 NOTE — PROGRESS NOTES
11/21/22 1033   Appointment Info   Signing Clinician's Name / Credentials (PT) Wang Knox   Living Environment   People in Home spouse   Current Living Arrangements apartment   Home Accessibility stairs to enter home;stairs within home   Number of Stairs, Main Entrance 2   Stair Railings, Main Entrance railings safe and in good condition   Number of Stairs, Within Home, Primary six   Stair Railings, Within Home, Primary railings safe and in good condition   Transportation Anticipated agency   Living Environment Comments tub shower, reports recently he's only been able to walk 5 steps before being exhaused   Self-Care   Usual Activity Tolerance moderate   Current Activity Tolerance fair   Equipment Currently Used at Home shower chair;walker, rolling   Fall history within last six months no   Activity/Exercise/Self-Care Comment Ind for BADLs , but has not been able to take care of self when spouse was in hospital   General Information   Onset of Illness/Injury or Date of Surgery 11/20/22   Patient/Family Therapy Goals Statement (PT) thinks he needs TCU at this time   Pertinent History of Current Problem (include personal factors and/or comorbidities that impact the POC) Hyponatremia 1/25/2022    Generalized muscle weakness   Existing Precautions/Restrictions fall   Cognition   Affect/Mental Status (Cognition) WFL   Orientation Status (Cognition) oriented x 4   Follows Commands (Cognition) follows one-step commands   Pain Assessment   Patient Currently in Pain Yes, see Vital Sign flowsheet   Posture    Posture Not impaired   Range of Motion (ROM)   Range of Motion ROM is WFL   Strength (Manual Muscle Testing)   Strength Comments general weakness   Transfers   Transfers sit-stand transfer   Sit-Stand Transfer   Sit-Stand [place] (Transfers) contact guard   Assistive Device (Sit-Stand Transfers) walker, front-wheeled   Comment, (Sit-Stand Transfer) slightly unsteady with initial standing   Gait/Stairs  (Locomotion)   [place] Level (Gait) contact guard   Assistive Device (Gait) walker, front-wheeled   Distance in Feet (Required for LE Total Joints) 6   Distance in Feet (Gait) 20   Pattern (Gait) step-through   Deviations/Abnormal Patterns (Gait) andrea decreased   Comment, (Gait/Stairs) Tremors noted with all gait,   Balance   Balance Comments tremors noticed , no loss of balance   Sensory Examination   Sensory Perception patient reports no sensory changes   Clinical Impression   Criteria for Skilled Therapeutic Intervention Yes, treatment indicated   PT Diagnosis (PT) impaired functional mobility   Influenced by the following impairments weakness, pain   Functional limitations due to impairments transfers, gait   Clinical Presentation (PT Evaluation Complexity) Evolving/Changing   Clinical Presentation Rationale Pt. presents as medically diagnosed   Clinical Decision Making (Complexity) moderate complexity   Planned Therapy Interventions (PT) balance training;gait training;stair training;transfer training;patient/family education   Anticipated Equipment Needs at Discharge (PT) walker, rolling   Risk & Benefits of therapy have been explained care plan/treatment goals reviewed   PT Total Evaluation Time   PT Eval, Moderate Complexity Minutes (56275) 15   Physical Therapy Goals   PT Frequency Daily   PT Predicted Duration/Target Date for Goal Attainment 11/28/22   PT Goals Transfers;Gait;Stairs;PT Goal 1   PT: Transfers Modified independent;Sit to/from stand;Assistive device   PT: Gait Modified independent;Rolling walker;Assistive device;50 feet   PT: Stairs 8 stairs;Supervision/stand-by assist   PT: Goal 1 I with HEP   Interventions   Interventions Quick Adds Gait Training;Therapeutic Activity;Therapeutic Procedure   Gait Training   Gait Training Minutes (88184) 8   Symptoms Noted During/After Treatment (Gait Training) fatigue   Treatment Detail/Skilled Intervention slow shuffling gait, tremors noted in both  arms/leg. Declined to walk any further due to fatigue   [place] Level (Gait Training) contact guard   Physical Assistance Level (Gait Training) 1 person assist;supervision   Weight Bearing (Gait Training) full weight-bearing   Assistive Device (Gait Training) rolling walker   Pattern Analysis (Gait Training) swing-to gait   Gait Analysis Deviations decreased andrea;decreased step length;decreased stride length;decreased toe-to-floor clearance   Impairments (Gait Analysis/Training) balance impaired;strength decreased   PT Discharge Planning   PT Plan Progress with gait/transfers as able. LE standing HEP   PT Discharge Recommendation (DC Rec) home with assist;home with home care physical therapy;Transitional Care Facility   PT Rationale for DC Rec Patient could return to home if they have at least contact guard  assist of one with all gait/transfers and FWW.  Patient noted to be fall risk with initial standing.   Patient is only able to ambulate short distances and would have 8-10 steps to go up to get into home..   Patient would need  PT for further strengthening, balance, mobility training.   Noted that patient s wife is currently in hospital.     If family can not provide this level of care would recommend TCU for for further strengthening, balance, mobility training to be able to safely return to home.   PT Brief overview of current status Patient needing cga with all mobility currently   Total Session Time   Timed Code Treatment Minutes 8   Total Session Time (sum of timed and untimed services) 23

## 2022-11-21 NOTE — PROGRESS NOTES
11/21/22 0825   Appointment Info   Signing Clinician's Name / Credentials (OT) Moon Verdin, OTR/L   Living Environment   People in Home spouse   Current Living Arrangements apartment   Living Environment Comments tub shower, reports recently he's only been able to walk 5 steps before being exhaused   Self-Care   Equipment Currently Used at Home shower chair   Activity/Exercise/Self-Care Comment ind for BADLs   Instrumental Activities of Daily Living (IADL)   IADL Comments ind for IADLs - drives, does meds for him and spouse   General Information   Onset of Illness/Injury or Date of Surgery 11/20/22   Referring Physician Pascual Beyer MD   Patient/Family Therapy Goal Statement (OT) go to TCU with spouse   Additional Occupational Profile Info/Pertinent History of Current Problem admitted with hyponatremia, generalized muscle weakness, failure to thrive, Alcohol withdrawal   Existing Precautions/Restrictions fall   Limitations/Impairments safety/cognitive   Cognitive Status Examination   Orientation Status orientation to person, place and time   Affect/Mental Status (Cognitive) flat/blunted affect   Follows Commands repetition of directions required   Safety Deficit moderate deficit;insight into deficits/self-awareness;safety precautions awareness;safety precautions follow-through/compliance   Memory Deficit minimal deficit;short-term memory  (2/3 short-term recall, unable to recall 3rd word with cues)   Cognitive Status Comments needing mod VC for safety during activity   Range of Motion Comprehensive   General Range of Motion bilateral upper extremity ROM WFL   Comment, General Range of Motion shoulder limitations   Strength Comprehensive (MMT)   Comment, General Manual Muscle Testing (MMT) Assessment generalized weakness   Coordination   Coordination Comments tremors present BUE   Bed Mobility   Bed Mobility supine-sit;sit-supine   Supine-Sit [place] (Bed Mobility) contact guard   Sit-Supine [place]  (Bed Mobility) contact guard   Assistive Device (Bed Mobility) bed rails   Transfers   Transfers sit-stand transfer   Sit-Stand Transfer   Sit-Stand [place] (Transfers) minimum assist (75% patient effort)   Balance   Balance Comments min/mod A to maintain balance intermittently during short transfers   Activities of Daily Living   BADL Assessment/Intervention lower body dressing;toileting   Lower Body Dressing Assessment/Training   Comment, (Lower Body Dressing) intermittent balance assist   [place] Level (Lower Body Dressing) minimum assist (75% patient effort)   Toileting   Comment, (Toileting) intermittent balance assist   [place] Level (Toileting) minimum assist (75% patient effort);contact guard assist   Clinical Impression   Criteria for Skilled Therapeutic Interventions Met (OT) Yes, treatment indicated   OT Diagnosis dec ADL indep   OT Problem List-Impairments impacting ADL problems related to;activity tolerance impaired;cognition;mobility;strength;coordination   Assessment of Occupational Performance 3-5 Performance Deficits   Identified Performance Deficits dressing, toileting, functional transfers, household mobility, safety awareness   Planned Therapy Interventions (OT) ADL retraining;balance training;bed mobility training;cognition;strengthening;transfer training;home program guidelines;progressive activity/exercise;risk factor education   Clinical Decision Making Complexity (OT) moderate complexity   Risk & Benefits of therapy have been explained evaluation/treatment results reviewed;participants included;patient   OT Total Evaluation Time   OT Eval, Moderate Complexity Minutes (69435) 10   OT Goals   Therapy Frequency (OT) Daily   OT Predicted Duration/Target Date for Goal Attainment 11/28/22   OT Goals Hygiene/Grooming;Lower Body Dressing;Bed Mobility;Toilet Transfer/Toileting;Cognition;OT Goal 1   OT: Hygiene/Grooming modified independent;while standing   OT: Lower Body Dressing  Modified independent   OT: Bed Mobility Independent;supine to/from sitting   OT: Toilet Transfer/Toileting Modified independent;toilet transfer;cleaning and garment management   OT: Cognitive Patient/caregiver will verbalize understanding of cognitive assessment results/recommendations as needed for safe discharge planning   OT: Goal 1 mod I for lateral tub transfer   Interventions   Interventions Quick Adds Self-Care/Home Management   Self-Care/Home Management   Self-Care/Home Mgmt/ADL, Compensatory, Meal Prep Minutes (73783) 15   Symptoms Noted During/After Treatment (Meal Preparation/Planning Training) fatigue   Treatment Detail/Skilled Intervention Educ pt on need to use FWW d/t balance deficits. CGA/min to stand to maintain balance 2x w VC needed for all transfers for hand placement. min/mod A intermittently for balance during 5 ft mobility with FWW 2x - needing mulitple direct VC to maintain hold on FWW.   OT Discharge Planning   OT Plan 11/28 goals, SLUMs, functional balance during ADLs   OT Discharge Recommendation (DC Rec) (S)  Transitional Care Facility;home with home care occupational therapy;home with assist   OT Rationale for DC Rec rec TCU to address strength and balance deficits. would require 24/7 hands on physical assist for all ADLs and household mobility d/t fall risk at this time.   OT Brief overview of current status min/mod A for balance during mobility with FWW, min A for balance during standing ADLs   Total Session Time   Timed Code Treatment Minutes 15   Total Session Time (sum of timed and untimed services) 25

## 2022-11-21 NOTE — PLAN OF CARE
Problem: Plan of Care - These are the overarching goals to be used throughout the patient stay.    Goal: Optimal Comfort and Wellbeing  Outcome: Progressing  Intervention: Monitor Pain and Promote Comfort  Recent Flowsheet Documentation  Taken 11/21/2022 0032 by Danette Walters RN  Pain Management Interventions: medication (see MAR)     Problem: Risk for Delirium  Goal: Improved Sleep  Outcome: Progressing; patient appears to sleep between cares after receiving medications to help address CIWA symptoms.     Patient has CIWA orders for alcohol withdrawal; 0053 patient scored #9 and valium 5 mg IV administered, Hydroxyzine 50 mg given due to muscle tightness, Zofran. CIWA at 0214 #0, at 0640 #5.    Paged on-call provider to notify UA results were flagged due to out or range.

## 2022-11-21 NOTE — PROGRESS NOTES
"CLINICAL NUTRITION SERVICES - ASSESSMENT NOTE     Nutrition Prescription    RECOMMENDATIONS FOR MDs/PROVIDERS TO ORDER:  Rec care mgt see regarding at home meal services like MOW    Malnutrition Status:    % Weight Loss:  > 7.5% in 3 months (severe malnutrition)  % Intake:  <75% for >/= 1 month (moderate malnutrition)  Subcutaneous Fat Loss:  Orbital region severe depletion and Thoracic region severe depletion  Muscle Loss:  Temporal region severe depletion and Clavicle bone region severe depletion  Fluid Retention:  None noted    Malnutrition Diagnosis: Severe malnutrition  In Context of:  Chronic illness or disease    NUTRITION DIAGNOSIS  Malnutrition related to chronic/situational as evidenced by severe wt loss, fat and muscle loss, reduced po       Recommendations already ordered by Registered Dietitian (RD):  Rec care mgt see regarding at home meal services like MOW  Start Ensure bid, chocolate     Future/Additional Recommendations:  Will monitor po, wt      REASON FOR ASSESSMENT  Familia CHARLI Cruzle is a/an 72 year old male assessed by the dietitian for Provider Order - alcohol withdrawal     Pt admitted with failure to thrive, reduced po, etoh abuse    NUTRITION HISTORY  NKFA  He feels he is eating well now  He said years ago he was drinking Ensure bid to gain wt   Wife with cancer and pt has not been able to care for himself       CURRENT NUTRITION ORDERS  Diet: Regular  Intake/Tolerance: 100%    LABS  Labs reviewed; Na-129, Mg-1.7, Gluc-171    MEDICATIONS  Medications reviewed, Vit B12, Folic acid, Mag-ox, MVI,     ANTHROPOMETRICS  Height: 172.7 cm (5' 8\")  Most Recent Weight: 53.5 kg (118 lb)    IBW: 70 kg  BMI: Underweight BMI <18.5  Weight History:   Wt Readings from Last 15 Encounters:   11/21/22 53.5 kg (118 lb)   09/15/22 61.2 kg (135 lb)   08/09/22 61.2 kg (135 lb)   07/17/22 59 kg (130 lb)   05/12/22 63.5 kg (140 lb)   04/07/22 59 kg (130 lb)   03/02/22 62.1 kg (137 lb)   01/25/22 58.2 kg (128 lb 3.2 " oz)   Pt down 17# in 2 months (12%)       Dosing Weight: 70 kg    ASSESSED NUTRITION NEEDS  Estimated Energy Needs: 8663-7444 kcals/day (25 - 30 kcals/kg)  Justification: Maintenance  Estimated Protein Needs: 78-84 grams protein/day (1 - 1.2 grams of pro/kg)  Justification: Maintenance  Estimated Fluid Needs: 5074-3804 mL/day (25 - 30 mL/kg)   Justification: Maintenance    PHYSICAL FINDINGS  See malnutrition section below.  GI: WDL   Skin: no breakdown        MALNUTRITION:  % Weight Loss:  > 7.5% in 3 months (severe malnutrition)  % Intake:  <75% for >/= 1 month (moderate malnutrition)  Subcutaneous Fat Loss:  Orbital region severe depletion and Thoracic region severe depletion  Muscle Loss:  Temporal region severe depletion and Clavicle bone region severe depletion  Fluid Retention:  None noted    Malnutrition Diagnosis: Severe malnutrition  In Context of:  Chronic illness or disease    NUTRITION DIAGNOSIS  Malnutrition related to chronic/situational as evidenced by severe wt loss, fat and muscle loss, reduced po       INTERVENTIONS  Implementation  Rec care mgt see regarding at home meal services like MOW  Start Ensure bid, chocolate     Goals  Patient to consume % of nutritionally adequate meals three times per day, or the equivalent with supplements/snacks.  Promote wt gain      Monitoring/Evaluation  Progress toward goals will be monitored and evaluated per protocol.

## 2022-11-22 ENCOUNTER — APPOINTMENT (OUTPATIENT)
Dept: PHYSICAL THERAPY | Facility: CLINIC | Age: 72
DRG: 643 | End: 2022-11-22
Payer: MEDICARE

## 2022-11-22 ENCOUNTER — APPOINTMENT (OUTPATIENT)
Dept: OCCUPATIONAL THERAPY | Facility: CLINIC | Age: 72
DRG: 643 | End: 2022-11-22
Payer: MEDICARE

## 2022-11-22 LAB
ALBUMIN SERPL-MCNC: 2.9 G/DL (ref 3.5–5)
ALP SERPL-CCNC: 91 U/L (ref 45–120)
ALT SERPL W P-5'-P-CCNC: 68 U/L (ref 0–45)
ANION GAP SERPL CALCULATED.3IONS-SCNC: 6 MMOL/L (ref 5–18)
AST SERPL W P-5'-P-CCNC: 90 U/L (ref 0–40)
BILIRUB SERPL-MCNC: 0.7 MG/DL (ref 0–1)
BUN SERPL-MCNC: 8 MG/DL (ref 8–28)
CALCIUM SERPL-MCNC: 8.1 MG/DL (ref 8.5–10.5)
CHLORIDE BLD-SCNC: 107 MMOL/L (ref 98–107)
CO2 SERPL-SCNC: 22 MMOL/L (ref 22–31)
CREAT SERPL-MCNC: 0.53 MG/DL (ref 0.7–1.3)
ERYTHROCYTE [DISTWIDTH] IN BLOOD BY AUTOMATED COUNT: 15.6 % (ref 10–15)
GFR SERPL CREATININE-BSD FRML MDRD: >90 ML/MIN/1.73M2
GLUCOSE BLD-MCNC: 136 MG/DL (ref 70–125)
GLUCOSE BLDC GLUCOMTR-MCNC: 135 MG/DL (ref 70–99)
GLUCOSE BLDC GLUCOMTR-MCNC: 140 MG/DL (ref 70–99)
GLUCOSE BLDC GLUCOMTR-MCNC: 141 MG/DL (ref 70–99)
HCT VFR BLD AUTO: 32.1 % (ref 40–53)
HGB BLD-MCNC: 11 G/DL (ref 13.3–17.7)
LACTATE SERPL-SCNC: 1.8 MMOL/L (ref 0.7–2)
MAGNESIUM SERPL-MCNC: 1.7 MG/DL (ref 1.8–2.6)
MCH RBC QN AUTO: 31.6 PG (ref 26.5–33)
MCHC RBC AUTO-ENTMCNC: 34.3 G/DL (ref 31.5–36.5)
MCV RBC AUTO: 92 FL (ref 78–100)
PLATELET # BLD AUTO: 85 10E3/UL (ref 150–450)
POTASSIUM BLD-SCNC: 3.6 MMOL/L (ref 3.5–5)
PROT SERPL-MCNC: 5.6 G/DL (ref 6–8)
RBC # BLD AUTO: 3.48 10E6/UL (ref 4.4–5.9)
SODIUM SERPL-SCNC: 135 MMOL/L (ref 136–145)
WBC # BLD AUTO: 3.7 10E3/UL (ref 4–11)

## 2022-11-22 PROCEDURE — 250N000013 HC RX MED GY IP 250 OP 250 PS 637: Performed by: STUDENT IN AN ORGANIZED HEALTH CARE EDUCATION/TRAINING PROGRAM

## 2022-11-22 PROCEDURE — 250N000011 HC RX IP 250 OP 636: Performed by: STUDENT IN AN ORGANIZED HEALTH CARE EDUCATION/TRAINING PROGRAM

## 2022-11-22 PROCEDURE — 258N000003 HC RX IP 258 OP 636: Performed by: STUDENT IN AN ORGANIZED HEALTH CARE EDUCATION/TRAINING PROGRAM

## 2022-11-22 PROCEDURE — 85027 COMPLETE CBC AUTOMATED: CPT | Performed by: STUDENT IN AN ORGANIZED HEALTH CARE EDUCATION/TRAINING PROGRAM

## 2022-11-22 PROCEDURE — 36415 COLL VENOUS BLD VENIPUNCTURE: CPT | Performed by: INTERNAL MEDICINE

## 2022-11-22 PROCEDURE — 97535 SELF CARE MNGMENT TRAINING: CPT | Mod: GO

## 2022-11-22 PROCEDURE — 36415 COLL VENOUS BLD VENIPUNCTURE: CPT | Performed by: STUDENT IN AN ORGANIZED HEALTH CARE EDUCATION/TRAINING PROGRAM

## 2022-11-22 PROCEDURE — 83605 ASSAY OF LACTIC ACID: CPT | Performed by: INTERNAL MEDICINE

## 2022-11-22 PROCEDURE — 80053 COMPREHEN METABOLIC PANEL: CPT | Performed by: STUDENT IN AN ORGANIZED HEALTH CARE EDUCATION/TRAINING PROGRAM

## 2022-11-22 PROCEDURE — 120N000001 HC R&B MED SURG/OB

## 2022-11-22 PROCEDURE — 83735 ASSAY OF MAGNESIUM: CPT | Performed by: STUDENT IN AN ORGANIZED HEALTH CARE EDUCATION/TRAINING PROGRAM

## 2022-11-22 PROCEDURE — 97116 GAIT TRAINING THERAPY: CPT | Mod: GP

## 2022-11-22 PROCEDURE — 99232 SBSQ HOSP IP/OBS MODERATE 35: CPT | Performed by: STUDENT IN AN ORGANIZED HEALTH CARE EDUCATION/TRAINING PROGRAM

## 2022-11-22 PROCEDURE — 250N000013 HC RX MED GY IP 250 OP 250 PS 637: Performed by: INTERNAL MEDICINE

## 2022-11-22 RX ORDER — GABAPENTIN 300 MG/1
300 CAPSULE ORAL 2 TIMES DAILY
Status: DISCONTINUED | OUTPATIENT
Start: 2022-11-22 | End: 2022-11-22

## 2022-11-22 RX ORDER — GABAPENTIN 300 MG/1
600 CAPSULE ORAL 2 TIMES DAILY
Status: DISCONTINUED | OUTPATIENT
Start: 2022-11-22 | End: 2022-11-23 | Stop reason: HOSPADM

## 2022-11-22 RX ADMIN — CYANOCOBALAMIN TAB 1000 MCG 1000 MCG: 1000 TAB at 08:01

## 2022-11-22 RX ADMIN — DIAZEPAM 10 MG: 5 TABLET ORAL at 11:26

## 2022-11-22 RX ADMIN — GABAPENTIN 600 MG: 300 CAPSULE ORAL at 20:12

## 2022-11-22 RX ADMIN — METOPROLOL TARTRATE 25 MG: 25 TABLET, FILM COATED ORAL at 20:12

## 2022-11-22 RX ADMIN — ONDANSETRON 4 MG: 2 INJECTION INTRAMUSCULAR; INTRAVENOUS at 16:42

## 2022-11-22 RX ADMIN — DIAZEPAM 10 MG: 5 TABLET ORAL at 14:18

## 2022-11-22 RX ADMIN — DIAZEPAM 10 MG: 5 TABLET ORAL at 20:52

## 2022-11-22 RX ADMIN — DIAZEPAM 10 MG: 5 TABLET ORAL at 16:33

## 2022-11-22 RX ADMIN — METOPROLOL TARTRATE 25 MG: 25 TABLET, FILM COATED ORAL at 08:01

## 2022-11-22 RX ADMIN — NICOTINE 1 PATCH: 21 PATCH, EXTENDED RELEASE TRANSDERMAL at 08:01

## 2022-11-22 RX ADMIN — GABAPENTIN 900 MG: 300 CAPSULE ORAL at 06:13

## 2022-11-22 RX ADMIN — DIAZEPAM 10 MG: 5 TABLET ORAL at 05:30

## 2022-11-22 RX ADMIN — MULTIPLE VITAMINS W/ MINERALS TAB 1 TABLET: TAB at 08:01

## 2022-11-22 RX ADMIN — ENOXAPARIN SODIUM 40 MG: 100 INJECTION SUBCUTANEOUS at 16:33

## 2022-11-22 RX ADMIN — ATORVASTATIN CALCIUM 40 MG: 40 TABLET, FILM COATED ORAL at 20:12

## 2022-11-22 RX ADMIN — DEXTROSE AND SODIUM CHLORIDE: 5; 900 INJECTION, SOLUTION INTRAVENOUS at 01:03

## 2022-11-22 RX ADMIN — DIAZEPAM 10 MG: 5 TABLET ORAL at 12:20

## 2022-11-22 RX ADMIN — Medication 100 MG: at 08:01

## 2022-11-22 RX ADMIN — MAGNESIUM OXIDE TAB 400 MG (241.3 MG ELEMENTAL MG) 400 MG: 400 (241.3 MG) TAB at 08:01

## 2022-11-22 RX ADMIN — FOLIC ACID 1 MG: 1 TABLET ORAL at 08:01

## 2022-11-22 RX ADMIN — DIAZEPAM 10 MG: 5 TABLET ORAL at 18:56

## 2022-11-22 ASSESSMENT — ACTIVITIES OF DAILY LIVING (ADL)
ADLS_ACUITY_SCORE: 36
ADLS_ACUITY_SCORE: 42
ADLS_ACUITY_SCORE: 34
ADLS_ACUITY_SCORE: 44
ADLS_ACUITY_SCORE: 42
ADLS_ACUITY_SCORE: 36
ADLS_ACUITY_SCORE: 42
ADLS_ACUITY_SCORE: 36
ADLS_ACUITY_SCORE: 42
ADLS_ACUITY_SCORE: 44
ADLS_ACUITY_SCORE: 34
ADLS_ACUITY_SCORE: 34

## 2022-11-22 NOTE — PLAN OF CARE
Problem: Alcohol Withdrawal  Goal: Alcohol Withdrawal Symptom Control  Outcome: Progressing   Goal Outcome Evaluation:  Patient is alert and oriented. Scored between 1-8 on CIWA. PRN Valium administered. Denied pain. IV infusing. Voided. Call light within reach. Bed alarms activated for safety.

## 2022-11-22 NOTE — PROGRESS NOTES
[facility]    Medicine Progress Note - Hospitalist Service    Date of Admission:  11/20/2022    Assessment & Plan      Familia Watson is a 72 year old man with past medical history significant for hypertension, dyslipidemia, tobacco use, alcohol use disorder, poor living condition who presented to the hospital on 11/20/2022 with a few weeks of decreased p.o. intake, generalized weakness and fatigue. Treating alcoholic withdrawal and monitoring for potential refeeding syndrome. Therapies recommend TCU or C. Plan to discharge to the SAME TCU as his wife (discuss with ).    Barriers to discharge- still having alcoholic withdrawal. (optimistic goal 11/23 or 1-2 days)  ------------    Failure to thrive  Poor living condition.  Living with his wife who was recently diagnosed with cancer. Unable to take care of himself. Has not been eating for over a week PTA.  Plan:  - following  -Watch for refeeding syndrome during this hospitalization.  -11/21- pt is now agreeable or at least open to possibly a facility (on admit wanted to go home)   - now 11/22 plan for discharge to TCU, same place with his wife. SAMARIA Rogers discussed/aware    Alcohol withdrawal  Drinks around half pints of alcohol a day. Last alcohol drink AM of admit on 11/20. Reported alcohol withdrawal symptoms in the past but no seizures. HD1 with tremors. HD2 with CIWA scoring 8-10 range overnight and on-exam still quite tremulous. Optimistic goal discharge as early as 11/23.  Plan:  -CIWA protocol  -Folic acid and thiamine.  Thiamine before any glucose.  -Gabapentin tapering dose.    Hyponatremia   Sodium on presentation 127. No clear neurological symptoms. HD1 improving slowly appropriately at 129.  - ddx of hypovolemic hyponatremia in the setting of decreased p.o. intake, vomiting vsdecreased solute intake, beer proteinemia. Reducing rate of fluids HD1 and stopped HD2 given labs; Na mildly low at 135.    Plan:  -On admit was on MIVFs 100cc/hr to 75cc/hr and now discontinued 11/22  -Continue to monitor  -urine studies consistent with siadh - fluid restrict 1.5L started    normocytic anemia  Thrombocytopenia  Likely in setting of substance use; hgb is relatively stable in 11 range but platelets are trending down  - trend in AM   - clinically monitor  - transfuse if hgb <7 or plts < 20     High anion gap metabolic acidosis  Hypophosphatemia  hypomagnesemia  Anion gap 23; Possible etiologies include starvation ketoacidosis versus alcoholic ketoacidosis. Based on labs, more likely starvation ketoacidosis.  Plan:  - lactic acid 4.2 on admit to 1.3 night of admit  -Urinalysis demonstrated ketones  - replace phosphorus, magnesium     Hypertension  -At home on amlodipine 10 mg daily.  Plan:  -Hold antihypertensive for now. Can likely resume 11/22    Insomnia  -Trazodone as needed at night.  Plan:  -Continue trazodone as needed.    Dyslipidemia  -Mild transaminitis  Plan:  -Resume home atorvastatin on discharge.    Tobacco use  Plan:  -Nicotine patch.  -encourage cessation     DVTP: Lovenox prophylactic dose   Code Status: Prior  Disposition: Inpatient   Goals for the hospitalization: Resolution of alcohol withdrawal, resolution of metabolic acidosis, safe discharge planning (plan for TCU where his wife will go, discuss with SW)      Diet: Combination Diet Regular Diet Adult  Snacks/Supplements Adult: Ensure Enlive; Between Meals  Fluid restriction 1500 ML FLUID    DVT Prophylaxis: Enoxaparin (Lovenox) SQ  Alberto Catheter: Not present  Central Lines: None  Cardiac Monitoring: None  Code Status: Full Code      Disposition Plan      Expected Discharge Date: 11/23/2022      Destination: inpatient rehabilitation facility;other (comment) (Transitional care)  Discharge Comments: pending TCU    [facility] to assess pt and wife for admission        The patient's care was discussed with the Bedside Nurse and Patient and with SW  (Ritu S).     Amaury Berger MD  Hospitalist Service  [facility]  Securely message with the [employer] Web Console (learn more here)  Text page via [employer] Paging/Directory     Clinically Significant Risk Factors          # Hyponatremia: Lowest Na = 129 mmol/L (Ref range: 136-145) in last 2 days, will monitor as appropriate      # Hypoalbuminemia: Lowest albumin = 2.9 g/dL (Ref range: 3.5-5.2) at 11/22/2022  5:07 AM, will monitor as appropriate   # Thrombocytopenia: Lowest platelets = 85 (Ref range: 150-450) in last 2 days, will monitor for bleeding          # Severe Malnutrition: based on nutrition assessment, PRESENT ON ADMISSION     ______________________________________________________________________    Interval History   - no acute interval events; CIWA scoring 8-10 range overnight and received AM medication  - this AM pt is feeling relatively better but is still tremulous and his leg pain is bothering him  - no new swelling or breathing issues  - we discussed CIWA protocol yesterday and reviewed today and goal of possibly tomorrow   - again discussed timing with his wife's discharge; he is not willing to discharge anywhere without her   - he has no new concerns other than the tremors; confirmed no prior seizure hx from etoh withdrawal    Data reviewed today: I reviewed all medications, new labs and imaging results over the last 24 hours. I personally reviewed no images or EKG's today.    Physical Exam   Vital Signs: Temp: 97.1  F (36.2  C) Temp src: Oral BP: (!) 143/85 Pulse: 77   Resp: 18 SpO2: 98 % O2 Device: None (Room air)    Weight: 119 lbs 7.83 oz    General: alert, oriented, and in no acute distress, less tremulous than 24 hrs ago  Pulmonary: clear to auscultation bilaterally, normal respiratory effort, on room air, no rales or wheezes or evidence of accessory muscle use  CVS: regular rate and rhythm, no murmurs, rubs, or gallops; no blatant jugular venous distention; no extremity  edema and extremities are warm to the touch  GI: soft, nontender, BS+, no rebound or guarding, no conspicuous organomegaly   Neuro: tremulous throughout, moves all extremities of own volition; demonstrates some asterixis but is oriented x4, remains stable and unchanged  Psych: appropriate and cooperative     Data   Recent Labs   Lab 11/22/22  0738 11/22/22  0507 11/21/22  2101 11/21/22  1413 11/21/22  0815 11/20/22  1517 11/20/22  1149   WBC  --  3.7*  --   --  3.8*  --  4.9   HGB  --  11.0*  --   --  11.7*  --  12.7*   MCV  --  92  --   --  92  --  93   PLT  --  85*  --   --  102*  --  135*   NA  --  135*  --  132* 129*  --  127*   POTASSIUM  --  3.6  --   --  3.5  --  3.9   CHLORIDE  --  107  --   --  100  --  93*   CO2  --  22  --   --  20*  --  11*   BUN  --  8  --   --  10  --  11   CR  --  0.53*  --   --  0.69*  --  0.78   ANIONGAP  --  6  --   --  9  --  23*   MELLISA  --  8.1*  --   --  8.2*  --  8.4*   * 136* 169*  --  171*   < > 123   ALBUMIN  --  2.9*  --   --  3.2*  --  3.8   PROTTOTAL  --  5.6*  --   --  5.8*  --  7.1   BILITOTAL  --  0.7  --   --  1.2*  --  1.0   ALKPHOS  --  91  --   --  96  --  119   ALT  --  68*  --   --  72*  --  111*   AST  --  90*  --   --  88*  --  134*   LIPASE  --   --   --   --   --   --  173*    < > = values in this interval not displayed.     No results found for this or any previous visit (from the past 24 hour(s)).  Medications     - MEDICATION INSTRUCTIONS -         atorvastatin  40 mg Oral At Bedtime     cyanocobalamin  1,000 mcg Oral Daily     enoxaparin ANTICOAGULANT  40 mg Subcutaneous Q24H     folic acid  1 mg Oral Daily     gabapentin  600 mg Oral BID     magnesium oxide  400 mg Oral Daily     metoprolol tartrate  25 mg Oral BID     multivitamin w/minerals  1 tablet Oral Daily     nicotine  1 patch Transdermal Daily     nicotine   Transdermal Q8H     sodium chloride (PF)  3 mL Intracatheter Q8H     thiamine  100 mg Oral Daily

## 2022-11-22 NOTE — PROVIDER NOTIFICATION
Dr. Pulido notified of complaints of double vision with no other neuro symptoms noted. Drowsy after Valium. Recommend to monitor.

## 2022-11-22 NOTE — PLAN OF CARE
Goal Outcome Evaluation:       Patient scored 8-9/10 on CIWA scores this evening. Most notable CIWA symptoms was tremors, received Valium with resolution of tremors. Sepsis protocol BPA fired and was negative.       Problem: Alcohol Withdrawal  Goal: Alcohol Withdrawal Symptom Control  Outcome: Progressing

## 2022-11-22 NOTE — PLAN OF CARE
Problem: Plan of Care - These are the overarching goals to be used throughout the patient stay.    Goal: Absence of Hospital-Acquired Illness or Injury  Outcome: Progressing  Intervention: Identify and Manage Fall Risk  Recent Flowsheet Documentation  Taken 11/22/2022 0800 by Sadie Lu RN  Safety Promotion/Fall Prevention:   activity supervised   assistive device/personal items within reach   bed alarm on   clutter free environment maintained   fall prevention program maintained   mobility aid in reach   lighting adjusted   room door open   room near nurse's station     Problem: Alcohol Withdrawal  Goal: Alcohol Withdrawal Symptom Control  Outcome: Progressing  Goal: Optimal Neurologic Function  Outcome: Progressing     Problem: Malnutrition  Goal: Improved Nutritional Intake  Outcome: Progressing     Patient is alert and oriented x 4. CIWA scores this shift were 3, 8, 10, and 8. Patient required PRN valium PO 3 times this shift per protocol. Scoring items were tremors and anxiety. VSS on room air and voiding adequately using the urinal at bedside.     IVF discontinued by MD and 1500 mL fluid restriction started.

## 2022-11-22 NOTE — PROGRESS NOTES
Care Management Follow Up    Length of Stay (days): 2    Expected Discharge Date: 11/23/2022     Concerns to be Addressed: care coordination/care conferences, discharge planning, home safety     Patient plan of care discussed at interdisciplinary rounds: Yes    Anticipated Discharge Disposition: Transitional Care     Anticipated Discharge Services: None  Anticipated Discharge DME: None    Patient/family educated on Medicare website which has current facility and service quality ratings: yes (Declined)  Education Provided on the Discharge Plan:  Yes Patient/Family in Agreement with the Plan: yes    Referrals Placed by CM/SW: Post Acute Facilities  Private pay costs discussed: transportation costs    Additional Information:  [facility] sent out more referrals as Pt and wife want to be together in same TCU.  PAS needed.    11:59 AM    Pt has been accepted at [facility] when medically ready.  [facility] met with Pt and is happy that he and is wife can be together.  [facility] transport needed.    2:12 PM  [facility] set up [facility] transport ride for 2:00 Pm tomorrow to go to [facility].  PAS completed.     DRAKE Spencer

## 2022-11-23 VITALS
DIASTOLIC BLOOD PRESSURE: 78 MMHG | BODY MASS INDEX: 18.75 KG/M2 | TEMPERATURE: 97.5 F | HEART RATE: 95 BPM | OXYGEN SATURATION: 99 % | HEIGHT: 68 IN | RESPIRATION RATE: 17 BRPM | WEIGHT: 123.7 LBS | SYSTOLIC BLOOD PRESSURE: 135 MMHG

## 2022-11-23 LAB
ANION GAP SERPL CALCULATED.3IONS-SCNC: 9 MMOL/L (ref 5–18)
BUN SERPL-MCNC: 11 MG/DL (ref 8–28)
CALCIUM SERPL-MCNC: 8.4 MG/DL (ref 8.5–10.5)
CHLORIDE BLD-SCNC: 102 MMOL/L (ref 98–107)
CO2 SERPL-SCNC: 22 MMOL/L (ref 22–31)
CREAT SERPL-MCNC: 0.55 MG/DL (ref 0.7–1.3)
ERYTHROCYTE [DISTWIDTH] IN BLOOD BY AUTOMATED COUNT: 15.9 % (ref 10–15)
GFR SERPL CREATININE-BSD FRML MDRD: >90 ML/MIN/1.73M2
GLUCOSE BLD-MCNC: 117 MG/DL (ref 70–125)
GLUCOSE BLDC GLUCOMTR-MCNC: 117 MG/DL (ref 70–99)
HCT VFR BLD AUTO: 31.8 % (ref 40–53)
HGB BLD-MCNC: 10.7 G/DL (ref 13.3–17.7)
MAGNESIUM SERPL-MCNC: 1.7 MG/DL (ref 1.8–2.6)
MCH RBC QN AUTO: 31.8 PG (ref 26.5–33)
MCHC RBC AUTO-ENTMCNC: 33.6 G/DL (ref 31.5–36.5)
MCV RBC AUTO: 95 FL (ref 78–100)
PLATELET # BLD AUTO: 110 10E3/UL (ref 150–450)
POTASSIUM BLD-SCNC: 3.8 MMOL/L (ref 3.5–5)
RBC # BLD AUTO: 3.36 10E6/UL (ref 4.4–5.9)
SODIUM SERPL-SCNC: 133 MMOL/L (ref 136–145)
WBC # BLD AUTO: 3.4 10E3/UL (ref 4–11)

## 2022-11-23 PROCEDURE — 36415 COLL VENOUS BLD VENIPUNCTURE: CPT | Performed by: STUDENT IN AN ORGANIZED HEALTH CARE EDUCATION/TRAINING PROGRAM

## 2022-11-23 PROCEDURE — 250N000013 HC RX MED GY IP 250 OP 250 PS 637: Performed by: STUDENT IN AN ORGANIZED HEALTH CARE EDUCATION/TRAINING PROGRAM

## 2022-11-23 PROCEDURE — 85027 COMPLETE CBC AUTOMATED: CPT | Performed by: STUDENT IN AN ORGANIZED HEALTH CARE EDUCATION/TRAINING PROGRAM

## 2022-11-23 PROCEDURE — 250N000013 HC RX MED GY IP 250 OP 250 PS 637: Performed by: INTERNAL MEDICINE

## 2022-11-23 PROCEDURE — 83735 ASSAY OF MAGNESIUM: CPT | Performed by: STUDENT IN AN ORGANIZED HEALTH CARE EDUCATION/TRAINING PROGRAM

## 2022-11-23 PROCEDURE — 250N000011 HC RX IP 250 OP 636: Performed by: STUDENT IN AN ORGANIZED HEALTH CARE EDUCATION/TRAINING PROGRAM

## 2022-11-23 PROCEDURE — 99239 HOSP IP/OBS DSCHRG MGMT >30: CPT | Performed by: INTERNAL MEDICINE

## 2022-11-23 PROCEDURE — 80048 BASIC METABOLIC PNL TOTAL CA: CPT | Performed by: STUDENT IN AN ORGANIZED HEALTH CARE EDUCATION/TRAINING PROGRAM

## 2022-11-23 RX ORDER — FOLIC ACID 1 MG/1
1 TABLET ORAL DAILY
Qty: 10 TABLET | Refills: 0 | Status: SHIPPED | OUTPATIENT
Start: 2022-11-23 | End: 2023-04-20

## 2022-11-23 RX ORDER — LANOLIN ALCOHOL/MO/W.PET/CERES
100 CREAM (GRAM) TOPICAL DAILY
Qty: 10 TABLET | Refills: 0 | Status: ON HOLD | OUTPATIENT
Start: 2022-11-23 | End: 2023-05-08

## 2022-11-23 RX ADMIN — FOLIC ACID 1 MG: 1 TABLET ORAL at 08:45

## 2022-11-23 RX ADMIN — GABAPENTIN 600 MG: 300 CAPSULE ORAL at 08:45

## 2022-11-23 RX ADMIN — NICOTINE 1 PATCH: 21 PATCH, EXTENDED RELEASE TRANSDERMAL at 08:48

## 2022-11-23 RX ADMIN — ONDANSETRON 4 MG: 2 INJECTION INTRAMUSCULAR; INTRAVENOUS at 05:16

## 2022-11-23 RX ADMIN — MAGNESIUM OXIDE TAB 400 MG (241.3 MG ELEMENTAL MG) 400 MG: 400 (241.3 MG) TAB at 08:45

## 2022-11-23 RX ADMIN — ACETAMINOPHEN, ASPIRIN, CAFFEINE 1 TABLET: 250; 250; 65 TABLET, FILM COATED ORAL at 12:23

## 2022-11-23 RX ADMIN — HYDROXYZINE HYDROCHLORIDE 50 MG: 25 TABLET, FILM COATED ORAL at 11:55

## 2022-11-23 RX ADMIN — ENOXAPARIN SODIUM 40 MG: 100 INJECTION SUBCUTANEOUS at 16:09

## 2022-11-23 RX ADMIN — Medication 100 MG: at 08:45

## 2022-11-23 RX ADMIN — CYANOCOBALAMIN TAB 1000 MCG 1000 MCG: 1000 TAB at 08:45

## 2022-11-23 RX ADMIN — METOPROLOL TARTRATE 25 MG: 25 TABLET, FILM COATED ORAL at 08:45

## 2022-11-23 RX ADMIN — MULTIPLE VITAMINS W/ MINERALS TAB 1 TABLET: TAB at 08:45

## 2022-11-23 ASSESSMENT — ACTIVITIES OF DAILY LIVING (ADL)
ADLS_ACUITY_SCORE: 44
ADLS_ACUITY_SCORE: 47
ADLS_ACUITY_SCORE: 44
ADLS_ACUITY_SCORE: 47
ADLS_ACUITY_SCORE: 44
ADLS_ACUITY_SCORE: 44
ADLS_ACUITY_SCORE: 47
ADLS_ACUITY_SCORE: 47
ADLS_ACUITY_SCORE: 44

## 2022-11-23 NOTE — PLAN OF CARE
Problem: Alcohol Withdrawal  Goal: Alcohol Withdrawal Symptom Control  Outcome: Progressing  Intervention: Minimize or Manage Alcohol Withdrawal Symptoms  Recent Flowsheet Documentation  Taken 11/23/2022 0845 by Karina Yoo, RN  Sensory Stimulation Regulation: care clustered  Goal: Optimal Neurologic Function  Outcome: Progressing  Intervention: Minimize or Manage Acute Neurologic Symptoms  Recent Flowsheet Documentation  Taken 11/23/2022 0845 by Karina Yoo, RN  Sensory Stimulation Regulation: care clustered     Pt is A & O x 4. Pt endorses a mild headache this AM w/ observable tremors & CIWA score of 7. CMS intact x 4. Frequent dry cough noted. Continuing on the Mg+ & K+ protocols which will both be redraws in the AM. VSS, saline locked & up w/ an assist of 1, walker & gait belt. Tolerating a regular diet w/ a 1500 ml fluid restriction & Ensure supplements but needs encouragement for PO intake. Monitoring BG w/ a breakfast reading of 117. Anticipating discharge to TCU w/ wife today. Continuing to monitor.    PEPPER Mckenna  Shift: 0700 - 1100

## 2022-11-23 NOTE — PROGRESS NOTES
Care Management Discharge Note    Discharge Date: 11/23/2022       Discharge Disposition: Transitional Care    Discharge Services: None    Discharge DME: None    Discharge Transportation: agency    Private pay costs discussed: Not applicable    PAS Confirmation Code:  ([id number])  Patient/family educated on Medicare website which has current facility and service quality ratings: yes    Education Provided on the Discharge Plan:  yes  Persons Notified of Discharge Plans: patient   Patient/Family in Agreement with the Plan: yes    Handoff Referral Completed: sending orders when available     Additional Information:  SW reviewed chart and discussed updates with MD.  Pt discharging to [facility] at 1400 via [facility] wheel chair transport.  PAS completed and on chart.         DRAKE Anthony

## 2022-11-23 NOTE — PLAN OF CARE
Goal Outcome Evaluation:    Problem: Alcohol Withdrawal  Goal: Alcohol Withdrawal Symptom Control  Intervention: Minimize or Manage Alcohol Withdrawal Symptoms  Recent Flowsheet Documentation  Taken 11/23/2022 0204 by Toña De, RN  Sensory Stimulation Regulation: television on     Problem: Plan of Care - These are the overarching goals to be used throughout the patient stay.    Goal: Absence of Hospital-Acquired Illness or Injury  Intervention: Identify and Manage Fall Risk  Recent Flowsheet Documentation  Taken 11/23/2022 0204 by Toña De, RN  Safety Promotion/Fall Prevention:    activity supervised    assistive device/personal items within reach    bed alarm on    clutter free environment maintained    fall prevention program maintained    mobility aid in reach    lighting adjusted    room door open    room near nurse's station  Pt is Alert and oriented. Pt sleep between cares. Pt denies any pain. CIWA score were 0 and 2, repeated assessment every 4 hours as needed. VSS. Discharge plans for TCU on 11/23/22. Pt stated that he was a little nausea. PRN Zofran was administered, pt stated that it was effective.

## 2022-11-23 NOTE — PLAN OF CARE
Patient is medically stable to transfer to [facility]. All personal belongings sent with patient along with packet of discharge information.

## 2022-11-23 NOTE — PLAN OF CARE
Occupational Therapy Discharge Summary    Reason for therapy discharge:    Discharged to transitional care facility.    Progress towards therapy goal(s). See goals on Care Plan in [facility] electronic health record for goal details.  Goals partially met.  Barriers to achieving goals:   discharge from facility.    Therapy recommendation(s):    Continued therapy is recommended.  Rationale/Recommendations:  continue therapy at TCU to return to PLOF.

## 2022-11-23 NOTE — PLAN OF CARE
Patient is medically stable for transfer to [facility]. All personal belongings sent with patient along with packet of discharge paperwork.

## 2022-11-23 NOTE — PROGRESS NOTES
Physical Therapy Discharge Summary    Reason for therapy discharge:    Discharged to transitional care facility.    Progress towards therapy goal(s). See goals on Care Plan in [facility] electronic health record for goal details.  Goals not met.  Barriers to achieving goals:   limited tolerance for therapy.    Therapy recommendation(s):    Continued therapy is recommended.  Rationale/Recommendations:  Patient not at functional mobility baseline.

## 2022-11-23 NOTE — DISCHARGE SUMMARY
[facility] MEDICINE  DISCHARGE SUMMARY     Primary Care Physician: Daniel Martinez  Admission Date: 11/20/2022   Discharge Provider: Luis Manuel Vegas DO Discharge Date: 11/23/2022   Diet:   Active Diet and Nourishment Order   Procedures     Snacks/Supplements Adult: Ensure Enlive; Between Meals     Fluid restriction 1500 ML FLUID     Combination Diet Regular Diet Adult     Diet       Code Status: Full Code   Activity: DCACTIVITY: Activity as tolerated        Condition at Discharge: Stable     REASON FOR PRESENTATION(See Admission Note for Details)   Failure to thrive, alcohol withdrawal, hyponatremia.     PRINCIPAL & ACTIVE DISCHARGE DIAGNOSES     Principal Problem:    Hyponatremia  Active Problems:    Alcohol abuse with withdrawal (H)    Hypomagnesemia    Generalized muscle weakness    Hypophosphatemia    Elevated D-dimer    PENDING LABS     Unresulted Labs Ordered in the Past 30 Days of this Admission     No orders found from 10/21/2022 to 11/21/2022.            PROCEDURES ( this hospitalization only)          RECOMMENDATIONS TO OUTPATIENT PROVIDER FOR F/U VISIT     Follow-up Appointments     Follow Up and recommended labs and tests      Follow up with residential physician.  The following labs/tests are   recommended: BMP, magnesium level.             DISPOSITION     Skilled Nursing Facility    SUMMARY OF HOSPITAL COURSE:      Familia Watson is a 72 year old man with past medical history significant for hypertension, dyslipidemia, tobacco use, alcohol use disorder, poor living condition who presented to the hospital on 11/20/2022 with a few weeks of decreased p.o. intake, generalized weakness and fatigue. Elevated d-dimer prompted CTA chest which was negative for pulmonary embolism.  Treated for acute alcoholic withdrawal, with normal CIWA scores for 24h prior to discharge and CIWA protocol was discontinued.  Was monitored for potential refeeding syndrome and  electrolytes replaced. Diagnosed with hypovolemic hyponatremia and improved with IV fluids.  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  Therapies recommend TCU.    Discharge Medications with Med changes:     Current Discharge Medication List      START taking these medications    Details   aspirin-acetaminophen-caffeine (EXCEDRIN MIGRAINE) 250-250-65 MG tablet Take 1 tablet by mouth daily as needed for headaches  Qty: 7 tablet, Refills: 0    Associated Diagnoses: Tension headache         CONTINUE these medications which have NOT CHANGED    Details   atorvastatin (LIPITOR) 40 MG tablet Take 40 mg by mouth At Bedtime      cyanocobalamin (VITAMIN B-12) 1000 MCG tablet Take 1,000 mcg by mouth daily      gabapentin (NEURONTIN) 300 MG capsule Take 300 mg by mouth 2 times daily      guaiFENesin (ROBITUSSIN) 20 mg/mL SOLN solution Take 10 mLs by mouth every 4 hours as needed for cough  Qty: 180 mL, Refills: 0    Associated Diagnoses: 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        Consults     NUTRITION SERVICES ADULT IP CONSULT  CARE MANAGEMENT / SOCIAL WORK IP CONSULT  PHYSICAL THERAPY ADULT IP CONSULT  OCCUPATIONAL THERAPY ADULT IP CONSULT  PHYSICAL THERAPY ADULT IP CONSULT  OCCUPATIONAL THERAPY ADULT IP CONSULT    Immunizations given this encounter     Most Recent Immunizations   Administered Date(s)  Administered     COVID-19 Vaccine 18+ (Moderna) 05/22/2021     Influenza (intradermal) 10/11/2017     Pneumo Conj 13-V (2010&after) 10/11/2017     Pneumococcal 23 valent 07/03/2019     TDAP Vaccine (Boostrix) 12/18/2009           Anticoagulation Information      Recent INR results: No results for input(s): INR in the last 168 hours.  Warfarin doses (if applicable) or name of other anticoagulant: enoxaparin.      SIGNIFICANT IMAGING FINDINGS     Results for orders placed or performed during the hospital encounter of 11/20/22   CT Chest Pulmonary Embolism w Contrast    Impression    IMPRESSION:    1.  No acute pulmonary embolism or aortopathy.  2.  Decreased size of inflammatory cystic spaces in the anterior left upper lobe compared to February 2022 and increased cicatrization along the pleura.  3.  Unchanged emphysema and basilar fibrosis.  4.  Diffuse hepatic steatosis.       SIGNIFICANT LABORATORY FINDINGS     Most Recent 3 CBC's:Recent Labs   Lab Test 11/23/22  0752 11/22/22  0507 11/21/22  0815   WBC 3.4* 3.7* 3.8*   HGB 10.7* 11.0* 11.7*   MCV 95 92 92   * 85* 102*     Most Recent 3 BMP's:Recent Labs   Lab Test 11/23/22  0828 11/23/22  0752 11/22/22 2003 11/22/22  0738 11/22/22  0507 11/21/22  2101 11/21/22  1413 11/21/22  0815   NA  --  133*  --   --  135*  --  132* 129*   POTASSIUM  --  3.8  --   --  3.6  --   --  3.5   CHLORIDE  --  102  --   --  107  --   --  100   CO2  --  22  --   --  22  --   --  20*   BUN  --  11  --   --  8  --   --  10   CR  --  0.55*  --   --  0.53*  --   --  0.69*   ANIONGAP  --  9  --   --  6  --   --  9   MELLISA  --  8.4*  --   --  8.1*  --   --  8.2*   * 117 140*   < > 136*   < >  --  171*    < > = values in this interval not displayed.     Most Recent 2 LFT's:Recent Labs   Lab Test 11/22/22  0507 11/21/22  0815   AST 90* 88*   ALT 68* 72*   ALKPHOS 91 96   BILITOTAL 0.7 1.2*     Most Recent 3 INR's:Recent Labs   Lab Test 01/25/22  1429 05/12/21  1121 05/30/18  1644    INR 1.16* 1.07 0.99       Most Recent D-dimer:Recent Labs   Lab Test 11/20/22  1149   DD 2.01*     Most Recent Hemoglobin A1c:No lab results found.  Most Recent Urinalysis:Recent Labs   Lab Test 11/21/22  0031 01/25/22  1525 05/12/21  1325   COLOR Jennifer*   < > Light Yellow   APPEARANCE Clear   < > Clear   URINEGLC 1000*   < > Negative   URINEBILI Negative   < > Negative   URINEKETONE 100*   < > Negative   SG 1.047*   < > 1.009   UBLD Negative   < > Negative   URINEPH 6.5   < > 5.0   PROTEIN 50*   < > Negative   UROBILINOGEN  --   --  <2.0 mg/dL   NITRITE Negative   < > Negative   LEUKEST Negative   < > Negative   RBCU 1   < >  --    WBCU 1   < >  --     < > = values in this interval not displayed.     Discharge Orders        General info for SNF    Length of Stay Estimate: Short Term Care: Estimated # of Days <30  Condition at Discharge: Improving  Level of care:skilled   Rehabilitation Potential: Good  Admission H&P remains valid and up-to-date: Yes  Recent Chemotherapy: N/A  Use Nursing Home Standing Orders: Yes     Mantoux instructions    Give two-step Mantoux (PPD) Per Facility Policy Yes     Follow Up and recommended labs and tests    Follow up with correction physician.  The following labs/tests are recommended: BMP, magnesium level.     Reason for your hospital stay    Failure to thrive, acute alcohol withdrawal, hyponatremia.     Activity - Up with nursing assistance     Full Code     Physical Therapy Adult Consult    Evaluate and treat as clinically indicated.    Reason:  Patient will need strengthening exercise to rebuild mobility.     Occupational Therapy Adult Consult    Evaluate and treat as clinically indicated.    Reason:  Patient will need strengthening exercise to rebuild mobility.     Fall precautions     Diet    Follow this diet upon discharge: Orders Placed This Encounter      Snacks/Supplements Adult: Ensure Enlive; Between Meals      Fluid restriction 1500 ML FLUID      Combination Diet  Regular Diet Adult       Examination   Physical Exam   Temp:  [97.1  F (36.2  C)-98.5  F (36.9  C)] 97.9  F (36.6  C)  Pulse:  [74-94] 81  Resp:  [16-22] 18  BP: ()/(40-84) 118/69  SpO2:  [98 %-100 %] 99 %  Wt Readings from Last 1 Encounters:   11/23/22 56.1 kg (123 lb 11.2 oz)   General: alert, oriented, and in no acute distress, less tremulous than 24 hrs ago  Pulmonary: clear to auscultation bilaterally, normal respiratory effort, on room air, no rales or wheezes or evidence of accessory muscle use  CVS: regular rate and rhythm, no murmurs, rubs, or gallops; no blatant jugular venous distention; no extremity edema and extremities are warm to the touch  GI: soft, nontender, BS+, no rebound or guarding, no conspicuous organomegaly   Neuro: tremulous throughout, moves all extremities of own volition; demonstrates some asterixis but is oriented x4, remains stable and unchanged  Psych: appropriate and cooperative    Please see EMR for more detailed significant labs, imaging, consultant notes etc.    ILuis Manuel DO, personally saw the patient today and spent greater than 30 minutes discharging this patient.    Luis Manuel Vegas DO  [facility]    CC:Daniel Martinez

## 2022-11-23 NOTE — PLAN OF CARE
Goal Outcome Evaluation:       Patient has been scoring a 8-9 on CIWA. Valium given about every 2 hours. Most notable are tremors. Patient on fluid restriction and states he has more of an appetite this evening. Bedtime snack provided.   Problem: Alcohol Withdrawal  Goal: Alcohol Withdrawal Symptom Control  Outcome: Progressing     Problem: Oral Intake Inadequate  Goal: Improved Oral Intake  Outcome: Progressing     Karina Babcock RN

## 2022-11-24 ENCOUNTER — LAB REQUISITION (OUTPATIENT)
Dept: LAB | Facility: CLINIC | Age: 72
End: 2022-11-24
Payer: MEDICARE

## 2022-11-24 DIAGNOSIS — E83.42 HYPOMAGNESEMIA: ICD-10-CM

## 2022-11-24 DIAGNOSIS — M62.81 MUSCLE WEAKNESS (GENERALIZED): ICD-10-CM

## 2022-11-25 LAB
ANION GAP SERPL CALCULATED.3IONS-SCNC: 10 MMOL/L (ref 7–15)
BUN SERPL-MCNC: 15 MG/DL (ref 8–23)
CALCIUM SERPL-MCNC: 8.8 MG/DL (ref 8.8–10.2)
CHLORIDE SERPL-SCNC: 100 MMOL/L (ref 98–107)
CREAT SERPL-MCNC: 0.58 MG/DL (ref 0.67–1.17)
DEPRECATED HCO3 PLAS-SCNC: 25 MMOL/L (ref 22–29)
GFR SERPL CREATININE-BSD FRML MDRD: >90 ML/MIN/1.73M2
GLUCOSE SERPL-MCNC: 113 MG/DL (ref 70–99)
MAGNESIUM SERPL-MCNC: 1.8 MG/DL (ref 1.7–2.3)
POTASSIUM SERPL-SCNC: 4.2 MMOL/L (ref 3.4–5.3)
SODIUM SERPL-SCNC: 135 MMOL/L (ref 136–145)

## 2022-11-25 PROCEDURE — 36415 COLL VENOUS BLD VENIPUNCTURE: CPT | Performed by: NURSE PRACTITIONER

## 2022-11-25 PROCEDURE — 83735 ASSAY OF MAGNESIUM: CPT | Performed by: NURSE PRACTITIONER

## 2022-11-25 PROCEDURE — P9604 ONE-WAY ALLOW PRORATED TRIP: HCPCS | Performed by: NURSE PRACTITIONER

## 2022-11-25 PROCEDURE — 80051 ELECTROLYTE PANEL: CPT | Performed by: NURSE PRACTITIONER

## 2022-11-28 ENCOUNTER — TRANSITIONAL CARE UNIT VISIT (OUTPATIENT)
Dept: GERIATRICS | Facility: CLINIC | Age: 72
End: 2022-11-28
Payer: MEDICARE

## 2022-11-28 VITALS
TEMPERATURE: 98.3 F | RESPIRATION RATE: 16 BRPM | HEIGHT: 68 IN | DIASTOLIC BLOOD PRESSURE: 84 MMHG | HEART RATE: 83 BPM | SYSTOLIC BLOOD PRESSURE: 132 MMHG | WEIGHT: 120 LBS | OXYGEN SATURATION: 94 % | BODY MASS INDEX: 18.19 KG/M2

## 2022-11-28 DIAGNOSIS — E87.1 HYPONATREMIA: ICD-10-CM

## 2022-11-28 DIAGNOSIS — R62.7 FAILURE TO THRIVE IN ADULT: Primary | ICD-10-CM

## 2022-11-28 DIAGNOSIS — F10.930 ALCOHOL WITHDRAWAL SYNDROME WITHOUT COMPLICATION (H): ICD-10-CM

## 2022-11-28 PROCEDURE — 99310 SBSQ NF CARE HIGH MDM 45: CPT | Performed by: NURSE PRACTITIONER

## 2022-11-28 RX ORDER — ACETAMINOPHEN 325 MG/1
650 TABLET ORAL EVERY 4 HOURS PRN
Status: ON HOLD | COMMUNITY
End: 2023-06-14
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
## 2022-12-02 ENCOUNTER — TRANSITIONAL CARE UNIT VISIT (OUTPATIENT)
Dept: GERIATRICS | Facility: CLINIC | Age: 72
End: 2022-12-02
Payer: MEDICARE

## 2022-12-02 VITALS
HEART RATE: 93 BPM | HEIGHT: 68 IN | BODY MASS INDEX: 19.7 KG/M2 | DIASTOLIC BLOOD PRESSURE: 61 MMHG | TEMPERATURE: 97.1 F | SYSTOLIC BLOOD PRESSURE: 124 MMHG | WEIGHT: 130 LBS | RESPIRATION RATE: 18 BRPM | OXYGEN SATURATION: 100 %

## 2022-12-02 DIAGNOSIS — R62.7 ADULT FAILURE TO THRIVE: Primary | ICD-10-CM

## 2022-12-02 PROCEDURE — 99308 SBSQ NF CARE LOW MDM 20: CPT | Performed by: NURSE PRACTITIONER

## 2022-12-02 NOTE — LETTER
"    12/2/2022        RE: Familia Watson  [address]  [place] [address]        [facility] GERIATRICS    Chief Complaint   Patient presents with     RECHECK     HPI:  Familia Watson is a 72 year old  (1950), who is being seen today for an episodic care visit at: [facility] [[id number]]. [facility] stay 11/20/22 through 11/23/22. Patient with PMH HTN, dyslipidemia, tobacco use, and alcohol use disorder was hospitalized for failure to thrive and alcohol withdrawal. Diagnosed with hypovolemic hyponatremia which improved with IV fluids. 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 CTA chest prompted by elevated d-dimer was negative for pulmonary embolism.    Today's concern is:   Patient's primary concern today is that he has an ingrown toenail that is bothering him. He otherwise feels fine. He is interested in seeing podiatry. Provider does not know how quickly this will happen, offers epsom salt soaks in the meantime. He vehemently declines this.  Per therapy update, he is walking 150 feet with walker and SBA. He often forgets to take the walker.  -130s/70-80s  HR 90s    Allergies, and PMH/PSH reviewed in [facility] today.  REVIEW OF SYSTEMS:  10 point ROS of systems including Constitutional, Eyes, Respiratory, Cardiovascular, Gastroenterology, Genitourinary, Integumentary, Musculoskeletal, Psychiatric were all negative except for pertinent positives noted in my HPI.    Objective:   /61   Pulse 93   Temp 97.1  F (36.2  C)   Resp 18   Ht 1.727 m (5' 8\")   Wt 59 kg (130 lb)   SpO2 100%   BMI 19.77 kg/m    GENERAL APPEARANCE:  Alert, in no distress, thin  ENT:  Mouth and posterior oropharynx normal, moist mucous membranes  EYES:  EOM normal, conjunctiva and lids normal  RESP:  no respiratory distress  PSYCH:  oriented X 3, insight and judgement impaired, affect abnormal flat      Assessment/Plan:  (R62.7) Adult " failure to thrive  (primary encounter diagnosis)  Comment: No cognitive testing completed at this time. Patient's wife is also at this TCU. They are meeting with social work later to discuss options for discharge, jail. It is not yet clear if he would be safe to discharge back to their apartment if this were his wish.   Plan: PT/OT eval and treat, discharge planning per their recommendations.      Electronically signed by: JEAN-PAUL Jeter CNP             Sincerely,        JEAN-PAUL Jeter CNP

## 2022-12-02 NOTE — PROGRESS NOTES
"[facility] GERIATRICS    Chief Complaint   Patient presents with     RECHECK     HPI:  Familia Watson is a 72 year old  (1950), who is being seen today for an episodic care visit at: [facility] [[id number]]. [facility] stay 11/20/22 through 11/23/22. Patient with PMH HTN, dyslipidemia, tobacco use, and alcohol use disorder was hospitalized for failure to thrive and alcohol withdrawal. Diagnosed with hypovolemic hyponatremia which improved with IV fluids. 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 CTA chest prompted by elevated d-dimer was negative for pulmonary embolism.    Today's concern is:   Patient's primary concern today is that he has an ingrown toenail that is bothering him. He otherwise feels fine. He is interested in seeing podiatry. Provider does not know how quickly this will happen, offers epsom salt soaks in the meantime. He vehemently declines this.  Per therapy update, he is walking 150 feet with walker and SBA. He often forgets to take the walker.  -130s/70-80s  HR 90s    Allergies, and PMH/PSH reviewed in [facility] today.  REVIEW OF SYSTEMS:  10 point ROS of systems including Constitutional, Eyes, Respiratory, Cardiovascular, Gastroenterology, Genitourinary, Integumentary, Musculoskeletal, Psychiatric were all negative except for pertinent positives noted in my HPI.    Objective:   /61   Pulse 93   Temp 97.1  F (36.2  C)   Resp 18   Ht 1.727 m (5' 8\")   Wt 59 kg (130 lb)   SpO2 100%   BMI 19.77 kg/m    GENERAL APPEARANCE:  Alert, in no distress, thin  ENT:  Mouth and posterior oropharynx normal, moist mucous membranes  EYES:  EOM normal, conjunctiva and lids normal  RESP:  no respiratory distress  PSYCH:  oriented X 3, insight and judgement impaired, affect abnormal flat      Assessment/Plan:  (R62.7) Adult failure to thrive  (primary encounter diagnosis)  Comment: No cognitive testing completed at this " time. Patient's wife is also at this TCU. They are meeting with social work later to discuss options for discharge, [facility]. It is not yet clear if he would be safe to discharge back to their apartment if this were his wish.   Plan: PT/OT eval and treat, discharge planning per their recommendations.      Electronically signed by: JEAN-PAUL Jeter CNP

## 2022-12-05 PROBLEM — F10.930 ALCOHOL WITHDRAWAL SYNDROME WITHOUT COMPLICATION (H): Status: RESOLVED | Noted: 2022-04-08 | Resolved: 2022-12-05

## 2022-12-05 PROBLEM — F10.139 ALCOHOL ABUSE WITH WITHDRAWAL (H): Status: RESOLVED | Noted: 2021-05-12 | Resolved: 2022-12-05

## 2022-12-07 ENCOUNTER — TRANSITIONAL CARE UNIT VISIT (OUTPATIENT)
Dept: GERIATRICS | Facility: CLINIC | Age: 72
End: 2022-12-07
Payer: MEDICARE

## 2022-12-07 VITALS
DIASTOLIC BLOOD PRESSURE: 84 MMHG | HEART RATE: 101 BPM | TEMPERATURE: 97.6 F | BODY MASS INDEX: 18.76 KG/M2 | SYSTOLIC BLOOD PRESSURE: 134 MMHG | RESPIRATION RATE: 18 BRPM | WEIGHT: 123.8 LBS | OXYGEN SATURATION: 98 % | HEIGHT: 68 IN

## 2022-12-07 DIAGNOSIS — R62.7 ADULT FAILURE TO THRIVE: Primary | ICD-10-CM

## 2022-12-07 DIAGNOSIS — F41.9 ANXIETY: ICD-10-CM

## 2022-12-07 DIAGNOSIS — G62.9 NEUROPATHY: ICD-10-CM

## 2022-12-07 PROCEDURE — 99309 SBSQ NF CARE MODERATE MDM 30: CPT | Performed by: NURSE PRACTITIONER

## 2022-12-07 NOTE — LETTER
"    2022        RE: Familia Watson  [address]  [place] [address]        [facility] GERIATRICS    Chief Complaint   Patient presents with     RECHECK     HPI:  Familia Watson is a 72 year old  (1950), who is being seen today for an episodic care visit at: [facility] (U) [[id number]]. [facility] stay 22 through 22. Patient with PMH HTN, dyslipidemia, tobacco use, and alcohol use disorder was hospitalized for failure to thrive and alcohol withdrawal. Diagnosed with hypovolemic hyponatremia which improved with IV fluids. 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 CTA chest prompted by elevated d-dimer was negative for pulmonary embolism.      Today's concern is:   Patient is seen along with his wife who is also at this TCU. They are working with social work to apply for MA. The plan is for them to move into LTC. There is not a double room available currently, but their hope is to eventually be in the same room. His last day of therapy is 22. He reports worsening foot pain that is due to neuropathy. He is currently on gabapentin and would like this increased. He also has an order for prn hydroxyzine that is about to . He has been taking this several times a day for anxiety. He uses this at home. He denies any dizziness, drowsiness, dry mouth. As provider starts advising patient on side effects of these medications, he interrupts with accurate knowledge of the risk of hypotension and dizziness.     Allergies, and PMH/PSH reviewed in [facility] today.    REVIEW OF SYSTEMS:  10 point ROS of systems including Constitutional, Eyes, Respiratory, Cardiovascular, Gastroenterology, Genitourinary, Integumentary, Musculoskeletal, Psychiatric were all negative except for pertinent positives noted in my HPI.    Objective:   /84   Pulse 101   Temp 97.6  F (36.4  C)   Resp 18   Ht 1.727 m (5' 8\")   Wt 56.2 kg " (123 lb 12.8 oz)   SpO2 98%   BMI 18.82 kg/m     GENERAL APPEARANCE:  Alert, in no distress, thin  ENT:  Mouth and posterior oropharynx normal, moist mucous membranes, normal hearing acuity  EYES:  EOM normal, conjunctiva and lids normal  RESP:  no respiratory distress  CV:  no edema  SKIN:  Inspection of skin and subcutaneous tissue baseline  PSYCH:  oriented X 3, insight and judgement impaired, affect and mood normal    Recent labs in [facility] reviewed by me today.     Assessment/Plan:  (R62.7) Adult failure to thrive  (primary encounter diagnosis)  Comment: Predominantly related to alcohol use. He is agreeable to moving into LTC. This will certainly limit his access to alcohol and should improve his health.     (F41.9) Anxiety  Comment: Chronic. No side effects noted with hydroxyzine. This is a safer alternative to benzodiazepines.   Plan: Change hydroxyzine to 50mg TID.     (G62.9) Neuropathy  Comment: Chronic, pain not controlled. His kidney function is intact. Will increase to TID.   Plan: Increase gabapentin to 300mg TID. Monitor for dizziness or confusion.       Electronically signed by: JEAN-PAUL Jeter CNP             Sincerely,        JEAN-PAUL Jeter CNP

## 2022-12-07 NOTE — PROGRESS NOTES
"[facility] GERIATRICS    Chief Complaint   Patient presents with     RECHECK     HPI:  Familia Watson is a 72 year old  (1950), who is being seen today for an episodic care visit at: [facility] (TCU) [[id number]]. [facility] stay 22 through 22. Patient with PMH HTN, dyslipidemia, tobacco use, and alcohol use disorder was hospitalized for failure to thrive and alcohol withdrawal. Diagnosed with hypovolemic hyponatremia which improved with IV fluids. 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 CTA chest prompted by elevated d-dimer was negative for pulmonary embolism.      Today's concern is:   Patient is seen along with his wife who is also at this TCU. They are working with social work to apply for MA. The plan is for them to move into LTC. There is not a double room available currently, but their hope is to eventually be in the same room. His last day of therapy is 22. He reports worsening foot pain that is due to neuropathy. He is currently on gabapentin and would like this increased. He also has an order for prn hydroxyzine that is about to . He has been taking this several times a day for anxiety. He uses this at home. He denies any dizziness, drowsiness, dry mouth. As provider starts advising patient on side effects of these medications, he interrupts with accurate knowledge of the risk of hypotension and dizziness.     Allergies, and PMH/PSH reviewed in EPIC today.    REVIEW OF SYSTEMS:  10 point ROS of systems including Constitutional, Eyes, Respiratory, Cardiovascular, Gastroenterology, Genitourinary, Integumentary, Musculoskeletal, Psychiatric were all negative except for pertinent positives noted in my HPI.    Objective:   /84   Pulse 101   Temp 97.6  F (36.4  C)   Resp 18   Ht 1.727 m (5' 8\")   Wt 56.2 kg (123 lb 12.8 oz)   SpO2 98%   BMI 18.82 kg/m     GENERAL APPEARANCE:  Alert, in no " distress, thin  ENT:  Mouth and posterior oropharynx normal, moist mucous membranes, normal hearing acuity  EYES:  EOM normal, conjunctiva and lids normal  RESP:  no respiratory distress  CV:  no edema  SKIN:  Inspection of skin and subcutaneous tissue baseline  PSYCH:  oriented X 3, insight and judgement impaired, affect and mood normal    Recent labs in [facility] reviewed by me today.     Assessment/Plan:  (R62.7) Adult failure to thrive  (primary encounter diagnosis)  Comment: Predominantly related to alcohol use. He is agreeable to moving into LTC. This will certainly limit his access to alcohol and should improve his health.     (F41.9) Anxiety  Comment: Chronic. No side effects noted with hydroxyzine. This is a safer alternative to benzodiazepines.   Plan: Change hydroxyzine to 50mg TID.     (G62.9) Neuropathy  Comment: Chronic, pain not controlled. His kidney function is intact. Will increase to TID.   Plan: Increase gabapentin to 300mg TID. Monitor for dizziness or confusion.       Electronically signed by: JEAN-PAUL Jeter CNP

## 2022-12-08 PROBLEM — F41.9 ANXIETY: Status: ACTIVE | Noted: 2022-12-08

## 2022-12-08 RX ORDER — HYDROXYZINE PAMOATE 50 MG/1
50 CAPSULE ORAL 3 TIMES DAILY
Status: ON HOLD
Start: 2022-12-08 | End: 2023-04-26

## 2022-12-08 RX ORDER — GABAPENTIN 300 MG/1
300 CAPSULE ORAL 3 TIMES DAILY
Status: ON HOLD
Start: 2022-12-08 | End: 2023-06-18

## 2023-04-19 ENCOUNTER — APPOINTMENT (OUTPATIENT)
Dept: CT IMAGING | Facility: CLINIC | Age: 73
DRG: 896 | End: 2023-04-19
Attending: EMERGENCY MEDICINE
Payer: MEDICARE

## 2023-04-19 ENCOUNTER — HOSPITAL ENCOUNTER (INPATIENT)
Facility: CLINIC | Age: 73
LOS: 5 days | Discharge: HOME HEALTH | DRG: 896 | End: 2023-04-26
Attending: EMERGENCY MEDICINE | Admitting: INTERNAL MEDICINE
Payer: MEDICARE

## 2023-04-19 DIAGNOSIS — F41.9 ANXIETY: ICD-10-CM

## 2023-04-19 DIAGNOSIS — F10.920 ALCOHOLIC INTOXICATION WITHOUT COMPLICATION (H): ICD-10-CM

## 2023-04-19 DIAGNOSIS — R62.7 FAILURE TO THRIVE IN ADULT: ICD-10-CM

## 2023-04-19 DIAGNOSIS — K05.10 GINGIVITIS: Primary | ICD-10-CM

## 2023-04-19 LAB
ALBUMIN SERPL-MCNC: 3.5 G/DL (ref 3.5–5)
ALP SERPL-CCNC: 127 U/L (ref 45–120)
ALT SERPL W P-5'-P-CCNC: 140 U/L (ref 0–45)
ANION GAP SERPL CALCULATED.3IONS-SCNC: 13 MMOL/L (ref 5–18)
AST SERPL W P-5'-P-CCNC: 252 U/L (ref 0–40)
BASOPHILS # BLD AUTO: 0.1 10E3/UL (ref 0–0.2)
BASOPHILS NFR BLD AUTO: 1 %
BILIRUB SERPL-MCNC: 0.6 MG/DL (ref 0–1)
BUN SERPL-MCNC: 6 MG/DL (ref 8–28)
CALCIUM SERPL-MCNC: 9.3 MG/DL (ref 8.5–10.5)
CHLORIDE BLD-SCNC: 100 MMOL/L (ref 98–107)
CK SERPL-CCNC: 856 U/L (ref 30–190)
CO2 SERPL-SCNC: 25 MMOL/L (ref 22–31)
CREAT SERPL-MCNC: 0.56 MG/DL (ref 0.7–1.3)
EOSINOPHIL # BLD AUTO: 0 10E3/UL (ref 0–0.7)
EOSINOPHIL NFR BLD AUTO: 1 %
ERYTHROCYTE [DISTWIDTH] IN BLOOD BY AUTOMATED COUNT: 13.9 % (ref 10–15)
ETHANOL SERPL-MCNC: 300 MG/DL
GFR SERPL CREATININE-BSD FRML MDRD: >90 ML/MIN/1.73M2
GLUCOSE BLD-MCNC: 90 MG/DL (ref 70–125)
HCT VFR BLD AUTO: 32.8 % (ref 40–53)
HGB BLD-MCNC: 11.1 G/DL (ref 13.3–17.7)
IMM GRANULOCYTES # BLD: 0 10E3/UL
IMM GRANULOCYTES NFR BLD: 1 %
LYMPHOCYTES # BLD AUTO: 1.4 10E3/UL (ref 0.8–5.3)
LYMPHOCYTES NFR BLD AUTO: 32 %
MAGNESIUM SERPL-MCNC: 1.5 MG/DL (ref 1.8–2.6)
MCH RBC QN AUTO: 33.6 PG (ref 26.5–33)
MCHC RBC AUTO-ENTMCNC: 33.8 G/DL (ref 31.5–36.5)
MCV RBC AUTO: 99 FL (ref 78–100)
MONOCYTES # BLD AUTO: 0.4 10E3/UL (ref 0–1.3)
MONOCYTES NFR BLD AUTO: 9 %
NEUTROPHILS # BLD AUTO: 2.5 10E3/UL (ref 1.6–8.3)
NEUTROPHILS NFR BLD AUTO: 56 %
NRBC # BLD AUTO: 0 10E3/UL
NRBC BLD AUTO-RTO: 1 /100
PLATELET # BLD AUTO: 188 10E3/UL (ref 150–450)
POTASSIUM BLD-SCNC: 4.2 MMOL/L (ref 3.5–5)
PROT SERPL-MCNC: 6.2 G/DL (ref 6–8)
RBC # BLD AUTO: 3.3 10E6/UL (ref 4.4–5.9)
SODIUM SERPL-SCNC: 138 MMOL/L (ref 136–145)
WBC # BLD AUTO: 4.4 10E3/UL (ref 4–11)

## 2023-04-19 PROCEDURE — 83735 ASSAY OF MAGNESIUM: CPT | Performed by: EMERGENCY MEDICINE

## 2023-04-19 PROCEDURE — HZ2ZZZZ DETOXIFICATION SERVICES FOR SUBSTANCE ABUSE TREATMENT: ICD-10-PCS | Performed by: INTERNAL MEDICINE

## 2023-04-19 PROCEDURE — G1010 CDSM STANSON: HCPCS

## 2023-04-19 PROCEDURE — 80053 COMPREHEN METABOLIC PANEL: CPT | Performed by: EMERGENCY MEDICINE

## 2023-04-19 PROCEDURE — 258N000003 HC RX IP 258 OP 636: Performed by: INTERNAL MEDICINE

## 2023-04-19 PROCEDURE — G0378 HOSPITAL OBSERVATION PER HR: HCPCS

## 2023-04-19 PROCEDURE — 99223 1ST HOSP IP/OBS HIGH 75: CPT | Mod: AI | Performed by: INTERNAL MEDICINE

## 2023-04-19 PROCEDURE — 99285 EMERGENCY DEPT VISIT HI MDM: CPT | Mod: 25

## 2023-04-19 PROCEDURE — 85025 COMPLETE CBC W/AUTO DIFF WBC: CPT | Performed by: EMERGENCY MEDICINE

## 2023-04-19 PROCEDURE — 36415 COLL VENOUS BLD VENIPUNCTURE: CPT | Performed by: EMERGENCY MEDICINE

## 2023-04-19 PROCEDURE — 82550 ASSAY OF CK (CPK): CPT | Performed by: EMERGENCY MEDICINE

## 2023-04-19 PROCEDURE — 82077 ASSAY SPEC XCP UR&BREATH IA: CPT | Performed by: EMERGENCY MEDICINE

## 2023-04-19 RX ORDER — ACETAMINOPHEN 325 MG/1
650 TABLET ORAL EVERY 6 HOURS PRN
Status: DISCONTINUED | OUTPATIENT
Start: 2023-04-19 | End: 2023-04-20

## 2023-04-19 RX ORDER — SODIUM CHLORIDE 9 MG/ML
INJECTION, SOLUTION INTRAVENOUS CONTINUOUS
Status: DISCONTINUED | OUTPATIENT
Start: 2023-04-19 | End: 2023-04-23

## 2023-04-19 RX ORDER — ONDANSETRON 4 MG/1
4 TABLET, ORALLY DISINTEGRATING ORAL EVERY 6 HOURS PRN
Status: DISCONTINUED | OUTPATIENT
Start: 2023-04-19 | End: 2023-04-26 | Stop reason: HOSPADM

## 2023-04-19 RX ORDER — LORAZEPAM 0.5 MG/1
0.5 TABLET ORAL EVERY 6 HOURS PRN
Status: DISCONTINUED | OUTPATIENT
Start: 2023-04-19 | End: 2023-04-25

## 2023-04-19 RX ORDER — ACETAMINOPHEN 650 MG/1
650 SUPPOSITORY RECTAL EVERY 6 HOURS PRN
Status: DISCONTINUED | OUTPATIENT
Start: 2023-04-19 | End: 2023-04-20

## 2023-04-19 RX ORDER — ONDANSETRON 2 MG/ML
4 INJECTION INTRAMUSCULAR; INTRAVENOUS EVERY 6 HOURS PRN
Status: DISCONTINUED | OUTPATIENT
Start: 2023-04-19 | End: 2023-04-26 | Stop reason: HOSPADM

## 2023-04-19 RX ADMIN — SODIUM CHLORIDE: 9 INJECTION, SOLUTION INTRAVENOUS at 22:58

## 2023-04-19 ASSESSMENT — ENCOUNTER SYMPTOMS
WEAKNESS: 1
CHILLS: 0
FEVER: 0
SHORTNESS OF BREATH: 0

## 2023-04-19 ASSESSMENT — ACTIVITIES OF DAILY LIVING (ADL)
ADLS_ACUITY_SCORE: 35
ADLS_ACUITY_SCORE: 35

## 2023-04-20 LAB
ALBUMIN SERPL-MCNC: 3.1 G/DL (ref 3.5–5)
ALP SERPL-CCNC: 116 U/L (ref 45–120)
ALT SERPL W P-5'-P-CCNC: 126 U/L (ref 0–45)
ANION GAP SERPL CALCULATED.3IONS-SCNC: 11 MMOL/L (ref 5–18)
AST SERPL W P-5'-P-CCNC: 237 U/L (ref 0–40)
BASOPHILS # BLD AUTO: 0.1 10E3/UL (ref 0–0.2)
BASOPHILS NFR BLD AUTO: 2 %
BILIRUB SERPL-MCNC: 0.7 MG/DL (ref 0–1)
BUN SERPL-MCNC: 6 MG/DL (ref 8–28)
CALCIUM SERPL-MCNC: 8.7 MG/DL (ref 8.5–10.5)
CHLORIDE BLD-SCNC: 99 MMOL/L (ref 98–107)
CK SERPL-CCNC: 629 U/L (ref 30–190)
CO2 SERPL-SCNC: 24 MMOL/L (ref 22–31)
CREAT SERPL-MCNC: 0.5 MG/DL (ref 0.7–1.3)
EOSINOPHIL # BLD AUTO: 0 10E3/UL (ref 0–0.7)
EOSINOPHIL NFR BLD AUTO: 1 %
ERYTHROCYTE [DISTWIDTH] IN BLOOD BY AUTOMATED COUNT: 14 % (ref 10–15)
GFR SERPL CREATININE-BSD FRML MDRD: >90 ML/MIN/1.73M2
GLUCOSE BLD-MCNC: 95 MG/DL (ref 70–125)
HCT VFR BLD AUTO: 28 % (ref 40–53)
HGB BLD-MCNC: 9.7 G/DL (ref 13.3–17.7)
IMM GRANULOCYTES # BLD: 0 10E3/UL
IMM GRANULOCYTES NFR BLD: 1 %
LYMPHOCYTES # BLD AUTO: 1.1 10E3/UL (ref 0.8–5.3)
LYMPHOCYTES NFR BLD AUTO: 34 %
MAGNESIUM SERPL-MCNC: 1.5 MG/DL (ref 1.8–2.6)
MCH RBC QN AUTO: 34 PG (ref 26.5–33)
MCHC RBC AUTO-ENTMCNC: 34.6 G/DL (ref 31.5–36.5)
MCV RBC AUTO: 98 FL (ref 78–100)
MONOCYTES # BLD AUTO: 0.3 10E3/UL (ref 0–1.3)
MONOCYTES NFR BLD AUTO: 10 %
NEUTROPHILS # BLD AUTO: 1.8 10E3/UL (ref 1.6–8.3)
NEUTROPHILS NFR BLD AUTO: 52 %
NRBC # BLD AUTO: 0 10E3/UL
NRBC BLD AUTO-RTO: 0 /100
PHOSPHATE SERPL-MCNC: 4.4 MG/DL (ref 2.5–4.5)
PLATELET # BLD AUTO: 163 10E3/UL (ref 150–450)
POTASSIUM BLD-SCNC: 4.3 MMOL/L (ref 3.5–5)
PROT SERPL-MCNC: 5.7 G/DL (ref 6–8)
RBC # BLD AUTO: 2.85 10E6/UL (ref 4.4–5.9)
SODIUM SERPL-SCNC: 134 MMOL/L (ref 136–145)
WBC # BLD AUTO: 3.3 10E3/UL (ref 4–11)

## 2023-04-20 PROCEDURE — 80053 COMPREHEN METABOLIC PANEL: CPT | Performed by: INTERNAL MEDICINE

## 2023-04-20 PROCEDURE — 85004 AUTOMATED DIFF WBC COUNT: CPT | Performed by: INTERNAL MEDICINE

## 2023-04-20 PROCEDURE — 250N000013 HC RX MED GY IP 250 OP 250 PS 637: Performed by: INTERNAL MEDICINE

## 2023-04-20 PROCEDURE — 84100 ASSAY OF PHOSPHORUS: CPT | Performed by: INTERNAL MEDICINE

## 2023-04-20 PROCEDURE — 99233 SBSQ HOSP IP/OBS HIGH 50: CPT | Performed by: INTERNAL MEDICINE

## 2023-04-20 PROCEDURE — 250N000011 HC RX IP 250 OP 636: Performed by: INTERNAL MEDICINE

## 2023-04-20 PROCEDURE — 36415 COLL VENOUS BLD VENIPUNCTURE: CPT | Performed by: INTERNAL MEDICINE

## 2023-04-20 PROCEDURE — G0378 HOSPITAL OBSERVATION PER HR: HCPCS

## 2023-04-20 PROCEDURE — 82550 ASSAY OF CK (CPK): CPT | Performed by: INTERNAL MEDICINE

## 2023-04-20 PROCEDURE — 96376 TX/PRO/DX INJ SAME DRUG ADON: CPT

## 2023-04-20 PROCEDURE — 83735 ASSAY OF MAGNESIUM: CPT | Performed by: INTERNAL MEDICINE

## 2023-04-20 PROCEDURE — 96375 TX/PRO/DX INJ NEW DRUG ADDON: CPT

## 2023-04-20 PROCEDURE — 96374 THER/PROPH/DIAG INJ IV PUSH: CPT

## 2023-04-20 RX ORDER — FLUMAZENIL 0.1 MG/ML
0.2 INJECTION, SOLUTION INTRAVENOUS
Status: DISCONTINUED | OUTPATIENT
Start: 2023-04-20 | End: 2023-04-25

## 2023-04-20 RX ORDER — GABAPENTIN 300 MG/1
600 CAPSULE ORAL ONCE
Status: COMPLETED | OUTPATIENT
Start: 2023-04-20 | End: 2023-04-20

## 2023-04-20 RX ORDER — LORAZEPAM 1 MG/1
1-2 TABLET ORAL EVERY 30 MIN PRN
Status: DISCONTINUED | OUTPATIENT
Start: 2023-04-20 | End: 2023-04-25

## 2023-04-20 RX ORDER — CLONIDINE HYDROCHLORIDE 0.1 MG/1
0.1 TABLET ORAL EVERY 8 HOURS
Status: DISCONTINUED | OUTPATIENT
Start: 2023-04-20 | End: 2023-04-24

## 2023-04-20 RX ORDER — HALOPERIDOL 5 MG/ML
2.5-5 INJECTION INTRAMUSCULAR EVERY 6 HOURS PRN
Status: DISCONTINUED | OUTPATIENT
Start: 2023-04-20 | End: 2023-04-25

## 2023-04-20 RX ORDER — MAGNESIUM SULFATE 4 G/50ML
4 INJECTION INTRAVENOUS ONCE
Status: COMPLETED | OUTPATIENT
Start: 2023-04-20 | End: 2023-04-20

## 2023-04-20 RX ORDER — GABAPENTIN 300 MG/1
300 CAPSULE ORAL 2 TIMES DAILY
Status: DISCONTINUED | OUTPATIENT
Start: 2023-04-21 | End: 2023-04-26 | Stop reason: HOSPADM

## 2023-04-20 RX ORDER — GABAPENTIN 300 MG/1
300 CAPSULE ORAL 3 TIMES DAILY
Status: DISCONTINUED | OUTPATIENT
Start: 2023-04-20 | End: 2023-04-20

## 2023-04-20 RX ORDER — GABAPENTIN 300 MG/1
300 CAPSULE ORAL 2 TIMES DAILY
Status: DISCONTINUED | OUTPATIENT
Start: 2023-04-21 | End: 2023-04-20

## 2023-04-20 RX ORDER — LORAZEPAM 2 MG/ML
1-2 INJECTION INTRAMUSCULAR EVERY 30 MIN PRN
Status: DISCONTINUED | OUTPATIENT
Start: 2023-04-20 | End: 2023-04-24

## 2023-04-20 RX ADMIN — GABAPENTIN 300 MG: 300 CAPSULE ORAL at 13:53

## 2023-04-20 RX ADMIN — LORAZEPAM 1 MG: 1 TABLET ORAL at 08:08

## 2023-04-20 RX ADMIN — GABAPENTIN 600 MG: 300 CAPSULE ORAL at 04:26

## 2023-04-20 RX ADMIN — LORAZEPAM 1 MG: 1 TABLET ORAL at 13:56

## 2023-04-20 RX ADMIN — NICOTINE 7 MG/24 HR DAILY TRANSDERMAL PATCH 1 PATCH: at 13:53

## 2023-04-20 RX ADMIN — MAGNESIUM SULFATE HEPTAHYDRATE 4 G: 80 INJECTION, SOLUTION INTRAVENOUS at 18:51

## 2023-04-20 RX ADMIN — CLONIDINE HYDROCHLORIDE 0.1 MG: 0.1 TABLET ORAL at 13:53

## 2023-04-20 RX ADMIN — LORAZEPAM 1 MG: 1 TABLET ORAL at 10:24

## 2023-04-20 RX ADMIN — CLONIDINE HYDROCHLORIDE 0.1 MG: 0.1 TABLET ORAL at 19:49

## 2023-04-20 RX ADMIN — CLONIDINE HYDROCHLORIDE 0.1 MG: 0.1 TABLET ORAL at 04:27

## 2023-04-20 RX ADMIN — LORAZEPAM 2 MG: 2 INJECTION INTRAMUSCULAR; INTRAVENOUS at 14:57

## 2023-04-20 RX ADMIN — GABAPENTIN 300 MG: 300 CAPSULE ORAL at 19:49

## 2023-04-20 RX ADMIN — LORAZEPAM 2 MG: 2 INJECTION INTRAMUSCULAR; INTRAVENOUS at 04:24

## 2023-04-20 ASSESSMENT — ACTIVITIES OF DAILY LIVING (ADL)
ADLS_ACUITY_SCORE: 35
ADLS_ACUITY_SCORE: 39

## 2023-04-20 NOTE — CONSULTS
Care Management Initial Consult    General Information  Assessment completed with: Patient, VM-chart review,    Type of CM/SW Visit: Initial Assessment  Primary Care Provider verified and updated as needed: Yes   Readmission within the last 30 days: no previous admission in last 30 days   Reason for Consult: discharge planning, health care directive  Advance Care Planning: Advance Care Planning Reviewed: no concerns identified        Communication Assessment  Patient's communication style: spoken language (English or Bilingual)        Cognitive  Cognitive/Neuro/Behavioral:   WDL    Level of Consciousness: alert       Orientation: oriented x 4          Speech: logical    Living Environment:   People in home: alone     Current living Arrangements: apartment      Able to return to prior arrangements: other (see comments) (Safety in doing so is still TBD.)     Family/Social Support:  Care provided by: self, other (see comments) (Pt states his nephew Chip assists regularly/as needed.)  Provides care for: no one, unable/limited ability to care for self  Marital Status:   Other (specify) (Nephew - Chip)          Description of Support System: Involved, Supportive    Support Assessment: Caregiver difficulty providing physical care/safety    Current Resources:   Patient receiving home care services: No  Community Resources: None (None identified.)  Equipment currently used at home: none  Supplies currently used at home: None (Pt states no use of DME.)    Employment/Financial:  Employment Status: retired     Financial Concerns: No concerns identified   Referral to Financial Worker: No     Does the patient's insurance plan have a 3 day qualifying hospital stay waiver?  No    Lifestyle & Psychosocial Needs:  Social Determinants of Health     Tobacco Use: High Risk (12/8/2022)    Patient History      Smoking Tobacco Use: Every Day      Smokeless Tobacco Use: Never      Passive Exposure: Not on file   Alcohol Use: Not  on file   Financial Resource Strain: Not on file   Food Insecurity: Not on file   Transportation Needs: Not on file   Physical Activity: Not on file   Stress: Not on file   Social Connections: Not on file   Intimate Partner Violence: Not on file   Depression: Not on file   Housing Stability: Not on file     Functional Status:  Prior to admission patient needed assistance:   Dependent ADLs:: Independent (Pt states independence.)  Dependent IADLs:: Transportation, Cleaning, Shopping, Meal Preparation, Laundry (Nephew assists as needed.)  Assessment of Functional Status: Not at  functional baseline    Mental Health Status:  Mental Health Status: No Current Concerns       Chemical Dependency Status:  Chemical Dependency Status: No Current Concerns           Values/Beliefs:  Spiritual, Cultural Beliefs, Adventist Practices, Values that affect care: no (None identified)             Additional Information:  Writer met with patient to introduce Care Management(CM), obtain an initial assessment, and provide RODRIGUEZ info if necessary. Pt resides in an apartment alone and reports typical I/ADL independence when at baseline. Nephew Chip LANTIGUA(TS) reportedly transports and assists as needed. Patient's wife passed away in December of 2022. Pt now lives alone and states having no services in place to help at home. Pt stated openness to whatever discharge safety recommendations the hospital has to offer.    Pt/family was provided with the Medicare Compare list for Transitional Care/Home Care services and discussed associated Medicare star ratings to assist with choice for referrals/discharge planning. Education was given to pt/family that star ratings are updated/maintained by Medicare and can be reviewed by visiting www.medicare.gov - Yes. Pt declined. Pt states a desire to be transferred to the [facility] for rehabilitation if needed. This is the only facility pt has a preference for. Preemptive referral placed per pt  "request.     per ANTONI Willard.-\"72 year old male  presents to the Emergency Department for evaluation of alcohol intoxication, failure to thrive, inability to care for self at home.  His wife  a few months ago and reportedly he is just been drinking alcohol at home every day.  He has become deconditioned to the point where today he fell and lied on the floor for over 9 hours before he was able to crawl to a phone for help. Initial Vitals Reviewed. Initial exam notable for patient who is disheveled with mostly missing teeth, appears unkempt but interestingly not significantly intoxicated.  His alcohol level is still 300, so clearly he is a significant alcoholic.  He looks very thin and malnourished.  His LFTs show ALT greater than AST which is consistent with alcohol use and with no leukocytosis or abdominal pain I am less suspicious of a biliary emergency.  I did do a CT of the head due to the abrasion and this is unremarkable.  Ultimately, I do think he needs to be admitted for failure to thrive as clearly he is not caring for himself at home.  Case discussed with hospitalist Dr. Rose who accepted the patient for admission.\"     PT&OT Consults requested by MD Martin Note. Anticipate TCU placement post therapy consults. CM to follow-up on TCU referral and coordinate with pt if location is unavailable. [facility] is anticipated to transport pt at time of discharge if appropriate for safety.    Morro Emerson RN        "

## 2023-04-20 NOTE — PROGRESS NOTES
"[facility]  Hospitalist Progress Note    Admit Date:  4/19/2023  Date of Service (when I saw the patient): 04/20/2023   Provider:  Shaila Arvizu, DO    Assessment & Plan   Familia Watson is a 72 year old male who was admitted on 4/19/2023 after presenting to the ED with weakness and inability to care for himself.  He had fallen at home and been unable to get up to get to phone for several hours.      Problem List:    1. Failure to thrive      Vulnerable adult    He reports that his wife diet several months ago and that he has been unable to take care of himself  PT/OT consults  SW consult pending    2.  Ongoing Alcohol use       Acute inebriation    ETOH level elevated on admission  Will need to monitor closely for s/s of withdrawal    Will place on thiamine, folate, mvi    3.  Acute rhabdomyolysis    CK level coming down with IVF given  Will continue IVF and monitoring of his I/O's closely     4.  Hypomagnesia    Magnesium still low this am  Will supplement per protocol     5.  Elevated LFTs     AST and ALT elevated but improved this am  Total bilirubin normal     6. HTN and HL    not taking any meds on a regular basis  Will continue to monitor vitals closely     4. History of migraine headache    not taking any meds       Medical Decision Making     52 MINUTES SPENT BY ME on the date of service doing chart review, history, exam, documentation & further activities per the note.        Labs/Imaging Reviewed:  See Information above and Data section below    Diet: Regular Diet Adult    DVT Prophylaxis: Pneumatic Compression Devices  Alberto Catheter: Not present  Code Status: Full Code      Disposition Plan  likely will need placement; safe discharge a concern     Expected Discharge Date: 04/20/2023             Entered: Shaila Arvizu DO 04/20/2023, 7:46 AM     Interval History     \"Can I have something for my nerves?\".  Has been urinating ok.  No current CP or SOB.  No " N/V, F/C.  No HA.      -Data reviewed today: I reviewed all new labs and imaging results over the last 24 hours. I personally reviewed no images or EKG's today.    Physical Exam   Temp: 97.8  F (36.6  C) Temp src: Axillary BP: (!) 140/76 Pulse: 97   Resp: 20 SpO2: 99 % O2 Device: None (Room air)    There were no vitals filed for this visit.  Vital Signs with Ranges  Temp:  [97.8  F (36.6  C)] 97.8  F (36.6  C)  Pulse:  [79-98] 97  Resp:  [16-20] 20  BP: ()/(60-98) 140/76  SpO2:  [99 %-100 %] 99 %  I/O last 3 completed shifts:  In: -   Out: 800 [Urine:800]    GEN:  Alert, oriented x 3, appears fairly comfortable. Appears older than his stated age; disheveled   HEENT:  Normocephalic/atraumatic, no scleral icterus, no nasal discharge, mouth and membranes moist  CV:  Regular rate and rhythm, no loud murmur to ausc.  S1 + S2 noted, no S3 or S4.  LUNGS:  Clear to auscultation ant/lat bilaterally.  No clear rales/rhonchi/wheezing auscultated bilaterally.  No costal retractions bilaterally.  Symmetric chest rise on inhalation noted.  ABD:  Active bowel sounds, soft, non-tender/non-distended.  No clear rebound/guarding/rigidity.    EXT:  No significant pretibial edema or cyanosis bilaterally.   SKIN:  Dry to touch, no rashes or jaundice noted.  PSYCH:  Mood somewhat flattened; cooperative and not agitated   NEURO:  No tremors at rest, speech is somewhat limited but appropriate    Data   Labs:  Recent Labs   Lab 04/20/23  0607 04/19/23  2107   * 138   POTASSIUM 4.3 4.2   CHLORIDE 99 100   CO2 24 25   ANIONGAP 11 13   GLC 95 90   BUN 6* 6*   CR 0.50* 0.56*   GFRESTIMATED >90 >90   MELLISA 8.7 9.3     Recent Labs   Lab 04/20/23  0607 04/19/23  2107   WBC 3.3* 4.4   HGB 9.7* 11.1*   HCT 28.0* 32.8*   MCV 98 99    188     Recent Labs   Lab 04/20/23  0607 04/19/23  2107   * 138   POTASSIUM 4.3 4.2   CHLORIDE 99 100   CO2 24 25   ANIONGAP 11 13   GLC 95 90   BUN 6* 6*   CR 0.50* 0.56*   GFRESTIMATED >90 >90    MELLISA 8.7 9.3   MAG 1.5* 1.5*   PHOS 4.4  --    PROTTOTAL 5.7* 6.2   ALBUMIN 3.1* 3.5   BILITOTAL 0.7 0.6   ALKPHOS 116 127*   * 252*   * 140*      Recent Imaging:   Recent Results (from the past 24 hour(s))   Head CT w/o contrast    Narrative    EXAM: CT HEAD W/O CONTRAST, CT CERVICAL SPINE W/O CONTRAST  LOCATION: [facility]  DATE/TIME: 4/19/2023 9:27 PM CDT    INDICATION: Head and neck injury  COMPARISON: CT head 04/08/2022, CT cervical spine 01/08/2021  TECHNIQUE:   1) Routine CT Head without IV contrast. Multiplanar reformats. Dose reduction techniques were used.  2) Routine CT Cervical Spine without IV contrast. Multiplanar reformats. Dose reduction techniques were used.    FINDINGS:   HEAD CT:   INTRACRANIAL CONTENTS: No intracranial hemorrhage, extraaxial collection, or mass effect.  No CT evidence of acute infarct. Moderate presumed chronic small vessel ischemic changes. Moderate generalized volume loss. No hydrocephalus.     VISUALIZED ORBITS/SINUSES/MASTOIDS: No intraorbital abnormality. No paranasal sinus mucosal disease. No middle ear or mastoid effusion.    BONES/SOFT TISSUES: No acute abnormality.    CERVICAL SPINE CT:   VERTEBRA: Normal vertebral body heights and alignment. Fracture at the anterior inferior left aspect of C5. This was likely present on the prior exam as well. Cortication of fracture fragments suggests this is a chronic fracture.     CANAL/FORAMINA: Mild degenerative changes without significant canal narrowing. Multilevel mild-to-moderate neural foraminal narrowing.    PARASPINAL: No extraspinal abnormality. Visualized lung fields are clear.      Impression    IMPRESSION:  HEAD CT:  No acute intracranial process.    CERVICAL SPINE CT:  1.  No CT evidence for acute fracture or post traumatic subluxation.  2.  Fracture at the anterior inferior left aspect of C5. This was likely present on the prior exam as well. Cortication of fracture fragments  suggests this is a chronic fracture.     Cervical spine CT w/o contrast    Narrative    EXAM: CT HEAD W/O CONTRAST, CT CERVICAL SPINE W/O CONTRAST  LOCATION: [facility]  DATE/TIME: 4/19/2023 9:27 PM CDT    INDICATION: Head and neck injury  COMPARISON: CT head 04/08/2022, CT cervical spine 01/08/2021  TECHNIQUE:   1) Routine CT Head without IV contrast. Multiplanar reformats. Dose reduction techniques were used.  2) Routine CT Cervical Spine without IV contrast. Multiplanar reformats. Dose reduction techniques were used.    FINDINGS:   HEAD CT:   INTRACRANIAL CONTENTS: No intracranial hemorrhage, extraaxial collection, or mass effect.  No CT evidence of acute infarct. Moderate presumed chronic small vessel ischemic changes. Moderate generalized volume loss. No hydrocephalus.     VISUALIZED ORBITS/SINUSES/MASTOIDS: No intraorbital abnormality. No paranasal sinus mucosal disease. No middle ear or mastoid effusion.    BONES/SOFT TISSUES: No acute abnormality.    CERVICAL SPINE CT:   VERTEBRA: Normal vertebral body heights and alignment. Fracture at the anterior inferior left aspect of C5. This was likely present on the prior exam as well. Cortication of fracture fragments suggests this is a chronic fracture.     CANAL/FORAMINA: Mild degenerative changes without significant canal narrowing. Multilevel mild-to-moderate neural foraminal narrowing.    PARASPINAL: No extraspinal abnormality. Visualized lung fields are clear.      Impression    IMPRESSION:  HEAD CT:  No acute intracranial process.    CERVICAL SPINE CT:  1.  No CT evidence for acute fracture or post traumatic subluxation.  2.  Fracture at the anterior inferior left aspect of C5. This was likely present on the prior exam as well. Cortication of fracture fragments suggests this is a chronic fracture.         Medications     sodium chloride 100 mL/hr at 04/20/23 0414       cloNIDine  0.1 mg Oral Q8H     gabapentin  300 mg Oral TID
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
## 2023-04-20 NOTE — PHARMACY-ADMISSION MEDICATION HISTORY
Pharmacy Intern Admission Medication History    Admission medication history is complete. The information provided in this note is only as accurate as the sources available at the time of the update.    Medication reconciliation/reorder completed by provider prior to medication history? Yes    Information Source(s): Patient via in-person      Allergies reviewed with patient and updates made in EHR: yes    Medication History Completed By: Adrien oMnge 4/20/2023 9:12 AM    Prior to Admission medications    Medication Sig Last Dose Taking? Auth Provider Long Term End Date   acetaminophen (TYLENOL) 325 MG tablet Take 650 mg by mouth every 4 hours as needed for mild pain or fever Not to exceed 3 grams in 24 hours. Unknown at prn Yes Reported, Patient     aspirin-acetaminophen-caffeine (EXCEDRIN MIGRAINE) 250-250-65 MG tablet Take 1 tablet by mouth daily as needed for headaches Unknown at prn Yes Luis Manuel Vegas DO     atorvastatin (LIPITOR) 40 MG tablet Take 40 mg by mouth At Bedtime More than a month at pm Yes Unknown, Entered By History     cyanocobalamin (VITAMIN B-12) 1000 MCG tablet Take 1,000 mcg by mouth daily More than a month at am Yes Unknown, Entered By History     gabapentin (NEURONTIN) 300 MG capsule Take 1 capsule (300 mg) by mouth 3 times daily  Patient taking differently: Take 300 mg by mouth 2 times daily More than a month at pm Yes Eve Morton APRN CNP Yes    hydrOXYzine (VISTARIL) 50 MG capsule Take 1 capsule (50 mg) by mouth 3 times daily  Patient taking differently: Take 50 mg by mouth 4 times daily as needed More than a month at pm Yes Eve Morton APRN CNP     magnesium oxide (MAG-OX) 400 MG tablet Take 400 mg by mouth daily More than a month at am Yes Unknown, Entered By History     thiamine (B-1) 100 MG tablet Take 1 tablet (100 mg) by mouth daily More than a month at am Yes Luis Manuel Vegas DO     Vitamin D3 (CHOLECALCIFEROL) 25 mcg (1000 units) tablet Take 1 tablet by mouth  daily More than a month at am Yes Unknown, Entered By History

## 2023-04-20 NOTE — PLAN OF CARE
Goal Outcome Evaluation:       Problem: Plan of Care - These are the overarching goals to be used throughout the patient stay.    Goal: Absence of Hospital-Acquired Illness or Injury  Intervention: Identify and Manage Fall Risk  Recent Flowsheet Documentation  Taken 4/20/2023 0830 by Mary Gonzalez, RN  Safety Promotion/Fall Prevention:   activity supervised   nonskid shoes/slippers when out of bed   mobility aid in reach   lighting adjusted   increase visualization of patient   increased rounding and observation           Patient Has been scoring on etoh protocol requiring prn ativan. Patient has denied pain through shift. Has been up with assist of 1 and wheel chair.

## 2023-04-20 NOTE — PROGRESS NOTES
"PRIMARY DIAGNOSIS: \"GENERIC\" NURSING  OUTPATIENT/OBSERVATION GOALS TO BE MET BEFORE DISCHARGE:  1. ADLs back to baseline: No    2. Activity and level of assistance: Up with standby assistance.    3. Pain status: Pain free.    4. Return to near baseline physical activity: No     Discharge Planner Nurse   Safe discharge environment identified: No  Barriers to discharge: Yes       Entered by: Nenita Weston RN 04/20/2023 4:32 AM     Please review provider order for any additional goals.   Nurse to notify provider when observation goals have been met and patient is ready for discharge.      Pt pleasant and anxious about withdrawal  A&Ox4  Started to score higher on CIWA @ 0400.   (scored 1, 10, 2) prn IV ativan given   Next recheck for CIWA around 0930.   Stood at bedside for urinal  NS rate increased.   RA.   "

## 2023-04-20 NOTE — ED TRIAGE NOTES
Pt brought in by ambulance for evaluation of etoh and failure to thrive. Pt reports his wife  in December and has been drinking Vodka consistently ever since. States he lives alone, has lost a lot of weight. His nephew checks in on him and brings him groceries. Pt denies depression or suicidal ideations but states he would like help with his alcohol problem and more help at home. Pt reports he felt generalized weakness causing him to fall today was on the ground for 9 hours before he could get to the phone. Some redness, bruising noted to R forehead and cheek. Denies any pain, HA, nausea, vomiting, dark or bloody stools, CP.

## 2023-04-21 LAB
ALBUMIN SERPL-MCNC: 2.9 G/DL (ref 3.5–5)
ALP SERPL-CCNC: 114 U/L (ref 45–120)
ALT SERPL W P-5'-P-CCNC: 107 U/L (ref 0–45)
ANION GAP SERPL CALCULATED.3IONS-SCNC: 7 MMOL/L (ref 5–18)
AST SERPL W P-5'-P-CCNC: 176 U/L (ref 0–40)
BILIRUB DIRECT SERPL-MCNC: 0.5 MG/DL
BILIRUB SERPL-MCNC: 1.2 MG/DL (ref 0–1)
BUN SERPL-MCNC: 8 MG/DL (ref 8–28)
CALCIUM SERPL-MCNC: 8 MG/DL (ref 8.5–10.5)
CHLORIDE BLD-SCNC: 101 MMOL/L (ref 98–107)
CK SERPL-CCNC: 294 U/L (ref 30–190)
CO2 SERPL-SCNC: 25 MMOL/L (ref 22–31)
CREAT SERPL-MCNC: 0.49 MG/DL (ref 0.7–1.3)
ERYTHROCYTE [DISTWIDTH] IN BLOOD BY AUTOMATED COUNT: 13.9 % (ref 10–15)
GFR SERPL CREATININE-BSD FRML MDRD: >90 ML/MIN/1.73M2
GLUCOSE BLD-MCNC: 104 MG/DL (ref 70–125)
HCT VFR BLD AUTO: 29.6 % (ref 40–53)
HGB BLD-MCNC: 9.7 G/DL (ref 13.3–17.7)
MAGNESIUM SERPL-MCNC: 1.9 MG/DL (ref 1.8–2.6)
MAGNESIUM SERPL-MCNC: 2.6 MG/DL (ref 1.8–2.6)
MCH RBC QN AUTO: 33.9 PG (ref 26.5–33)
MCHC RBC AUTO-ENTMCNC: 32.8 G/DL (ref 31.5–36.5)
MCV RBC AUTO: 104 FL (ref 78–100)
OSMOLALITY SERPL: 278 MMOL/KG (ref 280–301)
PHOSPHATE SERPL-MCNC: 3.4 MG/DL (ref 2.5–4.5)
PLATELET # BLD AUTO: 150 10E3/UL (ref 150–450)
POTASSIUM BLD-SCNC: 3.4 MMOL/L (ref 3.5–5)
POTASSIUM BLD-SCNC: 3.4 MMOL/L (ref 3.5–5)
POTASSIUM BLD-SCNC: 3.7 MMOL/L (ref 3.5–5)
PROT SERPL-MCNC: 5.2 G/DL (ref 6–8)
RBC # BLD AUTO: 2.86 10E6/UL (ref 4.4–5.9)
SODIUM SERPL-SCNC: 133 MMOL/L (ref 136–145)
TSH SERPL DL<=0.005 MIU/L-ACNC: 0.91 UIU/ML (ref 0.3–5)
WBC # BLD AUTO: 3.1 10E3/UL (ref 4–11)

## 2023-04-21 PROCEDURE — 250N000013 HC RX MED GY IP 250 OP 250 PS 637: Performed by: INTERNAL MEDICINE

## 2023-04-21 PROCEDURE — 84100 ASSAY OF PHOSPHORUS: CPT | Performed by: INTERNAL MEDICINE

## 2023-04-21 PROCEDURE — G0378 HOSPITAL OBSERVATION PER HR: HCPCS

## 2023-04-21 PROCEDURE — 84132 ASSAY OF SERUM POTASSIUM: CPT | Performed by: INTERNAL MEDICINE

## 2023-04-21 PROCEDURE — 36415 COLL VENOUS BLD VENIPUNCTURE: CPT | Performed by: INTERNAL MEDICINE

## 2023-04-21 PROCEDURE — 82310 ASSAY OF CALCIUM: CPT | Performed by: INTERNAL MEDICINE

## 2023-04-21 PROCEDURE — 99232 SBSQ HOSP IP/OBS MODERATE 35: CPT | Performed by: INTERNAL MEDICINE

## 2023-04-21 PROCEDURE — 80053 COMPREHEN METABOLIC PANEL: CPT | Performed by: INTERNAL MEDICINE

## 2023-04-21 PROCEDURE — 120N000001 HC R&B MED SURG/OB

## 2023-04-21 PROCEDURE — 82248 BILIRUBIN DIRECT: CPT | Performed by: INTERNAL MEDICINE

## 2023-04-21 PROCEDURE — 96376 TX/PRO/DX INJ SAME DRUG ADON: CPT

## 2023-04-21 PROCEDURE — 258N000003 HC RX IP 258 OP 636: Performed by: INTERNAL MEDICINE

## 2023-04-21 PROCEDURE — 84300 ASSAY OF URINE SODIUM: CPT | Performed by: INTERNAL MEDICINE

## 2023-04-21 PROCEDURE — 82550 ASSAY OF CK (CPK): CPT | Performed by: INTERNAL MEDICINE

## 2023-04-21 PROCEDURE — 83935 ASSAY OF URINE OSMOLALITY: CPT | Performed by: INTERNAL MEDICINE

## 2023-04-21 PROCEDURE — 85027 COMPLETE CBC AUTOMATED: CPT | Performed by: INTERNAL MEDICINE

## 2023-04-21 PROCEDURE — 250N000011 HC RX IP 250 OP 636: Performed by: INTERNAL MEDICINE

## 2023-04-21 PROCEDURE — 83930 ASSAY OF BLOOD OSMOLALITY: CPT | Performed by: INTERNAL MEDICINE

## 2023-04-21 PROCEDURE — 84443 ASSAY THYROID STIM HORMONE: CPT | Performed by: INTERNAL MEDICINE

## 2023-04-21 PROCEDURE — 83735 ASSAY OF MAGNESIUM: CPT | Performed by: INTERNAL MEDICINE

## 2023-04-21 RX ORDER — MULTIPLE VITAMINS W/ MINERALS TAB 9MG-400MCG
1 TAB ORAL DAILY
Status: DISCONTINUED | OUTPATIENT
Start: 2023-04-21 | End: 2023-04-26 | Stop reason: HOSPADM

## 2023-04-21 RX ORDER — ATORVASTATIN CALCIUM 40 MG/1
40 TABLET, FILM COATED ORAL AT BEDTIME
Status: DISCONTINUED | OUTPATIENT
Start: 2023-04-21 | End: 2023-04-26 | Stop reason: HOSPADM

## 2023-04-21 RX ORDER — POTASSIUM CHLORIDE 1500 MG/1
20 TABLET, EXTENDED RELEASE ORAL ONCE
Status: COMPLETED | OUTPATIENT
Start: 2023-04-21 | End: 2023-04-21

## 2023-04-21 RX ORDER — HYDROXYZINE HYDROCHLORIDE 25 MG/1
50 TABLET, FILM COATED ORAL 4 TIMES DAILY PRN
Status: DISCONTINUED | OUTPATIENT
Start: 2023-04-21 | End: 2023-04-24

## 2023-04-21 RX ORDER — FOLIC ACID 1 MG/1
1 TABLET ORAL DAILY
Status: DISCONTINUED | OUTPATIENT
Start: 2023-04-21 | End: 2023-04-26 | Stop reason: HOSPADM

## 2023-04-21 RX ORDER — LANOLIN ALCOHOL/MO/W.PET/CERES
1000 CREAM (GRAM) TOPICAL DAILY
Status: DISCONTINUED | OUTPATIENT
Start: 2023-04-21 | End: 2023-04-26 | Stop reason: HOSPADM

## 2023-04-21 RX ORDER — POTASSIUM CHLORIDE 1500 MG/1
40 TABLET, EXTENDED RELEASE ORAL ONCE
Status: COMPLETED | OUTPATIENT
Start: 2023-04-21 | End: 2023-04-21

## 2023-04-21 RX ADMIN — LORAZEPAM 2 MG: 2 INJECTION INTRAMUSCULAR; INTRAVENOUS at 10:37

## 2023-04-21 RX ADMIN — LORAZEPAM 1 MG: 1 TABLET ORAL at 02:32

## 2023-04-21 RX ADMIN — LORAZEPAM 1 MG: 2 INJECTION INTRAMUSCULAR; INTRAVENOUS at 22:24

## 2023-04-21 RX ADMIN — CYANOCOBALAMIN TAB 1000 MCG 1000 MCG: 1000 TAB at 13:07

## 2023-04-21 RX ADMIN — LORAZEPAM 1 MG: 2 INJECTION INTRAMUSCULAR; INTRAVENOUS at 16:39

## 2023-04-21 RX ADMIN — CLONIDINE HYDROCHLORIDE 0.1 MG: 0.1 TABLET ORAL at 13:07

## 2023-04-21 RX ADMIN — Medication 100 MG: at 13:07

## 2023-04-21 RX ADMIN — HYDROXYZINE HYDROCHLORIDE 50 MG: 25 TABLET ORAL at 13:10

## 2023-04-21 RX ADMIN — LORAZEPAM 2 MG: 2 INJECTION INTRAMUSCULAR; INTRAVENOUS at 09:57

## 2023-04-21 RX ADMIN — GABAPENTIN 300 MG: 300 CAPSULE ORAL at 22:15

## 2023-04-21 RX ADMIN — LORAZEPAM 2 MG: 2 INJECTION INTRAMUSCULAR; INTRAVENOUS at 07:07

## 2023-04-21 RX ADMIN — LORAZEPAM 2 MG: 2 INJECTION INTRAMUSCULAR; INTRAVENOUS at 04:23

## 2023-04-21 RX ADMIN — POTASSIUM CHLORIDE 20 MEQ: 1500 TABLET, EXTENDED RELEASE ORAL at 22:16

## 2023-04-21 RX ADMIN — SODIUM CHLORIDE: 9 INJECTION, SOLUTION INTRAVENOUS at 21:53

## 2023-04-21 RX ADMIN — SODIUM CHLORIDE: 9 INJECTION, SOLUTION INTRAVENOUS at 08:02

## 2023-04-21 RX ADMIN — NICOTINE 7 MG/24 HR DAILY TRANSDERMAL PATCH 1 PATCH: at 08:36

## 2023-04-21 RX ADMIN — LORAZEPAM 2 MG: 1 TABLET ORAL at 03:28

## 2023-04-21 RX ADMIN — FOLIC ACID 1 MG: 1 TABLET ORAL at 13:07

## 2023-04-21 RX ADMIN — CLONIDINE HYDROCHLORIDE 0.1 MG: 0.1 TABLET ORAL at 22:15

## 2023-04-21 RX ADMIN — MULTIPLE VITAMINS W/ MINERALS TAB 1 TABLET: TAB at 13:07

## 2023-04-21 RX ADMIN — CLONIDINE HYDROCHLORIDE 0.1 MG: 0.1 TABLET ORAL at 03:30

## 2023-04-21 ASSESSMENT — ACTIVITIES OF DAILY LIVING (ADL)
ADLS_ACUITY_SCORE: 39

## 2023-04-21 NOTE — PROGRESS NOTES
Writer attempted to collect urine sample for lab orders in placed but unsuccessful due to Patient's CIWA scoring high during this shift with hallucinations, agitations, and yelling managed with IV Lorazepam per CIWA score protocol, Patient is incontinent of urine, incoherent, unable to process instructions, voided in the bathroom assisted by BRIGHT but urine was mixed with stool.  Writer will update the receiving RN of the PM shift. -Rebeca SANTANA RN

## 2023-04-21 NOTE — PROGRESS NOTES
[facility]    Medicine Progress Note - Hospitalist Service    Date of Admission:  4/19/2023    Assessment & Plan   Familia Watson is a 72 year old male who was admitted on 4/19/2023 after presenting to the ED with weakness and inability to care for himself.  He had fallen at home and been unable to get up to get to phone for several hours.  Presented with acute alcohol intoxication, acute mild rhabdomyolysis, alcohol induced liver injury with elevated liver function tests, and hypomagnesemia.      Failure to thrive  Vulnerable adult  Upper arm contractions, resting tremor.  PT/OT consults  ?  Parkinsonism, consider neurology referral at discharge.   Care management for disposition plans.    Alcohol abuse.  Alcohol withdrawal.   Alcohol intoxication resolved.  ETOH level elevated on admission  Continue CIWA monitoring.  Continue supplements including thiamine, folate, mvi  Gabapentin 300 mg twice daily    Acute rhabdomyolysis  CK level trending down with IVF.  856->294.  Will continue IVF.    Hyponatremia, mild.   Sodium 133 down from 138 on admission  Order serum osmolality.   Order urine sodium, urine osm.  Order TSH with reflex T4  Order free water restriction 1500 mL.     Normocytic anemia, leukopenia.   Likely bone marrow suppression from chronic alcohol use.   Repeat CBC in a.m.  Will need outpatient CBC to document resolution.     Hypomagnesia  Replace per protocol     Elevated LFTs  AST and ALT elevated but improved this am  Total bilirubin normal     HTN and HL   not taking any meds on a regular basis  Will continue to monitor vitals closely     History of migraine headache   not taking any meds       Tobacco abuse  Nicotine 7 mg transdermal patch  daily.     Diet: Regular Diet Adult    DVT Prophylaxis: Pneumatic Compression Devices  Alberto Catheter: Not present  Lines: None     Cardiac Monitoring: None  Code Status: Full Code      Clinically Significant Risk Factors Present on  Admission            # Hypomagnesemia: Lowest Mg = 1.5 mg/dL in last 2 days, will replace as needed   # Hypoalbuminemia: Lowest albumin = 3.1 g/dL at 4/20/2023  6:07 AM, will monitor as appropriate                  Disposition Plan      Expected Discharge Date: 04/22/2023      Destination: other (comment) (Home with help vs. TCU placement - therapy consults pending.)            Luis Manuel Vegas DO  Hospitalist Service  [facility]  Securely message with [employer] (more info)  Text page via [employer] Paging/Directory   ______________________________________________________________________    Interval History   72-year-old laying in bed.  Patient reports he continues to have visual hallucinations.  He complains of anxiety.  No nausea or vomiting.  No headache.    Physical Exam   Vital Signs: Temp: (!) 96.4  F (35.8  C) Temp src: Axillary BP: 132/75 Pulse: 78   Resp: 16 SpO2: 100 % O2 Device: None (Room air)    Weight: 120 lbs 0 oz  GEN:  Alert, oriented x 3.. Appears older than his stated age; disheveled.  HEENT:  Normocephalic/atraumatic, no scleral icterus, no nasal discharge, mouth and membranes moist  CV:  Regular rate and rhythm, no loud murmur to ausc.  S1 + S2 noted, no S3 or S4.  LUNGS:  Clear to auscultation ant/lat bilaterally.  No clear rales/rhonchi/wheezing auscultated bilaterally.  No costal retractions bilaterally.  Symmetric chest rise on inhalation noted.  ABD:  Active bowel sounds, soft, non-tender/non-distended.  No clear rebound/guarding/rigidity.    EXT:   Cogwheel rigidity.  SKIN:  Dry to touch, no rashes or jaundice noted.  PSYCH:   Flat affect; cooperative and not agitated   NEURO:   Resting tremor upper extremities, and contraction, speech is somewhat limited but appropriate    Medical Decision Making     45 MINUTES SPENT BY ME on the date of service doing chart review, history, exam, documentation & further activities per the note.      Data   No results found for this or  any previous visit (from the past 24 hour(s)).      Imaging results reviewed over the past 24 hrs:

## 2023-04-21 NOTE — PROGRESS NOTES
Care Management Follow Up    Length of Stay (days): 0    Expected Discharge Date: 04/23/2023     Concerns to be Addressed: care coordination/care conferences, discharge planning  Pt states willingness to go to TCU if needed.  Patient plan of care discussed at interdisciplinary rounds: Yes    Anticipated Discharge Disposition: Home Care, Transitional Care (TBD further.)     Anticipated Discharge Services: None  Anticipated Discharge DME: [facility]    Patient/family educated on Medicare website which has current facility and service quality ratings: yes (Pt declined.)  Education Provided on the Discharge Plan: Yes (Pt informed of assessment/supportive service coordination process and was encouraged to ask for clarification as needed.)  Patient/Family in Agreement with the Plan: unable to assess    Referrals Placed by CM/SW: Post Acute Facilities    Additional Information:  CM attempted to meet with the Pt, however the Pt is not ready for assessment ETOH. CM to continue to follow up.       DRAKE Abarca

## 2023-04-21 NOTE — PROGRESS NOTES
PRIMARY DIAGNOSIS: Alcoholic intoxication  OUTPATIENT/OBSERVATION GOALS TO BE MET BEFORE DISCHARGE:  1. ADLs back to baseline: No    2. Activity and level of assistance: Up with standby assistance.    3. Pain status: Pain free.    4. Return to near baseline physical activity: No     Discharge Planner Nurse   Safe discharge environment identified: No  Barriers to discharge: Yes       Entered by: Veena Baer RN 04/20/2023 8:07 PM     VSS. A&Ox3, disoriented to place. Denies pain. CIWA score 4, pt very sedated. NS running at 100mL/hr. K and Mg protocol, Mg recheck at 2330. Alarms on for safety.   no

## 2023-04-21 NOTE — UTILIZATION REVIEW
Admission Status; Secondary Review Determination   Under the authority of the Utilization Management Committee, the utilization review process indicated a secondary review on Familia Watson. The review outcome is based on review of the medical records, discussions with staff, and applying clinical experience noted on the date of the review.   (x) Inpatient Status Appropriate - This patient's medical care is consistent with medical management for inpatient care and reasonable inpatient medical practice.     RATIONALE FOR DETERMINATION   72-year-old male with alcoholism admitted with acute alcohol withdrawal with high CIWA scores requiring benzodiazepines.  Has received thus far 5 doses of IV benzodiazepine in the last 24 hours in addition to 3 oral doses.  Also with acute rhabdomyolysis, receiving IV fluids.  Also having elevated liver function tests and hypomagnesemia.    At the time of admission with the information available to the attending physician more than 2 nights Hospital complex care was anticipated, based on patient risk of adverse outcome if treated as outpatient and complex care required. Inpatient admission is appropriate based on the Medicare guidelines.   The information on this document is developed by the utilization review team in order for the business office to ensure compliance. This only denotes the appropriateness of proper admission status and does not reflect the quality of care rendered.   The definitions of Inpatient Status and Observation Status used in making the determination above are those provided in the CMS Coverage Manual, Chapter 1 and Chapter 6, section 70.4.   Sincerely,   Donnie Lala MD  Utilization Review  Physician Advisor  [facility]

## 2023-04-21 NOTE — PROGRESS NOTES
"PRIMARY DIAGNOSIS: \"GENERIC\" NURSING  OUTPATIENT/OBSERVATION GOALS TO BE MET BEFORE DISCHARGE:  1. ADLs back to baseline: No    2. Activity and level of assistance: Up with standby assistance.    3. Pain status: Pain free.    4. Return to near baseline physical activity: No     Pt CIWA scores 0-8-14-17. Oral ativan unsuccessful in lowering CIWA score. Pt reporting visual hallucinations of \"little cone people.\" 2mg IV ativan effective in lowering CIWA score from 17 to 5. Continue CIWA protocol. Voiding spontaneously. Oriented to self, place, and situation. Pt acknowledges hallucinations as hallucinations but will still swat at the air. No hallucinations reported since last ativan dose, pt resting comfortably.     Pt endorsed some past suicidal thoughts to nursing staff. Said he \"hadn't stopped drinking\" since his wife  in December. He wanted to kill himself, but Chip (nephew) talked him out of it. Expressed fear at the severity of his withdrawals. Pt would appreciate staff updating nephew on his situation and involve nephew in care planning.   "

## 2023-04-21 NOTE — PROGRESS NOTES
"PRIMARY DIAGNOSIS: \"GENERIC\" NURSING  OUTPATIENT/OBSERVATION GOALS TO BE MET BEFORE DISCHARGE:  1. ADLs back to baseline: No    2. Activity and level of assistance: Up with standby assistance.    3. Pain status: Pain free.    4. Return to near baseline physical activity: No     Discharge Planner Nurse   Safe discharge environment identified: No  Barriers to discharge: Yes       Entered by: Rebeca Gongora RN 04/21/2023 8:25 AM    CIWA Score 0, assessed patient noted comfortable, sound asleep in bed, no pain using rFLACC pain assessment, light sedated, per report overnight RN gave IV Lorazepam at 7:07AM today, VSS, on room air, on NS at 100ml/hr. Opened both eyes to voice but when back to sleep right away. Will continue to monitor.    Please review provider order for any additional goals.   Nurse to notify provider when observation goals have been met and patient is ready for discharge.  "

## 2023-04-21 NOTE — PROGRESS NOTES
"PRIMARY DIAGNOSIS: \"GENERIC\" NURSING  OUTPATIENT/OBSERVATION GOALS TO BE MET BEFORE DISCHARGE:  1. ADLs back to baseline: No    2. Activity and level of assistance: Up with standby assistance.    3. Pain status: Pain free.    4. Return to near baseline physical activity: No     Discharge Planner Nurse   Safe discharge environment identified: Yes - per  notes 4/20/23 anticipate discharge TCU  Barriers to discharge: Yes       Entered by: Rebeca Gongora RN 04/21/2023 1:12 PM    Stable VS re-assessment, CIWA Score at this time is 6, afternoon oral scheduled medications given, patient able to swallow with no difficulty, PRN Atarax given for mild anxiety. Patient ate 25% breakfast with assist.   On continuous IV fluids NS at 100ml/hr via PIV on left forearm.    Please review provider order for any additional goals.   Nurse to notify provider when observation goals have been met and patient is ready for discharge.  "

## 2023-04-21 NOTE — PROGRESS NOTES
PRIMARY DIAGNOSIS: Alcoholic intoxication  OUTPATIENT/OBSERVATION GOALS TO BE MET BEFORE DISCHARGE:  1. ADLs back to baseline: No    2. Activity and level of assistance: Up with standby assistance.    3. Pain status: Pain free.    4. Return to near baseline physical activity: No     Discharge Planner Nurse   Safe discharge environment identified: No  Barriers to discharge: Yes       Entered by: Veena Baer RN 04/20/2023 10:10 PM     VSS. Denies pain. CIWA score 0, pt remains very sedated. NS running at 100mL/hr. K and Mg protocol, Mg recheck at 2330. Alarms on for safety.

## 2023-04-21 NOTE — PROGRESS NOTES
PRIMARY DIAGNOSIS: Alcohol intoxication  OUTPATIENT/OBSERVATION GOALS TO BE MET BEFORE DISCHARGE:  1. ADLs back to baseline: No    2. Activity and level of assistance: Up with standby assistance.    3. Pain status: Pain free.    4. Return to near baseline physical activity: No     CIWA score zero. Pt sedated, but still rouses to voice. Mg and K protcols rechecks in AM.

## 2023-04-22 LAB
ANION GAP SERPL CALCULATED.3IONS-SCNC: 9 MMOL/L (ref 5–18)
BUN SERPL-MCNC: 6 MG/DL (ref 8–28)
CALCIUM SERPL-MCNC: 8.1 MG/DL (ref 8.5–10.5)
CHLORIDE BLD-SCNC: 101 MMOL/L (ref 98–107)
CO2 SERPL-SCNC: 22 MMOL/L (ref 22–31)
CREAT SERPL-MCNC: 0.51 MG/DL (ref 0.7–1.3)
GFR SERPL CREATININE-BSD FRML MDRD: >90 ML/MIN/1.73M2
GLUCOSE BLD-MCNC: 97 MG/DL (ref 70–125)
HOLD SPECIMEN: NORMAL
MAGNESIUM SERPL-MCNC: 1.8 MG/DL (ref 1.8–2.6)
OSMOLALITY UR: 412 MMOL/KG (ref 100–1200)
POTASSIUM BLD-SCNC: 4.3 MMOL/L (ref 3.5–5)
SODIUM SERPL-SCNC: 132 MMOL/L (ref 136–145)
SODIUM UR-SCNC: 159 MMOL/L

## 2023-04-22 PROCEDURE — 250N000013 HC RX MED GY IP 250 OP 250 PS 637: Performed by: INTERNAL MEDICINE

## 2023-04-22 PROCEDURE — 250N000011 HC RX IP 250 OP 636: Performed by: INTERNAL MEDICINE

## 2023-04-22 PROCEDURE — 99232 SBSQ HOSP IP/OBS MODERATE 35: CPT | Performed by: INTERNAL MEDICINE

## 2023-04-22 PROCEDURE — 120N000001 HC R&B MED SURG/OB

## 2023-04-22 PROCEDURE — 258N000003 HC RX IP 258 OP 636: Performed by: INTERNAL MEDICINE

## 2023-04-22 PROCEDURE — 36415 COLL VENOUS BLD VENIPUNCTURE: CPT | Performed by: INTERNAL MEDICINE

## 2023-04-22 PROCEDURE — 80048 BASIC METABOLIC PNL TOTAL CA: CPT | Performed by: INTERNAL MEDICINE

## 2023-04-22 PROCEDURE — 83735 ASSAY OF MAGNESIUM: CPT | Performed by: INTERNAL MEDICINE

## 2023-04-22 RX ORDER — MAGNESIUM SULFATE HEPTAHYDRATE 40 MG/ML
2 INJECTION, SOLUTION INTRAVENOUS ONCE
Status: COMPLETED | OUTPATIENT
Start: 2023-04-22 | End: 2023-04-22

## 2023-04-22 RX ADMIN — MAGNESIUM SULFATE HEPTAHYDRATE 2 G: 40 INJECTION, SOLUTION INTRAVENOUS at 16:54

## 2023-04-22 RX ADMIN — CLONIDINE HYDROCHLORIDE 0.1 MG: 0.1 TABLET ORAL at 20:53

## 2023-04-22 RX ADMIN — ONDANSETRON 4 MG: 2 INJECTION INTRAMUSCULAR; INTRAVENOUS at 03:10

## 2023-04-22 RX ADMIN — LORAZEPAM 1 MG: 1 TABLET ORAL at 03:11

## 2023-04-22 RX ADMIN — SODIUM CHLORIDE: 9 INJECTION, SOLUTION INTRAVENOUS at 18:17

## 2023-04-22 RX ADMIN — Medication 5 MG: at 20:52

## 2023-04-22 RX ADMIN — HYDROXYZINE HYDROCHLORIDE 50 MG: 25 TABLET ORAL at 20:52

## 2023-04-22 RX ADMIN — CLONIDINE HYDROCHLORIDE 0.1 MG: 0.1 TABLET ORAL at 14:02

## 2023-04-22 RX ADMIN — HYDROXYZINE HYDROCHLORIDE 50 MG: 25 TABLET ORAL at 14:02

## 2023-04-22 RX ADMIN — CLONIDINE HYDROCHLORIDE 0.1 MG: 0.1 TABLET ORAL at 04:08

## 2023-04-22 RX ADMIN — NICOTINE 7 MG/24 HR DAILY TRANSDERMAL PATCH 1 PATCH: at 14:02

## 2023-04-22 RX ADMIN — GABAPENTIN 300 MG: 300 CAPSULE ORAL at 20:55

## 2023-04-22 RX ADMIN — SODIUM CHLORIDE: 9 INJECTION, SOLUTION INTRAVENOUS at 06:58

## 2023-04-22 RX ADMIN — LORAZEPAM 1 MG: 2 INJECTION INTRAMUSCULAR; INTRAVENOUS at 01:58

## 2023-04-22 ASSESSMENT — ACTIVITIES OF DAILY LIVING (ADL)
ADLS_ACUITY_SCORE: 47
ADLS_ACUITY_SCORE: 39

## 2023-04-22 NOTE — PLAN OF CARE
Problem: Plan of Care - These are the overarching goals to be used throughout the patient stay.    Goal: Patient-Specific Goal (Individualized): Optimal Coping--reduced symptoms of active ETOH withdrawal  Outcome: Progressing  Goal Outcome Evaluation: Patient assessed at this time noted sound asleep, CIWA score 0, VSS afebrile, on room air. Will continue to monitor.  -Rebeca SANTANA RN

## 2023-04-22 NOTE — PLAN OF CARE
Problem: Plan of Care - These are the overarching goals to be used throughout the patient stay.    Goal: Plan of Care Review  Description: The Plan of Care Review/Shift note should be completed every shift.  The Outcome Evaluation is a brief statement about your assessment that the patient is improving, declining, or no change.  This information will be displayed automatically on your shift note.  Outcome: Progressing    Patient is Disorientated to time. Denies pain; however, c/o nausea and received IV Zofran. CIWA scores tonight were 10,6,10,5. Tremors being most significant symptom. Given prn ativan IV times one and PO times one. NS infusing at 100ml/hr. Continues on the K+ and mag protocols with rechecks this AM. Bed alarm in place for patient's safety.
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
## 2023-04-22 NOTE — PLAN OF CARE
Problem: Risk for Delirium  Goal: Improved Sleep  Outcome: Progressing     Problem: Risk for Delirium  Goal: Improved Attention and Thought Clarity  Outcome: Progressing     Problem: Plan of Care - These are the overarching goals to be used throughout the patient stay.    Goal: Readiness for Transition of Care  Outcome: Progressing   Goal Outcome Evaluation:       Pt A&Ox4 and VSS. UA collected on shift, clean catch. Neuros intact. CIWA scored a 9 IV ativan given, rechecked in one hour and scored a 7. Rechecked in 4 hours and scored a 8 and 1 mg ativan given IV. Recheck pt scored a 6. Pt did not eat dinner.  leeping between cares otherwise. Call light within reach and bed alarm on.

## 2023-04-22 NOTE — PROGRESS NOTES
Re-assessed Patient at this time noted comfortably sound asleep, RN attempted to wake patient up with a gently touch for AM scheduled meds but went back to sleep immediately. Noted charted by BRIGHT, Patient ate 75% breakfast at 9:30AM. Will continue to monitor.   -Rebeca SANTANA RN

## 2023-04-23 ENCOUNTER — APPOINTMENT (OUTPATIENT)
Dept: PHYSICAL THERAPY | Facility: CLINIC | Age: 73
DRG: 896 | End: 2023-04-23
Attending: INTERNAL MEDICINE
Payer: MEDICARE

## 2023-04-23 ENCOUNTER — APPOINTMENT (OUTPATIENT)
Dept: OCCUPATIONAL THERAPY | Facility: CLINIC | Age: 73
DRG: 896 | End: 2023-04-23
Attending: INTERNAL MEDICINE
Payer: MEDICARE

## 2023-04-23 LAB
ANION GAP SERPL CALCULATED.3IONS-SCNC: 10 MMOL/L (ref 5–18)
BUN SERPL-MCNC: 9 MG/DL (ref 8–28)
CALCIUM SERPL-MCNC: 8.3 MG/DL (ref 8.5–10.5)
CHLORIDE BLD-SCNC: 101 MMOL/L (ref 98–107)
CO2 SERPL-SCNC: 20 MMOL/L (ref 22–31)
CREAT SERPL-MCNC: 0.52 MG/DL (ref 0.7–1.3)
ERYTHROCYTE [DISTWIDTH] IN BLOOD BY AUTOMATED COUNT: 13.9 % (ref 10–15)
GFR SERPL CREATININE-BSD FRML MDRD: >90 ML/MIN/1.73M2
GLUCOSE BLD-MCNC: 119 MG/DL (ref 70–125)
HCT VFR BLD AUTO: 32.3 % (ref 40–53)
HGB BLD-MCNC: 11.2 G/DL (ref 13.3–17.7)
MAGNESIUM SERPL-MCNC: 1.9 MG/DL (ref 1.8–2.6)
MCH RBC QN AUTO: 34.6 PG (ref 26.5–33)
MCHC RBC AUTO-ENTMCNC: 34.7 G/DL (ref 31.5–36.5)
MCV RBC AUTO: 100 FL (ref 78–100)
PLATELET # BLD AUTO: 131 10E3/UL (ref 150–450)
POTASSIUM BLD-SCNC: 3.9 MMOL/L (ref 3.5–5)
RBC # BLD AUTO: 3.24 10E6/UL (ref 4.4–5.9)
SODIUM SERPL-SCNC: 131 MMOL/L (ref 136–145)
WBC # BLD AUTO: 4.6 10E3/UL (ref 4–11)

## 2023-04-23 PROCEDURE — 99232 SBSQ HOSP IP/OBS MODERATE 35: CPT | Performed by: EMERGENCY MEDICINE

## 2023-04-23 PROCEDURE — 250N000013 HC RX MED GY IP 250 OP 250 PS 637: Performed by: INTERNAL MEDICINE

## 2023-04-23 PROCEDURE — 258N000003 HC RX IP 258 OP 636: Performed by: INTERNAL MEDICINE

## 2023-04-23 PROCEDURE — 83735 ASSAY OF MAGNESIUM: CPT | Performed by: INTERNAL MEDICINE

## 2023-04-23 PROCEDURE — 36415 COLL VENOUS BLD VENIPUNCTURE: CPT | Performed by: INTERNAL MEDICINE

## 2023-04-23 PROCEDURE — 97166 OT EVAL MOD COMPLEX 45 MIN: CPT | Mod: GO

## 2023-04-23 PROCEDURE — 250N000013 HC RX MED GY IP 250 OP 250 PS 637: Performed by: EMERGENCY MEDICINE

## 2023-04-23 PROCEDURE — 80048 BASIC METABOLIC PNL TOTAL CA: CPT | Performed by: INTERNAL MEDICINE

## 2023-04-23 PROCEDURE — 120N000001 HC R&B MED SURG/OB

## 2023-04-23 PROCEDURE — 97530 THERAPEUTIC ACTIVITIES: CPT | Mod: GP

## 2023-04-23 PROCEDURE — 85027 COMPLETE CBC AUTOMATED: CPT | Performed by: INTERNAL MEDICINE

## 2023-04-23 PROCEDURE — 250N000011 HC RX IP 250 OP 636: Performed by: INTERNAL MEDICINE

## 2023-04-23 PROCEDURE — 97161 PT EVAL LOW COMPLEX 20 MIN: CPT | Mod: GP

## 2023-04-23 PROCEDURE — 97535 SELF CARE MNGMENT TRAINING: CPT | Mod: GO

## 2023-04-23 PROCEDURE — 97116 GAIT TRAINING THERAPY: CPT | Mod: GP

## 2023-04-23 RX ORDER — DOCUSATE SODIUM 100 MG/1
100 CAPSULE, LIQUID FILLED ORAL 2 TIMES DAILY
Status: DISCONTINUED | OUTPATIENT
Start: 2023-04-23 | End: 2023-04-26 | Stop reason: HOSPADM

## 2023-04-23 RX ORDER — MAGNESIUM OXIDE 400 MG/1
400 TABLET ORAL EVERY 4 HOURS
Status: COMPLETED | OUTPATIENT
Start: 2023-04-23 | End: 2023-04-23

## 2023-04-23 RX ORDER — POLYETHYLENE GLYCOL 3350 17 G/17G
17 POWDER, FOR SOLUTION ORAL DAILY PRN
Status: DISCONTINUED | OUTPATIENT
Start: 2023-04-23 | End: 2023-04-26 | Stop reason: HOSPADM

## 2023-04-23 RX ADMIN — Medication 100 MG: at 08:28

## 2023-04-23 RX ADMIN — GABAPENTIN 300 MG: 300 CAPSULE ORAL at 20:27

## 2023-04-23 RX ADMIN — ONDANSETRON 4 MG: 4 TABLET, ORALLY DISINTEGRATING ORAL at 16:08

## 2023-04-23 RX ADMIN — CLONIDINE HYDROCHLORIDE 0.1 MG: 0.1 TABLET ORAL at 20:27

## 2023-04-23 RX ADMIN — DOCUSATE SODIUM 100 MG: 100 CAPSULE, LIQUID FILLED ORAL at 20:27

## 2023-04-23 RX ADMIN — CLONIDINE HYDROCHLORIDE 0.1 MG: 0.1 TABLET ORAL at 04:03

## 2023-04-23 RX ADMIN — Medication 400 MG: at 13:52

## 2023-04-23 RX ADMIN — SODIUM CHLORIDE: 9 INJECTION, SOLUTION INTRAVENOUS at 03:56

## 2023-04-23 RX ADMIN — GABAPENTIN 300 MG: 300 CAPSULE ORAL at 08:28

## 2023-04-23 RX ADMIN — ONDANSETRON 4 MG: 4 TABLET, ORALLY DISINTEGRATING ORAL at 21:48

## 2023-04-23 RX ADMIN — CLONIDINE HYDROCHLORIDE 0.1 MG: 0.1 TABLET ORAL at 12:49

## 2023-04-23 RX ADMIN — FOLIC ACID 1 MG: 1 TABLET ORAL at 08:28

## 2023-04-23 RX ADMIN — CYANOCOBALAMIN TAB 1000 MCG 1000 MCG: 1000 TAB at 08:29

## 2023-04-23 RX ADMIN — Medication 400 MG: at 18:39

## 2023-04-23 RX ADMIN — MULTIPLE VITAMINS W/ MINERALS TAB 1 TABLET: TAB at 08:28

## 2023-04-23 RX ADMIN — NICOTINE 7 MG/24 HR DAILY TRANSDERMAL PATCH 1 PATCH: at 08:32

## 2023-04-23 ASSESSMENT — ACTIVITIES OF DAILY LIVING (ADL)
ADLS_ACUITY_SCORE: 47
ADLS_ACUITY_SCORE: 43
ADLS_ACUITY_SCORE: 47
ADLS_ACUITY_SCORE: 47

## 2023-04-23 NOTE — PLAN OF CARE
Problem: Plan of Care - These are the overarching goals to be used throughout the patient stay.    Goal: Absence of Hospital-Acquired Illness or Injury  Intervention: Identify and Manage Fall Risk  Recent Flowsheet Documentation  Taken 4/23/2023 0826 by Rebeca Gongora RN  Safety Promotion/Fall Prevention:   assistive device/personal items within reach   activity supervised   lighting adjusted   mobility aid in reach   clutter free environment maintained   increased rounding and observation   increase visualization of patient   nonskid shoes/slippers when out of bed   patient and family education   room door open   room near nurse's station   room organization consistent   safety round/check completed     Problem: Plan of Care - These are the overarching goals to be used throughout the patient stay.    Goal: Optimal Comfort and Wellbeing  Outcome: Progressing    Goal Outcome Evaluation: A&Ox4, denied pain, VSS, CIWA Score 0, worked with OT and PT, up on the chair ate with excellent appetite 100% breakfast no swallowing issues noted, ambulated in the room, outside the room and at the hallways assistive 1 with a walker and gaitbelt activities tolerated well. Per PT walked 200feet total. Patient is back on the chair legs elevated blanket provided, call light, phone(talking to nephew Jose), and tray table within Patient's reach. Will continue to monitor.  - Rebeca SANTANA RN

## 2023-04-23 NOTE — PROGRESS NOTES
04/23/23 0800   Appointment Info   Signing Clinician's Name / Credentials (PT) ABDIAZIZ Payne   Student Supervision Direct supervision provided   Living Environment   People in Home alone   Current Living Arrangements apartment   Home Accessibility stairs to enter home;stairs within home   Number of Stairs, Main Entrance 2   Stair Railings, Main Entrance railings on both sides of stairs   Number of Stairs, Within Home, Primary six   Stair Railings, Within Home, Primary railings on both sides of stairs   Self-Care   Usual Activity Tolerance moderate   Current Activity Tolerance fair   Cognition   Affect/Mental Status (Cognition) WFL   Range of Motion (ROM)   Range of Motion ROM is WFL   Strength (Manual Muscle Testing)   Strength Comments decreased LE strength   Transfers   Transfers sit-stand transfer   Impairments Contributing to Impaired Transfers decreased strength   Sit-Stand Transfer   Sit-Stand [place] (Transfers) contact guard   Assistive Device (Sit-Stand Transfers) walker, front-wheeled   Gait/Stairs (Locomotion)   [place] Level (Gait) contact guard   Assistive Device (Gait) walker, front-wheeled   Distance in Feet 10   Distance in Feet (Gait) 200   Pattern (Gait) step-through   Deviations/Abnormal Patterns (Gait) andrea decreased;stride length decreased   Clinical Impression   Criteria for Skilled Therapeutic Intervention Yes, treatment indicated   PT Diagnosis (PT) impaired functional mobility   Influenced by the following impairments decreased strength and balance   Functional limitations due to impairments transfers, gait, stairs   Clinical Presentation (PT Evaluation Complexity) Evolving/Changing   Clinical Presentation Rationale pt presents as medically diagnosed   Clinical Decision Making (Complexity) low complexity   Planned Therapy Interventions (PT) gait training;home exercise program;strengthening;stair training   Anticipated Equipment Needs at Discharge (PT) walker, rolling    Risk & Benefits of therapy have been explained evaluation/treatment results reviewed;care plan/treatment goals reviewed;patient   PT Total Evaluation Time   PT Eval, Low Complexity Minutes (05353) 10   Physical Therapy Goals   PT Frequency Daily   PT Predicted Duration/Target Date for Goal Attainment 04/28/23   PT Goals Transfers;Gait;Stairs;PT Goal 1   PT: Transfers Modified independent;Sit to/from stand;Assistive device  (FWW)   PT: Gait Modified independent;Assistive device;Rolling walker;Greater than 200 feet   PT: Stairs Modified independent;4 stairs;Rail on both sides   PT: Goal 1 independent with HEP for LE strengthening   Interventions   Interventions Quick Adds Gait Training;Therapeutic Activity;Therapeutic Procedure   Therapeutic Procedure/Exercise   Treatment Detail/Skilled Intervention seated exercises for LE strengthening: marches and LAQ x 10.   Therapeutic Activity   Therapeutic Activities: dynamic activities to improve functional performance Minutes (27230) 8   Treatment Detail/Skilled Intervention sit to/from stand FWW CGA with verbal cueing for hand placement. standing weightshifting and marches x 10 CGA FWW.   Gait Training   Gait Training Minutes (96412) 10   Symptoms Noted During/After Treatment (Gait Training) fatigue   Treatment Detail/Skilled Intervention pt ambulated 200' FWW CGA with chair follow for safety with VC for proper use of walker. Pt demonstrated fatigue by decreasing andrea and presenting with a anterior lean with outstretched UE but did not verbalize experiencing fatigue.   [place] Level (Gait Training) contact guard   Physical Assistance Level (Gait Training) verbal cues   Assistive Device (Gait Training) rolling walker   Pattern Analysis (Gait Training) swing-through gait   Gait Analysis Deviations decreased stride length;decreased step length;decreased andrea;decreased velocity of limb motion   Impairments (Gait Analysis/Training) balance impaired;strength decreased    PT Discharge Planning   PT Plan gait, stairs, HEP   PT Discharge Recommendation (DC Rec) Transitional Care Facility;home with assist   PT Rationale for DC Rec pt able to ambulate and transfer safely with CGA would need support in order to go home safely pending ability to complete the appropriate number of stairs safely due to decreased endurance and strength.   PT Brief overview of current status sit to/from stand FWW CGA, ambulated 200' FWW CGA with chair follow.   Total Session Time   Timed Code Treatment Minutes 18   Total Session Time (sum of timed and untimed services) 28

## 2023-04-23 NOTE — PROGRESS NOTES
23 0740   Appointment Info   Signing Clinician's Name / Credentials (OT) Yamileth Gaviria OTR/L   Rehab Comments (OT) OT Evaluation-Jessie Behrman assisted in tx   Living Environment   People in Home alone   Current Living Arrangements apartment   Home Accessibility   (reports that he needs to do a few stairs)   Living Environment Comments Standard toilet, walkin shower   Self-Care   Usual Activity Tolerance moderate   Current Activity Tolerance fair   Activity/Exercise/Self-Care Comment Pt reports that he has been very weak and not taking good care of himself, he has been drinking everyday since his wife  this past December. Reports that his nephew will help him as needed and does the driving of errands. He gave his nephew his car.   General Information   Onset of Illness/Injury or Date of Surgery 23   Referring Physician Luis Manuel Vegas, DO   Patient/Family Therapy Goal Statement (OT) I want to get better   Existing Precautions/Restrictions fall   Limitations/Impairments safety/cognitive   Cognitive Status Examination   Follows Commands follows one-step commands   Cognitive Status Comments Pt cooperative w/therapy, increased effort for focus/attention, delayed processing but able to follow directions. Pt asking questions advocating for himself and when he does not understand what the next step is.   Visual Perception   Visual Impairment/Limitations corrective lenses for reading   Impact of Vision Impairment on Function (Vision) seemed overwhelmed by light, asked for blinds to be closed even when almost entirely shut   Sensory   Sensory Comments report no numbness or tingling   Pain Assessment   Patient Currently in Pain No   Posture   Posture forward head position   Range of Motion Comprehensive   General Range of Motion bilateral upper extremity ROM WNL   Strength Comprehensive (MMT)   Comment, General Manual Muscle Testing (MMT) Assessment generalized weakness   Muscle Tone Assessment  "  Muscle Tone Comments ataxic movements noted of hands UE   Coordination   Coordination Comments increased effort though able to complete fine motor   Bed Mobility   Bed Mobility supine-sit   Supine-Sit [place] (Bed Mobility) minimum assist (75% patient effort);verbal cues   Comment (Bed Mobility) HOB elevated   Transfers   Transfers bed-chair transfer   Transfer Skill: Bed to Chair/Chair to Bed   Bed-Chair [place] (Transfers) minimum assist (75% patient effort);contact guard;verbal cues   Balance   Balance Assessment sitting balance: dynamic   Balance Comments SBA   Activities of Daily Living   BADL Assessment/Intervention toileting   Toileting   Comment, (Toileting) manage brief and urinal in bed   [place] Level (Toileting) minimum assist (75% patient effort)   Clinical Impression   Criteria for Skilled Therapeutic Interventions Met (OT) Yes, treatment indicated   OT Diagnosis Decreased ADL independence   Influenced by the following impairments Per chart \"72 year old male who was admitted on 4/19/2023 after presenting to the ED with weakness and inability to care for himself.  He had fallen at home and been unable to get up to get to phone for several hours.  Presented with acute alcohol intoxication, acute mild rhabdomyolysis, alcohol induced liver injury with elevated liver function tests, and hypomagnesemia\"   OT Problem List-Impairments impacting ADL problems related to;activity tolerance impaired;cognition;strength;mobility;fear & anxiety   Assessment of Occupational Performance 3-5 Performance Deficits   Identified Performance Deficits fx transfers, toileting, dressing, grooming, standing ADLs, bed mob   Planned Therapy Interventions (OT) ADL retraining;bed mobility training;cognition;strengthening;transfer training;progressive activity/exercise   Clinical Decision Making Complexity (OT) moderate complexity   Risk & Benefits of therapy have been explained evaluation/treatment results reviewed "   OT Total Evaluation Time   OT Eval, Moderate Complexity Minutes (13757) 10   OT Goals   Therapy Frequency (OT) Daily   OT Predicted Duration/Target Date for Goal Attainment 05/01/23   OT Goals Toilet Transfer/Toileting;Cognition;Hygiene/Grooming   OT: Hygiene/Grooming supervision/stand-by assist   OT: Toilet Transfer/Toileting Minimal assist   OT: Cognitive Patient/caregiver will verbalize understanding of cognitive assessment results/recommendations as needed for safe discharge planning   Interventions   Interventions Quick Adds Self-Care/Home Management   Self-Care/Home Management   Self-Care/Home Mgmt/ADL, Compensatory, Meal Prep Minutes (13906) 23   Symptoms Noted During/After Treatment (Meal Preparation/Planning Training) fatigue   Treatment Detail/Skilled Intervention Pt in bed upon arrival asking for support to order food. Pt needed min support for reading items and min cues to ensure all foods ordered what he wanted. Pt motivated to get up and out of bed but needing reassurance that activity will match his ability at this time. Given min vc and asssist to intiate moving legs to EOB and support at the shoulder pt transitioned for sitting up in bed to EOB. Pt sat EOB for 5 minutes, offered option of ADLs (brush teeth, wash face or change gown) pt declined at this time thinking it was too much right now. Given increased time and ed pt completed STS x2 reps pushing from bed given cues. Pivot transferred to chair w/walker. Set up pt in chair w/call waiting for meal to arrive.   OT Discharge Planning   OT Plan 5/1-transfers/toileting, standing ADLs, cog as able   OT Discharge Recommendation (DC Rec) Transitional Care Facility   OT Rationale for DC Rec Pt is below baseline with ADLs demonstrates generalized weakness for all mobility. Benefit from continued OT to advance strength/endurance and safety at this time.   OT Brief overview of current status Min A for ADL and fx mobility, support for planning/problem  solving   Total Session Time   Timed Code Treatment Minutes 23   Total Session Time (sum of timed and untimed services) 33

## 2023-04-23 NOTE — PLAN OF CARE
Problem: Plan of Care - These are the overarching goals to be used throughout the patient stay.    Goal: Optimal Comfort and Wellbeing  Outcome: Progressing     Problem: Plan of Care - These are the overarching goals to be used throughout the patient stay.    Goal: Absence of Hospital-Acquired Illness or Injury  Intervention: Prevent Skin Injury  Recent Flowsheet Documentation  Taken 4/22/2023 1646 by Alma Olivo RN  Body Position: position maintained     Problem: Risk for Delirium  Goal: Improved Sleep  Outcome: Progressing     Problem: Risk for Delirium  Goal: Improved Attention and Thought Clarity  Outcome: Progressing   Goal Outcome Evaluation:       Pt A&Ox2 disoriented to time and place. Pt CIWA was 7 and reassessment was 4 hours later was a 4. K and Mg protocols ran on shift. K is AM recheck and Mg was replaced IV on shift. Pt eat dinner. PRN Atarax 50 mG orally given on shift. Call light within reach and bed alarm on.

## 2023-04-23 NOTE — PROGRESS NOTES
[facility] MEDICINE PROGRESS NOTE      Summary: 72 year old male admitted from home after being found by a nephew dishevelled  And not caring for himself.  Pts wife  4 months ago.  He has been using alcohol since.      Clinically he is improved.  This morning he feels achy and shaky all over though improved.  On interview he would like to get stronger.  He says he will not afford TCU.    Hospital day number 4    Problem list:    1.  Alcohol dependence, misuse, withdrawal: improved; see below  2.  FTT/vulnerable adult: psych and chem dep consult pending;   3.  Hyponatremia: due to decreased oral intake;   4.  Elevated LFT: due to chronic alcohol use  5.  Pancytopenia likely due to alcohol use: outpatient follow up  6.  Elevated cpk: underwhelming; resume statin on discharge  6.  Disposition:  Pending psych, chem dep input tomorrow          Corine Collado MD  [facility] Medicine  [facility]  Phone: #[phone]  Securely message with the Vocera Web Console (learn more here)  Text page via [employer] Paging/Directory     Interval History/Subjective:  Where will I go after today?  I feel tremoring inside    Physical Exam/Objective:  Temp:  [97.1  F (36.2  C)-98.9  F (37.2  C)] 97.9  F (36.6  C)  Pulse:  [67-86] 80  Resp:  [16-20] 18  BP: (100-157)/(67-86) 100/67  SpO2:  [92 %-100 %] 100 %  Body mass index is 17.16 kg/m .    GENERAL:  Alert, thin, chronically ill, cooperative;    HEAD:  Normocephalic, without obvious abnormality, atraumatic   EYES:  PERRL, conjunctiva/corneas clear, no scleral icterus, EOM's intact   NOSE: Nares normal, septum midline, mucosa normal, no drainage   THROAT: Lips, mucosa, and tongue normal; teeth and gums normal, mouth moist   NECK: Supple, symmetrical, trachea midline   BACK:   Symmetric, no curvature, ROM normal   LUNGS:   Clear to auscultation bilaterally, no rales, rhonchi, or wheezing, symmetric chest rise on inhalation,  respirations unlabored   CHEST WALL:  No tenderness or deformity   HEART:  Regular rate and rhythm, S1 and S2 normal, no murmur, rub, or gallop    ABDOMEN:   Soft, non-tender, bowel sounds active all four quadrants, no masses, no organomegaly, no rebound or guarding   EXTREMITIES: Bilateral knee abrasions (old), resolving   SKIN: Dry to touch, no exanthems in the visualized areas   NEURO: Alert, oriented x3, moves all four extremities freely   PSYCH: Cooperative, behavior is appropriate      Data reviewed today: I personally reviewed all new medications, labs, imaging/diagnostics reports over the past 24 hours. Pertinent findings include:    Imaging:   No results found for this or any previous visit (from the past 24 hour(s)).    Labs:  Most Recent 3 BMP's:Recent Labs   Lab Test 04/23/23  0912 04/22/23  1058 04/21/23  1557 04/21/23  0758   * 132*  --  133*   POTASSIUM 3.9 4.3 3.7 3.4*  3.4*   CHLORIDE 101 101  --  101   CO2 20* 22  --  25   BUN 9 6*  --  8   CR 0.52* 0.51*  --  0.49*   ANIONGAP 10 9  --  7   MELLISA 8.3* 8.1*  --  8.0*    97  --  104       Medications:   Personally Reviewed.  Medications       [Held by provider] atorvastatin  40 mg Oral At Bedtime     cloNIDine  0.1 mg Oral Q8H     cyanocobalamin  1,000 mcg Oral Daily     folic acid  1 mg Oral Daily     gabapentin  300 mg Oral BID     multivitamin w/minerals  1 tablet Oral Daily     nicotine  1 patch Transdermal Daily     nicotine   Transdermal Q8H     thiamine  100 mg Oral Daily

## 2023-04-23 NOTE — PROGRESS NOTES
Care Management Follow Up    Length of Stay (days): 2    Expected Discharge Date: 04/24/2023     Concerns to be Addressed: care coordination/care conferences, discharge planning  Pt states willingness to go to TCU if needed.  Patient plan of care discussed at interdisciplinary rounds: Yes    Anticipated Discharge Disposition: Home Care, Transitional Care (TBD further.)     Anticipated Discharge Services: None  Anticipated Discharge DME: [facility]    Patient/family educated on Medicare website which has current facility and service quality ratings: yes (Pt declined.)  Education Provided on the Discharge Plan: Yes (Pt informed of assessment/supportive service coordination process and was encouraged to ask for clarification as needed.)  Patient/Family in Agreement with the Plan: unable to assess    Referrals Placed by CM/SW: Post Acute Facilities  Private pay costs discussed: Not applicable    Additional Information:  Met with pt regarding discharge plans and CD concerns.  Pt is agreeable to TCU at discharge.  TCU referrals pending, messages left with TCU admissions.    Discussed pt's alcohol use.  Pt admits to drinking 1/2 pint of vodka daily.  Pt reports only drinking occasionally prior to Dec.  Pt's wife passed away in Dec and then pt starting drinking daily.  Pt is declining any CD resources or referrals at this time.  Pt feeling like he can control his drinking on his own.      Care Management will continue to follow and assist with discharge plans.      DRAKE Young

## 2023-04-23 NOTE — PROGRESS NOTES
Dr. Collado at bedside.   Fluid restrictions and NaCl infusion discontinued.    Consult ordered.

## 2023-04-23 NOTE — PLAN OF CARE
"Problem: Plan of Care - These are the overarching goals to be used throughout the patient stay.    Goal: Plan of Care Review  Description: The Plan of Care Review/Shift note should be completed every shift.  The Outcome Evaluation is a brief statement about your assessment that the patient is improving, declining, or no change.  This information will be displayed automatically on your shift note.  Outcome: Progressing     Patient is alert and orientated times four. He denies pain when asked. Order placed for SCD's to BLE and are in place. CIWA scores tonight both were two. Patient did state he is having mild anxiety regarding \"where life will take me after here.\" Provided emotional support. Has been both continent and incontinent of urine. Did state that he doesn't know when he urinates. IV fluids infusing at 100ml/hr. Continues on the K+ and mag protocols with rechecks scheduled for this AM. Bed alarm in place for patient's safety.     "

## 2023-04-24 ENCOUNTER — APPOINTMENT (OUTPATIENT)
Dept: PHYSICAL THERAPY | Facility: CLINIC | Age: 73
DRG: 896 | End: 2023-04-24
Payer: MEDICARE

## 2023-04-24 ENCOUNTER — APPOINTMENT (OUTPATIENT)
Dept: RADIOLOGY | Facility: CLINIC | Age: 73
DRG: 896 | End: 2023-04-24
Attending: EMERGENCY MEDICINE
Payer: MEDICARE

## 2023-04-24 ENCOUNTER — APPOINTMENT (OUTPATIENT)
Dept: CT IMAGING | Facility: CLINIC | Age: 73
DRG: 896 | End: 2023-04-24
Attending: EMERGENCY MEDICINE
Payer: MEDICARE

## 2023-04-24 LAB
ANION GAP SERPL CALCULATED.3IONS-SCNC: 9 MMOL/L (ref 5–18)
BUN SERPL-MCNC: 19 MG/DL (ref 8–28)
CALCIUM SERPL-MCNC: 8.8 MG/DL (ref 8.5–10.5)
CHLORIDE BLD-SCNC: 100 MMOL/L (ref 98–107)
CO2 SERPL-SCNC: 22 MMOL/L (ref 22–31)
CREAT SERPL-MCNC: 0.69 MG/DL (ref 0.7–1.3)
GFR SERPL CREATININE-BSD FRML MDRD: >90 ML/MIN/1.73M2
GLUCOSE BLD-MCNC: 133 MG/DL (ref 70–125)
MAGNESIUM SERPL-MCNC: 1.8 MG/DL (ref 1.8–2.6)
POTASSIUM BLD-SCNC: 4.3 MMOL/L (ref 3.5–5)
POTASSIUM BLD-SCNC: 4.5 MMOL/L (ref 3.5–5)
SODIUM SERPL-SCNC: 131 MMOL/L (ref 136–145)

## 2023-04-24 PROCEDURE — 84132 ASSAY OF SERUM POTASSIUM: CPT | Performed by: EMERGENCY MEDICINE

## 2023-04-24 PROCEDURE — 999N000065 XR ABDOMEN PORT 1 VIEW

## 2023-04-24 PROCEDURE — 250N000013 HC RX MED GY IP 250 OP 250 PS 637: Performed by: INTERNAL MEDICINE

## 2023-04-24 PROCEDURE — 36415 COLL VENOUS BLD VENIPUNCTURE: CPT | Performed by: EMERGENCY MEDICINE

## 2023-04-24 PROCEDURE — 83735 ASSAY OF MAGNESIUM: CPT | Performed by: EMERGENCY MEDICINE

## 2023-04-24 PROCEDURE — 99232 SBSQ HOSP IP/OBS MODERATE 35: CPT | Performed by: EMERGENCY MEDICINE

## 2023-04-24 PROCEDURE — 97116 GAIT TRAINING THERAPY: CPT | Mod: GP

## 2023-04-24 PROCEDURE — 82607 VITAMIN B-12: CPT | Performed by: FAMILY MEDICINE

## 2023-04-24 PROCEDURE — G1010 CDSM STANSON: HCPCS

## 2023-04-24 PROCEDURE — 120N000001 HC R&B MED SURG/OB

## 2023-04-24 PROCEDURE — 82306 VITAMIN D 25 HYDROXY: CPT | Performed by: FAMILY MEDICINE

## 2023-04-24 PROCEDURE — 250N000013 HC RX MED GY IP 250 OP 250 PS 637: Performed by: EMERGENCY MEDICINE

## 2023-04-24 PROCEDURE — 74018 RADEX ABDOMEN 1 VIEW: CPT

## 2023-04-24 PROCEDURE — 97110 THERAPEUTIC EXERCISES: CPT | Mod: GP

## 2023-04-24 PROCEDURE — 258N000003 HC RX IP 258 OP 636: Performed by: EMERGENCY MEDICINE

## 2023-04-24 PROCEDURE — 250N000011 HC RX IP 250 OP 636: Performed by: INTERNAL MEDICINE

## 2023-04-24 RX ORDER — PROCHLORPERAZINE MALEATE 5 MG
5 TABLET ORAL EVERY 6 HOURS PRN
Status: DISCONTINUED | OUTPATIENT
Start: 2023-04-24 | End: 2023-04-26 | Stop reason: HOSPADM

## 2023-04-24 RX ORDER — LACTULOSE 10 G/15ML
20 SOLUTION ORAL ONCE
Status: COMPLETED | OUTPATIENT
Start: 2023-04-24 | End: 2023-04-24

## 2023-04-24 RX ORDER — MECLIZINE HCL 12.5 MG 12.5 MG/1
12.5 TABLET ORAL 3 TIMES DAILY PRN
Status: DISCONTINUED | OUTPATIENT
Start: 2023-04-24 | End: 2023-04-25

## 2023-04-24 RX ORDER — MAGNESIUM HYDROXIDE/ALUMINUM HYDROXICE/SIMETHICONE 120; 1200; 1200 MG/30ML; MG/30ML; MG/30ML
30 SUSPENSION ORAL EVERY 4 HOURS PRN
Status: DISCONTINUED | OUTPATIENT
Start: 2023-04-24 | End: 2023-04-26 | Stop reason: HOSPADM

## 2023-04-24 RX ORDER — CLONIDINE HYDROCHLORIDE 0.1 MG/1
0.1 TABLET ORAL 2 TIMES DAILY
Status: DISCONTINUED | OUTPATIENT
Start: 2023-04-25 | End: 2023-04-25

## 2023-04-24 RX ORDER — TRAZODONE HYDROCHLORIDE 50 MG/1
50 TABLET, FILM COATED ORAL
Status: DISCONTINUED | OUTPATIENT
Start: 2023-04-24 | End: 2023-04-26 | Stop reason: HOSPADM

## 2023-04-24 RX ORDER — PROCHLORPERAZINE 25 MG
12.5 SUPPOSITORY, RECTAL RECTAL EVERY 12 HOURS PRN
Status: DISCONTINUED | OUTPATIENT
Start: 2023-04-24 | End: 2023-04-26 | Stop reason: HOSPADM

## 2023-04-24 RX ORDER — PANTOPRAZOLE SODIUM 40 MG/1
40 TABLET, DELAYED RELEASE ORAL
Status: DISCONTINUED | OUTPATIENT
Start: 2023-04-25 | End: 2023-04-26 | Stop reason: HOSPADM

## 2023-04-24 RX ORDER — MAGNESIUM OXIDE 400 MG/1
400 TABLET ORAL EVERY 4 HOURS
Status: COMPLETED | OUTPATIENT
Start: 2023-04-24 | End: 2023-04-24

## 2023-04-24 RX ADMIN — GABAPENTIN 300 MG: 300 CAPSULE ORAL at 08:55

## 2023-04-24 RX ADMIN — PROCHLORPERAZINE MALEATE 5 MG: 5 TABLET ORAL at 20:45

## 2023-04-24 RX ADMIN — ONDANSETRON 4 MG: 4 TABLET, ORALLY DISINTEGRATING ORAL at 19:34

## 2023-04-24 RX ADMIN — GABAPENTIN 300 MG: 300 CAPSULE ORAL at 19:41

## 2023-04-24 RX ADMIN — Medication 400 MG: at 16:50

## 2023-04-24 RX ADMIN — CLONIDINE HYDROCHLORIDE 0.1 MG: 0.1 TABLET ORAL at 11:42

## 2023-04-24 RX ADMIN — Medication 5 MG: at 22:44

## 2023-04-24 RX ADMIN — POLYETHYLENE GLYCOL 3350 17 G: 17 POWDER, FOR SOLUTION ORAL at 13:57

## 2023-04-24 RX ADMIN — CLONIDINE HYDROCHLORIDE 0.1 MG: 0.1 TABLET ORAL at 04:35

## 2023-04-24 RX ADMIN — LACTULOSE 20 G: 10 SOLUTION ORAL at 16:50

## 2023-04-24 RX ADMIN — MULTIPLE VITAMINS W/ MINERALS TAB 1 TABLET: TAB at 08:55

## 2023-04-24 RX ADMIN — ONDANSETRON 4 MG: 4 TABLET, ORALLY DISINTEGRATING ORAL at 10:09

## 2023-04-24 RX ADMIN — Medication 100 MG: at 08:55

## 2023-04-24 RX ADMIN — CYANOCOBALAMIN TAB 1000 MCG 1000 MCG: 1000 TAB at 08:55

## 2023-04-24 RX ADMIN — Medication 400 MG: at 11:42

## 2023-04-24 RX ADMIN — TRAZODONE HYDROCHLORIDE 50 MG: 50 TABLET ORAL at 23:54

## 2023-04-24 RX ADMIN — DOCUSATE SODIUM 100 MG: 100 CAPSULE, LIQUID FILLED ORAL at 08:55

## 2023-04-24 RX ADMIN — DOCUSATE SODIUM 100 MG: 100 CAPSULE, LIQUID FILLED ORAL at 19:41

## 2023-04-24 RX ADMIN — NICOTINE 7 MG/24 HR DAILY TRANSDERMAL PATCH 1 PATCH: at 08:52

## 2023-04-24 RX ADMIN — SODIUM CHLORIDE 500 ML: 9 INJECTION, SOLUTION INTRAVENOUS at 16:43

## 2023-04-24 RX ADMIN — FOLIC ACID 1 MG: 1 TABLET ORAL at 08:55

## 2023-04-24 ASSESSMENT — ACTIVITIES OF DAILY LIVING (ADL)
EQUIPMENT_CURRENTLY_USED_AT_HOME: WALKER, ROLLING
ADLS_ACUITY_SCORE: 30
DOING_ERRANDS_INDEPENDENTLY_DIFFICULTY: NO
ADLS_ACUITY_SCORE: 30
CHANGE_IN_FUNCTIONAL_STATUS_SINCE_ONSET_OF_CURRENT_ILLNESS/INJURY: NO
ADLS_ACUITY_SCORE: 43
ADLS_ACUITY_SCORE: 43
DIFFICULTY_COMMUNICATING: NO
TOILETING_ISSUES: NO
FALL_HISTORY_WITHIN_LAST_SIX_MONTHS: NO
ADLS_ACUITY_SCORE: 43
ADLS_ACUITY_SCORE: 43
ADLS_ACUITY_SCORE: 30
ADLS_ACUITY_SCORE: 43
CONCENTRATING,_REMEMBERING_OR_MAKING_DECISIONS_DIFFICULTY: NO
ADLS_ACUITY_SCORE: 43
WALKING_OR_CLIMBING_STAIRS_DIFFICULTY: NO
ADLS_ACUITY_SCORE: 30
WEAR_GLASSES_OR_BLIND: NO
ADLS_ACUITY_SCORE: 43
DIFFICULTY_EATING/SWALLOWING: NO
DRESSING/BATHING_DIFFICULTY: NO
HEARING_DIFFICULTY_OR_DEAF: NO
ADLS_ACUITY_SCORE: 43

## 2023-04-24 NOTE — PROGRESS NOTES
CIWA discontinued   Pt pleasantly resting in room.   C/o nausea, zofran ODT given   RA, VSS  No IV access  Up with 1 assist

## 2023-04-24 NOTE — PLAN OF CARE
Problem: Risk for Delirium  Goal: Improved Attention and Thought Clarity  Outcome: Progressing  Intervention: Maximize Cognitive Function  Recent Flowsheet Documentation  Taken 4/24/2023 4987 by Sheela Guardado, RN  Sensory Stimulation Regulation:   television on   care clustered   lighting decreased  Reorientation Measures: calendar in view     A/O x 4, VSS on room air. Intermittent nausea, ODT Zofran administered along with a cool washcloth, sips of water, and breathing exercise. K+/Mag protocol continued. Tolerating regular diet. SBA with use of walker in the room.

## 2023-04-24 NOTE — CONSULTS
Care Management Follow Up    Length of Stay (days): 3    Expected Discharge Date: 04/25/2023     Concerns to be Addressed: care coordination/care conferences, discharge planning  Pt states willingness to go to TCU if needed.  Patient plan of care discussed at interdisciplinary rounds: Yes    Anticipated Discharge Disposition: Home Care, Transitional Care (TBD further.)     Anticipated Discharge Services: None  Anticipated Discharge DME: [facility]    Patient/family educated on Medicare website which has current facility and service quality ratings: yes (Pt declined.)  Education Provided on the Discharge Plan: Yes (Pt informed of assessment/supportive service coordination process and was encouraged to ask for clarification as needed.)  Patient/Family in Agreement with the Plan: unable to assess    Referrals Placed by CM/SW: Post Acute Facilities  Private pay costs discussed: Not applicable    Additional Information:  See below       Sadie Clancy RN          Additional TCU referrals sent. Per SW note yesterday, patient is declining CD resources but is agreeable to TCU

## 2023-04-24 NOTE — PLAN OF CARE
"Pt had head CT and abdominal xray done this evening. Pt has some abdominal discomfort per pt \" usually happens when I havent had a BM in awhile, pt doesn't know when the last time he a BM\", MD ordered some lactulose to be given. Nursing staff was able to place IV in for bolus, however shortly after bolus pt said \" this IV hurts really bad it's stinging\". Nursing staff removed IV to help with comfort. Pt on K and Mg protocol, recheck for the AM.   Livia Howe RN on 4/24/2023 at 6:49 PM   Problem: Plan of Care - These are the overarching goals to be used throughout the patient stay.    Goal: Plan of Care Review  Description: The Plan of Care Review/Shift note should be completed every shift.  The Outcome Evaluation is a brief statement about your assessment that the patient is improving, declining, or no change.  This information will be displayed automatically on your shift note.  Outcome: Progressing  Goal: Patient-Specific Goal (Individualized)  Description: You can add care plan individualizations to a care plan. Examples of Individualization might be:  \"Parent requests to be called daily at 9am for status\", \"I have a hard time hearing out of my right ear\", or \"Do not touch me to wake me up as it startles me\".  Outcome: Progressing  Goal: Absence of Hospital-Acquired Illness or Injury  Outcome: Progressing  Intervention: Identify and Manage Fall Risk  Recent Flowsheet Documentation  Taken 4/24/2023 1600 by Livia Howe, RN  Safety Promotion/Fall Prevention:   activity supervised   increase visualization of patient   increased rounding and observation   clutter free environment maintained   lighting adjusted   room door open   room near nurse's station   safety round/check completed  Goal: Optimal Comfort and Wellbeing  Outcome: Progressing  Intervention: Monitor Pain and Promote Comfort  Recent Flowsheet Documentation  Taken 4/24/2023 1600 by Livia Howe, RN  Pain Management Interventions: medication " (see MAR)  Goal: Readiness for Transition of Care  Outcome: Progressing  Intervention: Mutually Develop Transition Plan  Recent Flowsheet Documentation  Taken 4/24/2023 1600 by Livia Howe, RN  Equipment Currently Used at Home: walker, rolling   Goal Outcome Evaluation:                         [phone]

## 2023-04-24 NOTE — PROGRESS NOTES
"CLINICAL NUTRITION SERVICES - ASSESSMENT NOTE     Nutrition Prescription    RECOMMENDATIONS FOR MDs/PROVIDERS TO ORDER:  none    Malnutrition Status:      % Weight Loss:  > 7.5% in 3 months (severe malnutrition)  % Intake:  </= 50% for >/= 1 month (severe malnutrition)  Subcutaneous Fat Loss:  Orbital region moderate depletion, Upper arm region moderate depletion and Thoracic region moderate depletion  Muscle Loss:  Temporal region moderate depletion and Clavicle bone region moderate depletion  Fluid Retention:  None noted    Malnutrition Diagnosis: Severe malnutrition  In Context of:  Acute illness or injury    Recommendations already ordered by Registered Dietitian (RD):  Change supplement to chocolate ensure bid    Future/Additional Recommendations:  Will monitor po, wt     REASON FOR ASSESSMENT  Familia CHARLI Watson is a/an 72 year old male assessed by the dietitian for low BMI     Pt admitted with alcohol dependence, FTT, vulnerable adult, hyponatremia, grieving, pts wife passed 4 months ago    NUTRITION HISTORY  NKFA    He states he would have occassions where he would drink too much but his wife would keep him in line. Since her passing he has been drinking to the point of vodka being his primary nutrition and maybe having one meal a day. He started losing wt and then started Ensure chocolate once daily at home.     CURRENT NUTRITION ORDERS  Diet: Regular  Intake/Tolerance: 100% of meals ordered   Per [facility] on 4/2339=[id number], 58g protein   approx 70% of estimated needs     LABS  Labs reviewed, Na-131, CA-8.3    MEDICATIONS  Medications reviewed, Vit B12, Colace, Folvite, Mag-ox, MVI, Thiamine    ANTHROPOMETRICS  Height: 5'8\"  Most Recent Weight: 51.2 kg (112 lb 14 oz)    IBW: 68.4 kg  BMI: Underweight BMI <18.5  Weight History:   Wt Readings from Last 10 Encounters:   04/23/23 51.2 kg (112 lb 14 oz)   12/07/22 56.2 kg (123 lb 12.8 oz)   12/02/22 59 kg (130 lb)   11/28/22 54.4 kg (120 lb)   11/23/22 56.1 kg " (123 lb 11.2 oz)   09/15/22 61.2 kg (135 lb)   08/09/22 61.2 kg (135 lb)   07/17/22 59 kg (130 lb)   05/12/22 63.5 kg (140 lb)   04/07/22 59 kg (130 lb)     Pt down 18# in the past 4 months (14%)   Dosing Weight: 51 kg    ASSESSED NUTRITION NEEDS  Estimated Energy Needs: 2005-9173 kcals/day (30 - 35 kcals/kg )  Justification: Underweight  Estimated Protein Needs: 61-77 grams protein/day (1.2 - 1.5 grams of pro/kg)  Justification: Repletion  Estimated Fluid Needs: 5401-3566 mL/day (25 - 30 mL/kg)   Justification: Maintenance    PHYSICAL FINDINGS  See malnutrition section below.  GI: nausea, No Bm this admission   Skin: no skin breakdown       MALNUTRITION:  % Weight Loss:  > 7.5% in 3 months (severe malnutrition)  % Intake:  </= 50% for >/= 1 month (severe malnutrition)  Subcutaneous Fat Loss:  Orbital region moderate depletion, Upper arm region moderate depletion and Thoracic region moderate depletion  Muscle Loss:  Temporal region moderate depletion and Clavicle bone region moderate depletion  Fluid Retention:  None noted    Malnutrition Diagnosis: Severe malnutrition  In Context of:  Acute illness or injury    NUTRITION DIAGNOSIS    Malnutrition related to ETOH abuse as evidenced by 14% wt loss in 4 months, <50% intake for over a month, moderate fat and muscle loss noted       INTERVENTIONS  Implementation  Change supplement to chocolate Ensure bid       Goals  Patient to consume % of nutritionally adequate meals three times per day, or the equivalent with supplements/snacks.  Promote wt gain   Regular BMs      Monitoring/Evaluation  Progress toward goals will be monitored and evaluated per protocol.

## 2023-04-24 NOTE — PROGRESS NOTES
[facility] MEDICINE PROGRESS NOTE      Summary: 72 year old male admitted from home after being found by a nephew dishevelled  And not caring for himself.  Pts wife  4 months ago.  He has been using alcohol since.      Clinically he is improved.  This morning he feels achy and shaky all over though improved.  On interview he would like to get stronger.  He says he will not afford TCU.    Psychotherapy input reviewed and appreciated.    This man gave up after his wife  last year.  He used to   Play the [employer].      Hospital day number 5    Problem list:    1.  Alcohol dependence, misuse, withdrawal: improved; see below  2.  FTT/vulnerable adult: psych and chem dep consult pending;   3.  Hyponatremia: due to decreased oral intake;   4.  Elevated LFT: due to chronic alcohol use  5.  Pancytopenia likely due to alcohol use: outpatient follow up  6.  Elevated cpk: underwhelming; resume statin on discharge  7.  Underweight:  BMI 18; nutrition following; supplements  6.  Disposition:  Pending psych          Corine Collado MD  [facility]  [facility]  Phone: #[phone]  Securely message with the Vocera Web Console (learn more here)  Text page via [employer] Paging/Directory     Interval History/Subjective:  Needs to have a bm; felt dizzy this morning; eating well    Physical Exam/Objective:  Temp:  [97.2  F (36.2  C)-98.5  F (36.9  C)] 97.5  F (36.4  C)  Pulse:  [74-90] 90  Resp:  [16-20] 20  BP: ()/(57-79) 96/57  SpO2:  [98 %-100 %] 99 %  Body mass index is 17.16 kg/m .    GENERAL:  Alert, thin, chronically ill, cooperative;    HEAD:  Normocephalic, without obvious abnormality, atraumatic   EYES:  PERRL, conjunctiva/corneas clear, no scleral icterus, EOM's intact   NOSE: Nares normal, septum midline, mucosa normal, no drainage   THROAT: Lips, mucosa, and tongue normal; teeth and gums normal, mouth moist   NECK: Supple, symmetrical, trachea  midline   BACK:   Symmetric, no curvature, ROM normal   LUNGS:   Clear to auscultation bilaterally, no rales, rhonchi, or wheezing, symmetric chest rise on inhalation, respirations unlabored   CHEST WALL:  No tenderness or deformity   HEART:  Regular rate and rhythm, S1 and S2 normal, no murmur, rub, or gallop    ABDOMEN:   Soft, non-tender, bowel sounds active all four quadrants, no masses, no organomegaly, no rebound or guarding   EXTREMITIES: Bilateral knee abrasions (old), resolving   SKIN: Dry to touch, no exanthems in the visualized areas   NEURO: Alert, oriented x3, moves all four extremities freely   PSYCH: Cooperative, behavior is appropriate      Data reviewed today: I personally reviewed all new medications, labs, imaging/diagnostics reports over the past 24 hours. Pertinent findings include:    Imaging:   Recent Results (from the past 24 hour(s))   CT Head w/o Contrast    Narrative    EXAM: CT HEAD W/O CONTRAST  LOCATION: [facility]  DATE/TIME: 4/24/2023 3:09 PM CDT    INDICATION: dizziness;  COMPARISON: Head CT 04/19/2023  TECHNIQUE: Routine CT Head without IV contrast. Multiplanar reformats. Dose reduction techniques were used.    FINDINGS:  INTRACRANIAL CONTENTS: No intracranial hemorrhage, extraaxial collection, or mass effect.  No CT evidence of acute infarct. Mild presumed chronic small vessel ischemic changes. Mild generalized volume loss. No hydrocephalus.     VISUALIZED ORBITS/SINUSES/MASTOIDS: No intraorbital abnormality. Mild mucosal thickening sphenoid sinuses. No middle ear or mastoid effusion.    BONES/SOFT TISSUES: No acute abnormality. Prior left frontal craniotomy.      Impression    IMPRESSION:  1.  No acute intracranial injury, hemorrhage, mass, or CT evidence of recent ischemia.   XR Abdomen Port 1 View    Narrative    EXAM: XR ABDOMEN PORT 1 VIEW  LOCATION: [facility]  DATE/TIME: 4/24/2023 3:16 PM CDT    INDICATION: abdominal  pain  COMPARISON: CT 01/25/2022      Impression    IMPRESSION: Lung bases are clear. Bowel gas pattern is unremarkable. No abnormally dilated loops of bowel. Mild stool burden. No suspicious calcifications.       Labs:  Most Recent 3 BMP's:  Recent Labs   Lab Test 04/24/23  1451 04/24/23  1035 04/23/23  0912 04/22/23  1058   *  --  131* 132*   POTASSIUM 4.5 4.3 3.9 4.3   CHLORIDE 100  --  101 101   CO2 22  --  20* 22   BUN 19  --  9 6*   CR 0.69*  --  0.52* 0.51*   ANIONGAP 9  --  10 9   MELLISA 8.8  --  8.3* 8.1*   *  --  119 97       Medications:   Personally Reviewed.  Medications       sodium chloride 0.9%  500 mL Intravenous Once     [Held by provider] atorvastatin  40 mg Oral At Bedtime     [START ON 4/25/2023] cloNIDine  0.1 mg Oral BID     cyanocobalamin  1,000 mcg Oral Daily     docusate sodium  100 mg Oral BID     folic acid  1 mg Oral Daily     gabapentin  300 mg Oral BID     lactulose  20 g Oral Once     magnesium oxide  400 mg Oral Q4H     multivitamin w/minerals  1 tablet Oral Daily     nicotine  1 patch Transdermal Daily     nicotine   Transdermal Q8H     [START ON 4/25/2023] pantoprazole  40 mg Oral QAM AC     thiamine  100 mg Oral Daily

## 2023-04-24 NOTE — CONSULTS
"Triage and Transition - Consult and Liaison     Familia Watson  April 24, 2023    Session start: 1:15 pm  Session end: 1:44 pm  Session duration in minutes: 29  CPT utilized: 15444 - Brief diagnostic assessment (modifier 52)  Patient was seen virtually ([employer] cart or other teleconferencing device).    Diagnosis:   Adjustment Disorders  309.0 (F43.21) With depressed mood, present and by hx.;  Remote hx of MDD, PTSD 2003, per chart review.  Alcohol use    Plan/Recommendations:     Pt not on any psych meds, expresses desire to do so.  Will ask psychiatry provider to follow up with pt, left message. Follow up consult.    Pt appears a bit confused at times, does report he has memory loss. May benefit from a SLUMS.    Home does not appear to be a safe option at this point in time.    He states he wants to go to \"[facility]\".     Maintain current transition plan. Next steps include: Psychiatry provider follow-up.           Reason for consult: Psychiatry consult was requested due to \"Alcohol dependence\". Patient was seen by Triage and Transition Consult & Liaison team.     Identifying information: Ciara is 65 year old  or   male   followed related to \"alcohol dependence\".  Pt reports he lives in apartment, reports his nephew helps him, appears to need more supports.      Summary of Patient Situation  Pt was sitting up in chair upon TH arrival.  Pt agreeable to meet via TH today.  Pt states he is here at the hospital because \"I was weak and only eating certain meals, I also been drinking\".  Pt reports \"I am eating and drinking a lot here though and feeling a lot better today\".  Pt is a , appears to have a VA hx.  Per chart review, pt with a more recent hx of adjustment dx with depressed mood, and a remote hx of MDD and PTSD in 2003, with information available in epic ehr at the time of this note.      Pt today, presents with adjustment depression sx in the setting of his wife " "passing away 4 months ago, depression sx including sadness, low mood, low motivation.  Pt denies depression sx today, states \"No, I am not depressed, I am feeling better now\".  He denies any trauma sx.  Pt reports memory loss as well, appears a bit confused at times, pt may benefit from SLUMS.  Pt denies any anxiety sx, upon inquiry.  Pt denies SI,HI,AH/VH.  Pt reports he is hopeful , reports he is looking forward to getting some strength back.  Pt reports he was delighted to see his nephew visit him today.    Pt reports supports including \"my niece and nephew\".  He appears to lack supports.  Pt reports historical coping skills of \"playing my guitar and watching interesting shows\".  Writer psychoeducated on additional including deep breathing, distraction, mindfulness.        Pt with a hx of alcohol use, which he reports increased after his wife passed away 4 months ago.  Pt would benefit from CD, however appears he is going to TCU/LTC, he states \"would go to [facility]\".  Pt appears to lack supports and home does not appear to be a safe option at this point in time.      Pt does not appear to be on any psych meds, he reports he does not know which meds he is on and that they have changed since here in the hospital\".  He states he thinks he is on gabapentin, nothing for depression.  Pt to benefit from psyc med eval. He expresses desire in potential psych meds for depression.       Significant Clinical History  Per chart review pt with a remote psych hx MDD and PTSD in 2003, with information available in epic ehr at the time of this assessment.      Collateral information:   Reviewed chart, coordinated with care team and coordinated with psychiatry provider for pt follow up, left message..      Mental Status Exam   Affect: Appropriate  Appearance: Appropriate   Attention Span/Concentration: Attentive    Eye Contact: Engaged  Fund of Knowledge: Other: appears confused at times   Language /Speech Content: " "Fluent  Language /Speech Volume: Normal   Language /Speech Rate/Productions: Normal   Recent Memory: Variable  Remote Memory: Variable  Mood: Normal   Orientation:   Person: Yes   Place: Yes  Time of Day: Yes   Date: Answer: .   Situation (Do they understand why they are here?): Yes   Psychomotor Behavior: Normal   Thought Content: Clear denies SI,HI,AH/VH  Thought Form: Other: appears confused at times, states has memory loss\"    Current medications: Per EHR at the time of this note.  Current Facility-Administered Medications  Medication    acetaminophen (TYLENOL) tablet 500-1,000 mg    albuterol (PROVENTIL) neb solution 2.5 mg    amLODIPine (NORVASC) tablet 10 mg    aspirin EC tablet 81 mg    atorvastatin (LIPITOR) tablet 80 mg    bismuth subsalicylate (PEPTO BISMOL) chewable tablet 262 mg    carvedilol (COREG) tablet 25 mg    Continuing ACE inhibitor/ARB/ARNI from home medication list OR ACE inhibitor/ARB/ARNI order already placed during this visit    Continuing beta blocker from home medication list OR beta blocker order already placed during this visit    ferrous sulfate (FEROSUL) tablet 325 mg    furosemide (LASIX) injection 60 mg    [Held by provider] hydrALAZINE (APRESOLINE) tablet 100 mg    isosorbide dinitrate (ISORDIL) tablet 20 mg    magic mouthwash suspension (diphenhydramine, lidocaine, aluminum-magnesium & simethicone)    ondansetron (ZOFRAN ODT) ODT tab 4 mg    pantoprazole (PROTONIX) EC tablet 40 mg    Patient is already receiving mechanical prophylaxis    senna-docusate (SENOKOT-S/PERICOLACE) 8.6-50 MG per tablet 1 tablet   Or    senna-docusate (SENOKOT-S/PERICOLACE) 8.6-50 MG per tablet 2 tablet    sodium chloride (PF) 0.9% PF flush 3 mL    sodium chloride (PF) 0.9% PF flush 3 mL       Therapeutic intervention and progress:  Therapeutic intervention consisted of building therapeutic rapport, active listening, validation, thought reframing, normalizing and CBT concepts.       Chelsey Hunting " Psychotherapist Trainee  Triage and Transition - Consult and Liaison   [phone]

## 2023-04-25 ENCOUNTER — APPOINTMENT (OUTPATIENT)
Dept: PHYSICAL THERAPY | Facility: CLINIC | Age: 73
DRG: 896 | End: 2023-04-25
Payer: MEDICARE

## 2023-04-25 ENCOUNTER — APPOINTMENT (OUTPATIENT)
Dept: OCCUPATIONAL THERAPY | Facility: CLINIC | Age: 73
DRG: 896 | End: 2023-04-25
Attending: EMERGENCY MEDICINE
Payer: MEDICARE

## 2023-04-25 LAB
ALBUMIN SERPL-MCNC: 2.5 G/DL (ref 3.5–5)
ALP SERPL-CCNC: 135 U/L (ref 45–120)
ALT SERPL W P-5'-P-CCNC: 62 U/L (ref 0–45)
ANION GAP SERPL CALCULATED.3IONS-SCNC: 8 MMOL/L (ref 5–18)
AST SERPL W P-5'-P-CCNC: 57 U/L (ref 0–40)
BILIRUB DIRECT SERPL-MCNC: 0.2 MG/DL
BILIRUB SERPL-MCNC: 0.6 MG/DL (ref 0–1)
BUN SERPL-MCNC: 14 MG/DL (ref 8–28)
CALCIUM SERPL-MCNC: 7.3 MG/DL (ref 8.5–10.5)
CHLORIDE BLD-SCNC: 106 MMOL/L (ref 98–107)
CO2 SERPL-SCNC: 19 MMOL/L (ref 22–31)
CORTIS SERPL-MCNC: 14.4 UG/DL
CREAT SERPL-MCNC: 0.52 MG/DL (ref 0.7–1.3)
GFR SERPL CREATININE-BSD FRML MDRD: >90 ML/MIN/1.73M2
GLUCOSE BLD-MCNC: 87 MG/DL (ref 70–125)
MAGNESIUM SERPL-MCNC: 1.7 MG/DL (ref 1.8–2.6)
POTASSIUM BLD-SCNC: 4.1 MMOL/L (ref 3.5–5)
PROT SERPL-MCNC: 5.5 G/DL (ref 6–8)
SODIUM SERPL-SCNC: 133 MMOL/L (ref 136–145)
VIT B12 SERPL-MCNC: 3335 PG/ML (ref 232–1245)

## 2023-04-25 PROCEDURE — 82533 TOTAL CORTISOL: CPT | Performed by: FAMILY MEDICINE

## 2023-04-25 PROCEDURE — 250N000013 HC RX MED GY IP 250 OP 250 PS 637: Performed by: INTERNAL MEDICINE

## 2023-04-25 PROCEDURE — 258N000003 HC RX IP 258 OP 636: Performed by: FAMILY MEDICINE

## 2023-04-25 PROCEDURE — 250N000011 HC RX IP 250 OP 636: Performed by: FAMILY MEDICINE

## 2023-04-25 PROCEDURE — 99222 1ST HOSP IP/OBS MODERATE 55: CPT | Performed by: NURSE PRACTITIONER

## 2023-04-25 PROCEDURE — 99232 SBSQ HOSP IP/OBS MODERATE 35: CPT | Performed by: FAMILY MEDICINE

## 2023-04-25 PROCEDURE — 97129 THER IVNTJ 1ST 15 MIN: CPT | Mod: GO

## 2023-04-25 PROCEDURE — 120N000001 HC R&B MED SURG/OB

## 2023-04-25 PROCEDURE — 80053 COMPREHEN METABOLIC PANEL: CPT | Performed by: FAMILY MEDICINE

## 2023-04-25 PROCEDURE — 250N000013 HC RX MED GY IP 250 OP 250 PS 637: Performed by: NURSE PRACTITIONER

## 2023-04-25 PROCEDURE — 82248 BILIRUBIN DIRECT: CPT | Performed by: FAMILY MEDICINE

## 2023-04-25 PROCEDURE — 97116 GAIT TRAINING THERAPY: CPT | Mod: GP

## 2023-04-25 PROCEDURE — 250N000013 HC RX MED GY IP 250 OP 250 PS 637: Performed by: EMERGENCY MEDICINE

## 2023-04-25 PROCEDURE — 36415 COLL VENOUS BLD VENIPUNCTURE: CPT | Performed by: FAMILY MEDICINE

## 2023-04-25 PROCEDURE — 250N000011 HC RX IP 250 OP 636: Performed by: INTERNAL MEDICINE

## 2023-04-25 PROCEDURE — 83735 ASSAY OF MAGNESIUM: CPT | Performed by: EMERGENCY MEDICINE

## 2023-04-25 PROCEDURE — 97130 THER IVNTJ EA ADDL 15 MIN: CPT | Mod: GO

## 2023-04-25 RX ORDER — MAGNESIUM SULFATE HEPTAHYDRATE 40 MG/ML
2 INJECTION, SOLUTION INTRAVENOUS ONCE
Status: COMPLETED | OUTPATIENT
Start: 2023-04-25 | End: 2023-04-25

## 2023-04-25 RX ORDER — TRAZODONE HYDROCHLORIDE 50 MG/1
50 TABLET, FILM COATED ORAL AT BEDTIME
Status: DISCONTINUED | OUTPATIENT
Start: 2023-04-25 | End: 2023-04-26 | Stop reason: HOSPADM

## 2023-04-25 RX ADMIN — MULTIPLE VITAMINS W/ MINERALS TAB 1 TABLET: TAB at 08:51

## 2023-04-25 RX ADMIN — PROCHLORPERAZINE MALEATE 5 MG: 5 TABLET ORAL at 20:10

## 2023-04-25 RX ADMIN — CYANOCOBALAMIN TAB 1000 MCG 1000 MCG: 1000 TAB at 08:51

## 2023-04-25 RX ADMIN — DOCUSATE SODIUM 100 MG: 100 CAPSULE, LIQUID FILLED ORAL at 20:11

## 2023-04-25 RX ADMIN — MAGNESIUM SULFATE HEPTAHYDRATE 2 G: 40 INJECTION, SOLUTION INTRAVENOUS at 13:12

## 2023-04-25 RX ADMIN — SODIUM CHLORIDE 500 ML: 9 INJECTION, SOLUTION INTRAVENOUS at 10:24

## 2023-04-25 RX ADMIN — GABAPENTIN 300 MG: 300 CAPSULE ORAL at 08:51

## 2023-04-25 RX ADMIN — TRAZODONE HYDROCHLORIDE 75 MG: 50 TABLET ORAL at 20:11

## 2023-04-25 RX ADMIN — NICOTINE 7 MG/24 HR DAILY TRANSDERMAL PATCH 1 PATCH: at 08:49

## 2023-04-25 RX ADMIN — Medication 100 MG: at 08:51

## 2023-04-25 RX ADMIN — ONDANSETRON 4 MG: 2 INJECTION INTRAMUSCULAR; INTRAVENOUS at 14:40

## 2023-04-25 RX ADMIN — PANTOPRAZOLE SODIUM 40 MG: 40 TABLET, DELAYED RELEASE ORAL at 08:51

## 2023-04-25 RX ADMIN — GABAPENTIN 300 MG: 300 CAPSULE ORAL at 20:11

## 2023-04-25 RX ADMIN — DOCUSATE SODIUM 100 MG: 100 CAPSULE, LIQUID FILLED ORAL at 08:51

## 2023-04-25 RX ADMIN — FOLIC ACID 1 MG: 1 TABLET ORAL at 08:51

## 2023-04-25 ASSESSMENT — ACTIVITIES OF DAILY LIVING (ADL)
ADLS_ACUITY_SCORE: 31
ADLS_ACUITY_SCORE: 30
ADLS_ACUITY_SCORE: 31
ADLS_ACUITY_SCORE: 27
ADLS_ACUITY_SCORE: 31
ADLS_ACUITY_SCORE: 30
ADLS_ACUITY_SCORE: 27

## 2023-04-25 NOTE — PROGRESS NOTES
[facility] MEDICINE PROGRESS NOTE      Identification/Summary: Familia Watson is a 72 year old male with a past medical history of subdural hematoma with traumatic brain injury at 39 years of age, alcohol abuse and his wife  4 months ago who was admitted on 2023 for escalation of alcohol use for the past 4 months and alcohol withdrawal with visual hallucinations. Hospital course is notable for patient being disheveled and diagnosed with protein-calorie malnutrition.  Also mood disorder with component of anxiety, depression and insomnia.  Now seen by psychiatry.  Overall he is feeling better.    Assessment and Plan:    1.  Alcohol dependence, misuse, withdrawal: improved; plan: Can discontinue alcohol withdrawal order set  2.  FTT/vulnerable adult: psych and chem dep consult appreciated  3.  Hyponatremia: due to decreased oral intake, now improved with sodium 133  4.  Elevated LFT: due to chronic alcohol use, improving  5.  Pancytopenia likely due to alcohol use, stable: outpatient follow up  6.  Elevated cpk: underwhelming; resume statin on discharge  7.  Severe protein-calorie malnutrition.  Likely getting most of his calories from alcohol recently.  Now reports he is eating well.  Plan: Follow caloric intake  8.  Hypotension this morning with systolic pressure in the 80s.  Was treated with fluid bolus and we stopped the clonidine that he was started on scheduled with the alcohol withdrawal order set.  9..  Disposition:  Pending psych and social work final recommendations            Diet: Regular Diet Adult  Snacks/Supplements Adult: Ensure Enlive; With Meals  Alberto Catheter: Not present  Lines: None       DVT Prophylaxis: Ambulation and will have ambulate 3 times today  Code Status: Full Code    The patient's care was discussed with the Bedside Nurse and Patient.    Roberto Yang MD  [facility]  [facility]  Phone:  #[phone]  Securely message with the Vocera Web Console (learn more here)  Text page via [employer] Paging/Directory     Interval History/Subjective:  Patient feels like he is definitely doing a lot better.  Now starting to eat well.  Wants to be more active.    Physical Exam/Objective:  Temp:  [97.6  F (36.4  C)-98.1  F (36.7  C)] 98.1  F (36.7  C)  Pulse:  [77-85] 77  Resp:  [16-18] 18  BP: ()/(58-69) 121/63  SpO2:  [98 %-100 %] 99 %  Body mass index is 17.16 kg/m .    GENERAL:  Alert, appears comfortable, in no acute distress, appears stated age   LUNGS:   Clear to auscultation bilaterally, no rales, rhonchi, or wheezing, symmetric chest rise on inhalation, respirations unlabored   CHEST WALL:  No tenderness or deformity   HEART:  Regular rate and rhythm, S1 and S2 normal, no murmur, rub, or gallop    ABDOMEN:   Soft, non-tender   EXTREMITIES: No ankle edema    SKIN: Dry to touch, no exanthems in the visualized areas   NEURO: Alert,  moves all four extremities freely   PSYCH: Cooperative, behavior is appropriate      Data reviewed today: I personally reviewed all new medications, labs, imaging/diagnostics reports over the past 24 hours. Pertinent findings include:    Labs:  Most Recent 3 CBC's:Recent Labs   Lab Test 04/23/23  0912 04/21/23  0758 04/20/23  0607   WBC 4.6 3.1* 3.3*   HGB 11.2* 9.7* 9.7*    104* 98   * 150 163     Most Recent 3 BMP's:Recent Labs   Lab Test 04/25/23  1020 04/24/23  1451 04/24/23  1035 04/23/23  0912   * 131*  --  131*   POTASSIUM 4.1 4.5 4.3 3.9   CHLORIDE 106 100  --  101   CO2 19* 22  --  20*   BUN 14 19  --  9   CR 0.52* 0.69*  --  0.52*   ANIONGAP 8 9  --  10   MELLISA 7.3* 8.8  --  8.3*   GLC 87 133*  --  119     Most Recent 2 LFT's:Recent Labs   Lab Test 04/25/23  1020 04/21/23  0758   AST 57* 176*   ALT 62* 107*   ALKPHOS 135* 114   BILITOTAL 0.6 1.2*       Medications:   Personally Reviewed.  Medications       [Held by provider] atorvastatin  40 mg Oral  At Bedtime     cyanocobalamin  1,000 mcg Oral Daily     docusate sodium  100 mg Oral BID     folic acid  1 mg Oral Daily     gabapentin  300 mg Oral BID     multivitamin w/minerals  1 tablet Oral Daily     nicotine  1 patch Transdermal Daily     nicotine   Transdermal Q8H     pantoprazole  40 mg Oral QAM AC     traZODone  50 mg Oral At Bedtime    Or     traZODone  75 mg Oral At Bedtime

## 2023-04-25 NOTE — PROGRESS NOTES
RN and SN met Dr. Yang in Patient's room in response to low BP of 88/58. Per Dr. aYng he will place an order to do IV NS bolus 500cc over 2hours.-Rebeca SANTANA RN

## 2023-04-25 NOTE — PROGRESS NOTES
"SPIRITUAL HEALTH SERVICES Progress Note      Saw pt Familia A Charlotte per staff request    Patient/Family Understanding of Illness and Goals of Care - Familia shared he admitted due to weakness and recounted having had fallen and being stuck on the floor at home.      Distress and Loss - Familia is grieving the loss of his wife recounting her passing and how her absence has taken the michele out of his life.    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  He named his nephew Jose as a big support     Meaning, Beliefs, and Spirituality - Native Spirituality, he named finding \"God\" in nature and feeling at peace connecting with the out doors    Plan of Care - Con to assess and support through duration of stay.      Chaz Allen M.Div., [facility]  Staff    [phone]  "

## 2023-04-25 NOTE — PLAN OF CARE
Problem: Plan of Care - These are the overarching goals to be used throughout the patient stay.    Goal: Readiness for Transition of Care  Outcome: Progressing   Goal Outcome Evaluation:                      Pt A/O x 4, calm and cooperative. Continues to have intermittent nausea, prn zofran was ineffective, new order received for compazine which was given and effective. Also having intermittent abdominal cramping. BM on shift, soft. Tremors noted to upper extremities. K and Mg protocols recheck this AM. Encouraging fluid intake. Using bedside urinal. Reported trouble sleeping, prn melatonin was given but ineffective, new order received for trazodone which was given and effective. Discharge pending TCU placement.

## 2023-04-25 NOTE — CONSULTS
Psychiatry Consultation      Patient name: Familia Watson   MRN: [id number]    Age: 72 year old    YOB: 1950    Please refer to the [facility] 's note for additional historical information, safety assessment, and psychosocial treatment details:     Reason for consult: Chart review and recommend medications, Psych provider follow up      Diagnoses:    Adjustment disorder with depression and anxiety.  Exacerbated in the setting of hospital environment, physical weakness, poor functionality  Sleep difficulties.  Grief reaction: will benefit from psychotherapy outpatient basis.         Recommendations:  Patient does not wish to be started on any antidepressant or anxiolytic however he is concerned about his sleep dysregulation and wants his medications adjusted.  Trazodone 50-75 mg at bedtime.  Referral to [facility], FABY program, rule 25 if needs it.   Psychotherapy for grief management.  Spiritual care consult for emotional support.     Mental status examination:  General Appearance: Not in acute distress, seated in chair, eating and precious crackers.  Behavior: Good eye contact, no bizarre ideations  Speech: Coherent.  Thought Process: Linear, clear.  Thought content: No evidence of hallucinations, delusions or paranoia.    Thought Formation: Associations are connected  Judgment: Fair  Insight : Fair  Attention : Adequate  Memory: Fluctuates.  Able to recall events from today accurately  Fund Of Knowledge: Average  Affect: Neutral  Mood: Congruent  Alert : Awake  Suicidal ideation: Absent  Homicidal ideation: Absent  Orientation: X 2-3  Comprehension: Sufficient pertaining to current medical needs  Generative thought content: Adequate.  Spontaneous conversation  Language: Intact  Gait and Ambulation: Gait and ambulation with assistive device.    Musculoskeletal: No tonal abnormalities, psychomotor slow, age-appropriate ambulation    BP (!) 88/58 (BP Location: Left arm, Patient Position: Sitting,  "Cuff Size: Adult Regular)   Pulse 83   Temp 97.6  F (36.4  C) (Oral)   Resp 18   Wt 51.2 kg (112 lb 14 oz)   SpO2 99%   BMI 17.16 kg/m          Pertinent information related to this encounter:     Upon assessment patient was noted to be seated in a chair eating precious crackers.  He was pleasant and cooperative, engaged in a coherent conversation.  He is aware of his hospital environment and believes that he had fallen and could not get up thus needed to be \"here\" referring to hospital.  He endorses of feeling depressed however he attributes that to \"being stuck in the hospital and I am weaker than I was before\".  He denies any thoughts of dying or suicidality.  He acknowledges having difficulties caring for himself at home.  Unable to quantify the amount of alcohol consumption .    Reviewed home medications.  Home medications to include but not limited to gabapentin 300 mg 3 times a day, hydroxyzine 50 mg 3 times a day as needed.  He reports of taking trazodone \"thus the only medication helps me sleep\" however feels that he is not getting enough to attain full nights of sleep.  He is amenable to having trazodone scheduled at 50 mg.  He denies any apathy, anhedonia, lack of motivation or lack of interest.  His appetite is improving, wishes to ambulate more to \"get more strength\".Expresses sadness and missing his wife. He feels alone. He denies any lack of interest however there is some lack of motivation. Now he feels better being in hospital and wants to ambulate more. \"Soonere I get strong better it is for me\".  He does not think he uses alcohol to treat his insomnia however when he does not drink he has difficulty falling asleep. He has never taken any antidepressant and does not feel the need for it \"now\".   He denies any mood swings, mood changes or irritability.  Feels sad as he misses his wife who passed away in 2022.  He has not sought out any grief management therapy or support group.  Medical History: "  Refer to the latest internal medicine/hospitalist note which I reviewed.   Past Medical History:   Diagnosis Date     Alcohol abuse      Emphysema lung (H) 05/12/2021     Hyperlipidemia      Hypertension      Migraine      Subdural hematoma 1989            Medications:    Current Facility-Administered Medications:      0.9% sodium chloride BOLUS, 500 mL, Intravenous, Once, Roberto Yang MD     alum & mag hydroxide-simethicone (MAALOX) suspension 30 mL, 30 mL, Oral, Q4H PRN, Corine Collado MD     [Held by provider] atorvastatin (LIPITOR) tablet 40 mg, 40 mg, Oral, At Bedtime, Luis Manuel Vegas DO     cloNIDine (CATAPRES) tablet 0.1 mg, 0.1 mg, Oral, BID, Corine Collado MD     cyanocobalamin (VITAMIN B-12) tablet 1,000 mcg, 1,000 mcg, Oral, Daily, Luis Manuel Vegas DO, 1,000 mcg at 04/25/23 0851     docusate sodium (COLACE) capsule 100 mg, 100 mg, Oral, BID, Corine Collado MD, 100 mg at 04/25/23 0851     flumazenil (ROMAZICON) injection 0.2 mg, 0.2 mg, Intravenous, q1 min prn, Iva Rose MD     folic acid (FOLVITE) tablet 1 mg, 1 mg, Oral, Daily, Luis Manuel Vegas DO, 1 mg at 04/25/23 0851     gabapentin (NEURONTIN) capsule 300 mg, 300 mg, Oral, BID, Iva Rose MD, 300 mg at 04/25/23 0851     OLANZapine zydis (zyPREXA) ODT half-tab 5-10 mg, 5-10 mg, Oral, Q6H PRN **OR** haloperidol lactate (HALDOL) injection 2.5-5 mg, 2.5-5 mg, Intravenous, Q6H PRN, Iva Rose MD     LORazepam (ATIVAN) tablet 0.5 mg, 0.5 mg, Oral, Q6H PRN, Iva Rose MD     LORazepam (ATIVAN) tablet 1-2 mg, 1-2 mg, Oral, Q30 Min PRN, 1 mg at 04/22/23 0311 **OR** [DISCONTINUED] LORazepam (ATIVAN) injection 1-2 mg, 1-2 mg, Intravenous, Q30 Min PRN, Iva Rose MD, 1 mg at 04/22/23 0158     meclizine (ANTIVERT) tablet 12.5 mg, 12.5 mg, Oral, TID PRN, Corine Collado MD     melatonin tablet 5 mg, 5 mg, Oral, QPM PRN, Iva Rose MD, 5 mg at 04/24/23 1199     multivitamin  "w/minerals (THERA-VIT-M) tablet 1 tablet, 1 tablet, Oral, Daily, Luis Manuel Vegas, DO, 1 tablet at 04/25/23 0851     nicotine (NICODERM CQ) 7 MG/24HR 24 hr patch 1 patch, 1 patch, Transdermal, Daily, Shaila Arvizu DO, 1 patch at 04/25/23 0849     nicotine Patch in Place, , Transdermal, Q8H, Shaila Arvizu,      ondansetron (ZOFRAN ODT) ODT tab 4 mg, 4 mg, Oral, Q6H PRN, 4 mg at 04/24/23 1934 **OR** ondansetron (ZOFRAN) injection 4 mg, 4 mg, Intravenous, Q6H PRN, Iva Rose MD, 4 mg at 04/22/23 0310     pantoprazole (PROTONIX) EC tablet 40 mg, 40 mg, Oral, QAM AC, Corine Collado MD, 40 mg at 04/25/23 0851     polyethylene glycol (MIRALAX) Packet 17 g, 17 g, Oral, Daily PRN, Corine Collado MD, 17 g at 04/24/23 1357     prochlorperazine (COMPAZINE) tablet 5 mg, 5 mg, Oral, Q6H PRN, 5 mg at 04/24/23 2045 **OR** prochlorperazine (COMPAZINE) suppository 12.5 mg, 12.5 mg, Rectal, Q12H PRN, Leonard Schmitz MD     traZODone (DESYREL) tablet 50 mg, 50 mg, Oral, At Bedtime PRN, Leonard Schmitz MD, 50 mg at 04/24/23 6764    Vital Signs:    B/P: 88/58[pt denied chest pain, SOB, and n/v. MD notified.[, T: 97.6, P: 83, R: 18  Estimated body mass index is 17.16 kg/m  as calculated from the following:    Height as of 12/7/22: 1.727 m (5' 8\").    Weight as of this encounter: 51.2 kg (112 lb 14 oz).                   Please reconsult with psychiatry as needed.   Casie Gabriel, APRBAIRON CNP       " Yes

## 2023-04-25 NOTE — PLAN OF CARE
Problem: Plan of Care - These are the overarching goals to be used throughout the patient stay.    Goal: Absence of Hospital-Acquired Illness or Injury  Intervention: Identify and Manage Fall Risk  Recent Flowsheet Documentation  Taken 4/25/2023 1791 by Rebeca Gongora RN  Safety Promotion/Fall Prevention:    assistive device/personal items within reach    clutter free environment maintained    increased rounding and observation    increase visualization of patient    lighting adjusted    mobility aid in reach    nonskid shoes/slippers when out of bed    patient and family education    room door open    room near nurse's station    room organization consistent    safety round/check completed     Problem: Plan of Care - These are the overarching goals to be used throughout the patient stay.    Goal: Optimal Comfort and Wellbeing  Outcome: Progressing    Goal Outcome Evaluation: A&Ox4, denied pain, c/o nausea manged with PRN Dajuan, Dr. Yang saw Patient at bedside to further assess in response to page on low BP ordered IV bolus NS 500ml, BP re-assessed at 103/63, patient ate with good appetite no vomitting episodes. Has been up on the chair the entire shift, ambulated with PT. Patient is back on the chair call light, phone, and tray table within reach, chair alarm activated, TV on. Will continue to monitor. -Rebeca SANTANA RN

## 2023-04-25 NOTE — PROGRESS NOTES
RN assessed Patient sitting on the chair with SN, and OT performing eval and tx. -Reebca SANTANA RN

## 2023-04-26 VITALS
WEIGHT: 121.1 LBS | SYSTOLIC BLOOD PRESSURE: 97 MMHG | TEMPERATURE: 98.4 F | HEART RATE: 85 BPM | BODY MASS INDEX: 18.41 KG/M2 | RESPIRATION RATE: 16 BRPM | OXYGEN SATURATION: 98 % | DIASTOLIC BLOOD PRESSURE: 66 MMHG

## 2023-04-26 LAB
ANION GAP SERPL CALCULATED.3IONS-SCNC: 9 MMOL/L (ref 5–18)
BUN SERPL-MCNC: 17 MG/DL (ref 8–28)
CALCIUM SERPL-MCNC: 8.3 MG/DL (ref 8.5–10.5)
CHLORIDE BLD-SCNC: 100 MMOL/L (ref 98–107)
CO2 SERPL-SCNC: 24 MMOL/L (ref 22–31)
CREAT SERPL-MCNC: 0.59 MG/DL (ref 0.7–1.3)
DEPRECATED CALCIDIOL+CALCIFEROL SERPL-MC: 27 UG/L (ref 20–75)
GFR SERPL CREATININE-BSD FRML MDRD: >90 ML/MIN/1.73M2
GLUCOSE BLD-MCNC: 94 MG/DL (ref 70–125)
MAGNESIUM SERPL-MCNC: 1.9 MG/DL (ref 1.8–2.6)
POTASSIUM BLD-SCNC: 4.5 MMOL/L (ref 3.5–5)
SODIUM SERPL-SCNC: 133 MMOL/L (ref 136–145)

## 2023-04-26 PROCEDURE — 250N000013 HC RX MED GY IP 250 OP 250 PS 637: Performed by: INTERNAL MEDICINE

## 2023-04-26 PROCEDURE — 83735 ASSAY OF MAGNESIUM: CPT | Performed by: FAMILY MEDICINE

## 2023-04-26 PROCEDURE — 250N000013 HC RX MED GY IP 250 OP 250 PS 637: Performed by: EMERGENCY MEDICINE

## 2023-04-26 PROCEDURE — 250N000013 HC RX MED GY IP 250 OP 250 PS 637: Performed by: FAMILY MEDICINE

## 2023-04-26 PROCEDURE — 80048 BASIC METABOLIC PNL TOTAL CA: CPT | Performed by: FAMILY MEDICINE

## 2023-04-26 PROCEDURE — 36415 COLL VENOUS BLD VENIPUNCTURE: CPT | Performed by: FAMILY MEDICINE

## 2023-04-26 RX ORDER — ACETAMINOPHEN 325 MG/1
650 TABLET ORAL EVERY 4 HOURS PRN
Status: DISCONTINUED | OUTPATIENT
Start: 2023-04-26 | End: 2023-04-26 | Stop reason: HOSPADM

## 2023-04-26 RX ORDER — FOLIC ACID 1 MG/1
1 TABLET ORAL DAILY
Qty: 30 TABLET | Refills: 0 | Status: ON HOLD | OUTPATIENT
Start: 2023-04-26 | End: 2023-05-08

## 2023-04-26 RX ORDER — PANTOPRAZOLE SODIUM 40 MG/1
40 TABLET, DELAYED RELEASE ORAL
Qty: 30 TABLET | Refills: 0 | Status: ON HOLD | OUTPATIENT
Start: 2023-04-27 | End: 2023-06-14

## 2023-04-26 RX ORDER — TRAZODONE HYDROCHLORIDE 150 MG/1
75 TABLET ORAL AT BEDTIME
Qty: 15 TABLET | Refills: 0 | Status: ON HOLD | OUTPATIENT
Start: 2023-04-26 | End: 2023-05-08

## 2023-04-26 RX ORDER — MULTIPLE VITAMINS W/ MINERALS TAB 9MG-400MCG
1 TAB ORAL DAILY
Qty: 100 TABLET | Refills: 0 | Status: ON HOLD | OUTPATIENT
Start: 2023-04-26 | End: 2023-06-14

## 2023-04-26 RX ORDER — CHLORHEXIDINE GLUCONATE ORAL RINSE 1.2 MG/ML
15 SOLUTION DENTAL 4 TIMES DAILY
Qty: 473 ML | Refills: 0 | Status: ON HOLD | OUTPATIENT
Start: 2023-04-26 | End: 2023-05-16

## 2023-04-26 RX ORDER — CHLORHEXIDINE GLUCONATE ORAL RINSE 1.2 MG/ML
15 SOLUTION DENTAL 4 TIMES DAILY
Status: DISCONTINUED | OUTPATIENT
Start: 2023-04-26 | End: 2023-04-26 | Stop reason: HOSPADM

## 2023-04-26 RX ADMIN — NICOTINE 7 MG/24 HR DAILY TRANSDERMAL PATCH 1 PATCH: at 07:59

## 2023-04-26 RX ADMIN — DOCUSATE SODIUM 100 MG: 100 CAPSULE, LIQUID FILLED ORAL at 07:57

## 2023-04-26 RX ADMIN — GABAPENTIN 300 MG: 300 CAPSULE ORAL at 07:57

## 2023-04-26 RX ADMIN — CYANOCOBALAMIN TAB 1000 MCG 1000 MCG: 1000 TAB at 07:57

## 2023-04-26 RX ADMIN — FOLIC ACID 1 MG: 1 TABLET ORAL at 07:57

## 2023-04-26 RX ADMIN — CHLORHEXIDINE GLUCONATE 15 ML: 1.2 SOLUTION ORAL at 12:36

## 2023-04-26 RX ADMIN — MULTIPLE VITAMINS W/ MINERALS TAB 1 TABLET: TAB at 07:57

## 2023-04-26 RX ADMIN — PANTOPRAZOLE SODIUM 40 MG: 40 TABLET, DELAYED RELEASE ORAL at 06:45

## 2023-04-26 RX ADMIN — CHLORHEXIDINE GLUCONATE 15 ML: 1.2 SOLUTION ORAL at 10:00

## 2023-04-26 ASSESSMENT — ACTIVITIES OF DAILY LIVING (ADL)
ADLS_ACUITY_SCORE: 27

## 2023-04-26 NOTE — DISCHARGE INSTRUCTIONS
Substance Use Disorder Direct Access Resources    It is recommended that you abstain from all mood altering chemicals. Please contact the sober support hotline ([phone]) as needed; phones are answered 24 hours a day, 7 days a week.    To access substance use treatment you must have a comprehensive assessment completed to begin any treatment program.     If uninsured, please contact your county of residence for eligibility screen to substance use disorder evaluation and treatment:    [place] - [phone]   [place] - [phone]   [facility] [phone]   [place] - [phone]   [place]  [phone]   [place] - [phone]   [facility] [phone]   [place] - [phone]     If you have private insurance, call the customer service number on the back of your insurance card to find an in-network substance abuse use disorder assessment. The ideal provider will be a treatment facility, licensed in the [facility].     Community FABY Evaluations: Clients may call their [facility] for a full list of providers - Availability and services listed belo are subject to change, please call the provider to confirm    [facility]  [phone]  [facility] [place], [place], [address]  *Please call the above number to schedule a comprehensive assessment for determination of level of care needs. In person and virtual appointments available Mon-Fri.    [facility], [facility] [address], [address] Floor, [address], [place] [address] (next to the outpatient lab)    Phone: [phone]   Provides bridging services to people with Opiate Use Disorders (OUD) seeking care. This is a front door to Medication Assisted Treatments (MAT), ages 16+  Walk In hours: Monday-Friday 9:00am-3:00pm    [facility]  [phone]  Walk in Assessments: Mon-Friday 7a-1:45p  [address], [place], [address]    [facility]  Central Access [phone]  [address] [place] [address]  [place], [place], [address]  *by appointment only    [facility]  [phone] (phone consultation available )  Locations in: [place], [place], [facility], and [place], [place]  Japanese virtual IOP [phone] or visit [email or web address]   Also offers LGBTQ programming     [facility]  [phone]  [address] [facility] [address]  [place], [place], [address]  *Currently only offered via telehealth - call to set up an appointment    [facility]  [address] [place], [place], [address]  [facility]  For more information to to make a referral call:  [phone]  Walk-in on   9-11 a.m.    [facility]  [phone]  [address] [place], [place], [address]  *available by appointments only    [facility]  [phone]  [address] [place], [place], [address]  *available by appointment only    [facility]  [phone]  [address] [place], [place], [address]  *walk in assessments available M-F starting at 7 am.    [facility]  [phone]  Locations: [facility], [facility], and [place]  *Walk in assessments availble M-F starting at 8 am -virtual only    [facility]  [phone]  [address] [place] [address]    [facility]  Virtual + Locations: [place], [place], [place], [place], [facility], [place], [place], [place]   [phone]  *available by appointment only    [facility]  [phone]  [address]  [place], [place], [address]    [facility]  [phone]  [address] [place], [place], [address]  *available by appointment    [facility]  [phone]  [address] [place], [place], [address]  *walk ins available M-TH from 9-3    [facility] program: [phone]  [address]  [place], [place], [address]    [facility]  [phone]  Same day substance use disorder assessments are available Monday - Friday, via walk-in or by appointment at the [place] location.  [address], [address], [place] [address]     [facility] - adolescent and adult SUDs services  [phone]  Offer services Monday through Friday, as well as evening hours Monday through Thursday. Normally, a first appointment will be scheduled within one week  [email or web address]  Locations all over [place]    If you are intoxicated, you may be required to detox at a detox facility before starting treatment. The following are detox facilities that you can self present to. All detox facilities are able to help you complete an assessment prior to discharge if you choose:    [facility]: Arrive at a [facility] for immediate medical evaluation    [facility]: [address] [place], [place], [address].         [phone]    [facility]: [address] [place], [place], [address]  [phone]     [facility] ([facility]): [address] [place], [place], [address]  [phone]     [facility]: [address] [place], [place], [address], [phone]         Ways to help cope with sobriety:    -- Take prescribed medicines as scheduled  -- Keep follow-up appointments  -- Talk to others about your concerns  -- Get regular exercise  -- Practice deep breathing skills  -- Eat a healthy diet  -- Use community resources, including hotline numbers, [facility] crisis and support meetings  -- Stay sober and avoid places/people/things associated with substance use  --Maintain a daily schedule/routine  --Get at least 7-8 hours of sleep per night  --Create a list 10--20 healthy activities that you can do that are enjoyable and do not involve substance use  --Create daily goals (approx. 1-4 goals) per day and work to achieve them throughout the day.       Free  Resources:    [facility] ([facility])  [facility] connects people seeking recovery to resources that help foster and sustain long-term recovery. Whether you are seeking resources for treatment, transportation, housing, job training, education, health care or other pathways to recovery, [facility] is a great place to start.  Phone: [phone]. www.[place] Home Pro.[employer] (Great listing of all types of recovery and non-recovery related resources)    Alcoholics Anonymous  Phone: 7-013-ALCOHOL  Website: HTTP://WWW.AA.ORG/  [facility] [place] (096-543-9798 or http://aaStatSocial.org)  [facility] [place] (768-077-5690 or www.aastpaul.org)     Narcotics Anonymous  Phone: 976.844.3270  Website: www.[employer]s.[employer].    [employer] [address], [place], [place],  Phone: [phone]  Drop-in Hours: Monday-Friday 9-11:30 am. By appointment at other times.  Provides: [facility] is a drop-in center on the east side [facility] that provides a safe space for individuals who are homeless and have a history of chemical use. Sobriety is not a requirement but drugs and alcohol are not allowed on the property.  Services: Non-clients can access drop-in services such as Recovery and Harm Reduction Groups, referrals to case management, community activities, shower facilities, and a pool table. Individuals who are homeless and have chemical health needs may be eligible for enrollment into [facility]'s case management program. Clients and  work together to access benefits, treatment, health care, shelter, and external housing resources.

## 2023-04-26 NOTE — PROGRESS NOTES
Care Management Discharge Note    Discharge Date: 04/26/2023       Discharge Disposition: Home Care    Discharge Services: None    Discharge DME: Walker    Discharge Transportation: family or friend will provide    Private pay costs discussed: Not applicable    PAS Confirmation Code:    Patient/family educated on Medicare website which has current facility and service quality ratings: yes (Pt declined.)    Education Provided on the Discharge Plan: Yes (Pt informed of assessment/supportive service coordination process and was encouraged to ask for clarification as needed.)  Persons Notified of Discharge Plans: patient   Patient/Family in Agreement with the Plan: unable to assess    Handoff Referral Completed: Yes    Additional Information:  See below         Sadie Clancy RN          TCU referrals pending and additional referrals sent     9:12 AM  Discussed with MD. Patient is progressing well and can likely return home with home care. [facility]. Will discuss with patient. SW to put chem dep resources in discharge paperwork.     11:24 AM  [facility] accepted for services.     1:06 PM  Home care orders faxed

## 2023-04-26 NOTE — PROGRESS NOTES
Care Management Follow Up    Length of Stay (days): 5    Expected Discharge Date: 04/26/2023     Concerns to be Addressed: care coordination/care conferences, discharge planning  Pt states willingness to go to TCU if needed.  Patient plan of care discussed at interdisciplinary rounds: Yes    Anticipated Discharge Disposition: Home Care     Anticipated Discharge Services: None  Anticipated Discharge DME: Wilian    Patient/family educated on Medicare website which has current facility and service quality ratings: yes (Pt declined.)  Education Provided on the Discharge Plan: Yes (Pt informed of assessment/supportive service coordination process and was encouraged to ask for clarification as needed.)  Patient/Family in Agreement with the Plan: unable to assess    Referrals Placed by CM/SW: Post Acute Facilities  Private pay costs discussed: Not applicable    Additional Information:  Met with pt regarding discharge plans.  Plan is now for pt to return home,  Discussed recommendation for home care and pt is in agreement.  Has no preference regarding home care agency. Discussed CD resources again.  Pt not interested in treatment at this time but is agreeable to receiving resources on discharge instructions.  Discussed with pt referral for psychotherapy for grief management.  Pt declining referral at this time.  Is not interested in any kind of psychotherapy at this time.    Nephew will transport pt at discharge.      DRAKE Young

## 2023-04-26 NOTE — PLAN OF CARE
Problem: Risk for Delirium  Goal: Improved Sleep  Outcome: Progressing   Goal Outcome Evaluation:       Patient denies wanting any medication during shift for pain. Calls appropriately. Tried to minimally disturb on shift so patient could rest as patient requested. Using bedside urinal.

## 2023-04-26 NOTE — PLAN OF CARE
Goal Outcome Evaluation: Patient adequate for discharge order in placed, PIV removed. Writer discussed AVS with Patient, noted Patient verbalized understanding, denied any further questions or concerns. Patient is all dressed up ready to go just waiting for family to pick him up.   -Rebeca SANTANA RN

## 2023-04-26 NOTE — DISCHARGE SUMMARY
[facility] MEDICINE  DISCHARGE SUMMARY     Primary Care Physician: Daniel Martinez  Admission Date: 2023   Discharge Provider: Cuca Novak Discharge Date: 2023   Diet:   Active Diet and Nourishment Order   Procedures     Snacks/Supplements Adult: Ensure Enlive; With Meals     Regular Diet Adult     Diet       Code Status: Full Code   Activity: DCACTIVITY: Activity as tolerated        Condition at Discharge: Stable     REASON FOR PRESENTATION(See Admission Note for Details)   Familia Watson is a 72 year old male who presented to the hospital for alcohol intoxication, failure to thrive, and inability to care for himself at home.     PRINCIPAL & ACTIVE DISCHARGE DIAGNOSES   Principal Problem:  Alcoholic intoxication without complication: Patient has a history of alcohol withdrawal and alcohol level was elevated on admission. He had been drinking every day since his wife  4 months ago. Was placed on CIWA protocol in the hospital, requiring multiple doses of ativan. Placed on supplemental thiamine, folate, and multivitamin. Patient not interested in referral to chemical dependency treatment or psychotherapy for grief management at this time. Plan: multivitamin, thiamine, folate, and pantoprazole at discharge, patient will receive chemical dependency resources with discharge paperwork.     Failure to thrive in adult: Patient has been living alone in an apartment since his wife passed, fell and spent 9 hours on the gorund. Evaluated by psychiatry, patient does not wish to be started on antidepressants for grief reaction. Patient does not want to go to a TCU at this time. Plan: home social work, RN, PT, and OT.     Hyponatremia: Resolved with increased oral intake. Sodium 133, recheck with PCP.     Elevated LFTs: Elevated due to chronic alcohol use, has resolved this hospital stay. Plan to recheck CMP with PCP.      Pancytopenia: Likely due to chronic  alcohol use. Plan to recheck hemogram with PCP.     Elevated CK: Initially elevated due to acute rhabdomyolysis. Improved after IVF. Plan to restart atorvastatin 40 mg.     Severe protein calorie malnutrition: Suspect that prior to arrival patient was consuming the majority of his calories from alcohol. Patient is adequately consuming calories here. Plan to encourage oral intake. Patient's nephew assists with the grocery shopping and making meals.    Sleep dysregulation: Evaluated by psychiatry, patient requested his medications to be adjusted. Started trazodone 75 mg at bedtime.     Tension headache: Patient did not experience tension headache during this admission. Continue PTA Excedrin.     Gingivitis: Patient experiencing right submandibular lymph node pain, suspect gingivitis due to poor oral hygiene. Patient sent home with chlorhexidine rinse 4 times per day. Plan to follow up with dentist.     Neuropathy: Patient has neuropathy at baseline. Continue PTA gabapentin.     PENDING LABS     Unresulted Labs Ordered in the Past 30 Days of this Admission     No orders found from 3/20/2023 to 2023.            PROCEDURES ( this hospitalization only)          RECOMMENDATIONS TO OUTPATIENT PROVIDER FOR F/U VISIT     Follow-up Appointments     Follow-up and recommended labs and tests       Follow up with primary care provider, Daniel Martinez, within 7 days to   evaluate medication change and for hospital follow- up.  The following   labs/tests are recommended: Comprehensive metabolic and hemogram.           DISPOSITION     Home    SUMMARY OF HOSPITAL COURSE:    Familia Watson presented to the hospital for evaluation of alcohol intoxication and failure to thrive on . Patient has a history of alcohol withdrawal and had been drinking since his wife  in December. Placed on CIWA protocol, patients CIWA scores initially high 11-12. Patient had withdrawal symptoms of tremors and hallucinations. Given multiple  doses of PO and IV ativan. Continued to improve throughout his stay and discontinued from CIWA protocol on 4/25 after a episode of hypotension due to clonidine. Patient initially had mild acute rhabdomyolysis due to alcohol intoxication and laying on the floor for 9 hours. CK gradually decreased with IVF. The patients hyponatremia gradually improved with increased oral intake. Initially the patient had elevated LFTs, suspect this was due to chronic alcohol use. LFTs continued to decrease throughout hospitalization. Diagnosed with protein calorie malnutrition due to decreased oral intake. A referral to psychiatry was placed, diagnosed with adjustment disorder with depression and anxiety. Patient did not want to start antidepressants. Placed on trazodone to help with sleep dysregulation. Referral to PT and OT initially recommended TCU however the patient continue to gain strength and did not want to go to a TCU.4/26 patient was started on chlorhexidine for gingivitis. Discussed chemical dependency referral with the patient, he was not interested. The patient will be discharged with home care, PT, OT, and chemical dependency resources.     Discharge Medications with Med changes:     Current Discharge Medication List      START taking these medications    Details   chlorhexidine (PERIDEX) 0.12 % solution Swish and spit 15 mLs in mouth 4 times daily  Qty: 473 mL, Refills: 0    Associated Diagnoses: Gingivitis      folic acid (FOLVITE) 1 MG tablet Take 1 tablet (1 mg) by mouth daily  Qty: 30 tablet, Refills: 0    Associated Diagnoses: Alcoholic intoxication without complication (H)      multivitamin w/minerals (THERA-VIT-M) tablet Take 1 tablet by mouth daily  Qty: 100 tablet, Refills: 0    Associated Diagnoses: Alcoholic intoxication without complication (H)      pantoprazole (PROTONIX) 40 MG EC tablet Take 1 tablet (40 mg) by mouth every morning (before breakfast)  Qty: 30 tablet, Refills: 0    Associated Diagnoses:  Alcoholic intoxication without complication (H)      traZODone (DESYREL) 150 MG tablet Take 0.5 tablets (75 mg) by mouth At Bedtime  Qty: 15 tablet, Refills: 0    Associated Diagnoses: Alcoholic intoxication without complication (H)         CONTINUE these medications which have NOT CHANGED    Details   acetaminophen (TYLENOL) 325 MG tablet Take 650 mg by mouth every 4 hours as needed for mild pain or fever Not to exceed 3 grams in 24 hours.      aspirin-acetaminophen-caffeine (EXCEDRIN MIGRAINE) 250-250-65 MG tablet Take 1 tablet by mouth daily as needed for headaches  Qty: 7 tablet, Refills: 0    Associated Diagnoses: Tension headache      atorvastatin (LIPITOR) 40 MG tablet Take 40 mg by mouth At Bedtime      cyanocobalamin (VITAMIN B-12) 1000 MCG tablet Take 1,000 mcg by mouth daily      gabapentin (NEURONTIN) 300 MG capsule Take 1 capsule (300 mg) by mouth 3 times daily    Associated Diagnoses: Neuropathy      magnesium oxide (MAG-OX) 400 MG tablet Take 400 mg by mouth daily      thiamine (B-1) 100 MG tablet Take 1 tablet (100 mg) by mouth daily  Qty: 10 tablet, Refills: 0    Associated Diagnoses: Alcohol dependence, uncomplicated (H)      Vitamin D3 (CHOLECALCIFEROL) 25 mcg (1000 units) tablet Take 1 tablet by mouth daily         STOP taking these medications       hydrOXYzine (VISTARIL) 50 MG capsule Comments:   Reason for Stopping:                     Rationale for medication changes:    Chlorhexidine: started due to gingivitis   Pantoprazole: gastro protection due to chronic alcohol use   Multivitamin: started due to chronic alcohol use   Folic acid: started due to chronic alcohol use   Trazodone: started due to sleep dysregulation       Consults     CARE MANAGEMENT / SOCIAL WORK IP CONSULT  PHYSICAL THERAPY ADULT IP CONSULT  OCCUPATIONAL THERAPY ADULT IP CONSULT  CARE MANAGEMENT / SOCIAL WORK IP CONSULT  PSYCHIATRY IP CONSULT  CARE MANAGEMENT / SOCIAL WORK IP CONSULT  OCCUPATIONAL THERAPY ADULT IP  CONSULT  PSYCHIATRY IP CONSULT  OCCUPATIONAL THERAPY ADULT IP CONSULT    Immunizations given this encounter     Most Recent Immunizations   Administered Date(s) Administered     COVID-19 Vaccine 18+ (Moderna) 05/22/2021     COVID-19 Vaccine Bivalent Booster 18+ (Moderna) 12/09/2022     Influenza (intradermal) 10/11/2017     Pneumo Conj 13-V (2010&after) 10/11/2017     Pneumococcal 23 valent 07/03/2019     TDAP Vaccine (Boostrix) 12/18/2009           Anticoagulation Information      Recent INR results: No results for input(s): INR in the last 168 hours.      SIGNIFICANT IMAGING FINDINGS     Results for orders placed or performed during the hospital encounter of 04/19/23   Head CT w/o contrast    Impression    IMPRESSION:  HEAD CT:  No acute intracranial process.    CERVICAL SPINE CT:  1.  No CT evidence for acute fracture or post traumatic subluxation.  2.  Fracture at the anterior inferior left aspect of C5. This was likely present on the prior exam as well. Cortication of fracture fragments suggests this is a chronic fracture.     Cervical spine CT w/o contrast    Impression    IMPRESSION:  HEAD CT:  No acute intracranial process.    CERVICAL SPINE CT:  1.  No CT evidence for acute fracture or post traumatic subluxation.  2.  Fracture at the anterior inferior left aspect of C5. This was likely present on the prior exam as well. Cortication of fracture fragments suggests this is a chronic fracture.     XR Abdomen Port 1 View    Impression    IMPRESSION: Lung bases are clear. Bowel gas pattern is unremarkable. No abnormally dilated loops of bowel. Mild stool burden. No suspicious calcifications.   CT Head w/o Contrast    Impression    IMPRESSION:  1.  No acute intracranial injury, hemorrhage, mass, or CT evidence of recent ischemia.       SIGNIFICANT LABORATORY FINDINGS     Most Recent 3 CBC's:Recent Labs   Lab Test 04/23/23  0912 04/21/23  0758 04/20/23  0607   WBC 4.6 3.1* 3.3*   HGB 11.2* 9.7* 9.7*    104*  98   * 150 163     Most Recent 3 BMP's:Recent Labs   Lab Test 23  0948 23  1020 23  1451   * 133* 131*   POTASSIUM 4.5 4.1 4.5   CHLORIDE 100 106 100   CO2 24 19* 22   BUN 17 14 19   CR 0.59* 0.52* 0.69*   ANIONGAP 9 8 9   MELLISA 8.3* 7.3* 8.8   GLC 94 87 133*     Most Recent 2 LFT's:Recent Labs   Lab Test 23  1020 23  0758   AST 57* 176*   ALT 62* 107*   ALKPHOS 135* 114   BILITOTAL 0.6 1.2*         Discharge Orders        Primary Care - Care Coordination Referral      Home Care Referral      Home Care Referral      Reason for your hospital stay    Familia Watson is a 72 year old male with a past medical history of subdural hematoma with traumatic brain injury at 39 years of age, alcohol abuse and his wife  4 months ago who was admitted on 2023 for escalation of alcohol use for the past 4 months and alcohol withdrawal with visual hallucinations. Hospital course is notable for patient being disheveled and diagnosed with protein-calorie malnutrition.  Also mood disorder with component of anxiety, depression and insomnia.  Now seen by psychiatry they have increased his trazodone..  Overall he is much better but slept poorly last night.  Eager to get home.  Does not want a TCU.  Bad oral hygiene for which we started chlorhexidine.  He should follow-up with primary within the week.  We will get home care including PT, OT and social work and given resources to help with alcohol cessation.     Follow-up and recommended labs and tests     Follow up with primary care provider, Daniel Martinez, within 7 days to evaluate medication change and for hospital follow- up.  The following labs/tests are recommended: Comprehensive metabolic and hemogram.     Activity    Your activity upon discharge: activity as tolerated     Diet    Follow this diet upon discharge: Orders Placed This Encounter      Snacks/Supplements Adult: Ensure Enlive; With Meals      Regular Diet Adult        Examination   Physical Exam   Temp:  [98.1  F (36.7  C)-98.7  F (37.1  C)] 98.4  F (36.9  C)  Pulse:  [71-85] 85  Resp:  [16] 16  BP: ()/(63-66) 97/66  SpO2:  [98 %-99 %] 98 %  Wt Readings from Last 1 Encounters:   04/26/23 54.9 kg (121 lb 1.6 oz)     General Appearance: Alert, comfortable sitting upright in a chair in no acute distress. Resting tremor.  HEENT: Rampant decay of teeth. Mildly tender, enlarged left submandibular node. The pharynx is normal in appearance without tonsillar swelling or exudates.    Respiratory: Non labored breathing on room air. No wheezes, rales, or rhonchi.   Cardiovascular: Regular rate and rhythm. No murmurs, rubs, or gallops.   GI: Soft, non tender. No mass or organomegaly.   Skin: Abrasions to bilateral knees. Granulation tissue present, meplex dressings in place. No erythema or warmth.     Please see EMR for more detailed significant labs, imaging, consultant notes etc.    Cuca Novak PA-S2  [facility] patient seen and examined    Patient seen and examined  Patient discussed with physician assistant student  Agree with assessment and plan as outlined above    Roberto Yang MD  [facility] Medicine Service    CC:Daniel Martinez

## 2023-04-26 NOTE — PLAN OF CARE
Occupational Therapy Discharge Summary    Reason for therapy discharge:    Discharged to home with home therapy.    Progress towards therapy goal(s). See goals on Care Plan in [facility] electronic health record for goal details.  Goals partially met.  Barriers to achieving goals:   discharge from facility.    Therapy recommendation(s):    Continued therapy is recommended.  Rationale/Recommendations:  home care to facilitate safe transition home w/ all ADLs.

## 2023-04-26 NOTE — PROGRESS NOTES
Physical Therapy Discharge Summary    Reason for therapy discharge:    Discharged to home with home therapy.    Progress towards therapy goal(s). See goals on Care Plan in [facility] electronic health record for goal details.  Goals not met.  Barriers to achieving goals:   discharge from facility.    Therapy recommendation(s):    Continued therapy is recommended.  Rationale/Recommendations:  improve LE strength.

## 2023-04-26 NOTE — PLAN OF CARE
Problem: Plan of Care - These are the overarching goals to be used throughout the patient stay.    Goal: Optimal Comfort and Wellbeing  Outcome: Progressing   Goal Outcome Evaluation: Full assessment was done by Akanksha WING until writer took over the shift at 11:38AM. Patient re-assessed noted A&Ox4, sitting on the chair ate 100% lunch, just offered a fresh cup of coffee by writer, denied pain at this time. Chlorhexidine given for oral care. Patient denied any immediate needs or concerns and told writer cannot wait to go home. Plans to discharge today to home with home care. Patient's family to transport. -Rebeca SANTANA RN

## 2023-04-27 ENCOUNTER — HOSPITAL ENCOUNTER (INPATIENT)
Facility: CLINIC | Age: 73
LOS: 9 days | Discharge: NURSING, REHAB OR LONG-TERM CARE | DRG: 896 | End: 2023-05-08
Attending: EMERGENCY MEDICINE | Admitting: INTERNAL MEDICINE
Payer: MEDICARE

## 2023-04-27 DIAGNOSIS — F10.982 ALCOHOL-INDUCED INSOMNIA (H): Primary | ICD-10-CM

## 2023-04-27 DIAGNOSIS — R62.7 FAILURE TO THRIVE IN ADULT: ICD-10-CM

## 2023-04-27 DIAGNOSIS — F10.229 ALCOHOL DEPENDENCE WITH INTOXICATION WITH COMPLICATION (H): ICD-10-CM

## 2023-04-27 DIAGNOSIS — Z72.0 TOBACCO ABUSE: ICD-10-CM

## 2023-04-27 DIAGNOSIS — J42 CHRONIC BRONCHITIS, UNSPECIFIED CHRONIC BRONCHITIS TYPE (H): ICD-10-CM

## 2023-04-27 DIAGNOSIS — R07.89 CHEST WALL PAIN: ICD-10-CM

## 2023-04-27 LAB
ALBUMIN SERPL-MCNC: 3.1 G/DL (ref 3.5–5)
ALBUMIN UR-MCNC: NEGATIVE MG/DL
ALP SERPL-CCNC: 133 U/L (ref 45–120)
ALT SERPL W P-5'-P-CCNC: 61 U/L (ref 0–45)
ANION GAP SERPL CALCULATED.3IONS-SCNC: 11 MMOL/L (ref 5–18)
APPEARANCE UR: CLEAR
AST SERPL W P-5'-P-CCNC: 64 U/L (ref 0–40)
BASOPHILS # BLD AUTO: 0 10E3/UL (ref 0–0.2)
BASOPHILS NFR BLD AUTO: 1 %
BILIRUB DIRECT SERPL-MCNC: 0.2 MG/DL
BILIRUB SERPL-MCNC: 0.4 MG/DL (ref 0–1)
BILIRUB UR QL STRIP: NEGATIVE
BUN SERPL-MCNC: 10 MG/DL (ref 8–28)
CALCIUM SERPL-MCNC: 8.8 MG/DL (ref 8.5–10.5)
CHLORIDE BLD-SCNC: 103 MMOL/L (ref 98–107)
CO2 SERPL-SCNC: 22 MMOL/L (ref 22–31)
COLOR UR AUTO: ABNORMAL
CREAT SERPL-MCNC: 0.54 MG/DL (ref 0.7–1.3)
EOSINOPHIL # BLD AUTO: 0.1 10E3/UL (ref 0–0.7)
EOSINOPHIL NFR BLD AUTO: 2 %
ERYTHROCYTE [DISTWIDTH] IN BLOOD BY AUTOMATED COUNT: 14.6 % (ref 10–15)
ETHANOL SERPL-MCNC: 217 MG/DL
GFR SERPL CREATININE-BSD FRML MDRD: >90 ML/MIN/1.73M2
GLUCOSE BLD-MCNC: 91 MG/DL (ref 70–125)
GLUCOSE UR STRIP-MCNC: NEGATIVE MG/DL
HCT VFR BLD AUTO: 30.2 % (ref 40–53)
HGB BLD-MCNC: 10.2 G/DL (ref 13.3–17.7)
HGB UR QL STRIP: NEGATIVE
IMM GRANULOCYTES # BLD: 0.2 10E3/UL
IMM GRANULOCYTES NFR BLD: 3 %
KETONES UR STRIP-MCNC: NEGATIVE MG/DL
LEUKOCYTE ESTERASE UR QL STRIP: NEGATIVE
LYMPHOCYTES # BLD AUTO: 1.4 10E3/UL (ref 0.8–5.3)
LYMPHOCYTES NFR BLD AUTO: 29 %
MAGNESIUM SERPL-MCNC: 1.8 MG/DL (ref 1.8–2.6)
MAGNESIUM SERPL-MCNC: 1.8 MG/DL (ref 1.8–2.6)
MCH RBC QN AUTO: 33.9 PG (ref 26.5–33)
MCHC RBC AUTO-ENTMCNC: 33.8 G/DL (ref 31.5–36.5)
MCV RBC AUTO: 100 FL (ref 78–100)
MONOCYTES # BLD AUTO: 0.7 10E3/UL (ref 0–1.3)
MONOCYTES NFR BLD AUTO: 14 %
MUCOUS THREADS #/AREA URNS LPF: PRESENT /LPF
NEUTROPHILS # BLD AUTO: 2.5 10E3/UL (ref 1.6–8.3)
NEUTROPHILS NFR BLD AUTO: 51 %
NITRATE UR QL: NEGATIVE
NRBC # BLD AUTO: 0 10E3/UL
NRBC BLD AUTO-RTO: 0 /100
PH UR STRIP: 5.5 [PH] (ref 5–7)
PHOSPHATE SERPL-MCNC: 3.6 MG/DL (ref 2.5–4.5)
PLATELET # BLD AUTO: 244 10E3/UL (ref 150–450)
POTASSIUM BLD-SCNC: 4.2 MMOL/L (ref 3.5–5)
PROT SERPL-MCNC: 6.5 G/DL (ref 6–8)
RBC # BLD AUTO: 3.01 10E6/UL (ref 4.4–5.9)
RBC URINE: 0 /HPF
SODIUM SERPL-SCNC: 136 MMOL/L (ref 136–145)
SP GR UR STRIP: 1.01 (ref 1–1.03)
UROBILINOGEN UR STRIP-MCNC: <2 MG/DL
WBC # BLD AUTO: 5 10E3/UL (ref 4–11)
WBC URINE: <1 /HPF

## 2023-04-27 PROCEDURE — G0378 HOSPITAL OBSERVATION PER HR: HCPCS

## 2023-04-27 PROCEDURE — 36415 COLL VENOUS BLD VENIPUNCTURE: CPT | Performed by: INTERNAL MEDICINE

## 2023-04-27 PROCEDURE — 250N000013 HC RX MED GY IP 250 OP 250 PS 637: Performed by: INTERNAL MEDICINE

## 2023-04-27 PROCEDURE — 85014 HEMATOCRIT: CPT | Performed by: EMERGENCY MEDICINE

## 2023-04-27 PROCEDURE — 83735 ASSAY OF MAGNESIUM: CPT | Performed by: EMERGENCY MEDICINE

## 2023-04-27 PROCEDURE — 36415 COLL VENOUS BLD VENIPUNCTURE: CPT | Performed by: EMERGENCY MEDICINE

## 2023-04-27 PROCEDURE — 84100 ASSAY OF PHOSPHORUS: CPT | Performed by: INTERNAL MEDICINE

## 2023-04-27 PROCEDURE — HZ2ZZZZ DETOXIFICATION SERVICES FOR SUBSTANCE ABUSE TREATMENT: ICD-10-PCS | Performed by: EMERGENCY MEDICINE

## 2023-04-27 PROCEDURE — 81001 URINALYSIS AUTO W/SCOPE: CPT | Performed by: EMERGENCY MEDICINE

## 2023-04-27 PROCEDURE — 82248 BILIRUBIN DIRECT: CPT | Performed by: EMERGENCY MEDICINE

## 2023-04-27 PROCEDURE — 99222 1ST HOSP IP/OBS MODERATE 55: CPT | Performed by: INTERNAL MEDICINE

## 2023-04-27 PROCEDURE — 82310 ASSAY OF CALCIUM: CPT | Performed by: EMERGENCY MEDICINE

## 2023-04-27 PROCEDURE — 82077 ASSAY SPEC XCP UR&BREATH IA: CPT | Performed by: EMERGENCY MEDICINE

## 2023-04-27 PROCEDURE — 83735 ASSAY OF MAGNESIUM: CPT | Performed by: INTERNAL MEDICINE

## 2023-04-27 PROCEDURE — 99285 EMERGENCY DEPT VISIT HI MDM: CPT | Mod: 25

## 2023-04-27 RX ORDER — FOLIC ACID 1 MG/1
1 TABLET ORAL DAILY
Status: DISCONTINUED | OUTPATIENT
Start: 2023-04-27 | End: 2023-04-27

## 2023-04-27 RX ORDER — NICOTINE 21 MG/24HR
1 PATCH, TRANSDERMAL 24 HOURS TRANSDERMAL DAILY
Status: DISCONTINUED | OUTPATIENT
Start: 2023-04-27 | End: 2023-05-08 | Stop reason: HOSPADM

## 2023-04-27 RX ORDER — PANTOPRAZOLE SODIUM 40 MG/1
40 TABLET, DELAYED RELEASE ORAL
Status: DISCONTINUED | OUTPATIENT
Start: 2023-04-27 | End: 2023-04-30

## 2023-04-27 RX ORDER — HALOPERIDOL 5 MG/ML
2.5-5 INJECTION INTRAMUSCULAR EVERY 6 HOURS PRN
Status: DISCONTINUED | OUTPATIENT
Start: 2023-04-27 | End: 2023-05-08 | Stop reason: HOSPADM

## 2023-04-27 RX ORDER — FOLIC ACID 1 MG/1
1 TABLET ORAL DAILY
Status: DISCONTINUED | OUTPATIENT
Start: 2023-04-27 | End: 2023-05-08 | Stop reason: HOSPADM

## 2023-04-27 RX ORDER — FLUMAZENIL 0.1 MG/ML
0.2 INJECTION, SOLUTION INTRAVENOUS
Status: DISCONTINUED | OUTPATIENT
Start: 2023-04-27 | End: 2023-04-30

## 2023-04-27 RX ORDER — GABAPENTIN 300 MG/1
300 CAPSULE ORAL 3 TIMES DAILY
Status: DISCONTINUED | OUTPATIENT
Start: 2023-04-27 | End: 2023-05-01

## 2023-04-27 RX ORDER — DIAZEPAM 10 MG/2ML
5-10 INJECTION, SOLUTION INTRAMUSCULAR; INTRAVENOUS EVERY 30 MIN PRN
Status: DISCONTINUED | OUTPATIENT
Start: 2023-04-27 | End: 2023-05-02

## 2023-04-27 RX ORDER — MULTIPLE VITAMINS W/ MINERALS TAB 9MG-400MCG
1 TAB ORAL DAILY
Status: DISCONTINUED | OUTPATIENT
Start: 2023-04-27 | End: 2023-04-28

## 2023-04-27 RX ORDER — LIDOCAINE 40 MG/G
CREAM TOPICAL
Status: DISCONTINUED | OUTPATIENT
Start: 2023-04-27 | End: 2023-05-08 | Stop reason: HOSPADM

## 2023-04-27 RX ORDER — CLONIDINE HYDROCHLORIDE 0.1 MG/1
0.1 TABLET ORAL EVERY 8 HOURS
Status: DISCONTINUED | OUTPATIENT
Start: 2023-04-27 | End: 2023-04-29

## 2023-04-27 RX ORDER — MAGNESIUM OXIDE 400 MG/1
400 TABLET ORAL DAILY
Status: DISCONTINUED | OUTPATIENT
Start: 2023-04-27 | End: 2023-05-08 | Stop reason: HOSPADM

## 2023-04-27 RX ORDER — ACETAMINOPHEN 325 MG/1
650 TABLET ORAL EVERY 4 HOURS PRN
Status: DISCONTINUED | OUTPATIENT
Start: 2023-04-27 | End: 2023-05-08 | Stop reason: HOSPADM

## 2023-04-27 RX ORDER — DIAZEPAM 5 MG
10 TABLET ORAL EVERY 30 MIN PRN
Status: DISCONTINUED | OUTPATIENT
Start: 2023-04-27 | End: 2023-05-02

## 2023-04-27 RX ORDER — MULTIPLE VITAMINS W/ MINERALS TAB 9MG-400MCG
1 TAB ORAL DAILY
Status: DISCONTINUED | OUTPATIENT
Start: 2023-04-27 | End: 2023-05-08 | Stop reason: HOSPADM

## 2023-04-27 RX ORDER — VITAMIN B COMPLEX
25 TABLET ORAL DAILY
Status: DISCONTINUED | OUTPATIENT
Start: 2023-04-27 | End: 2023-05-08 | Stop reason: HOSPADM

## 2023-04-27 RX ORDER — LANOLIN ALCOHOL/MO/W.PET/CERES
1000 CREAM (GRAM) TOPICAL DAILY
Status: DISCONTINUED | OUTPATIENT
Start: 2023-04-27 | End: 2023-05-04

## 2023-04-27 RX ORDER — ATORVASTATIN CALCIUM 40 MG/1
40 TABLET, FILM COATED ORAL AT BEDTIME
Status: DISCONTINUED | OUTPATIENT
Start: 2023-04-27 | End: 2023-05-08 | Stop reason: HOSPADM

## 2023-04-27 RX ADMIN — ATORVASTATIN CALCIUM 40 MG: 40 TABLET, FILM COATED ORAL at 21:45

## 2023-04-27 RX ADMIN — NICOTINE 1 PATCH: 14 PATCH, EXTENDED RELEASE TRANSDERMAL at 12:53

## 2023-04-27 RX ADMIN — Medication 400 MG: at 16:17

## 2023-04-27 RX ADMIN — Medication 25 MCG: at 16:17

## 2023-04-27 RX ADMIN — MULTIPLE VITAMINS W/ MINERALS TAB 1 TABLET: TAB at 09:18

## 2023-04-27 RX ADMIN — DIAZEPAM 10 MG: 5 TABLET ORAL at 19:00

## 2023-04-27 RX ADMIN — DIAZEPAM 10 MG: 5 TABLET ORAL at 16:53

## 2023-04-27 RX ADMIN — CLONIDINE HYDROCHLORIDE 0.1 MG: 0.1 TABLET ORAL at 09:19

## 2023-04-27 RX ADMIN — PANTOPRAZOLE SODIUM 40 MG: 40 TABLET, DELAYED RELEASE ORAL at 16:17

## 2023-04-27 RX ADMIN — GABAPENTIN 300 MG: 300 CAPSULE ORAL at 16:17

## 2023-04-27 RX ADMIN — Medication 100 MG: at 09:18

## 2023-04-27 RX ADMIN — FOLIC ACID 1 MG: 1 TABLET ORAL at 09:18

## 2023-04-27 RX ADMIN — TRAZODONE HYDROCHLORIDE 75 MG: 150 TABLET ORAL at 21:45

## 2023-04-27 RX ADMIN — MULTIPLE VITAMINS W/ MINERALS TAB 1 TABLET: TAB at 16:16

## 2023-04-27 RX ADMIN — DIAZEPAM 10 MG: 5 TABLET ORAL at 20:09

## 2023-04-27 RX ADMIN — CLONIDINE HYDROCHLORIDE 0.1 MG: 0.1 TABLET ORAL at 16:16

## 2023-04-27 RX ADMIN — CYANOCOBALAMIN TAB 1000 MCG 1000 MCG: 1000 TAB at 16:17

## 2023-04-27 RX ADMIN — ACETAMINOPHEN 650 MG: 325 TABLET ORAL at 16:53

## 2023-04-27 RX ADMIN — Medication 100 MG: at 16:17

## 2023-04-27 RX ADMIN — GABAPENTIN 300 MG: 300 CAPSULE ORAL at 19:59

## 2023-04-27 ASSESSMENT — ACTIVITIES OF DAILY LIVING (ADL)
ADLS_ACUITY_SCORE: 41
ADLS_ACUITY_SCORE: 35
ADLS_ACUITY_SCORE: 35
ADLS_ACUITY_SCORE: 37
ADLS_ACUITY_SCORE: 41
ADLS_ACUITY_SCORE: 35
ADLS_ACUITY_SCORE: 39
ADLS_ACUITY_SCORE: 41
ADLS_ACUITY_SCORE: 35

## 2023-04-27 NOTE — ED PROVIDER NOTES
Emergency Department Encounter     Evaluation Date & Time:   4/27/2023  6:18 AM    CHIEF COMPLAINT:  Alcohol Intoxication      Triage Note:  Patient arrives to ER with alcohol intoxication and inability to care for self.  Patient falling asleep during triage.  Denies pain.             ED COURSE & MEDICAL DECISION MAKING:     ED Course as of 04/27/23 0758   Thu Apr 27, 2023   0712 Chronic anemia similar to baseline.  WBC wnl.     0730 Chemistry reassuring.  Alcohol level 217, consistent with known drinking.  Will hospitalize for failure to thrive.     Pt with a history of etoh abuse, failure to thrive, just admitted to hospital for previous week for same and discharged yesterday after they recommended TCU admission. Pt refused and was discharged to home.  Pt having difficulty again caring for himself at home, has nephew to help with getting groceries, but that's about it.  Pt again called 911 to be brought in as a result. Denies any acute complaints, including no cp/sob, new falls or injuries.  Pt neuro intact, AOx3 here.  I spoke with pt's nephew and primary contact, Jose.  He confirms that they cannot care for him and he needs TCU.  Will inform pt again of this, check basic labs and readmit to hospital.    6:48 AM I met with the patient, obtained history, performed an initial exam, and discussed options and plan for diagnostics and treatment here in the ED.  6:49 AM I spoke with patient's nephew over the phone.  7:05 AM I rechecked and updated patient.  7:42 AM I discussed case with Dr. Otto, hospitalist, who accepts the patient for admission.      Medical Decision Making    History:    Supplemental history from: Documented in chart, if applicable and Family Member/Significant Other    External Record(s) reviewed: Documented in chart, if applicable. and Inpatient Record: Admission at [facility] from 4/19/23-4/26/23    Work Up:    Chart documentation includes differential considered and any EKGs or  imaging independently interpreted by provider, where specified.    In additional to work up documented, I considered the following work up: Documented in chart, if applicable.    External consultation:    Discussion of management with another provider: Documented in chart, if applicable    Complicating factors:    Care impacted by chronic illness: Mental Health and Other: substance abuse/dependence    Care affected by social determinants of health: Alcohol Abuse and/or Recreational Drug Use    Disposition considerations: Admit.      At the conclusion of the encounter I discussed the results of all the tests and the disposition. The questions were answered. The patient or family acknowledged understanding and was agreeable with the care plan.      MEDICATIONS GIVEN IN THE EMERGENCY DEPARTMENT:  Medications - No data to display    NEW PRESCRIPTIONS STARTED AT TODAY'S ED VISIT:  New Prescriptions    No medications on file       HPI   HPI     Familia Watson is a 72 year old male with a pertinent history of HTN, yperlipidemia, alcohol dependence, combinations of drug dependence excluding opioid type dependence, who presents to this ED via EMS for evaluation of alcohol intoxication.    HPI Limited. Patient is a poor historian.    Patient reports that he was discharged home from the hospital recently. After he was discharged, he went home and started drinking alcohol. Today, he was feeling weak and was unable to dress himself. Patient states that he called EMS afterwards but notes that EMS did not show up. His nephew called EMS afterwards and patient was subsequently brought into the ED. Otherwise, he denies any abdominal pin, chest pain, shortness of breath, and recent falls. Patient is not on blood thinners.     Per chart review, patient was seen at [facility] on 4/19/23-4/26/23 for alcohol intoxication without complication and failure to thrive. Patient with history of alcohol withdrawal. Alcohol level was  elevated on admission. He had been drinking everyday since his wife passed for months ago. He fell down and spent nine hours on the ground. Patient was placed on CIWA protocol, requiring multiple doses of Ativan. He was placed on supplemental thiamine, folate, and, multivitamins. Patient not interested in referral to chemical dependency treatment or psychotherapy for grief management at this time. He was evaluated by psychiatry. Patient does not wish to be started on antidepressants for grief reaction. Patient is declining TC at this time. Patient was discharged home with home social work, RN, PT, and OT, in addition to multivitamin, thiamine, folate, and pantoprazole. Patient sent home with chemical dependency resources with discharge paperwork.     REVIEW OF SYSTEMS:  See HPI    Medical History     Past Medical History:   Diagnosis Date     Alcohol abuse      Emphysema lung (H) 05/12/2021     Hyperlipidemia      Hypertension      Migraine      Subdural hematoma (H) 1989       Past Surgical History:   Procedure Laterality Date     CATARACT EXTRACTION Left      LEFT VITRECTOMY POSTERIOR 25 GAUGE SYSTEM, MEMBRANE PEEL, AIR FLUID EXCHANGE  Left 08/31/2016     SUBDURAL HEMATOMA EVACUATION VIA CRANIOTOMY  1989       Family History   Problem Relation Age of Onset     No Known Problems Mother      Alcoholism Father 40     Lung Cancer Sister      No Known Problems Sister      Alcoholism Brother 45       Social History     Tobacco Use     Smoking status: Every Day     Packs/day: 1.00     Years: 57.00     Pack years: 57.00     Types: Cigarettes     Smokeless tobacco: Never   Substance Use Topics     Alcohol use: Not Currently     Comment: Np alcohol since October 2021.     Drug use: Never       acetaminophen (TYLENOL) 325 MG tablet  aspirin-acetaminophen-caffeine (EXCEDRIN MIGRAINE) 250-250-65 MG tablet  atorvastatin (LIPITOR) 40 MG tablet  chlorhexidine (PERIDEX) 0.12 % solution  cyanocobalamin (VITAMIN B-12) 1000 MCG  "tablet  folic acid (FOLVITE) 1 MG tablet  gabapentin (NEURONTIN) 300 MG capsule  magnesium oxide (MAG-OX) 400 MG tablet  multivitamin w/minerals (THERA-VIT-M) tablet  pantoprazole (PROTONIX) 40 MG EC tablet  thiamine (B-1) 100 MG tablet  traZODone (DESYREL) 150 MG tablet  Vitamin D3 (CHOLECALCIFEROL) 25 mcg (1000 units) tablet        Physical Exam     Vitals:  /61   Pulse 81   Temp 97.5  F (36.4  C) (Oral)   Resp 15   Ht 1.702 m (5' 7\")   Wt 54.4 kg (120 lb)   SpO2 97%   BMI 18.79 kg/m      PHYSICAL EXAM:   Physical Exam  Vitals and nursing note reviewed.   Constitutional:       General: He is not in acute distress.     Comments: Disheveled   HENT:      Head: Normocephalic and atraumatic.      Nose: Nose normal.      Mouth/Throat:      Mouth: Mucous membranes are moist.   Eyes:      Pupils: Pupils are equal, round, and reactive to light.   Cardiovascular:      Rate and Rhythm: Normal rate and regular rhythm.      Pulses: Normal pulses.           Radial pulses are 2+ on the right side and 2+ on the left side.        Dorsalis pedis pulses are 2+ on the right side and 2+ on the left side.   Pulmonary:      Effort: Pulmonary effort is normal. No respiratory distress.      Breath sounds: Normal breath sounds.   Abdominal:      Palpations: Abdomen is soft.      Tenderness: There is no abdominal tenderness.   Musculoskeletal:      Cervical back: Full passive range of motion without pain and neck supple.      Comments: No calf tenderness or swelling b/l   Skin:     General: Skin is warm.      Findings: Abrasion (Older appearing abrasion to bilateral lower legs) present. No rash.   Neurological:      General: No focal deficit present.      Mental Status: He is alert. Mental status is at baseline.      Comments: Fluent speech, no acute lateralizing deficits   Psychiatric:         Mood and Affect: Mood normal.         Behavior: Behavior normal.         Results     LAB:  All pertinent labs reviewed and " interpreted  Labs Ordered and Resulted from Time of ED Arrival to Time of ED Departure   BASIC METABOLIC PANEL - Abnormal       Result Value    Sodium 136      Potassium 4.2      Chloride 103      Carbon Dioxide (CO2) 22      Anion Gap 11      Urea Nitrogen 10      Creatinine 0.54 (*)     Calcium 8.8      Glucose 91      GFR Estimate >90     ETHYL ALCOHOL LEVEL - Abnormal    Alcohol, Blood 217 (*)    HEPATIC FUNCTION PANEL - Abnormal    Bilirubin Total 0.4      Bilirubin Direct 0.2      Protein Total 6.5      Albumin 3.1 (*)     Alkaline Phosphatase 133 (*)     AST 64 (*)     ALT 61 (*)    CBC WITH PLATELETS AND DIFFERENTIAL - Abnormal    WBC Count 5.0      RBC Count 3.01 (*)     Hemoglobin 10.2 (*)     Hematocrit 30.2 (*)           MCH 33.9 (*)     MCHC 33.8      RDW 14.6      Platelet Count 244      % Neutrophils 51      % Lymphocytes 29      % Monocytes 14      % Eosinophils 2      % Basophils 1      % Immature Granulocytes 3      NRBCs per 100 WBC 0      Absolute Neutrophils 2.5      Absolute Lymphocytes 1.4      Absolute Monocytes 0.7      Absolute Eosinophils 0.1      Absolute Basophils 0.0      Absolute Immature Granulocytes 0.2      Absolute NRBCs 0.0     MAGNESIUM - Normal    Magnesium 1.8     ROUTINE UA WITH MICROSCOPIC REFLEX TO CULTURE       RADIOLOGY:  No orders to display                  PROCEDURES:  Procedures:        FINAL IMPRESSION:    ICD-10-CM    1. Failure to thrive in adult  R62.7       2. Alcohol dependence with intoxication with complication (H)  F10.229           0 minutes of critical care time      I, Yecenia Rust, am serving as a scribe to document services personally performed by Dr. Jerry Arias, based on my observations and the provider's statements to me. I, Jerry Arias, DO attest that Yecenia Rust is acting in a scribe capacity, has observed my performance of the services and has documented them in accordance with my direction.      Jerry Arias, DO  Emergency Medicine  [facility]  [facility] EMERGENCY ROOM  4/27/2023  6:48 AM        Jerry Arias MD  04/27/23 0758

## 2023-04-27 NOTE — ED NOTES
Patient out of bed at room doorway with unsteady gait stating he wants to go home. Patient requesting that nephew Jose come and pick him up. Spoke with ER MD Dr. Arias who just spoke with Jose who states patient needs to be seen in ER since they are unable to care for him. Dr. Arias at bedside talking to patient.

## 2023-04-27 NOTE — CONSULTS
Care Management Initial Consult      General Information  Assessment completed with: Patient, VM-chart review (Recent admit.),    Type of CM/SW Visit: Initial Assessment  Primary Care Provider verified and updated as needed: Yes   Readmission within the last 30 days: previous discharge plan unsuccessful   Return Category: Exacerbation of disease  Reason for Consult: discharge planning, health care directive, transportation  Advance Care Planning: Advance Care Planning Reviewed: no concerns identified        Communication Assessment  Patient's communication style: spoken language (English or Bilingual)        Cognitive  Cognitive/Neuro/Behavioral:   WDL except (ETOH)  Level of Consciousness: alert    Arousal Level: opens eyes spontaneously    Orientation: oriented x 4    Mood/Behavior: anxious       Speech: logical, spontaneous, clear    Living Environment:   People in home: alone     Current living Arrangements: apartment      Able to return to prior arrangements: yes (Pt states he will stop drinking and he wishes to return home at d/c.)     Family/Social Support:  Care provided by: self  Provides care for: no one, unable/limited ability to care for self  Marital Status:   Other (specify) (Nephew Chip)          Description of Support System: Involved, Supportive (As needed/able)    Support Assessment: Adequate family and caregiver support, Adequate social supports (Pt states sufficient support at home when sober.)    Current Resources:   Patient receiving home care services: Yes (Recently arranged - not yet started.)  Skilled Home Care Services: Physical Therapy, Occupational Therapy ()  Community Resources: Home Care  Equipment currently used at home: walker, rolling  Supplies currently used at home: None (Pt states no supply or DME use prior to recent hospitalization. Walker now recommended.)    Employment/Financial:  Employment Status: retired     Financial Concerns: other (see comments) (Pt  continues to have access to alcohol.)   Referral to Financial Worker: No     Does the patient's insurance plan have a 3 day qualifying hospital stay waiver?  No    Lifestyle & Psychosocial Needs:  Social Determinants of Health     Tobacco Use: High Risk (4/27/2023)    Patient History      Smoking Tobacco Use: Every Day      Smokeless Tobacco Use: Never      Passive Exposure: Not on file   Alcohol Use: Not on file   Financial Resource Strain: Not on file   Food Insecurity: Not on file   Transportation Needs: Not on file   Physical Activity: Not on file   Stress: Not on file   Social Connections: Not on file   Intimate Partner Violence: Not on file   Depression: Not on file   Housing Stability: Not on file     Functional Status:  Prior to admission patient needed assistance:   Dependent ADLs:: Ambulation-walker  Dependent IADLs:: Transportation, Cleaning, Shopping, Meal Preparation, Laundry (Nephew reportedly assists as needed.)  Assessment of Functional Status: Not at  functional baseline    Mental Health Status:  Mental Health Status: No Current Concerns       Chemical Dependency Status:  Chemical Dependency Status: Current Concern  Chemical Dependency Management: Previous treatment, Other (see comment) (Recent resources provided. Pt declines additional resources at this time.)        Values/Beliefs:  Spiritual, Cultural Beliefs, Christian Practices, Values that affect care: no (None identified)             Additional Information:  Writer met with patient to introduce Care Management(CM), obtain an initial assessment, and provide RODRIGUEZ info. Recently discharged with [facility] for PT, OT, and SW per previous CM charting. Pt resides in an apartment alone as his wife passed away in December of 2022. Pt states understanding of the need to quit drinking alcohol. At this time, pt declines any TCU placement and states he wishes to return home and stop drinking alcohol independently. Pt declined additional CD resources  stating alcohol related resources focus too much on discussing alcohol. He stated this just keeps alcohol present in the mind. Pt states he just wants to quit and put it behind him upon return home. CM to follow for changes in current anticipated discharge disposition. [facility] is anticipated to provide transport at time of discharge.    Morro Emerson RN

## 2023-04-27 NOTE — ED TRIAGE NOTES
Patient arrives to ER with alcohol intoxication and inability to care for self.  Patient falling asleep during triage.  Denies pain.     Triage Assessment     Row Name 04/27/23 0623       Triage Assessment (Adult)    Airway WDL WDL       Respiratory WDL    Respiratory WDL WDL       Skin Circulation/Temperature WDL    Skin Circulation/Temperature WDL WDL       Cardiac WDL    Cardiac WDL WDL       Peripheral/Neurovascular WDL    Peripheral Neurovascular WDL WDL       Cognitive/Neuro/Behavioral WDL    Cognitive/Neuro/Behavioral WDL --  ETOH

## 2023-04-27 NOTE — PHARMACY-ADMISSION MEDICATION HISTORY
Pharmacist Admission Medication History    Admission medication history is complete. The information provided in this note is only as accurate as the sources available at the time of the update.    Medication reconciliation/reorder completed by provider prior to medication history? No    Information Source(s): Patient and Hospital records via in-person    Pertinent Information: pt left [facility] on 4/[facility].  He did NOT  new Rx medications and denies taking any medication after leaving hospital    Changes made to PTA medication list:    Added: None    Deleted: None    Changed: None    Medication Affordability:  Not including over the counter (OTC) medications, was there a time in the past 12 months when you did not take your medications as prescribed because of cost?: Unable to Assess    Allergies reviewed with patient and updates made in EHR: yes    Medication History Completed By: Gaby Willis [facility] 4/27/2023 8:15 AM    Prior to Admission medications    Medication Sig Last Dose Taking? Auth Provider Long Term End Date   acetaminophen (TYLENOL) 325 MG tablet Take 650 mg by mouth every 4 hours as needed for mild pain or fever Not to exceed 3 grams in 24 hours. > 1 week at PRN Yes Reported, Patient     aspirin-acetaminophen-caffeine (EXCEDRIN MIGRAINE) 250-250-65 MG tablet Take 1 tablet by mouth daily as needed for headaches > 1 week at PRN Yes Luis Manuel Vegas DO     atorvastatin (LIPITOR) 40 MG tablet Take 40 mg by mouth At Bedtime > 1 week at PRN Yes Unknown, Entered By History     chlorhexidine (PERIDEX) 0.12 % solution Swish and spit 15 mLs in mouth 4 times daily 4/26/2023 at 1230 Yes Roberto Yang MD     cyanocobalamin (VITAMIN B-12) 1000 MCG tablet Take 1,000 mcg by mouth daily 4/26/2023 at am Yes Unknown, Entered By History     folic acid (FOLVITE) 1 MG tablet Take 1 tablet (1 mg) by mouth daily 4/26/2023 at am Yes Roberto Yang MD     gabapentin (NEURONTIN) 300 MG capsule Take 1 capsule  (300 mg) by mouth 3 times daily 4/26/2023 at 0757 Yes Eve Morton, APRN CNP Yes    magnesium oxide (MAG-OX) 400 MG tablet Take 400 mg by mouth daily 4/26/2023 at am IV form Yes Unknown, Entered By History     multivitamin w/minerals (THERA-VIT-M) tablet Take 1 tablet by mouth daily 4/26/2023 at am Yes Roberto Yang MD     pantoprazole (PROTONIX) 40 MG EC tablet Take 1 tablet (40 mg) by mouth every morning (before breakfast) 4/26/2023 at am Yes Roberto Yang MD     thiamine (B-1) 100 MG tablet Take 1 tablet (100 mg) by mouth daily 4/26/2023 at am Yes Luis Manuel Vegas DO     traZODone (DESYREL) 150 MG tablet Take 0.5 tablets (75 mg) by mouth At Bedtime 4/25/2023 at HS Yes Roberto Yang MD Yes    Vitamin D3 (CHOLECALCIFEROL) 25 mcg (1000 units) tablet Take 1 tablet by mouth daily >1 week at am Yes Unknown, Entered By History

## 2023-04-27 NOTE — H&P
Admission History & Physical:  Familia Watson, 1950, [id number]      Primary MD: Daniel Martinez, [phone]    Code Status: Prior    Chief Complaint:  weakness    HPI:   Familia Watson is a 72 year old old male   Chronic alcohol dependence with recent withdrawal syndrome regular alcohol use  He was advised for chemical dependency treatment but he refused probably going through some grief since his wife  5 months ago.  Felt to be failing to thrive as an adult but not a vulnerable adult has chronic elevated LFTs with pancytopenia presumed secondary to alcohol use severe protein calorie malnutrition; returns because of failure to thrive was intoxicated with an alcohol level 0.217.  Working again on placement TCU plus or minus alcohol treatment programs    Assessment and Plan:    Recurrent alcohol dependence with relapse         -Readmit, use minds protocol triggered Valium         -We will re-offer him treatment programs    Acute situational anxiety depression         -He was seen recently by psychiatry did not wish medications         -He did agree to trazodone at bedtime    Frequent falls with gait instability         -Readmitted with a fall         -PT OT consults    Review Of Systems  Pertinent items are noted in HPI.    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     Past Surgical History:   Procedure Laterality Date     CATARACT EXTRACTION Left      LEFT VITRECTOMY POSTERIOR 25 GAUGE SYSTEM, MEMBRANE PEEL, AIR FLUID EXCHANGE  Left 08/31/2016     SUBDURAL HEMATOMA EVACUATION VIA CRANIOTOMY  1989       Family History  Reviewed, and family history includes Alcoholism (age of onset: 40) in his father; Alcoholism (age of onset: 45) in his brother; Lung Cancer in his sister; No Known Problems in his mother and sister.    Social History  Reviewed, and he  reports that he has been smoking. He has a 57.00 pack-year smoking history. He has never used smokeless tobacco. He reports that he does not currently use alcohol. He reports that he does not use drugs.  Social History     Tobacco Use     Smoking status: Every Day     Packs/day: 1.00     Years: 57.00     Pack years: 57.00     Types: Cigarettes     Smokeless tobacco: Never   Vaping Use     Vaping status: Not on file   Substance Use Topics     Alcohol use: Not Currently     Comment: Np alcohol since October 2021.       Allergies  No Known Allergies    Medications   (Not in a hospital admission)      Physical Exam:  /61   Pulse 81   Temp 97.5  F (36.4  C) (Oral)   Resp 15   Ht 1.702 m (5' 7\")   Wt 54.4 kg (120 lb)   SpO2 97%   BMI 18.79 kg/m    BP Readings from Last 3 Encounters:   04/27/23 107/61   04/26/23 97/66   12/07/22 134/84       General:  WD WN In NAD  HEENT: NCAT OP Moist Scleral white PERRLA  Chest: CTA  Cor: RRR No MRG  Abd: Soft NT ND NABS  Extr: No " edema  Neuro: Non focal RIGGINS's  Psych: affect appropriate AO x 3    Results:  Results for orders placed or performed during the hospital encounter of 04/27/23   Basic metabolic panel     Status: Abnormal   Result Value Ref Range    Sodium 136 136 - 145 mmol/L    Potassium 4.2 3.5 - 5.0 mmol/L    Chloride 103 98 - 107 mmol/L    Carbon Dioxide (CO2) 22 22 - 31 mmol/L    Anion Gap 11 5 - 18 mmol/L    Urea Nitrogen 10 8 - 28 mg/dL    Creatinine 0.54 (L) 0.70 - 1.30 mg/dL    Calcium 8.8 8.5 - 10.5 mg/dL    Glucose 91 70 - 125 mg/dL    GFR Estimate >90 >60 mL/min/1.73m2   Magnesium     Status: Normal   Result Value Ref Range    Magnesium 1.8 1.8 - 2.6 mg/dL   Alcohol level blood     Status: Abnormal   Result Value Ref Range    Alcohol, Blood 217 (H) None detected mg/dL   Hepatic function panel     Status: Abnormal   Result Value Ref Range    Bilirubin Total 0.4 0.0 - 1.0 mg/dL    Bilirubin Direct 0.2 <=0.5 mg/dL    Protein Total 6.5 6.0 - 8.0 g/dL    Albumin 3.1 (L) 3.5 - 5.0 g/dL    Alkaline Phosphatase 133 (H) 45 - 120 U/L    AST 64 (H) 0 - 40 U/L    ALT 61 (H) 0 - 45 U/L   CBC with platelets and differential     Status: Abnormal   Result Value Ref Range    WBC Count 5.0 4.0 - 11.0 10e3/uL    RBC Count 3.01 (L) 4.40 - 5.90 10e6/uL    Hemoglobin 10.2 (L) 13.3 - 17.7 g/dL    Hematocrit 30.2 (L) 40.0 - 53.0 %     78 - 100 fL    MCH 33.9 (H) 26.5 - 33.0 pg    MCHC 33.8 31.5 - 36.5 g/dL    RDW 14.6 10.0 - 15.0 %    Platelet Count 244 150 - 450 10e3/uL    % Neutrophils 51 %    % Lymphocytes 29 %    % Monocytes 14 %    % Eosinophils 2 %    % Basophils 1 %    % Immature Granulocytes 3 %    NRBCs per 100 WBC 0 <1 /100    Absolute Neutrophils 2.5 1.6 - 8.3 10e3/uL    Absolute Lymphocytes 1.4 0.8 - 5.3 10e3/uL    Absolute Monocytes 0.7 0.0 - 1.3 10e3/uL    Absolute Eosinophils 0.1 0.0 - 0.7 10e3/uL    Absolute Basophils 0.0 0.0 - 0.2 10e3/uL    Absolute Immature Granulocytes 0.2 <=0.4 10e3/uL    Absolute NRBCs 0.0 10e3/uL    CBC with platelets differential     Status: Abnormal    Narrative    The following orders were created for panel order CBC with platelets differential.  Procedure                               Abnormality         Status                     ---------                               -----------         ------                     CBC with platelets and d...[[id number]]  Abnormal            Final result                 Please view results for these tests on the individual orders.       EKG:  EKG: normal EKG, normal sinus rhythm, unchanged from previous tracings.    Imaging:   CT Head w/o Contrast    Result Date: 4/24/2023  EXAM: CT HEAD W/O CONTRAST LOCATION: [facility] DATE/TIME: 4/24/2023 3:09 PM CDT INDICATION: dizziness; COMPARISON: Head CT 04/19/2023 TECHNIQUE: Routine CT Head without IV contrast. Multiplanar reformats. Dose reduction techniques were used. FINDINGS: INTRACRANIAL CONTENTS: No intracranial hemorrhage, extraaxial collection, or mass effect.  No CT evidence of acute infarct. Mild presumed chronic small vessel ischemic changes. Mild generalized volume loss. No hydrocephalus. VISUALIZED ORBITS/SINUSES/MASTOIDS: No intraorbital abnormality. Mild mucosal thickening sphenoid sinuses. No middle ear or mastoid effusion. BONES/SOFT TISSUES: No acute abnormality. Prior left frontal craniotomy.     IMPRESSION: 1.  No acute intracranial injury, hemorrhage, mass, or CT evidence of recent ischemia.    XR Abdomen Port 1 View    Result Date: 4/24/2023  EXAM: XR ABDOMEN PORT 1 VIEW LOCATION: [facility] DATE/TIME: 4/24/2023 3:16 PM CDT INDICATION: abdominal pain COMPARISON: CT 01/25/2022     IMPRESSION: Lung bases are clear. Bowel gas pattern is unremarkable. No abnormally dilated loops of bowel. Mild stool burden. No suspicious calcifications.    Head CT w/o contrast    Result Date: 4/19/2023  EXAM: CT HEAD W/O CONTRAST, CT CERVICAL SPINE W/O CONTRAST LOCATION:   [facility] DATE/TIME: 4/19/2023 9:27 PM CDT INDICATION: Head and neck injury COMPARISON: CT head 04/08/2022, CT cervical spine 01/08/2021 TECHNIQUE: 1) Routine CT Head without IV contrast. Multiplanar reformats. Dose reduction techniques were used. 2) Routine CT Cervical Spine without IV contrast. Multiplanar reformats. Dose reduction techniques were used. FINDINGS: HEAD CT: INTRACRANIAL CONTENTS: No intracranial hemorrhage, extraaxial collection, or mass effect.  No CT evidence of acute infarct. Moderate presumed chronic small vessel ischemic changes. Moderate generalized volume loss. No hydrocephalus. VISUALIZED ORBITS/SINUSES/MASTOIDS: No intraorbital abnormality. No paranasal sinus mucosal disease. No middle ear or mastoid effusion. BONES/SOFT TISSUES: No acute abnormality. CERVICAL SPINE CT: VERTEBRA: Normal vertebral body heights and alignment. Fracture at the anterior inferior left aspect of C5. This was likely present on the prior exam as well. Cortication of fracture fragments suggests this is a chronic fracture. CANAL/FORAMINA: Mild degenerative changes without significant canal narrowing. Multilevel mild-to-moderate neural foraminal narrowing. PARASPINAL: No extraspinal abnormality. Visualized lung fields are clear.     IMPRESSION: HEAD CT: No acute intracranial process. CERVICAL SPINE CT: 1.  No CT evidence for acute fracture or post traumatic subluxation. 2.  Fracture at the anterior inferior left aspect of C5. This was likely present on the prior exam as well. Cortication of fracture fragments suggests this is a chronic fracture.     Cervical spine CT w/o contrast    Result Date: 4/19/2023  EXAM: CT HEAD W/O CONTRAST, CT CERVICAL SPINE W/O CONTRAST LOCATION: [facility] DATE/TIME: 4/19/2023 9:27 PM CDT INDICATION: Head and neck injury COMPARISON: CT head 04/08/2022, CT cervical spine 01/08/2021 TECHNIQUE: 1) Routine CT Head without IV contrast.  Multiplanar reformats. Dose reduction techniques were used. 2) Routine CT Cervical Spine without IV contrast. Multiplanar reformats. Dose reduction techniques were used. FINDINGS: HEAD CT: INTRACRANIAL CONTENTS: No intracranial hemorrhage, extraaxial collection, or mass effect.  No CT evidence of acute infarct. Moderate presumed chronic small vessel ischemic changes. Moderate generalized volume loss. No hydrocephalus. VISUALIZED ORBITS/SINUSES/MASTOIDS: No intraorbital abnormality. No paranasal sinus mucosal disease. No middle ear or mastoid effusion. BONES/SOFT TISSUES: No acute abnormality. CERVICAL SPINE CT: VERTEBRA: Normal vertebral body heights and alignment. Fracture at the anterior inferior left aspect of C5. This was likely present on the prior exam as well. Cortication of fracture fragments suggests this is a chronic fracture. CANAL/FORAMINA: Mild degenerative changes without significant canal narrowing. Multilevel mild-to-moderate neural foraminal narrowing. PARASPINAL: No extraspinal abnormality. Visualized lung fields are clear.     IMPRESSION: HEAD CT: No acute intracranial process. CERVICAL SPINE CT: 1.  No CT evidence for acute fracture or post traumatic subluxation. 2.  Fracture at the anterior inferior left aspect of C5. This was likely present on the prior exam as well. Cortication of fracture fragments suggests this is a chronic fracture.           Extended Emergency Contact Information  Primary Emergency Contact: Chip Gillis  Mobile Phone: [phone]  Relation: Dayanna   needed? No

## 2023-04-28 PROCEDURE — 250N000011 HC RX IP 250 OP 636: Performed by: HOSPITALIST

## 2023-04-28 PROCEDURE — 99232 SBSQ HOSP IP/OBS MODERATE 35: CPT | Performed by: INTERNAL MEDICINE

## 2023-04-28 PROCEDURE — G0378 HOSPITAL OBSERVATION PER HR: HCPCS

## 2023-04-28 PROCEDURE — 250N000013 HC RX MED GY IP 250 OP 250 PS 637: Performed by: INTERNAL MEDICINE

## 2023-04-28 PROCEDURE — 96374 THER/PROPH/DIAG INJ IV PUSH: CPT

## 2023-04-28 RX ORDER — ONDANSETRON 2 MG/ML
4 INJECTION INTRAMUSCULAR; INTRAVENOUS EVERY 6 HOURS PRN
Status: DISCONTINUED | OUTPATIENT
Start: 2023-04-28 | End: 2023-04-30

## 2023-04-28 RX ORDER — PROCHLORPERAZINE MALEATE 10 MG
10 TABLET ORAL EVERY 4 HOURS PRN
Status: DISCONTINUED | OUTPATIENT
Start: 2023-04-28 | End: 2023-05-08 | Stop reason: HOSPADM

## 2023-04-28 RX ORDER — PROCHLORPERAZINE MALEATE 5 MG
5 TABLET ORAL EVERY 6 HOURS PRN
Status: DISCONTINUED | OUTPATIENT
Start: 2023-04-28 | End: 2023-04-28

## 2023-04-28 RX ADMIN — CLONIDINE HYDROCHLORIDE 0.1 MG: 0.1 TABLET ORAL at 01:18

## 2023-04-28 RX ADMIN — ONDANSETRON 4 MG: 2 INJECTION INTRAMUSCULAR; INTRAVENOUS at 06:30

## 2023-04-28 RX ADMIN — PROCHLORPERAZINE MALEATE 10 MG: 10 TABLET ORAL at 17:38

## 2023-04-28 RX ADMIN — NICOTINE 1 PATCH: 14 PATCH, EXTENDED RELEASE TRANSDERMAL at 08:23

## 2023-04-28 RX ADMIN — Medication 400 MG: at 08:20

## 2023-04-28 RX ADMIN — DIAZEPAM 10 MG: 5 TABLET ORAL at 22:27

## 2023-04-28 RX ADMIN — CLONIDINE HYDROCHLORIDE 0.1 MG: 0.1 TABLET ORAL at 16:35

## 2023-04-28 RX ADMIN — ACETAMINOPHEN 650 MG: 325 TABLET ORAL at 16:40

## 2023-04-28 RX ADMIN — TRAZODONE HYDROCHLORIDE 75 MG: 150 TABLET ORAL at 22:27

## 2023-04-28 RX ADMIN — DIAZEPAM 10 MG: 5 TABLET ORAL at 08:33

## 2023-04-28 RX ADMIN — GABAPENTIN 300 MG: 300 CAPSULE ORAL at 22:26

## 2023-04-28 RX ADMIN — PROCHLORPERAZINE MALEATE 10 MG: 10 TABLET ORAL at 23:21

## 2023-04-28 RX ADMIN — GABAPENTIN 300 MG: 300 CAPSULE ORAL at 08:20

## 2023-04-28 RX ADMIN — Medication 100 MG: at 08:19

## 2023-04-28 RX ADMIN — Medication 25 MCG: at 08:19

## 2023-04-28 RX ADMIN — DIAZEPAM 10 MG: 5 TABLET ORAL at 12:54

## 2023-04-28 RX ADMIN — PROCHLORPERAZINE MALEATE 10 MG: 10 TABLET ORAL at 10:24

## 2023-04-28 RX ADMIN — GABAPENTIN 300 MG: 300 CAPSULE ORAL at 14:59

## 2023-04-28 RX ADMIN — MULTIPLE VITAMINS W/ MINERALS TAB 1 TABLET: TAB at 08:19

## 2023-04-28 RX ADMIN — DIAZEPAM 10 MG: 5 TABLET ORAL at 18:49

## 2023-04-28 RX ADMIN — CLONIDINE HYDROCHLORIDE 0.1 MG: 0.1 TABLET ORAL at 08:33

## 2023-04-28 RX ADMIN — CYANOCOBALAMIN TAB 1000 MCG 1000 MCG: 1000 TAB at 08:20

## 2023-04-28 RX ADMIN — PANTOPRAZOLE SODIUM 40 MG: 40 TABLET, DELAYED RELEASE ORAL at 08:19

## 2023-04-28 RX ADMIN — FOLIC ACID 1 MG: 1 TABLET ORAL at 08:19

## 2023-04-28 RX ADMIN — ACETAMINOPHEN, ASPIRIN, CAFFEINE 1 TABLET: 250; 250; 65 TABLET, FILM COATED ORAL at 23:20

## 2023-04-28 RX ADMIN — ATORVASTATIN CALCIUM 40 MG: 40 TABLET, FILM COATED ORAL at 22:26

## 2023-04-28 ASSESSMENT — ACTIVITIES OF DAILY LIVING (ADL)
ADLS_ACUITY_SCORE: 45

## 2023-04-28 NOTE — ED NOTES
"PRIMARY DIAGNOSIS: \"GENERIC\" NURSING  OUTPATIENT/OBSERVATION GOALS TO BE MET BEFORE DISCHARGE:  1. ADLs back to baseline: No    2. Activity and level of assistance: Assist of one     3. Pain status: Improved-controlled with oral pain medications.    4. Return to near baseline physical activity: No , unsteady on feet - requiring assist of 1      Discharge Planner Nurse   Safe discharge environment identified: No , pending TCU placement   Barriers to discharge: No       Entered by: Augustina Lo RN 04/28/2023 10:49 AM     Please review provider order for any additional goals.   Nurse to notify provider when observation goals have been met and patient is ready for discharge.    Patient is alert. Pleasant. VSS. PIV, saline locked. CIWA scores 12 and 6. PRN Valium 10mg given x1 - effective. PRN compazine administered for nausea. Awaiting TCU placement.   "

## 2023-04-28 NOTE — PROGRESS NOTES
"[facility] PROGRESS NOTE      Brief Summary      72 year old male presenting with:  Familia Watson is a 72 year old old male   Chronic alcohol dependence with recent withdrawal syndrome regular alcohol use  He was advised for chemical dependency treatment but he refused probably going through some grief since his wife  5 months ago.  Felt to be failing to thrive as an adult but not a vulnerable adult has chronic elevated LFTs with pancytopenia presumed secondary to alcohol use severe protein calorie malnutrition; returns because of failure to thrive was intoxicated with an alcohol level 0.217.  Working again on placement TCU plus or minus alcohol treatment programs    Plan discontinue to TCU. He can go anytime, although hasn't completed etoh withdrawal yet.    ASSESSMENT AND PLAN:    Chronic alcohol dependence with relapse         -Readmit, use minds protocol triggered Valium         -We will re-offer him treatment programs     Acute persistent situational anxiety depression         -He was seen recently by psychiatry did not wish medications         -He did agree to trazodone at bedtime     Frequent falls with gait instability         -Readmitted with a fall         -PT OT consults    SUBJECTIVE:  comfortable    ROS:  12 Points review of systems reviewed and is negative except for what has already been mentioned above    OBJECTIVE:  /63 (BP Location: Right arm, Patient Position: Semi-Butler's, Cuff Size: Adult Small)   Pulse 66   Temp 98.2  F (36.8  C) (Oral)   Resp 16   Ht 1.702 m (5' 7\")   Wt 54.4 kg (120 lb)   SpO2 100%   BMI 18.79 kg/m    I/O last 3 completed shifts:  In: 240 [P.O.:240]  Out: 1050 [Urine:1050]  No intake/output data recorded.  GENRL: Alert and oriented X 3. Not in acute distress  HEENT: NC/AT      Pupils- round and reactive to light bilaterally      Neck- supple      Sclera- anicteric      Mucous membrane- moist and pink  CHEST: Clear to auscultation bilaterally  HEART: S1S2 " regular  ABDMN: Soft., non-distended.Bowel sounds- active  EXTRM: No pedal oedema  NEURO: Symmetric  INTGM: No skin rash, no cyanosis or clubbing    DIAGNOSTIC LABS:        Imaging      Radiology report reviewed        Antonino Otto MD  Extended Emergency Contact Information  Primary Emergency Contact: Chip Gillis  Mobile Phone: [phone]  Relation: Nephew   needed? No

## 2023-04-28 NOTE — PROGRESS NOTES
"PRIMARY DIAGNOSIS: \"GENERIC\" NURSING  OUTPATIENT/OBSERVATION GOALS TO BE MET BEFORE DISCHARGE:  1. ADLs back to baseline: No    2. Activity and level of assistance: did not get OOB    3. Pain status: Improved-controlled with oral pain medications.    4. Return to near baseline physical activity: No     Discharge Planner Nurse   Safe discharge environment identified: No  Barriers to discharge: Yes       Entered by: Nenita Weston RN 04/28/2023 4:13 AM     Please review provider order for any additional goals.   Nurse to notify provider when observation goals have been met and patient is ready for discharge.    Pt A&Ox4  CIWA, scoring ranges from 1-12. Valium PO given x 1  RA, VSS.   Uses urinal in bed, not safe to stand patient up when experiencingsevere tremors.   Scabs on BLE/knees     "

## 2023-04-28 NOTE — PROGRESS NOTES
"PRIMARY DIAGNOSIS: \"GENERIC\" NURSING  OUTPATIENT/OBSERVATION GOALS TO BE MET BEFORE DISCHARGE:  1. ADLs back to baseline: Yes    2. Activity and level of assistance: Up with standby assistance.    3. Pain status: Pain free.    4. Return to near baseline physical activity: Yes     Discharge Planner Nurse   Safe discharge environment identified: No, awaiting TCU placement.    Barriers to discharge: Yes, awaiting TCU placement.    Please review provider order for any additional goals.     Nurse to notify provider when observation goals have been met and patient is ready for discharge.  "

## 2023-04-28 NOTE — PROGRESS NOTES
Care Management Follow Up    Length of Stay (days): 0    Expected Discharge Date: 04/29/2023     Concerns to be Addressed: care coordination/care conferences, discharge planning, TCU placement    Additional Information:  On 4/27 writer met with pt who was insistent he could return home with services and be successful. Pt declined TCU coordination at that time. Today 4/28, pt now states a desire to stay at a TCU until better suited to return to his home. Pt/family was provided with the Medicare Compare list for Transitional Care services and discussed associated Medicare star ratings to assist with choice for referrals/discharge planning. Education was given to pt/family that star ratings are updated/maintained by Medicare and can be reviewed by visiting www.medicare.gov - Yes. Patient declined and requested to have CM find a place as close to his home as possible - referrals placed.    CM to follow-up on referrals/continue to assist with TCU coordination. Nephew to transport.    Morro Emerson RN

## 2023-04-28 NOTE — PROGRESS NOTES
"PRIMARY DIAGNOSIS: \"GENERIC\" NURSING  OUTPATIENT/OBSERVATION GOALS TO BE MET BEFORE DISCHARGE:  1. ADLs back to baseline: No    2. Activity and level of assistance: Did not get OOB    3. Pain status: Improved-controlled with oral pain medications.    4. Return to near baseline physical activity: No     Discharge Planner Nurse   Safe discharge environment identified: No  Barriers to discharge: Yes       Entered by: Nenita Weston RN 04/28/2023 4:12 AM     Please review provider order for any additional goals.   Nurse to notify provider when observation goals have been met and patient is ready for discharge.  "

## 2023-04-28 NOTE — PLAN OF CARE
Problem: Plan of Care - These are the overarching goals to be used throughout the patient stay.    Goal: Plan of Care Review  Description: The Plan of Care Review/Shift note should be completed every shift.  The Outcome Evaluation is a brief statement about your assessment that the patient is improving, declining, or no change.  This information will be displayed automatically on your shift note.  Outcome: Progressing    Goal Outcome Evaluation:    Patient is A & O x 4. Anxious. No complaints of pain. No complaints of nausea. IV is SL.     CIWA: 11 @ 1245 due to anxiousness/tremors (PO Valium 10 mg provided). 7 @ 1445 (no interventions needed). 11 @ 1845 due to anxiousness/tremors (PO Valium 10 mg provided). 2 hour recheck after medication dosage is due @ 2045.    Regular diet. SBA with GB. Alarms on for safety. Discharge pending TCU placement.

## 2023-04-29 ENCOUNTER — APPOINTMENT (OUTPATIENT)
Dept: CT IMAGING | Facility: CLINIC | Age: 73
DRG: 896 | End: 2023-04-29
Attending: INTERNAL MEDICINE
Payer: MEDICARE

## 2023-04-29 PROBLEM — F10.229 ALCOHOL DEPENDENCE WITH INTOXICATION WITH COMPLICATION (H): Chronic | Status: ACTIVE | Noted: 2023-04-27

## 2023-04-29 PROBLEM — F10.930 ALCOHOL WITHDRAWAL SYNDROME WITHOUT COMPLICATION (H): Chronic | Status: ACTIVE | Noted: 2022-04-08

## 2023-04-29 PROBLEM — R62.7 FAILURE TO THRIVE IN ADULT: Chronic | Status: ACTIVE | Noted: 2023-04-19

## 2023-04-29 LAB
AMMONIA PLAS-SCNC: 22 UMOL/L (ref 11–35)
ANION GAP SERPL CALCULATED.3IONS-SCNC: 9 MMOL/L (ref 5–18)
BUN SERPL-MCNC: 17 MG/DL (ref 8–28)
CALCIUM SERPL-MCNC: 8.6 MG/DL (ref 8.5–10.5)
CHLORIDE BLD-SCNC: 102 MMOL/L (ref 98–107)
CO2 SERPL-SCNC: 24 MMOL/L (ref 22–31)
CREAT SERPL-MCNC: 0.62 MG/DL (ref 0.7–1.3)
ERYTHROCYTE [DISTWIDTH] IN BLOOD BY AUTOMATED COUNT: 14.6 % (ref 10–15)
GFR SERPL CREATININE-BSD FRML MDRD: >90 ML/MIN/1.73M2
GLUCOSE BLD-MCNC: 91 MG/DL (ref 70–125)
GLUCOSE BLDC GLUCOMTR-MCNC: 97 MG/DL (ref 70–99)
HCT VFR BLD AUTO: 30.6 % (ref 40–53)
HGB BLD-MCNC: 10 G/DL (ref 13.3–17.7)
MCH RBC QN AUTO: 34 PG (ref 26.5–33)
MCHC RBC AUTO-ENTMCNC: 32.7 G/DL (ref 31.5–36.5)
MCV RBC AUTO: 104 FL (ref 78–100)
PLATELET # BLD AUTO: 264 10E3/UL (ref 150–450)
POTASSIUM BLD-SCNC: 3.9 MMOL/L (ref 3.5–5)
RBC # BLD AUTO: 2.94 10E6/UL (ref 4.4–5.9)
SODIUM SERPL-SCNC: 135 MMOL/L (ref 136–145)
WBC # BLD AUTO: 3.4 10E3/UL (ref 4–11)

## 2023-04-29 PROCEDURE — 36415 COLL VENOUS BLD VENIPUNCTURE: CPT | Performed by: INTERNAL MEDICINE

## 2023-04-29 PROCEDURE — 99233 SBSQ HOSP IP/OBS HIGH 50: CPT | Performed by: INTERNAL MEDICINE

## 2023-04-29 PROCEDURE — 250N000011 HC RX IP 250 OP 636: Performed by: HOSPITALIST

## 2023-04-29 PROCEDURE — 250N000013 HC RX MED GY IP 250 OP 250 PS 637: Performed by: INTERNAL MEDICINE

## 2023-04-29 PROCEDURE — 258N000003 HC RX IP 258 OP 636: Performed by: INTERNAL MEDICINE

## 2023-04-29 PROCEDURE — 250N000011 HC RX IP 250 OP 636: Performed by: INTERNAL MEDICINE

## 2023-04-29 PROCEDURE — 82962 GLUCOSE BLOOD TEST: CPT

## 2023-04-29 PROCEDURE — G0378 HOSPITAL OBSERVATION PER HR: HCPCS

## 2023-04-29 PROCEDURE — 85027 COMPLETE CBC AUTOMATED: CPT | Performed by: INTERNAL MEDICINE

## 2023-04-29 PROCEDURE — G1010 CDSM STANSON: HCPCS

## 2023-04-29 PROCEDURE — 120N000001 HC R&B MED SURG/OB

## 2023-04-29 PROCEDURE — 80048 BASIC METABOLIC PNL TOTAL CA: CPT | Performed by: INTERNAL MEDICINE

## 2023-04-29 PROCEDURE — 82140 ASSAY OF AMMONIA: CPT | Performed by: INTERNAL MEDICINE

## 2023-04-29 RX ORDER — SODIUM CHLORIDE 9 MG/ML
INJECTION, SOLUTION INTRAVENOUS CONTINUOUS
Status: DISCONTINUED | OUTPATIENT
Start: 2023-04-29 | End: 2023-05-01

## 2023-04-29 RX ADMIN — HALOPERIDOL LACTATE 2.5 MG: 5 INJECTION, SOLUTION INTRAMUSCULAR at 22:21

## 2023-04-29 RX ADMIN — ATORVASTATIN CALCIUM 40 MG: 40 TABLET, FILM COATED ORAL at 22:21

## 2023-04-29 RX ADMIN — GABAPENTIN 300 MG: 300 CAPSULE ORAL at 20:13

## 2023-04-29 RX ADMIN — Medication 100 MG: at 08:44

## 2023-04-29 RX ADMIN — PANTOPRAZOLE SODIUM 40 MG: 40 TABLET, DELAYED RELEASE ORAL at 08:49

## 2023-04-29 RX ADMIN — Medication 25 MCG: at 08:45

## 2023-04-29 RX ADMIN — CYANOCOBALAMIN TAB 1000 MCG 1000 MCG: 1000 TAB at 08:44

## 2023-04-29 RX ADMIN — FOLIC ACID 1 MG: 1 TABLET ORAL at 08:44

## 2023-04-29 RX ADMIN — DIAZEPAM 10 MG: 5 TABLET ORAL at 10:10

## 2023-04-29 RX ADMIN — CLONIDINE HYDROCHLORIDE 0.1 MG: 0.1 TABLET ORAL at 00:03

## 2023-04-29 RX ADMIN — DIAZEPAM 10 MG: 5 TABLET ORAL at 22:21

## 2023-04-29 RX ADMIN — CLONIDINE HYDROCHLORIDE 0.1 MG: 0.1 TABLET ORAL at 08:44

## 2023-04-29 RX ADMIN — ACETAMINOPHEN 650 MG: 325 TABLET ORAL at 03:41

## 2023-04-29 RX ADMIN — GABAPENTIN 300 MG: 300 CAPSULE ORAL at 08:44

## 2023-04-29 RX ADMIN — DIAZEPAM 10 MG: 5 TABLET ORAL at 08:48

## 2023-04-29 RX ADMIN — DIAZEPAM 10 MG: 5 TABLET ORAL at 20:27

## 2023-04-29 RX ADMIN — NICOTINE 1 PATCH: 14 PATCH, EXTENDED RELEASE TRANSDERMAL at 08:49

## 2023-04-29 RX ADMIN — DIAZEPAM 10 MG: 5 TABLET ORAL at 04:15

## 2023-04-29 RX ADMIN — PROCHLORPERAZINE MALEATE 10 MG: 10 TABLET ORAL at 04:55

## 2023-04-29 RX ADMIN — GABAPENTIN 300 MG: 300 CAPSULE ORAL at 15:37

## 2023-04-29 RX ADMIN — Medication 400 MG: at 08:44

## 2023-04-29 RX ADMIN — ONDANSETRON 4 MG: 2 INJECTION INTRAMUSCULAR; INTRAVENOUS at 20:27

## 2023-04-29 RX ADMIN — DIAZEPAM 10 MG: 5 TABLET ORAL at 17:53

## 2023-04-29 RX ADMIN — ACETAMINOPHEN, ASPIRIN, CAFFEINE 1 TABLET: 250; 250; 65 TABLET, FILM COATED ORAL at 17:53

## 2023-04-29 RX ADMIN — PROCHLORPERAZINE MALEATE 10 MG: 10 TABLET ORAL at 17:53

## 2023-04-29 RX ADMIN — DIAZEPAM 10 MG: 5 TABLET ORAL at 03:40

## 2023-04-29 RX ADMIN — SODIUM CHLORIDE: 9 INJECTION, SOLUTION INTRAVENOUS at 14:49

## 2023-04-29 RX ADMIN — PANTOPRAZOLE SODIUM 40 MG: 40 TABLET, DELAYED RELEASE ORAL at 06:26

## 2023-04-29 RX ADMIN — MULTIPLE VITAMINS W/ MINERALS TAB 1 TABLET: TAB at 08:50

## 2023-04-29 RX ADMIN — ACETAMINOPHEN, ASPIRIN, CAFFEINE 1 TABLET: 250; 250; 65 TABLET, FILM COATED ORAL at 09:34

## 2023-04-29 RX ADMIN — TRAZODONE HYDROCHLORIDE 75 MG: 150 TABLET ORAL at 22:23

## 2023-04-29 RX ADMIN — PROCHLORPERAZINE MALEATE 10 MG: 10 TABLET ORAL at 09:34

## 2023-04-29 ASSESSMENT — ACTIVITIES OF DAILY LIVING (ADL)
ADLS_ACUITY_SCORE: 48
ADLS_ACUITY_SCORE: 47
ADLS_ACUITY_SCORE: 48
ADLS_ACUITY_SCORE: 52
ADLS_ACUITY_SCORE: 48

## 2023-04-29 NOTE — SIGNIFICANT EVENT
"Significant Event Note    Time of event: 3:31 PM April 29, 2023    Description of event:  Altered mental status and patient withdrawing from alcohol  Nurse called, concerned that his mentation was worse than yesterday he is actively withdrawing his blood pressure was soft he got a dose of Valium about 2 hours ago  History of trauma is not clear is some abrasions but no head injury he does have chronic headaches  By the time people showed up with a rapid response he was responsive able to answer questions knew he was at [facility] knew that he \"drinks too much\"  Describes chronic headaches 7 out of 10    Plan:  We will check CT of the head to make sure he does not have some type of head trauma which would seem unlikely  Basic metabolic profile CBC ammonia level  Reviewed with staff no major new diagnoses are forthcoming    Discussed with: bedside nurse; rapid response team    Erick Castaneda MD    "

## 2023-04-29 NOTE — UTILIZATION REVIEW
Inpatient appropriate  Admission Status; Secondary Review Determination     Under the authority of the Utilization Management Committee, the utilization review process indicated a secondary review on the above patient. The review outcome is based on review of the medical records, discussions with staff, and applying clinical experience noted on the date of the review.     (x) Inpatient Status Appropriate - This patient's medical care is consistent with medical management for inpatient care and reasonable inpatient medical practice.     RATIONALE FOR DETERMINATION   72-year-old male brought to the ED for evaluation of alcohol intoxication and inability to care for self.  Past medical history of alcohol dependence, migraines, cavitary lung mass, depression, anxiety.  Patient was admitted from 4/19 through 4/26/2023 under similar circumstances but declined referral to chemical dependency or TCU.  Laboratory work-up remarkable for alk phos 133, ALT 61, AST 64, albumin 3.1, hemoglobin 10.2, blood alcohol level 217 mg/dL.  Patient is having withdrawal symptoms and has been treated with diazepam frequently.  At the time of admission with the information available to the attending physician more than 2 nights Hospital complex care was anticipated, based on patient risk of adverse outcome if treated as outpatient and complex care required. Inpatient admission is appropriate based on the Medicare guidelines.     This document was produced using voice recognition software     The information on this document is developed by the utilization review team in order for the business office to ensure compliance. This only denotes the appropriateness of proper admission status and does not reflect the quality of care rendered.   The definitions of Inpatient Status and Observation Status used in making the determination above are those provided in the CMS Coverage Manual, Chapter 1 and Chapter 6, section 70.4.     Sincerely,     Wagner  ANA LAURA Rivera MD  [facility]  Utilization Review Physician Advisor  Pager: [phone]

## 2023-04-29 NOTE — PROGRESS NOTES
"PRIMARY DIAGNOSIS: \"GENERIC\" NURSING  OUTPATIENT/OBSERVATION GOALS TO BE MET BEFORE DISCHARGE:  1. ADLs back to baseline: No    2. Activity and level of assistance: Up with standby assistance.    3. Pain status: Pain free.    4. Return to near baseline physical activity: No     Discharge Planner Nurse   Safe discharge environment identified: No, awaiting TCU placement. Still required medication for CIWA.    Barriers to discharge: Yes. See above.    Please review provider order for any additional goals.     Nurse to notify provider when observation goals have been met and patient is ready for discharge.  "

## 2023-04-29 NOTE — PLAN OF CARE
Problem: Plan of Care - These are the overarching goals to be used throughout the patient stay.    Goal: Plan of Care Review  Description: The Plan of Care Review/Shift note should be completed every shift.  The Outcome Evaluation is a brief statement about your assessment that the patient is improving, declining, or no change.  This information will be displayed automatically on your shift note.  Outcome: Progressing    Goal Outcome Evaluation:    Patient is A & O x 4. Anxious, but cooperative.     VSS throughout shift, besides noted bradycardia, ongoing. Regarding hypotension, see note on RR call. Patient stable now. More alert and back to baseline mentally. BP WNL x 2 after.    Excedrin PRN x 2 provided for headache. No other complaints of pain.     Compazine PRN x 2 provided for nausea.     IV with NS @ 100 mL/h.      CIWA: 32-41-53-5-5-11. PO Valium utilized x 3. Anxiety, tremors, and spot in left vision field are ratings. Next CIWA recheck due @ 1945.    Regular diet. SBA with GB. Alarms on for safety. Discharge pending TCU placement.

## 2023-04-29 NOTE — PROGRESS NOTES
Responded to RRT. Pt currently on RA, no respiratory distress. No further intervention required. Will follow as needed.

## 2023-04-29 NOTE — PLAN OF CARE
PRIMARY DIAGNOSIS: GENERALIZED WEAKNESS    OUTPATIENT/OBSERVATION GOALS TO BE MET BEFORE DISCHARGE  1. Orthostatic performed: N/A    2. Tolerating PO medications: Yes    3. Return to near baseline physical activity: Yes    4. Cleared for discharge by consultants (if involved): No    Discharge Planner Nurse   Safe discharge environment identified: Yes -  TCU  Barriers to discharge: Yes - still scoring CIWA and receiving oral medication.       Entered by: Breana Stauffer RN 04/29/2023      Please review provider order for any additional goals.   Nurse to notify provider when observation goals have been met and patient is ready for discharge.Goal Outcome Evaluation:

## 2023-04-29 NOTE — PROGRESS NOTES
[facility]    Medicine Progress Note - Hospitalist Service    Date of Admission:  2023    Familia Watson is a 72 year old old male   Chronic alcohol dependence with recent withdrawal syndrome regular alcohol use  He was advised for chemical dependency treatment but he refused probably going through some grief since his wife  5 months ago.  Felt to be failing to thrive as an adult but not a vulnerable adult has chronic elevated LFTs with pancytopenia presumed secondary to alcohol use severe protein calorie malnutrition; returns because of failure to thrive was intoxicated with an alcohol level 0.217.  Working again on placement TCU plus or minus alcohol treatment programs         Assessment & Plan   #1.  Continue with CIWA protocol   #2  Discontinue clonidine blood pressures been soft  #3 IV fluids  #4 he will need eventual placement he was just discharged and returned intoxicated  #5 see separate note for rapid response (CT head electrolytes ordered-no new diagnosis is entertained)       Diet: Combination Diet Regular Diet Adult    DVT Prophylaxis: Low Risk/Ambulatory with no VTE prophylaxis indicated  Alberto Catheter: Not present  Lines: None     Cardiac Monitoring: None  Code Status: Full Code      Clinically Significant Risk Factors Present on Admission              # Hypoalbuminemia: Lowest albumin = 3.1 g/dL at 2023  6:55 AM, will monitor as appropriate                  Disposition Plan      Expected Discharge Date: 2023    Discharge Delays: Placement - TCU  Destination: home with help/services  Discharge Comments: placement          Erick Castaneda MD  Hospitalist Service  [facility]  Securely message with [employer] (more info)  Text page via [employer] Paging/Directory   ______________________________________________________________________    Interval History   He was somnolent during the day and then blood pressure dropped a bit then became  "more confused alert tremor as expected rapid response called see separate note regarding rapid response    Physical Exam   Vital Signs: Temp: 97.2  F (36.2  C) Temp src: Oral BP: 94/52 Pulse: 72   Resp: 18 SpO2: 99 % O2 Device: None (Room air)    Weight: 117 lbs 1.03 oz    At the time of the rapid response he is sitting on the edge of the bed he has a tremor right greater than left he is got some abrasions on his knee that are old he is able to speak make some sense he knows he is at [facility] [employer] he has a chronic headache \"7 out of 10\"  Blood pressure stable systolic greater than 90 pulse tachycardic rate 100      Medical Decision Making            Data    Recent Results (from the past 24 hour(s))   Glucose by meter    Collection Time: 04/29/23  3:13 PM   Result Value Ref Range    GLUCOSE BY METER POCT 97 70 - 99 mg/dL     "

## 2023-04-29 NOTE — PROGRESS NOTES
Patient found to have soft BP's. MD paged. NS started. Soft BP's continued. Patient was less alert and lethargic. BS checked: 97. Patient then did not know who the nurse was. Spoke to charge RN. RR called. MD came to floor. Assessed patient. Believes this is from his morning clonidine and alcohol withdrawal.     CT Head and labs ordered. RR stopped. Patient resting in bed.

## 2023-04-29 NOTE — PLAN OF CARE
PRIMARY DIAGNOSIS: GENERALIZED WEAKNESS     OUTPATIENT/OBSERVATION GOALS TO BE MET BEFORE DISCHARGE  1. Orthostatic performed: N/A     2. Tolerating PO medications: Yes     3. Return to near baseline physical activity: Yes     4. Cleared for discharge by consultants (if involved): No     Discharge Planner Nurse   Safe discharge environment identified: Yes -  TCU  Barriers to discharge: Yes - still scoring CIWA and receiving oral medication. Seems to be having increasing withdrawal symptoms throughout shift. Last drink 4/27       Entered by: Breana Stauffer RN 04/29/2023   Please review provider order for any additional goals.   Nurse to notify provider when observation goals have been met and patient is ready for discharge.    Goal Outcome Evaluation:

## 2023-04-29 NOTE — PROGRESS NOTES
Resident team attended RRT. Hospitalist present and managing. Hospitalist dismissed resident.    Augustina Hallman MD PGY-2

## 2023-04-30 ENCOUNTER — APPOINTMENT (OUTPATIENT)
Dept: RADIOLOGY | Facility: CLINIC | Age: 73
DRG: 896 | End: 2023-04-30
Attending: INTERNAL MEDICINE
Payer: MEDICARE

## 2023-04-30 ENCOUNTER — ANESTHESIA (OUTPATIENT)
Dept: MEDSURG UNIT | Facility: CLINIC | Age: 73
DRG: 896 | End: 2023-04-30
Payer: MEDICARE

## 2023-04-30 ENCOUNTER — APPOINTMENT (OUTPATIENT)
Dept: CARDIOLOGY | Facility: CLINIC | Age: 73
DRG: 896 | End: 2023-04-30
Attending: INTERNAL MEDICINE
Payer: MEDICARE

## 2023-04-30 ENCOUNTER — APPOINTMENT (OUTPATIENT)
Dept: RADIOLOGY | Facility: CLINIC | Age: 73
DRG: 896 | End: 2023-04-30
Attending: HOSPITALIST
Payer: MEDICARE

## 2023-04-30 ENCOUNTER — ANESTHESIA EVENT (OUTPATIENT)
Dept: MEDSURG UNIT | Facility: CLINIC | Age: 73
DRG: 896 | End: 2023-04-30
Payer: MEDICARE

## 2023-04-30 PROBLEM — F10.929 ALCOHOL INTOXICATION (H): Status: ACTIVE | Noted: 2023-04-30

## 2023-04-30 LAB
ALBUMIN SERPL-MCNC: 2.9 G/DL (ref 3.5–5)
ALBUMIN UR-MCNC: 50 MG/DL
ALP SERPL-CCNC: 129 U/L (ref 45–120)
ALT SERPL W P-5'-P-CCNC: 85 U/L (ref 0–45)
AMMONIA PLAS-SCNC: 17 UMOL/L (ref 11–35)
ANION GAP SERPL CALCULATED.3IONS-SCNC: 11 MMOL/L (ref 5–18)
APPEARANCE UR: CLEAR
AST SERPL W P-5'-P-CCNC: 129 U/L (ref 0–40)
BASE EXCESS BLDA CALC-SCNC: -0.9 MMOL/L
BASE EXCESS BLDV CALC-SCNC: -3.7 MMOL/L
BILIRUB SERPL-MCNC: 0.4 MG/DL (ref 0–1)
BILIRUB UR QL STRIP: NEGATIVE
BUN SERPL-MCNC: 14 MG/DL (ref 8–28)
CALCIUM SERPL-MCNC: 8.6 MG/DL (ref 8.5–10.5)
CALCIUM, IONIZED MEASURED: 1.15 MMOL/L (ref 1.11–1.3)
CHLORIDE BLD-SCNC: 106 MMOL/L (ref 98–107)
CO2 SERPL-SCNC: 22 MMOL/L (ref 22–31)
COHGB MFR BLD: 99.6 % (ref 95–96)
COLOR UR AUTO: ABNORMAL
CREAT SERPL-MCNC: 0.71 MG/DL (ref 0.7–1.3)
ERYTHROCYTE [DISTWIDTH] IN BLOOD BY AUTOMATED COUNT: 14.5 % (ref 10–15)
GFR SERPL CREATININE-BSD FRML MDRD: >90 ML/MIN/1.73M2
GLUCOSE BLD-MCNC: 125 MG/DL (ref 70–125)
GLUCOSE BLDC GLUCOMTR-MCNC: 113 MG/DL (ref 70–99)
GLUCOSE BLDC GLUCOMTR-MCNC: 97 MG/DL (ref 70–99)
GLUCOSE UR STRIP-MCNC: 200 MG/DL
HCO3 BLD-SCNC: 24 MMOL/L (ref 23–29)
HCO3 BLDV-SCNC: 25 MMOL/L (ref 24–30)
HCT VFR BLD AUTO: 35.2 % (ref 40–53)
HGB BLD-MCNC: 11.3 G/DL (ref 13.3–17.7)
HGB UR QL STRIP: NEGATIVE
HOLD SPECIMEN: NORMAL
HYALINE CASTS: 3 /LPF
INR PPP: 1.04 (ref 0.85–1.15)
ION CA PH 7.4: 1.11 MMOL/L (ref 1.11–1.3)
KETONES UR STRIP-MCNC: NEGATIVE MG/DL
LACTATE SERPL-SCNC: 0.8 MMOL/L (ref 0.7–2)
LACTATE SERPL-SCNC: 3.7 MMOL/L (ref 0.7–2)
LEUKOCYTE ESTERASE UR QL STRIP: NEGATIVE
LVEF ECHO: NORMAL
MAGNESIUM SERPL-MCNC: 1.8 MG/DL (ref 1.8–2.6)
MAGNESIUM SERPL-MCNC: 2.5 MG/DL (ref 1.8–2.6)
MCH RBC QN AUTO: 34.2 PG (ref 26.5–33)
MCHC RBC AUTO-ENTMCNC: 32.1 G/DL (ref 31.5–36.5)
MCV RBC AUTO: 107 FL (ref 78–100)
MUCOUS THREADS #/AREA URNS LPF: PRESENT /LPF
NITRATE UR QL: NEGATIVE
OXYHGB MFR BLD: 96.9 % (ref 95–96)
OXYHGB MFR BLDV: 68.2 % (ref 70–75)
PCO2 BLD: 41 MM HG (ref 35–45)
PCO2 BLDV: 69 MM HG (ref 35–50)
PH BLD: 7.38 [PH] (ref 7.37–7.44)
PH BLDV: 7.17 [PH] (ref 7.35–7.45)
PH UR STRIP: 6 [PH] (ref 5–7)
PH: 7.33 (ref 7.35–7.45)
PHOSPHATE SERPL-MCNC: 4.3 MG/DL (ref 2.5–4.5)
PLATELET # BLD AUTO: 325 10E3/UL (ref 150–450)
PO2 BLD: 117 MM HG (ref 75–85)
PO2 BLDV: 49 MM HG (ref 25–47)
POTASSIUM BLD-SCNC: 3.6 MMOL/L (ref 3.5–5)
PROT SERPL-MCNC: 6.2 G/DL (ref 6–8)
RBC # BLD AUTO: 3.3 10E6/UL (ref 4.4–5.9)
RBC URINE: <1 /HPF
SAO2 % BLDV: 70 % (ref 70–75)
SODIUM SERPL-SCNC: 139 MMOL/L (ref 136–145)
SP GR UR STRIP: 1.01 (ref 1–1.03)
SQUAMOUS EPITHELIAL: <1 /HPF
TEMPERATURE: 37 DEGREES C
UROBILINOGEN UR STRIP-MCNC: <2 MG/DL
WBC # BLD AUTO: 10.3 10E3/UL (ref 4–11)
WBC URINE: 1 /HPF

## 2023-04-30 PROCEDURE — 82805 BLOOD GASES W/O2 SATURATION: CPT | Performed by: HOSPITALIST

## 2023-04-30 PROCEDURE — 94002 VENT MGMT INPAT INIT DAY: CPT

## 2023-04-30 PROCEDURE — 999N000026 HC STATISTIC CARDIOPULM RESUSCITATION

## 2023-04-30 PROCEDURE — 36415 COLL VENOUS BLD VENIPUNCTURE: CPT | Performed by: HOSPITALIST

## 2023-04-30 PROCEDURE — 258N000003 HC RX IP 258 OP 636: Performed by: INTERNAL MEDICINE

## 2023-04-30 PROCEDURE — 200N000001 HC R&B ICU

## 2023-04-30 PROCEDURE — 93005 ELECTROCARDIOGRAM TRACING: CPT

## 2023-04-30 PROCEDURE — 87040 BLOOD CULTURE FOR BACTERIA: CPT | Performed by: INTERNAL MEDICINE

## 2023-04-30 PROCEDURE — 85027 COMPLETE CBC AUTOMATED: CPT | Performed by: HOSPITALIST

## 2023-04-30 PROCEDURE — 272N000452 HC KIT SHRLOCK 5FR POWER PICC TRIPLE LUMEN

## 2023-04-30 PROCEDURE — 85610 PROTHROMBIN TIME: CPT | Performed by: INTERNAL MEDICINE

## 2023-04-30 PROCEDURE — 255N000002 HC RX 255 OP 636: Performed by: INTERNAL MEDICINE

## 2023-04-30 PROCEDURE — 999N000009 HC STATISTIC AIRWAY CARE

## 2023-04-30 PROCEDURE — 99207 PR NO CHARGE LOS: CPT | Performed by: INTERNAL MEDICINE

## 2023-04-30 PROCEDURE — 370N000003 HC ANESTHESIA WARD SERVICE: Performed by: ANESTHESIOLOGY

## 2023-04-30 PROCEDURE — 250N000013 HC RX MED GY IP 250 OP 250 PS 637: Performed by: INTERNAL MEDICINE

## 2023-04-30 PROCEDURE — 81001 URINALYSIS AUTO W/SCOPE: CPT | Performed by: INTERNAL MEDICINE

## 2023-04-30 PROCEDURE — 999N000065 XR CHEST PORT 1 VIEW

## 2023-04-30 PROCEDURE — 250N000009 HC RX 250: Performed by: INTERNAL MEDICINE

## 2023-04-30 PROCEDURE — 36569 INSJ PICC 5 YR+ W/O IMAGING: CPT

## 2023-04-30 PROCEDURE — 93010 ELECTROCARDIOGRAM REPORT: CPT | Mod: HIP | Performed by: GENERAL ACUTE CARE HOSPITAL

## 2023-04-30 PROCEDURE — 82805 BLOOD GASES W/O2 SATURATION: CPT | Performed by: INTERNAL MEDICINE

## 2023-04-30 PROCEDURE — 99291 CRITICAL CARE FIRST HOUR: CPT | Performed by: INTERNAL MEDICINE

## 2023-04-30 PROCEDURE — 999N000185 HC STATISTIC TRANSPORT TIME EA 15 MIN

## 2023-04-30 PROCEDURE — 83735 ASSAY OF MAGNESIUM: CPT | Performed by: HOSPITALIST

## 2023-04-30 PROCEDURE — 83735 ASSAY OF MAGNESIUM: CPT | Performed by: INTERNAL MEDICINE

## 2023-04-30 PROCEDURE — 3E033XZ INTRODUCTION OF VASOPRESSOR INTO PERIPHERAL VEIN, PERCUTANEOUS APPROACH: ICD-10-PCS | Performed by: HOSPITALIST

## 2023-04-30 PROCEDURE — 999N000065 XR ABDOMEN PORT 1 VIEW

## 2023-04-30 PROCEDURE — 36415 COLL VENOUS BLD VENIPUNCTURE: CPT | Performed by: INTERNAL MEDICINE

## 2023-04-30 PROCEDURE — 999N000157 HC STATISTIC RCP TIME EA 10 MIN

## 2023-04-30 PROCEDURE — 99231 SBSQ HOSP IP/OBS SF/LOW 25: CPT | Performed by: INTERNAL MEDICINE

## 2023-04-30 PROCEDURE — 5A1935Z RESPIRATORY VENTILATION, LESS THAN 24 CONSECUTIVE HOURS: ICD-10-PCS | Performed by: HOSPITALIST

## 2023-04-30 PROCEDURE — 93005 ELECTROCARDIOGRAM TRACING: CPT | Performed by: HOSPITALIST

## 2023-04-30 PROCEDURE — 250N000011 HC RX IP 250 OP 636: Performed by: INTERNAL MEDICINE

## 2023-04-30 PROCEDURE — 250N000011 HC RX IP 250 OP 636: Performed by: HOSPITALIST

## 2023-04-30 PROCEDURE — 82140 ASSAY OF AMMONIA: CPT | Performed by: INTERNAL MEDICINE

## 2023-04-30 PROCEDURE — 258N000003 HC RX IP 258 OP 636: Performed by: HOSPITALIST

## 2023-04-30 PROCEDURE — 83605 ASSAY OF LACTIC ACID: CPT | Performed by: INTERNAL MEDICINE

## 2023-04-30 PROCEDURE — 80053 COMPREHEN METABOLIC PANEL: CPT | Performed by: HOSPITALIST

## 2023-04-30 PROCEDURE — 93306 TTE W/DOPPLER COMPLETE: CPT | Mod: 26 | Performed by: INTERNAL MEDICINE

## 2023-04-30 PROCEDURE — 83605 ASSAY OF LACTIC ACID: CPT | Performed by: HOSPITALIST

## 2023-04-30 PROCEDURE — 84100 ASSAY OF PHOSPHORUS: CPT | Performed by: INTERNAL MEDICINE

## 2023-04-30 PROCEDURE — 82330 ASSAY OF CALCIUM: CPT | Performed by: INTERNAL MEDICINE

## 2023-04-30 RX ORDER — NALOXONE HYDROCHLORIDE 0.4 MG/ML
0.2 INJECTION, SOLUTION INTRAMUSCULAR; INTRAVENOUS; SUBCUTANEOUS
Status: DISCONTINUED | OUTPATIENT
Start: 2023-04-30 | End: 2023-05-08 | Stop reason: HOSPADM

## 2023-04-30 RX ORDER — IPRATROPIUM BROMIDE AND ALBUTEROL SULFATE 2.5; .5 MG/3ML; MG/3ML
3 SOLUTION RESPIRATORY (INHALATION) EVERY 4 HOURS PRN
Status: DISCONTINUED | OUTPATIENT
Start: 2023-04-30 | End: 2023-05-08 | Stop reason: HOSPADM

## 2023-04-30 RX ORDER — FENTANYL CITRATE 50 UG/ML
25-50 INJECTION, SOLUTION INTRAMUSCULAR; INTRAVENOUS
Status: DISCONTINUED | OUTPATIENT
Start: 2023-04-30 | End: 2023-05-01

## 2023-04-30 RX ORDER — NALOXONE HYDROCHLORIDE 0.4 MG/ML
0.4 INJECTION, SOLUTION INTRAMUSCULAR; INTRAVENOUS; SUBCUTANEOUS
Status: DISCONTINUED | OUTPATIENT
Start: 2023-04-30 | End: 2023-05-08 | Stop reason: HOSPADM

## 2023-04-30 RX ORDER — NICOTINE POLACRILEX 4 MG
15-30 LOZENGE BUCCAL
Status: DISCONTINUED | OUTPATIENT
Start: 2023-04-30 | End: 2023-05-08 | Stop reason: HOSPADM

## 2023-04-30 RX ORDER — ONDANSETRON 4 MG/1
4 TABLET, ORALLY DISINTEGRATING ORAL EVERY 6 HOURS PRN
Status: DISCONTINUED | OUTPATIENT
Start: 2023-04-30 | End: 2023-05-08 | Stop reason: HOSPADM

## 2023-04-30 RX ORDER — PROPOFOL 10 MG/ML
5-75 INJECTION, EMULSION INTRAVENOUS CONTINUOUS
Status: DISCONTINUED | OUTPATIENT
Start: 2023-04-30 | End: 2023-04-30

## 2023-04-30 RX ORDER — LIDOCAINE 40 MG/G
CREAM TOPICAL
Status: ACTIVE | OUTPATIENT
Start: 2023-04-30 | End: 2023-05-03

## 2023-04-30 RX ORDER — ONDANSETRON 2 MG/ML
4 INJECTION INTRAMUSCULAR; INTRAVENOUS EVERY 6 HOURS PRN
Status: DISCONTINUED | OUTPATIENT
Start: 2023-04-30 | End: 2023-05-08 | Stop reason: HOSPADM

## 2023-04-30 RX ORDER — CHLORHEXIDINE GLUCONATE ORAL RINSE 1.2 MG/ML
15 SOLUTION DENTAL EVERY 12 HOURS
Status: DISCONTINUED | OUTPATIENT
Start: 2023-04-30 | End: 2023-05-01

## 2023-04-30 RX ORDER — SODIUM CHLORIDE 9 MG/ML
INJECTION, SOLUTION INTRAVENOUS CONTINUOUS
Status: DISCONTINUED | OUTPATIENT
Start: 2023-04-30 | End: 2023-05-01

## 2023-04-30 RX ORDER — AMOXICILLIN 250 MG
1 CAPSULE ORAL 2 TIMES DAILY PRN
Status: DISCONTINUED | OUTPATIENT
Start: 2023-04-30 | End: 2023-05-08 | Stop reason: HOSPADM

## 2023-04-30 RX ORDER — MAGNESIUM SULFATE HEPTAHYDRATE 40 MG/ML
2 INJECTION, SOLUTION INTRAVENOUS ONCE
Status: COMPLETED | OUTPATIENT
Start: 2023-04-30 | End: 2023-04-30

## 2023-04-30 RX ORDER — FENTANYL CITRATE 50 UG/ML
50 INJECTION, SOLUTION INTRAMUSCULAR; INTRAVENOUS ONCE
Status: COMPLETED | OUTPATIENT
Start: 2023-04-30 | End: 2023-04-30

## 2023-04-30 RX ORDER — PROPOFOL 10 MG/ML
5-75 INJECTION, EMULSION INTRAVENOUS CONTINUOUS
Status: DISCONTINUED | OUTPATIENT
Start: 2023-04-30 | End: 2023-05-01

## 2023-04-30 RX ORDER — DEXTROSE MONOHYDRATE 25 G/50ML
25-50 INJECTION, SOLUTION INTRAVENOUS
Status: DISCONTINUED | OUTPATIENT
Start: 2023-04-30 | End: 2023-05-08 | Stop reason: HOSPADM

## 2023-04-30 RX ORDER — HEPARIN SODIUM 5000 [USP'U]/.5ML
5000 INJECTION, SOLUTION INTRAVENOUS; SUBCUTANEOUS EVERY 8 HOURS
Status: DISCONTINUED | OUTPATIENT
Start: 2023-04-30 | End: 2023-05-08 | Stop reason: HOSPADM

## 2023-04-30 RX ORDER — AMOXICILLIN 250 MG
2 CAPSULE ORAL 2 TIMES DAILY PRN
Status: DISCONTINUED | OUTPATIENT
Start: 2023-04-30 | End: 2023-05-08 | Stop reason: HOSPADM

## 2023-04-30 RX ADMIN — MULTIPLE VITAMINS W/ MINERALS TAB 1 TABLET: TAB at 09:21

## 2023-04-30 RX ADMIN — HEPARIN SODIUM 5000 UNITS: 5000 INJECTION, SOLUTION INTRAVENOUS; SUBCUTANEOUS at 17:19

## 2023-04-30 RX ADMIN — DIAZEPAM 10 MG: 5 TABLET ORAL at 09:29

## 2023-04-30 RX ADMIN — ACETAMINOPHEN, ASPIRIN, CAFFEINE 1 TABLET: 250; 250; 65 TABLET, FILM COATED ORAL at 09:29

## 2023-04-30 RX ADMIN — FENTANYL CITRATE 50 MCG: 50 INJECTION, SOLUTION INTRAMUSCULAR; INTRAVENOUS at 18:23

## 2023-04-30 RX ADMIN — PROPOFOL 30 MCG/KG/MIN: 10 INJECTION, EMULSION INTRAVENOUS at 23:40

## 2023-04-30 RX ADMIN — SODIUM CHLORIDE: 9 INJECTION, SOLUTION INTRAVENOUS at 02:34

## 2023-04-30 RX ADMIN — FENTANYL CITRATE 50 MCG: 50 INJECTION INTRAMUSCULAR; INTRAVENOUS at 15:19

## 2023-04-30 RX ADMIN — NICOTINE 1 PATCH: 14 PATCH, EXTENDED RELEASE TRANSDERMAL at 09:22

## 2023-04-30 RX ADMIN — CYANOCOBALAMIN TAB 1000 MCG 1000 MCG: 1000 TAB at 09:21

## 2023-04-30 RX ADMIN — FOLIC ACID 1 MG: 1 TABLET ORAL at 09:21

## 2023-04-30 RX ADMIN — PERFLUTREN 6 ML: 6.52 INJECTION, SUSPENSION INTRAVENOUS at 17:55

## 2023-04-30 RX ADMIN — Medication 100 MG: at 09:21

## 2023-04-30 RX ADMIN — PROCHLORPERAZINE MALEATE 10 MG: 10 TABLET ORAL at 09:29

## 2023-04-30 RX ADMIN — CHLORHEXIDINE GLUCONATE 15 ML: 1.2 SOLUTION ORAL at 20:07

## 2023-04-30 RX ADMIN — Medication 25 MCG: at 09:22

## 2023-04-30 RX ADMIN — MAGNESIUM SULFATE HEPTAHYDRATE 2 G: 40 INJECTION, SOLUTION INTRAVENOUS at 18:12

## 2023-04-30 RX ADMIN — GABAPENTIN 300 MG: 300 CAPSULE ORAL at 09:21

## 2023-04-30 RX ADMIN — SODIUM CHLORIDE: 9 INJECTION, SOLUTION INTRAVENOUS at 20:08

## 2023-04-30 RX ADMIN — GABAPENTIN 300 MG: 300 CAPSULE ORAL at 13:00

## 2023-04-30 RX ADMIN — PROPOFOL 20 MCG/KG/MIN: 10 INJECTION, EMULSION INTRAVENOUS at 15:20

## 2023-04-30 RX ADMIN — SODIUM CHLORIDE: 9 INJECTION, SOLUTION INTRAVENOUS at 12:42

## 2023-04-30 RX ADMIN — PANTOPRAZOLE SODIUM 40 MG: 40 TABLET, DELAYED RELEASE ORAL at 09:21

## 2023-04-30 RX ADMIN — DIAZEPAM 10 MG: 5 TABLET ORAL at 12:47

## 2023-04-30 RX ADMIN — LIDOCAINE HYDROCHLORIDE 2 ML: 10 INJECTION, SOLUTION EPIDURAL; INFILTRATION; INTRACAUDAL; PERINEURAL at 16:44

## 2023-04-30 RX ADMIN — SODIUM CHLORIDE 1000 ML: 9 INJECTION, SOLUTION INTRAVENOUS at 16:54

## 2023-04-30 RX ADMIN — DIAZEPAM 10 MG: 5 TABLET ORAL at 01:54

## 2023-04-30 RX ADMIN — HALOPERIDOL LACTATE 5 MG: 5 INJECTION, SOLUTION INTRAMUSCULAR at 01:49

## 2023-04-30 RX ADMIN — Medication 400 MG: at 09:21

## 2023-04-30 ASSESSMENT — ACTIVITIES OF DAILY LIVING (ADL)
ADLS_ACUITY_SCORE: 52

## 2023-04-30 NOTE — PROGRESS NOTES
RT responded to CODE, CPR was done. Pt intubated with ETT 8.0 23@teeth. Positive EtCO2, bilateral BS. EKG done. RT will continue to follow.    Aubrey Clark, RT

## 2023-04-30 NOTE — PLAN OF CARE
"Problem: Plan of Care - These are the overarching goals to be used throughout the patient stay.    Goal: Plan of Care Review  Description: The Plan of Care Review/Shift note should be completed every shift.  The Outcome Evaluation is a brief statement about your assessment that the patient is improving, declining, or no change.  This information will be displayed automatically on your shift note.  Outcome: Progressing    Goal Outcome Evaluation:    Patient is A & O x 4. Cooperative, and more calm on shift.     VSS throughout shift, besides noted bradycardia, ongoing. No hypotension noted.     Excedrin PRN x 1 provided for headache. No other complaints of pain.      Compazine PRN x 1 provided for nausea.      IV with NS @ 100 mL/h.      CIWA: 10-0 (sleeping)-12. X 2 PO Valium utilized. No other PRN's needed. Scores mostly for tremors, headache, and visual \"bugs.\"    See other note regarding Code Blue response. Patient transferred to ICU.    Problem: Risk for Delirium  Goal: Improved Behavioral Control  Intervention: Prevent and Manage Agitation  Recent Flowsheet Documentation  Taken 4/30/2023 0915 by Libertad Jimenez RN  Environment Familiarity/Consistency: daily routine followed  Intervention: Minimize Safety Risk  Recent Flowsheet Documentation  Taken 4/30/2023 0915 by Libertad Jimenez, RN  Enhanced Safety Measures: room near unit station     Problem: Alcohol Withdrawal  Goal: Alcohol Withdrawal Symptom Control  Outcome: Progressing  Intervention: Minimize or Manage Alcohol Withdrawal Symptoms  Recent Flowsheet Documentation  Taken 4/30/2023 0915 by Libertad Jimenez, RN  Sensory Stimulation Regulation: television on     "

## 2023-04-30 NOTE — CODE DOCUMENTATION
CODE BLUE DOCUMENTATION     I responded to a code blue called overhead and received on house pager. Dr. Vega was present and running code at arrival per CPR and ACLS pathways. See Dr. Vega's note for further details.    Sadie Barreto MD PGY1 4/30/2023  [facility]

## 2023-04-30 NOTE — ANESTHESIA PROCEDURE NOTES
Airway         Procedure Start/Stop Times: 4/30/2023 3:00 PM and 4/30/2023 3:17 PM  Staff -        Anesthesiologist:  Pepe Grover MD       Performed By: anesthesiologist  Consent for Airway        Urgency: emergent       Consent: The procedure was performed in an emergent situation.  Indications and Patient Condition       Indications for airway management: cardiovascular arrest         Mask difficulty assessment: 0 - not attempted    Final Airway Details       Final airway type: endotracheal airway       Successful airway: ETT - single, Oral and Single subglottic suction  Endotracheal Airway Details        ETT size (mm): 8.0       Cuffed: yes       Successful intubation technique: direct laryngoscopy       DL Blade Type: Verdin 2       Grade View of Cords: 2       Adjucts: stylet       Position: Right       Measured from: lips       Secured at (cm): 24       Bite block used: None    Post intubation assessment        Placement verified by: capnometry, equal breath sounds and chest rise        Number of attempts at approach: 1       Number of other approaches attempted: 0       Secured with: commercial tube ruiz       Ease of procedure: easy       Dentition: Unchanged    Medication(s) Administered   Medication Administration Time: 4/30/2023 3:00 PM    Additional Comments       Called to code blue in room 220.  On arrival CPR in place and patient was being ventilated with 100% oxygen/BVM by respiratory therapy X 2.  Patient had vomited and vomit suctioned from oropharynx prior to laryngoscopy.  Terrible dentition with broken and missing teeth.  Patient was intubated X 1 under direct laryngoscopy.  Positive color change via CO2 detection.  BBS checked.  ETT secured in place by RT.

## 2023-04-30 NOTE — PROGRESS NOTES
Echocardiogram reviewed.  Full report to follow.    1.  Low blood ventricular systolic function is within normal limits with an estimated ejection fraction of 50 to 55%.    2.  No regional wall motion normalities noted.    3.  No significant valvular stenosis or regurgitation noted.    4.  Trace pericardial effusion suspected.    5.  Right ventricular size may be mildly enlarged.  Right ventricular systolic function is at the lower limit of normal may be mildly depressed.    6.  Study is technically difficult while patient on a ventilator.    7.  Full report to follow.  Would consider repeat echocardiogram after extubation for more optimal imaging.

## 2023-04-30 NOTE — PLAN OF CARE
Assumed care of patient. Patient looks pale on cart, remains hypotensive, IVF continue to infuse. Patient was able to void using the bedpan. Family remains bedside. Will continue to monitor patient. Pt arrived on unit around 1600. Remains intubated and sedated. Sinus Tach on telle. BP stable. PICC placed. 1000ml NaCl bolus ordered and given, see MAR. Alberto placed, good UO. NG placed, waiting for xray to verify placement. Prop remains infusing at 30ml/hr, see MAR. PRN fent bolus given prior to NG placement for comfort. Family updated. Pt appears to be resting comfortably in bed.     Problem: Plan of Care - These are the overarching goals to be used throughout the patient stay.    Goal: Optimal Comfort and Wellbeing  Intervention: Monitor Pain and Promote Comfort  Recent Flowsheet Documentation  Taken 4/30/2023 1835 by Fareed Page, RN  Pain Management Interventions: medication (see MAR)     Problem: Plan of Care - These are the overarching goals to be used throughout the patient stay.    Goal: Readiness for Transition of Care  Outcome: Progressing     Fareed Page RN on 4/30/2023 at 6:58 PM

## 2023-04-30 NOTE — SIGNIFICANT EVENT
PCA alerted writer that patient needed help. Upon entering room patient was seizing, RRT called and then patient stopped seizing and did not appear to be breathing, code blue called and staff present and initiated compressions and code team and members present in short time.

## 2023-04-30 NOTE — PROCEDURES
"PICC Line Insertion Procedure Note  Pt. Name: Familia Watson  MRN:        [id number]    Procedure: Insertion of a  Triple Lumen  5 fr  Bard SOLO (valved) Power PICC, Lot number [id number]    Indications: Intubated / Access    Contraindications : none    Procedure Details     Patient identified with 2 identifiers and \"Time Out\" conducted.  .     Central line insertion bundle followed: hand hygiene performed prior to procedure, site cleansed with cholraprep, hat, mask, sterile gloves, sterile gown worn, patient draped with maximum barrier head to toe drape, sterile field maintained.    The vein was assessed and found to be compressible and of adequate size.     Lidocaine 1% 2 ml administered sq to the insertion site. A 5 Fr PICC was inserted into the basilic vein of the right arm with ultrasound guidance. 1 attempt(s) required to access vein.   Catheter threaded without difficulty. Good blood return noted.    Modified Seldinger Technique used for insertion.    The 8 sharps that are included in the PICC insertion kit were accounted for and disposed of in the sharps container prior to breakdown of the sterile field.    Catheter secured with Statlock, biopatch and Tegaderm dressing applied.    Findings:    Total catheter length  38 cm, with 0 cm exposed. Mid upper arm circumference is 24 cm. Catheter was flushed with 30 cc NS. Patient  tolerated procedure well.    Tip placement verified by 3CG technology and blood return      CLABSI prevention brochure left at bedside.    Patient's primary RN notified PICC is ready for use.      Comments:  none      Erick Marvin, RN, BSN  [facility]          "

## 2023-04-30 NOTE — CODE/RAPID RESPONSE
Code Blue lead note    Called for code blue.    CPR underway on my arrival.  Initial rhythm appeared to be PEA.  Brief return of pulse with subsequent return to PEA.  ET tube placed.  CPR performed for 10 minutes, with two administrations of epinephrine prior to obtaining ROSC.  Patient moving and coughing spontaneously.  SBP 200s.    Post ROSC EKG (my read) - sinus tachycardia, no acute ischemic changes.    Lactate 3.7  VBG - ph 7.17, pco2 69  WBC 10  Hgb 11  Plts 325    Started propofol infusion with 1x 50mcg fentanyl.  Updated family outside room and communicated my concern that he will not ultimately survive. I also communicated that we needed the next day or two to determine his trajectory.    A/P  Unclear etiology to cardiac arrest.  Dysrhythmia is high on the ddx given he is currently admitted for etoh withdrawal.  PE is high on the ddx as well, recommend pursuing CTA if renal function permits after patient is stabilized.    - bolus IVF  - consider CTA chest to evaluate for PE  - propofol gtt  - critical care en route  - updated primary team and family

## 2023-04-30 NOTE — PROGRESS NOTES
Patient was found to be with seizure like activity @ approximately 1500. RR was called. Upon entering room again patient was found to have no pulse and Code Blue was called along with the initiation of CPR due to Full code status. I provided patient history to Code team and MD present during the code event. I followed patient to transfer to ICU to settle patient. Hand-off report was provided to APRIL WING.

## 2023-04-30 NOTE — PROVIDER NOTIFICATION
04/30/23 1557   Ventilator Data   Vent Owner On Site Equipment   Vent Brand draeger   Ventilation Method Invasive   Ventilator Start - Date 04/30/23   $Vent Initial  Initial day   Ventilator   Ventilator Adult   Ventilator Settings    Vent Mode CMV/AC  (Continuous Mandatory Ventilation/ Assist Control)   Resp Rate (Set) 14 breaths/min   Tidal Volume (Set, mL) 530 mL   FiO2 70 %   PEEP (cm H2O) 5 cmH2O   Inspiratory Time (sec) 0.75 sec   Heater Temperature 37.4   Ventilator - Patient    Patient Resp Rate  21 breaths/min   Expiratory Vt (ml) 520   Minute Volume (ml) 10.1 L/min   Peak Inspiratory Pressure (cm H2O) 17 cmH2O   Mean Airway Pressure (cm H2O) 10 cmH2O   Plateau Pressure (cm H2O) 15 cmH2O   Dynamic Compliance (mL/cm H2O) 56 mL/cm H2O   Auto/ Intrinsic PEEP (cm H2O) 5.6 cmH2O   Ventilator Alarms   High Inspiratory Pressure Alarm (cmH2O) 45 cm H2O   Low Inspiratory Pressure (cm H2O) 18 cm H2O   High Respiratory Rate (breaths/min) 40 breaths/min   Low Respiratory Rate (breaths/min) 18 breaths/min   Minute Ventilation High (L) 12 L/min   Minute Ventilation Low (L) 6.7 L/min   Vt High (mL) 1000 mL   Vt Low (mL) 533 mL   External Alarm Functional and On Yes   ETT Cuffed Single;Single Subglottic Suction;Oral 8 mm   Placement Date/Time: 04/30/23 (c) 1500   Mask Ventilation: Not attempted  Ease of Intubation: Easy  Laryngoscopy Technique: Direct laryngoscopy  Cuffed: Cuffed  ETT Type: Single;Single Subglottic Suction;Oral  Single Lumen Tube Size: 8 mm  DL Blade Ty...   Secured at (cm) 23 cm   Measured from Teeth/Gums   Position Center   Secured by Commercial tube ruiz   Bite Block Included with Commercial tube ruiz   Site Appearance Clean;Dry   Safety Measures Manual resuscitator/mask/valve in room   Respiratory Secretions Assessment   Suction Device Inline   Secretions Amount small   Secretions Color red-streaked   Suction Tolerance Tolerated well   Suctioning Adverse Effects None   RT Device Skin Assessment    Oxygen Delivery Device ETT Vera   Ventilator Arm In Place Yes   Site Appearance neck circumference Clean and dry   Strap Tightness Finger Allowance between head and device strap   Device Skin Interventions Taken No adjustments needed   Respiratory WDL   Ambu at Bedside present   Breath Sounds   Breath Sounds All Fields   All Lung Fields Breath Sounds diminished     Pt intubated after code blue called on 2N. Placed on ventilator with above settings. Now on 40% 02 with adequate ABG's. Pt sedated at this time. Will continue to follow

## 2023-04-30 NOTE — PLAN OF CARE
Problem: Plan of Care - These are the overarching goals to be used throughout the patient stay.    Goal: Absence of Hospital-Acquired Illness or Injury  Outcome: Progressing  Intervention: Identify and Manage Fall Risk  Recent Flowsheet Documentation  Taken 4/30/2023 0049 by Nieves Briseno RN  Safety Promotion/Fall Prevention:    safety round/check completed    room near nurse's station    patient and family education    nonskid shoes/slippers when out of bed    activity supervised    assistive device/personal items within reach    clutter free environment maintained    increased rounding and observation    increase visualization of patient    room door open    room organization consistent    treat reversible contributory factors  Taken 4/29/2023 2000 by Nieves Briseno RN  Safety Promotion/Fall Prevention:    safety round/check completed    room near nurse's station    patient and family education    nonskid shoes/slippers when out of bed    activity supervised    assistive device/personal items within reach    clutter free environment maintained    increased rounding and observation    increase visualization of patient    room door open    room organization consistent    treat reversible contributory factors  Intervention: Prevent Skin Injury  Recent Flowsheet Documentation  Taken 4/30/2023 0049 by Neives Briseno RN  Body Position: position changed independently  Taken 4/29/2023 2000 by Nieves Briseno RN  Body Position: position changed independently   Goal Outcome Evaluation:       Pt is alert and oriented. Requesting Valium when awake. CIWA scores  3,14,5,15,0 (asleep) Diazepam and haldol given when pt's scoring for tremors, visual hallucination and headache. Pt mentioned seeing bugs intermittently.   On continuous IVF NS 0.9% 100 ml/hr. VSS.

## 2023-04-30 NOTE — PROGRESS NOTES
"[facility]    Medicine Progress Note - Hospitalist Service    Date of Admission:  4/27/2023    72-year-old refractory alcoholic who had just been discharged on April 26 and then returned to the hospital on April 27 once again intoxicated \"unable to manage\" the plan is for him to get through withdrawal and then eventually arrange for TCU or some type of placement in the next few days    Assessment & Plan   Principal Problem:    Alcohol dependence with intoxication with complication (H) (4/27/2023)  Active Problems:    Alcohol withdrawal syndrome without complication (H) (4/8/2022)    Failure to thrive in adult (4/19/2023)    Alcohol intoxication (H) (4/30/2023)         Diet: Combination Diet Regular Diet Adult    DVT Prophylaxis:   Alberto Catheter: Not present  Lines: None     Cardiac Monitoring: None  Code Status: Full Code                # Hypoalbuminemia: Lowest albumin = 3.1 g/dL at 4/27/2023  6:55 AM, will monitor as appropriate                  Disposition Plan      Expected Discharge Date: 05/02/2023    Discharge Delays: Placement - TCU  Destination: home with help/services  Discharge Comments: placement          Erick Castaneda MD  Hospitalist Service  [facility]  Securely message with [employer] (more info)  Text page via [employer] Paging/Directory   ______________________________________________________________________    Interval History   I do much better today yesterday he had a rapid response because he was obtunded probably related to some medications variety of issues  CT of the head yesterday and the outside chance he had some head trauma was negative for acute problem  He has chronic pain and related symptoms not much in way of insight he agrees that he can go home    Physical Exam   Vital Signs: Temp: 96.8  F (36  C) Temp src: Axillary BP: (!) 133/97 Pulse: 56   Resp: 18 SpO2: 98 % O2 Device: None (Room air)    Weight: 117 lbs 1.03 oz      Medical " Decision Making     Data   Imaging results reviewed over the past 24 hrs:   Recent Results (from the past 24 hour(s))   CT Head w/o Contrast    Narrative    EXAM: CT HEAD W/O CONTRAST  LOCATION: [facility]  DATE/TIME: 4/29/2023 4:02 PM CDT    INDICATION: ETOH;  withdrawal and increased confusion. trauma hx not clear  COMPARISON: CT head 04/24/2023.  TECHNIQUE: Routine CT Head without IV contrast. Multiplanar reformats. Dose reduction techniques were used.    FINDINGS:    INTRACRANIAL CONTENTS: No acute transcortical infarct. Chronic lacunar infarct in the right posterior limb internal capsule redemonstrated. No hemorrhage or extraaxial collection. No mass effect. Subarachnoid cisterns are patent. Patchy white matter   hypodensities are, while nonspecific, most compatible with chronic microvascular ischemic changes. Unchanged proportional prominence of the ventricles and sulci is compatible with diffuse cerebral volume loss.    VISUALIZED ORBITS/SINUSES/MASTOIDS: Left lens replacement. Small amount of layering fluid in the right sphenoid sinus partially visualized. No middle ear or mastoid effusion.    BONES/SOFT TISSUES: No acute abnormality. Left frontal craniotomy postsurgical changes, as before.      Impression    IMPRESSION:  1.  No acute transcortical infarct, intracranial hemorrhage, or mass effect.   2.  Small quantity of layering fluid in the right sphenoid sinus can be seen with acute sinusitis in the appropriate clinical context.  3.  Additional chronic and postsurgical intracranial findings, as detailed.
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
## 2023-05-01 LAB
ANION GAP SERPL CALCULATED.3IONS-SCNC: 8 MMOL/L (ref 5–18)
ATRIAL RATE - MUSE: 105 BPM
ATRIAL RATE - MUSE: 76 BPM
BASE EXCESS BLDV CALC-SCNC: 0.6 MMOL/L
BUN SERPL-MCNC: 13 MG/DL (ref 8–28)
CALCIUM SERPL-MCNC: 8.3 MG/DL (ref 8.5–10.5)
CHLORIDE BLD-SCNC: 106 MMOL/L (ref 98–107)
CO2 SERPL-SCNC: 23 MMOL/L (ref 22–31)
CREAT SERPL-MCNC: 0.57 MG/DL (ref 0.7–1.3)
DIASTOLIC BLOOD PRESSURE - MUSE: NORMAL MMHG
DIASTOLIC BLOOD PRESSURE - MUSE: NORMAL MMHG
ERYTHROCYTE [DISTWIDTH] IN BLOOD BY AUTOMATED COUNT: 14.9 % (ref 10–15)
GFR SERPL CREATININE-BSD FRML MDRD: >90 ML/MIN/1.73M2
GLUCOSE BLD-MCNC: 104 MG/DL (ref 70–125)
GLUCOSE BLDC GLUCOMTR-MCNC: 104 MG/DL (ref 70–99)
GLUCOSE BLDC GLUCOMTR-MCNC: 110 MG/DL (ref 70–99)
GLUCOSE BLDC GLUCOMTR-MCNC: 115 MG/DL (ref 70–99)
GLUCOSE BLDC GLUCOMTR-MCNC: 116 MG/DL (ref 70–99)
GLUCOSE BLDC GLUCOMTR-MCNC: 95 MG/DL (ref 70–99)
HCO3 BLDV-SCNC: 26 MMOL/L (ref 24–30)
HCT VFR BLD AUTO: 30.9 % (ref 40–53)
HGB BLD-MCNC: 9.9 G/DL (ref 13.3–17.7)
INTERPRETATION ECG - MUSE: NORMAL
INTERPRETATION ECG - MUSE: NORMAL
LACTATE SERPL-SCNC: 1.2 MMOL/L (ref 0.7–2)
LACTATE SERPL-SCNC: 1.2 MMOL/L (ref 0.7–2)
MAGNESIUM SERPL-MCNC: 2 MG/DL (ref 1.8–2.6)
MCH RBC QN AUTO: 33.8 PG (ref 26.5–33)
MCHC RBC AUTO-ENTMCNC: 32 G/DL (ref 31.5–36.5)
MCV RBC AUTO: 106 FL (ref 78–100)
OXYHGB MFR BLDV: 63.9 % (ref 70–75)
P AXIS - MUSE: 61 DEGREES
P AXIS - MUSE: 79 DEGREES
PCO2 BLDV: 41 MM HG (ref 35–50)
PH BLDV: 7.4 [PH] (ref 7.35–7.45)
PHOSPHATE SERPL-MCNC: 2.9 MG/DL (ref 2.5–4.5)
PLATELET # BLD AUTO: 286 10E3/UL (ref 150–450)
PO2 BLDV: 35 MM HG (ref 25–47)
POTASSIUM BLD-SCNC: 4.1 MMOL/L (ref 3.5–5)
PR INTERVAL - MUSE: 144 MS
PR INTERVAL - MUSE: 180 MS
PROCALCITONIN SERPL-MCNC: 0.27 NG/ML (ref 0–0.49)
QRS DURATION - MUSE: 84 MS
QRS DURATION - MUSE: 94 MS
QT - MUSE: 366 MS
QT - MUSE: 402 MS
QTC - MUSE: 452 MS
QTC - MUSE: 483 MS
R AXIS - MUSE: 84 DEGREES
R AXIS - MUSE: 87 DEGREES
RBC # BLD AUTO: 2.93 10E6/UL (ref 4.4–5.9)
SAO2 % BLDV: 65.5 % (ref 70–75)
SODIUM SERPL-SCNC: 137 MMOL/L (ref 136–145)
SYSTOLIC BLOOD PRESSURE - MUSE: NORMAL MMHG
SYSTOLIC BLOOD PRESSURE - MUSE: NORMAL MMHG
T AXIS - MUSE: 75 DEGREES
T AXIS - MUSE: 86 DEGREES
VENTRICULAR RATE- MUSE: 105 BPM
VENTRICULAR RATE- MUSE: 76 BPM
WBC # BLD AUTO: 9.1 10E3/UL (ref 4–11)

## 2023-05-01 PROCEDURE — 93005 ELECTROCARDIOGRAM TRACING: CPT | Performed by: INTERNAL MEDICINE

## 2023-05-01 PROCEDURE — 250N000013 HC RX MED GY IP 250 OP 250 PS 637: Performed by: INTERNAL MEDICINE

## 2023-05-01 PROCEDURE — 82805 BLOOD GASES W/O2 SATURATION: CPT | Performed by: INTERNAL MEDICINE

## 2023-05-01 PROCEDURE — 80048 BASIC METABOLIC PNL TOTAL CA: CPT | Performed by: INTERNAL MEDICINE

## 2023-05-01 PROCEDURE — 250N000011 HC RX IP 250 OP 636: Performed by: HOSPITALIST

## 2023-05-01 PROCEDURE — 83605 ASSAY OF LACTIC ACID: CPT | Performed by: INTERNAL MEDICINE

## 2023-05-01 PROCEDURE — 250N000011 HC RX IP 250 OP 636: Performed by: INTERNAL MEDICINE

## 2023-05-01 PROCEDURE — 84145 PROCALCITONIN (PCT): CPT | Performed by: INTERNAL MEDICINE

## 2023-05-01 PROCEDURE — 87205 SMEAR GRAM STAIN: CPT | Performed by: INTERNAL MEDICINE

## 2023-05-01 PROCEDURE — 120N000004 HC R&B MS OVERFLOW

## 2023-05-01 PROCEDURE — 85014 HEMATOCRIT: CPT | Performed by: INTERNAL MEDICINE

## 2023-05-01 PROCEDURE — 200N000001 HC R&B ICU

## 2023-05-01 PROCEDURE — 84100 ASSAY OF PHOSPHORUS: CPT | Performed by: INTERNAL MEDICINE

## 2023-05-01 PROCEDURE — 999N000157 HC STATISTIC RCP TIME EA 10 MIN

## 2023-05-01 PROCEDURE — 83735 ASSAY OF MAGNESIUM: CPT | Performed by: INTERNAL MEDICINE

## 2023-05-01 PROCEDURE — 93005 ELECTROCARDIOGRAM TRACING: CPT

## 2023-05-01 PROCEDURE — C9113 INJ PANTOPRAZOLE SODIUM, VIA: HCPCS | Performed by: INTERNAL MEDICINE

## 2023-05-01 PROCEDURE — 99291 CRITICAL CARE FIRST HOUR: CPT | Performed by: INTERNAL MEDICINE

## 2023-05-01 PROCEDURE — 94003 VENT MGMT INPAT SUBQ DAY: CPT

## 2023-05-01 PROCEDURE — 99233 SBSQ HOSP IP/OBS HIGH 50: CPT | Performed by: INTERNAL MEDICINE

## 2023-05-01 RX ORDER — GABAPENTIN 100 MG/1
100 CAPSULE ORAL 3 TIMES DAILY
Status: DISCONTINUED | OUTPATIENT
Start: 2023-05-01 | End: 2023-05-04

## 2023-05-01 RX ORDER — IOPAMIDOL 755 MG/ML
100 INJECTION, SOLUTION INTRAVASCULAR ONCE
Status: COMPLETED | OUTPATIENT
Start: 2023-05-01 | End: 2023-05-01

## 2023-05-01 RX ORDER — MAGNESIUM SULFATE HEPTAHYDRATE 40 MG/ML
2 INJECTION, SOLUTION INTRAVENOUS ONCE
Status: COMPLETED | OUTPATIENT
Start: 2023-05-01 | End: 2023-05-01

## 2023-05-01 RX ORDER — HYDROMORPHONE HYDROCHLORIDE 1 MG/ML
0.5 INJECTION, SOLUTION INTRAMUSCULAR; INTRAVENOUS; SUBCUTANEOUS EVERY 4 HOURS PRN
Status: DISCONTINUED | OUTPATIENT
Start: 2023-05-01 | End: 2023-05-02

## 2023-05-01 RX ORDER — HYDRALAZINE HYDROCHLORIDE 20 MG/ML
10 INJECTION INTRAMUSCULAR; INTRAVENOUS EVERY 6 HOURS PRN
Status: DISCONTINUED | OUTPATIENT
Start: 2023-05-01 | End: 2023-05-04

## 2023-05-01 RX ADMIN — HYDROMORPHONE HYDROCHLORIDE 0.5 MG: 1 INJECTION, SOLUTION INTRAMUSCULAR; INTRAVENOUS; SUBCUTANEOUS at 23:31

## 2023-05-01 RX ADMIN — HEPARIN SODIUM 5000 UNITS: 5000 INJECTION, SOLUTION INTRAVENOUS; SUBCUTANEOUS at 01:29

## 2023-05-01 RX ADMIN — GABAPENTIN 100 MG: 100 CAPSULE ORAL at 20:13

## 2023-05-01 RX ADMIN — ATORVASTATIN CALCIUM 40 MG: 40 TABLET, FILM COATED ORAL at 21:57

## 2023-05-01 RX ADMIN — PANTOPRAZOLE SODIUM 40 MG: 40 INJECTION, POWDER, LYOPHILIZED, FOR SOLUTION INTRAVENOUS at 06:34

## 2023-05-01 RX ADMIN — DIAZEPAM 10 MG: 5 TABLET ORAL at 16:38

## 2023-05-01 RX ADMIN — HYDRALAZINE HYDROCHLORIDE 10 MG: 20 INJECTION, SOLUTION INTRAMUSCULAR; INTRAVENOUS at 22:35

## 2023-05-01 RX ADMIN — DIAZEPAM 10 MG: 5 TABLET ORAL at 20:13

## 2023-05-01 RX ADMIN — PROPOFOL 45 MCG/KG/MIN: 10 INJECTION, EMULSION INTRAVENOUS at 05:58

## 2023-05-01 RX ADMIN — GABAPENTIN 100 MG: 100 CAPSULE ORAL at 13:44

## 2023-05-01 RX ADMIN — CHLORHEXIDINE GLUCONATE 15 ML: 1.2 SOLUTION ORAL at 08:10

## 2023-05-01 RX ADMIN — MAGNESIUM SULFATE HEPTAHYDRATE 2 G: 40 INJECTION, SOLUTION INTRAVENOUS at 07:03

## 2023-05-01 RX ADMIN — HEPARIN SODIUM 5000 UNITS: 5000 INJECTION, SOLUTION INTRAVENOUS; SUBCUTANEOUS at 10:24

## 2023-05-01 RX ADMIN — HYDROMORPHONE HYDROCHLORIDE 0.5 MG: 1 INJECTION, SOLUTION INTRAMUSCULAR; INTRAVENOUS; SUBCUTANEOUS at 09:27

## 2023-05-01 RX ADMIN — DIAZEPAM 10 MG: 5 TABLET ORAL at 13:44

## 2023-05-01 RX ADMIN — HEPARIN SODIUM 5000 UNITS: 5000 INJECTION, SOLUTION INTRAVENOUS; SUBCUTANEOUS at 16:38

## 2023-05-01 RX ADMIN — NICOTINE 1 PATCH: 14 PATCH, EXTENDED RELEASE TRANSDERMAL at 08:09

## 2023-05-01 ASSESSMENT — ACTIVITIES OF DAILY LIVING (ADL)
ADLS_ACUITY_SCORE: 48
ADLS_ACUITY_SCORE: 49
ADLS_ACUITY_SCORE: 45
ADLS_ACUITY_SCORE: 48
ADLS_ACUITY_SCORE: 49
ADLS_ACUITY_SCORE: 45
ADLS_ACUITY_SCORE: 48
ADLS_ACUITY_SCORE: 47
ADLS_ACUITY_SCORE: 48
ADLS_ACUITY_SCORE: 49

## 2023-05-01 NOTE — PROGRESS NOTES
[facility]    Medicine Progress Note - Hospitalist Service    Date of Admission:  4/27/2023    Assessment & Plan   72-year-old male with history of severe EtOH withdrawal      S/p cardiac arrest.  Was intubated and on mechanical ventilation.  Successfully extubated today.  Was satting okay on 2 L of oxygen.  Etiology of cardiac arrest is unclear, suspected to be due to cardiac arrhythmia.  He had 10 minutes of CPR, received 2 rounds of epinephrine, was intubated and put on mechanical ventilation and admitted to ICU.  He was on propofol for sedation.  Status post extubation 5/1/2023.  Patient currently on 2 L of oxygen via facemask.  Satting okay.  Continue nebulization treatment and   Acute toxic encephalopathy likely due to sedation medications.  He is able to follow commands and have a conversation, falls asleep quickly.  EtOH withdrawal.  Blood pressure is moderately severely limited.  Tachycardic.  Continue supportive care including IV fluids, benzodiazepines per [facility] protocol.  Elevated blood pressure in the setting of severe withdrawal   Hyponatremia, resolved   Metabolic acidosis status postcardiac arrest.  Venous blood gas obtained showed a pH of 7.17 PCO2 of 69.  Normal VBG obtained this morning prior to extubation.   Anemia, chronic, hemoglobin 9.9 today   will obtain venous blood gas today.  History of hyperlipidemia  History of subdural hematoma  History of COPD/emphysema  History of migraine headaches  EtOH use disorder    Diet: NPO for Medical/Clinical Reasons Except for: Meds    DVT Prophylaxis: Pneumatic Compression Devices  Alberto Catheter: PRESENT, indication: Strict 1-2 Hour I&O  Lines:   PICC 04/30/23 Triple Lumen Right Basilic-Site Assessment: WDL      Cardiac Monitoring: ACTIVE order. Indication: ICU  Code Status: Full Code      Clinically Significant Risk Factors              # Hypoalbuminemia: Lowest albumin = 2.9 g/dL at 4/30/2023  3:14 PM, will monitor as  appropriate                   Disposition Plan  To be determined     Expected Discharge Date: 05/02/2023      Destination: home with help/services  Discharge Comments: placement          Ramirez Dumont MD  Hospitalist Service  [facility]  Securely message with [employer] (more info)  Text page via [employer] Paging/Directory   ______________________________________________________________________    Interval History   Awake.  Following simple commands.  On terms of oxygen status post extubation.  Tachycardic.  Satting okay.  EKG showed sinus tachycardia  Patient was intubated and put on mechanical ventilator and admitted to ICU yesterday postcardiac arrest.  Echocardiogram obtained today showed preserved left ventricle ejection fraction of 50 to 55% no regional wall motion abnormalities.    Physical Exam   Vital Signs: Temp: 97.3  F (36.3  C) Temp src: Axillary BP: (!) 181/81 Pulse: 77   Resp: 17 SpO2: 100 % O2 Device: Mechanical Ventilator    Weight: 123 lbs 12.8 oz    Patient awake, follows commands.  In no cardiorespiratory distress  HNT: [place] conjunctive anicteric sclera no oral thrush  Neck supple  No wheezes diminished breath sounds on the bilateral lower lungs  Abdomen is nondistended soft bowel sounds are active  No edema on the lower extremity  No rashes or jaundice  Not agitated    Medical Decision Making       50 MINUTES SPENT BY ME on the date of service doing chart review, history, exam, documentation & further activities per the note.  MANAGEMENT DISCUSSED with the following over the past 24 hours: Intensivist and RN notes   NOTE(S)/MEDICAL RECORDS REVIEWED over the past 24 hours: Intensivist and nursing notes      Data     I have personally reviewed the following data over the past 24 hrs:    9.1  \   9.9 (L)   / 286     137 106 13 /  95   4.1 23 0.57 (L) \       ALT: 85 (H) AST: 129 (H) AP: 129 (H) TBILI: 0.4   ALB: 2.9 (L) TOT PROTEIN: 6.2 LIPASE: N/A       Procal: N/A CRP: N/A  Lactic Acid: 1.2       INR:  1.04 PTT:  N/A   D-dimer:  N/A Fibrinogen:  N/A       Imaging results reviewed over the past 24 hrs:   Recent Results (from the past 24 hour(s))   XR Chest Port 1 View    Narrative    EXAM: XR CHEST PORT 1 VIEW  LOCATION: [facility]  DATE/TIME: 4/30/2023 3:27 PM CDT    INDICATION: verify ett tube  COMPARISON: 12/29/2022 and CT 11/20/2022      Impression    IMPRESSION: ET tube 3.5 cm from the vivienne. Some fibrotic change in the left upper lung similar to prior CT. No acute new findings.   XR Abdomen Port 1 View    Narrative    EXAM: XR ABDOMEN PORT 1 VIEW  LOCATION: [facility]  DATE/TIME: 4/30/2023 11:43 PM CDT    INDICATION: NG placement confirmation  COMPARISON: 04/24/2023      Impression    IMPRESSION: Enteric tube not visualized with certainty on these images. Recommend correlation and/or repositioning.

## 2023-05-01 NOTE — PROGRESS NOTES
"CLINICAL NUTRITION SERVICES - ASSESSMENT NOTE     Nutrition Prescription    RECOMMENDATIONS FOR MDs/PROVIDERS TO ORDER:  none    Malnutrition Status:    % Weight Loss:  Weight loss does not meet criteria for malnutrition 12% in the past year   % Intake:  <75% for >/= 1 month (moderate malnutrition) due to ETOH use   Subcutaneous Fat Loss:  Orbital region severe depletion  Muscle Loss:  Temporal region moderate depletion and Clavicle bone region mild depletion  Fluid Retention:  None noted    Malnutrition Diagnosis: Moderate malnutrition  In Context of:  Chronic illness or disease    Recommendations already ordered by Registered Dietitian (RD):  When diet advances add chocolate Ensure Enlive 2 x daily     Future/Additional Recommendations:  Will monitor diet adv, po      REASON FOR ASSESSMENT  Familia CHARLI Watson is a/an 72 year old male assessed by the dietitian for Provider Order - RN request for alcohol abuse     Pt admitted with alcohol abuse, HTN, PEA arrest with resuscitation and need for mechanical ventilation, increase alcohol use with wife's passing 5 months ago.     NUTRITION HISTORY  NKFA  Familiar with pt from recent admission on 4/24/23  He was mainly drinking vodka daily at home with limited intake prior to that admission. He had started ensure chocolate daily at home and we were giving him it twice a day in the hospital         CURRENT NUTRITION ORDERS  Diet: NPO      LABS  Labs reviewed, Cr-.57, Ca-8.3    MEDICATIONS  Medications reviewed, Vitamin B12, folvite, mag-ox, MVI, Thiamine, Vitamin D3     IVF: NaCL 9 hours% infusion at 50ml/hr     ANTHROPOMETRICS  Height: 170.2 cm (5' 7\")  Most Recent Weight: 56.2 kg (123 lb 12.8 oz)    IBW: 67 kg  BMI: Normal BMI  Weight History:   Wt Readings from Last 10 Encounters:   05/01/23 56.2 kg (123 lb 12.8 oz)   04/26/23 54.9 kg (121 lb 1.6 oz)   12/07/22 56.2 kg (123 lb 12.8 oz)   12/02/22 59 kg (130 lb)   11/28/22 54.4 kg (120 lb)   11/23/22 56.1 kg (123 lb 11.2 " oz)   09/15/22 61.2 kg (135 lb)   08/09/22 61.2 kg (135 lb)   07/17/22 59 kg (130 lb)   05/12/22 63.5 kg (140 lb)   Pt down 17# (12%) in the past year    Dosing Weight: 56 kg    ASSESSED NUTRITION NEEDS  Estimated Energy Needs: 1455-5639 kcals/day (30 - 35 kcals/kg )  Justification: Repletion  Estimated Protein Needs: 56-67 grams protein/day (1 - 1.2 grams of pro/kg)  Justification: Maintenance  Estimated Fluid Needs: 8282-8021 mL/day (25 - 30 mL/kg)   Justification: Maintenance    PHYSICAL FINDINGS  See malnutrition section below.  Per flowsheet:   Edema-none charted   GI-WDL  Skin-no skin breakdown noted       MALNUTRITION:  % Weight Loss:  Weight loss does not meet criteria for malnutrition 12% in the past year   % Intake:  <75% for >/= 1 month (moderate malnutrition) due to ETOH use   Subcutaneous Fat Loss:  Orbital region severe depletion  Muscle Loss:  Temporal region moderate depletion and Clavicle bone region mild depletion  Fluid Retention:  None noted    Malnutrition Diagnosis: Moderate malnutrition  In Context of:  Chronic illness or disease    NUTRITION DIAGNOSIS  Malnutrition related to ETOH abuse with reduced po as evidenced by <75% oral intake when drinking, wt loss, fat and muscle loss       INTERVENTIONS  Implementation  Pt extubated now, if diet advances start chocolate Ensure Enlive 2 x daily     Goals  Patient to consume % of nutritionally adequate meals three times per day, or the equivalent with supplements/snacks.     Monitoring/Evaluation  Progress toward goals will be monitored and evaluated per protocol.

## 2023-05-01 NOTE — PROGRESS NOTES
CRITICAL CARE PROGRESS NOTE:    Assessment/Plan:  72M with etoh abuse, smoker, HLD, HTN, prior SDH, recent admission for etoh withdrawal and intoxication, readmitted 4/27 for same. Hospital stay c/b PEA arrest, possible seizure requiring compressions and intubation for airway protection and hypoxemic respiratory failure.     RESP:  Intubated in the setting of PEA arrest on 4/29. Extubated 5/1. Possible aspiration though CXR without obvious infiltrate. Requiring 2Lpm oxymask post-extubation likely due to underlying fibrosis, emphysema. Followed by Dr. Mckeon in pulm clinic - hx of pulmonary abscess treated with prolonged course of abx.     Titrate FiO2 for goal SpO2 88-92%, avoid hyperoxia    Encourage OOB, PT/OT, push IS when able    CV:  No issues, a bit hypertensive this AM when sedation turned off. Hx of HTN, HLD. Echo 4/30: EF 50-55%, mild to mod inferolateral wall hypoK, mild decrease in RV systolic function (no TAPSE reported), no valve issues.     Tele monitoring    MAP >65, not requiring pressors    Cont PTA atorvastatin    NEURO:  Encephalopathy, likely due to etoh withdrawal. Possible seizure activity prompting the PEA arrest (unclear), probable etoh withdrawal seizure.     Tylenol prn pain    Cautious use of narcotics    CIWA monitoring with symptom-triggered diazepam    Reduce gabapentin to 100mg tid to avoid oversedation    Continue olanzapine prn    Continue nicotine patch for tobacco cravings    GI:  No issues    Bedside nursing swallow eval    ADAT    Bowel regimen prn    Cont PTA PPI    RENAL:  No issues, normal renal function    Discontinue standing IVF    Maintain thayer - remove later today most likely    Avoid nephrotoxins    Follow BUN/SCr, lytes    ID:  No evidence of active infection, though at risk for aspiration mane-arrest.     Monitor off abx    Follow culture data    HEMATOLOGIC:  Stable mild anemia. Normal wbc count    hgb >7    Follow counts    ENDOCRINE:  No issues    FSBG checks,  "insulin sliding scale/drip per ICU protocol    ICU PROPHYLAXIS:    Discontinue peridex (extubated)    PTA PPI    HSQ    CODE STATUS, DISPOSITION, FAMILY COMMUNICATION: full code  Updated patient directly    Lines/Drains/Tubes:  PIV x2  PICC RUE 4/30  Alberto 4/30    Restraints  Progress Note  Restraint Application    I recognize that restraints are physical and/or chemical interventions intended to restrict a person's movements. Restraints are currently needed to ensure the safety of this patient and/or others. My clinical rationale appears below.    Category/Type of Restraint  Non Violent:  Soft limb restraint x 2  --  Behavior  Pulling at tubes/lines  --  Root Cause of the Behavior  Sedation/intubation  --  Less-Restrictive Measures that Failed  Non Violent Measures:  Close Observation  --  Response to the Restraint  Patient unable to pull at tubes/lines  --  Criteria for Release from the Restraint  Patient calm and off sedation    Chinmay Fajardo MD (Avi)  [facility] Pulmonary & Critical Care  Pager [phone]  Clinic [phone]  Fax [phone]     Clinically Significant Risk Factors              # Hypoalbuminemia: Lowest albumin = 2.9 g/dL at 4/30/2023  3:14 PM, will monitor as appropriate                           Overnight events:  No issues  Awake, following commands this morning.  Passed SBT -> extubated.  Lot of oral secretions requiring frequent oral care/suctioning; pieces of turkey were removed.  Reports pain at chest from compressions.     Off propofol  No pressors.     Subjective:  As above    Objective:  Physical Exam:  Vent settings for last 24 hours:  Vent Mode: CMV/AC  (Continuous Mandatory Ventilation/ Assist Control)  FiO2 (%): 40 %  Resp Rate (Set): 14 breaths/min  Tidal Volume (Set, mL): 530 mL  PEEP (cm H2O): 5 cmH2O  Resp: 17      BP (!) 172/79   Pulse 78   Temp 98.3  F (36.8  C) (Oral)   Resp 17   Ht 1.702 m (5' 7\")   Wt 56.2 kg (123 lb 12.8 oz)   SpO2 99%   " BMI 19.39 kg/m      Intake/Output last 3 shifts:  I/O last 3 completed shifts:  In: 4365.48 [P.O.:960; I.V.:2405.48; IV Piggyback:1000]  Out: 1500 [Urine:1500]  Intake/Output this shift:  I/O this shift:  In: 100 [I.V.:100]  Out: 175 [Urine:175]    Physical Exam  Gen: sleepy but arousable.   HEENT: no OP lesions, no TOVA  CV: RRR, no m/g/r  Resp: clear ant no w/r/r   Abd: soft, nontender, BS+  Neuro: PERRL, nonfocal  Ext: no edema    LAB:  Recent Labs   Lab 05/01/23 0522   WBC 9.1   HGB 9.9*   HCT 30.9*        Recent Labs   Lab 05/01/23  0522 04/30/23  1514 04/29/23  1529 04/27/23  0655 04/26/23  0948 04/25/23  1020    139 135* 136   < > 133*   CO2 23 22 24 22   < > 19*   BUN 13 14 17 10   < > 14   ALKPHOS  --  129*  --  133*  --  135*   ALT  --  85*  --  61*  --  62*   AST  --  129*  --  64*  --  57*    < > = values in this interval not displayed.     Micro  Sputum 5/1 pending  Blood 4/30 pending    No current outpatient medications on file.       Critical care attestation: 40 minutes spent managing the following issues: acute respiratory failure requiring intubation/IMV, severe etoh withdrawal delirium, toxic/metabolic encephalopathy. High risk for organ deterioration and death requiring ICU level care.

## 2023-05-01 NOTE — PLAN OF CARE
Goal Outcome Evaluation:    Patient initially sedated and intubated. Extubated about 0830 today 05/01/23. Alert and oriented now. Open eyes spontaneously. BP elevated otherwise VSS. LS fine crackles. NG removed. Alberto removed, voiding spontaneously.   Passed bedside dysphagia screening. On CIWA protocol score 6 and 12, available medication given x1. BG 95 and 116.   Denies chest pain or shortness of breath.  Will monitor and continue plan of care.     Problem: Malnutrition  Goal: Improved Nutritional Intake  Outcome: Progressing     Problem: Alcohol Withdrawal  Goal: Alcohol Withdrawal Symptom Control  Outcome: Progressing

## 2023-05-01 NOTE — PROGRESS NOTES
[facility] Pt still does have some tremors but more mild. Pt doesn't complain of headache or itchiness. Pt falling asleep, o2 kept dropping into the 80s. Nursing staff put 02 on pt at 4 liters pt now in the 90s.  Livia Howe RN on 5/1/2023 at 6:37 PM

## 2023-05-01 NOTE — PROGRESS NOTES
Pt remain on vent with settings below       05/01/23 0456   Tech Time   $Tech Time (10 minute increments) 2   Ventilator Data   Vent Owner On Site Equipment   Vent Brand arpita   Vent ID [id number]   Ventilation Method Invasive   Ventilator   Ventilator Adult   Ventilator Settings    Vent Mode CMV/AC  (Continuous Mandatory Ventilation/ Assist Control)   Resp Rate (Set) 14 breaths/min   Tidal Volume (Set, mL) 530 mL   FiO2 40 %   PEEP (cm H2O) 5 cmH2O   Inspiratory Time (sec) 0.75 sec   Sensitivity Flow Triggered (L/min) 2 L/min   Vent Humidifier - Heater/HME Heater   Heater Temperature 37   Ventilator - Patient    Patient Resp Rate  14 breaths/min   Expiratory Vt (ml) 529   Minute Volume (ml) 6.59 L/min   Peak Inspiratory Pressure (cm H2O) 23 cmH2O   Mean Airway Pressure (cm H2O) 8.1 cmH2O   Plateau Pressure (cm H2O) 22 cmH2O   Ventilator Alarms   High Inspiratory Pressure Alarm (cmH2O) 45 cm H2O   High Respiratory Rate (breaths/min) 40 breaths/min   Minute Ventilation High (L) 15 L/min   Minute Ventilation Low (L) 3 L/min   Vt High (mL) 1000 mL   External Alarm Functional and On Yes   ETT Cuffed Single;Single Subglottic Suction;Oral 8 mm   Placement Date/Time: 04/30/23 (c) 1500   Mask Ventilation: Not attempted  Ease of Intubation: Easy  Laryngoscopy Technique: Direct laryngoscopy  Cuffed: Cuffed  ETT Type: Single;Single Subglottic Suction;Oral  Single Lumen Tube Size: 8 mm  DL Blade Ty...   Secured at (cm) 23 cm   Measured from Teeth/Gums   Position Center   Secured by Commercial tube ruiz   Bite Block Included with Commercial tube ruiz   Site Appearance Clean;Dry   Cuff Assessment Minimal leak technique   Safety Measures Manual resuscitator/mask/valve in room   Respiratory Secretions Assessment   Suction Device ETT;Inline   Secretions Amount moderate;large   Secretions Color brown;leo   Secretions Consistency thick   Suction Tolerance Tolerated fairly well   Suctioning Adverse Effects Desaturation   Sputum  Sample Sent Yes   RT Device Skin Assessment   Oxygen Delivery Device ETT Vera   Ventilator Arm In Place Yes   Site Appearance neck circumference Clean and dry   Site Appearance lips Clean and dry   Site Appearance occiput Clean and dry   Strap Tightness Finger Allowance between head and device strap   Device Skin Interventions Taken No adjustments needed   Respiratory WDL   Ambu at Bedside present     Mercedes Cannon, RT

## 2023-05-01 NOTE — PROGRESS NOTES
Patient extubated to RA with not issues. Pt's VSS. Congested cough. BS coarse. Will continue to monitor.

## 2023-05-01 NOTE — PROGRESS NOTES
"Pt scored 14 on CIWAA PRN 10mg Valium given per protocol. Pt is having upper extremity tremors, pt has a headache that feels \"like back and front/all over per pt\". Pt also states he is having some \" itchiness on his lower legs\".   Livia Howe RN on 5/1/2023 at 4:45 PM  "

## 2023-05-01 NOTE — PLAN OF CARE
Goal: Optimal Comfort and Wellbeing  Outcome: Progressing     Problem: Alcohol Withdrawal  Goal: Alcohol Withdrawal Symptom Control  Outcome: Progressing     Problem: Restraint, Nonviolent  Goal: Absence of Harm or Injury  Outcome: Progressing     Pt sedated and intubated, VSS. No acute events overnight. Got x-ray to verify NG tube, radiologist was not able to see NG. Was not able to get pt down to CT for CTA due to CT being busy all night long. Will continue to monitor.

## 2023-05-01 NOTE — PROGRESS NOTES
Care Management Follow Up    Length of Stay (days): 2    Expected Discharge Date: 05/02/2023     Concerns to be Addressed: discharge planning       Patient plan of care discussed at interdisciplinary rounds: Yes    Anticipated Discharge Disposition:  TBD  Disposition Comments: TBD    Anticipated Discharge Services: TBD    Anticipated Discharge DME: None    Patient/family educated on Medicare website which has current facility and service quality ratings: yes      Education Provided on the Discharge Plan: Yes    Patient/Family in Agreement with the Plan: unable to assess      Referrals Placed by CM/SW: TCU        Additional Information:  CM reviewed chart. CM following for discharge needs. TCU referrals pending. Transportation TBD. CM will follow.     TCU referrals pending  [facility] ([facility])  [facility] ([facility])  [facility] ([facility])  [facility] ([facility])  [facility] ([facility])  [facility] ()  [facility]   [facility] ([facility])      Collette Osei RN

## 2023-05-01 NOTE — PROGRESS NOTES
Intensivist update    Patient is doing well post-extubation. Minimal O2 needs. Waking up more, conversant and pleasant.  PT/OT ordered  Will remove the thayer  OK to transfer out of ICU.  HMS notified.  Our service will sign off, please call with additional questions.    Chinmay (Ashvin) MD Tana  [facility] Pulmonary & Critical Care ([facility])  Clinic [phone]  Fax [phone]

## 2023-05-02 ENCOUNTER — APPOINTMENT (OUTPATIENT)
Dept: PHYSICAL THERAPY | Facility: CLINIC | Age: 73
DRG: 896 | End: 2023-05-02
Attending: INTERNAL MEDICINE
Payer: MEDICARE

## 2023-05-02 ENCOUNTER — APPOINTMENT (OUTPATIENT)
Dept: SPEECH THERAPY | Facility: CLINIC | Age: 73
DRG: 896 | End: 2023-05-02
Attending: INTERNAL MEDICINE
Payer: MEDICARE

## 2023-05-02 ENCOUNTER — APPOINTMENT (OUTPATIENT)
Dept: OCCUPATIONAL THERAPY | Facility: CLINIC | Age: 73
DRG: 896 | End: 2023-05-02
Attending: INTERNAL MEDICINE
Payer: MEDICARE

## 2023-05-02 LAB
ANION GAP SERPL CALCULATED.3IONS-SCNC: 12 MMOL/L (ref 5–18)
BUN SERPL-MCNC: 7 MG/DL (ref 8–28)
CALCIUM SERPL-MCNC: 8.5 MG/DL (ref 8.5–10.5)
CHLORIDE BLD-SCNC: 100 MMOL/L (ref 98–107)
CO2 SERPL-SCNC: 20 MMOL/L (ref 22–31)
CREAT SERPL-MCNC: 0.53 MG/DL (ref 0.7–1.3)
ERYTHROCYTE [DISTWIDTH] IN BLOOD BY AUTOMATED COUNT: 14.3 % (ref 10–15)
GFR SERPL CREATININE-BSD FRML MDRD: >90 ML/MIN/1.73M2
GLUCOSE BLD-MCNC: 102 MG/DL (ref 70–125)
GLUCOSE BLDC GLUCOMTR-MCNC: 100 MG/DL (ref 70–99)
HCT VFR BLD AUTO: 29.7 % (ref 40–53)
HGB BLD-MCNC: 10 G/DL (ref 13.3–17.7)
MAGNESIUM SERPL-MCNC: 1.9 MG/DL (ref 1.8–2.6)
MCH RBC QN AUTO: 34 PG (ref 26.5–33)
MCHC RBC AUTO-ENTMCNC: 33.7 G/DL (ref 31.5–36.5)
MCV RBC AUTO: 101 FL (ref 78–100)
PHOSPHATE SERPL-MCNC: 2.6 MG/DL (ref 2.5–4.5)
PLATELET # BLD AUTO: 267 10E3/UL (ref 150–450)
POTASSIUM BLD-SCNC: 3.6 MMOL/L (ref 3.5–5)
RBC # BLD AUTO: 2.94 10E6/UL (ref 4.4–5.9)
SODIUM SERPL-SCNC: 132 MMOL/L (ref 136–145)
WBC # BLD AUTO: 10.5 10E3/UL (ref 4–11)

## 2023-05-02 PROCEDURE — 250N000011 HC RX IP 250 OP 636: Performed by: INTERNAL MEDICINE

## 2023-05-02 PROCEDURE — 83735 ASSAY OF MAGNESIUM: CPT | Performed by: INTERNAL MEDICINE

## 2023-05-02 PROCEDURE — 97530 THERAPEUTIC ACTIVITIES: CPT | Mod: GP

## 2023-05-02 PROCEDURE — C9113 INJ PANTOPRAZOLE SODIUM, VIA: HCPCS | Performed by: INTERNAL MEDICINE

## 2023-05-02 PROCEDURE — 250N000013 HC RX MED GY IP 250 OP 250 PS 637: Performed by: INTERNAL MEDICINE

## 2023-05-02 PROCEDURE — 97166 OT EVAL MOD COMPLEX 45 MIN: CPT | Mod: GO

## 2023-05-02 PROCEDURE — 258N000003 HC RX IP 258 OP 636: Performed by: INTERNAL MEDICINE

## 2023-05-02 PROCEDURE — 99233 SBSQ HOSP IP/OBS HIGH 50: CPT | Performed by: INTERNAL MEDICINE

## 2023-05-02 PROCEDURE — 97535 SELF CARE MNGMENT TRAINING: CPT | Mod: GO

## 2023-05-02 PROCEDURE — 80048 BASIC METABOLIC PNL TOTAL CA: CPT | Performed by: INTERNAL MEDICINE

## 2023-05-02 PROCEDURE — 85027 COMPLETE CBC AUTOMATED: CPT | Performed by: INTERNAL MEDICINE

## 2023-05-02 PROCEDURE — 84100 ASSAY OF PHOSPHORUS: CPT | Performed by: INTERNAL MEDICINE

## 2023-05-02 PROCEDURE — 92526 ORAL FUNCTION THERAPY: CPT | Mod: GN

## 2023-05-02 PROCEDURE — 92610 EVALUATE SWALLOWING FUNCTION: CPT | Mod: GN

## 2023-05-02 PROCEDURE — 250N000009 HC RX 250: Performed by: INTERNAL MEDICINE

## 2023-05-02 PROCEDURE — 120N000004 HC R&B MS OVERFLOW

## 2023-05-02 PROCEDURE — 97162 PT EVAL MOD COMPLEX 30 MIN: CPT | Mod: GP

## 2023-05-02 RX ORDER — MAGNESIUM SULFATE HEPTAHYDRATE 40 MG/ML
2 INJECTION, SOLUTION INTRAVENOUS ONCE
Status: COMPLETED | OUTPATIENT
Start: 2023-05-02 | End: 2023-05-02

## 2023-05-02 RX ORDER — KETOROLAC TROMETHAMINE 15 MG/ML
15 INJECTION, SOLUTION INTRAMUSCULAR; INTRAVENOUS EVERY 6 HOURS PRN
Status: DISPENSED | OUTPATIENT
Start: 2023-05-02 | End: 2023-05-07

## 2023-05-02 RX ORDER — MAGNESIUM SULFATE HEPTAHYDRATE 40 MG/ML
2 INJECTION, SOLUTION INTRAVENOUS ONCE
Status: DISCONTINUED | OUTPATIENT
Start: 2023-05-02 | End: 2023-05-02

## 2023-05-02 RX ORDER — FOLIC ACID 5 MG/ML
1 INJECTION, SOLUTION INTRAMUSCULAR; INTRAVENOUS; SUBCUTANEOUS DAILY
Status: DISCONTINUED | OUTPATIENT
Start: 2023-05-02 | End: 2023-05-04

## 2023-05-02 RX ORDER — THIAMINE HYDROCHLORIDE 100 MG/ML
100 INJECTION, SOLUTION INTRAMUSCULAR; INTRAVENOUS DAILY
Status: DISCONTINUED | OUTPATIENT
Start: 2023-05-02 | End: 2023-05-08 | Stop reason: HOSPADM

## 2023-05-02 RX ADMIN — HEPARIN SODIUM 5000 UNITS: 5000 INJECTION, SOLUTION INTRAVENOUS; SUBCUTANEOUS at 01:21

## 2023-05-02 RX ADMIN — ATORVASTATIN CALCIUM 40 MG: 40 TABLET, FILM COATED ORAL at 21:10

## 2023-05-02 RX ADMIN — FOLIC ACID 1 MG: 5 INJECTION, SOLUTION INTRAMUSCULAR; INTRAVENOUS; SUBCUTANEOUS at 10:47

## 2023-05-02 RX ADMIN — KETOROLAC TROMETHAMINE 15 MG: 15 INJECTION, SOLUTION INTRAMUSCULAR; INTRAVENOUS at 16:34

## 2023-05-02 RX ADMIN — HEPARIN SODIUM 5000 UNITS: 5000 INJECTION, SOLUTION INTRAVENOUS; SUBCUTANEOUS at 09:21

## 2023-05-02 RX ADMIN — PANTOPRAZOLE SODIUM 40 MG: 40 INJECTION, POWDER, LYOPHILIZED, FOR SOLUTION INTRAVENOUS at 09:18

## 2023-05-02 RX ADMIN — SODIUM PHOSPHATE, MONOBASIC, MONOHYDRATE AND SODIUM PHOSPHATE, DIBASIC, ANHYDROUS 9 MMOL: 276; 142 INJECTION, SOLUTION INTRAVENOUS at 09:21

## 2023-05-02 RX ADMIN — DIAZEPAM 10 MG: 5 INJECTION, SOLUTION INTRAMUSCULAR; INTRAVENOUS at 04:50

## 2023-05-02 RX ADMIN — THIAMINE HYDROCHLORIDE 100 MG: 100 INJECTION, SOLUTION INTRAMUSCULAR; INTRAVENOUS at 10:47

## 2023-05-02 RX ADMIN — HEPARIN SODIUM 5000 UNITS: 5000 INJECTION, SOLUTION INTRAVENOUS; SUBCUTANEOUS at 16:34

## 2023-05-02 RX ADMIN — ONDANSETRON 4 MG: 2 INJECTION INTRAMUSCULAR; INTRAVENOUS at 21:12

## 2023-05-02 RX ADMIN — NICOTINE 1 PATCH: 14 PATCH, EXTENDED RELEASE TRANSDERMAL at 09:28

## 2023-05-02 RX ADMIN — ACETAMINOPHEN, ASPIRIN, CAFFEINE 1 TABLET: 250; 250; 65 TABLET, FILM COATED ORAL at 16:31

## 2023-05-02 RX ADMIN — MAGNESIUM SULFATE HEPTAHYDRATE 2 G: 40 INJECTION, SOLUTION INTRAVENOUS at 09:21

## 2023-05-02 RX ADMIN — GABAPENTIN 100 MG: 100 CAPSULE ORAL at 13:05

## 2023-05-02 RX ADMIN — GABAPENTIN 100 MG: 100 CAPSULE ORAL at 21:10

## 2023-05-02 RX ADMIN — PROCHLORPERAZINE MALEATE 10 MG: 10 TABLET ORAL at 21:40

## 2023-05-02 ASSESSMENT — ACTIVITIES OF DAILY LIVING (ADL)
PREVIOUS_RESPONSIBILITIES: HOUSEKEEPING
ADLS_ACUITY_SCORE: 47
ADLS_ACUITY_SCORE: 45
ADLS_ACUITY_SCORE: 43
ADLS_ACUITY_SCORE: 45
ADLS_ACUITY_SCORE: 47
ADLS_ACUITY_SCORE: 45
ADLS_ACUITY_SCORE: 47
ADLS_ACUITY_SCORE: 43
ADLS_ACUITY_SCORE: 45

## 2023-05-02 NOTE — PROGRESS NOTES
05/02/23 1400   Appointment Info   Signing Clinician's Name / Credentials (PT) Velma Davis, GENIA   Living Environment   Transportation Anticipated family or friend will provide  (Simultaneous filing. User may not have seen previous data.)   Living Environment Comments see prior notes this section   Self-Care   Equipment Currently Used at Home   (owns fww and 4ww)   General Information   Onset of Illness/Injury or Date of Surgery 04/27/23   Referring Physician reinier shook   Patient/Family Therapy Goals Statement (PT) TCU   Pertinent History of Current Problem (include personal factors and/or comorbidities that impact the POC) pt. reports great decline since wife's passing   Existing Precautions/Restrictions   (2L O2, IV, tele)   Cognition   Orientation Status (Cognition) person;place;situation   Pain Assessment   Patient Currently in Pain   (headache, chest, lower legs (reports RN knows))   Integumentary/Edema   Integumentary/Edema   (abrasions/scabs bilat. knees)   Posture    Posture   (incr. neck/thoracic flexion)   Range of Motion (ROM)   ROM Comment UE WFL (shoulders); LE's WNL   Bed Mobility   Impairments Contributing to Impaired Bed Mobility decreased strength   Transfers   Impairments Contributing to Impaired Transfers impaired balance;decreased strength;impaired postural control   Sit-Stand Transfer   Sit-Stand [place] (Transfers) minimum assist (75% patient effort)   Assistive Device (Sit-Stand Transfers) walker, front-wheeled   Gait/Stairs (Locomotion)   [place] Level (Gait) minimum assist (75% patient effort)   Assistive Device (Gait) walker, front-wheeled   Distance in Feet 5  (then cues/treatment)   Distance in Feet (Gait) 24   Deviations/Abnormal Patterns (Gait) gait speed decreased;festinating/shuffling   Balance   Balance Comments very poor; LOB during SPT using walker   Sensory Examination   Sensory Perception patient reports no sensory changes   Clinical Impression   Criteria for  Skilled Therapeutic Intervention Yes, treatment indicated   PT Diagnosis (PT) impaired functional mobility   Influenced by the following impairments weakness, imbalance, poor activity tolerance   Functional limitations due to impairments bed mob, trans, gait   Clinical Presentation (PT Evaluation Complexity) Evolving/Changing   Clinical Presentation Rationale presents as medically diagnosed   Clinical Decision Making (Complexity) moderate complexity   Planned Therapy Interventions (PT) balance training;bed mobility training;gait training;patient/family education;postural re-education;strengthening;transfer training;progressive activity/exercise;stair training   Anticipated Equipment Needs at Discharge (PT) walker, rolling;gait belt   Risk & Benefits of therapy have been explained care plan/treatment goals reviewed;patient   PT Total Evaluation Time   PT Eval, Moderate Complexity Minutes (63950) 29   Physical Therapy Goals   PT Frequency Daily   PT Predicted Duration/Target Date for Goal Attainment 05/09/23   PT Goals Bed Mobility   PT: Bed Mobility Independent   PT: Transfers Supervision/stand-by assist;Assistive device   PT: Gait Supervision/stand-by assist;Rolling walker;50 feet   PT: Stairs 4 stairs;Supervision/stand-by assist;Rail on both sides   Interventions   Interventions Quick Adds Therapeutic Activity;Gait Training   Therapeutic Activity   Therapeutic Activities: dynamic activities to improve functional performance Minutes (08155) 15   Treatment Detail/Skilled Intervention rolling bilat. with rails/min A/cues. Sup>sit with rail/mod A/cues. Scoot to EOB with min A/very slow. Sit>stand from EOB and from recliner mod A/max cues. SPT with fww/few steps to recliner: partial pivot and LOB into recliner (max A for safety)  Sit>supine min A; positioning in bed min'mod A/cues   Gait Training   Gait Training Minutes (98898) 6   Symptoms Noted During/After Treatment (Gait Training) shortness of breath  (tachycardia  "but O2 sat. in 90's (resting HR 90/O2 97))   Treatment Detail/Skilled Intervention cues for fww mgmt, posture, LE extension   [place] Level (Gait Training) moderate assist (50% patient effort)   Physical Assistance Level (Gait Training) 1 person assist  (and management of IV pole and O2)   Weight Bearing (Gait Training) full weight-bearing   Assistive Device (Gait Training) rolling walker   Gait Analysis Deviations decreased andrea;decreased step length;decreased stride length;decreased velocity of limb motion;decreased toe-to-floor clearance  (imbalance, flexed trunk and LE\"s)   Impairments (Gait Analysis/Training) balance impaired;coordination impaired;motor control impaired;postural control impaired;strength decreased   PT Discharge Planning   PT Plan all basic mobliilty/monitor HR/O2; strength ex   PT Discharge Recommendation (DC Rec) Transitional Care Facility   PT Rationale for DC Rec far from baseline, but good participation with PT. Lives alone; almost nonamb. at this time.   PT Brief overview of current status mod A trans; barely amb.; poor tolerance   Total Session Time   Timed Code Treatment Minutes 21   Total Session Time (sum of timed and untimed services) 50     "

## 2023-05-02 NOTE — PROGRESS NOTES
Care Management Follow Up    Length of Stay (days): 3    Expected Discharge Date: 05/04/2023     Concerns to be Addressed: discharge planning      Patient plan of care discussed at interdisciplinary rounds: Yes    Anticipated Discharge Disposition:  TCU  Disposition Comments: TCU    Anticipated Discharge Services: Transportation Services    Anticipated Discharge DME: None    Patient/family educated on Medicare website which has current facility and service quality ratings: yes      Education Provided on the Discharge Plan: Yes (AVS per bedside RN)    Patient/Family in Agreement with the Plan: yes    Referrals Placed by CM/SW: TCU        Additional Information:  CM reviewed chart. CM met with patient and he is agreeable to TCU at discharge. He would like a TCU close to his home. Referrals have been sent by previous CM. CM following up with TCU's.  Transportation will likely need to be arranged by CM. CM will follow.     [facility] ([facility])- considering      TCU referrals pending  [facility] ([facility])  [facility]  [facility] ([facility])  [facility] ([facility])  [facility] ([facility])  [facility] ()  [facility] - REFERRAL ONLY (SNF)  [facility] Intake      PT recommendations: pending  OT recommendations: Transitional Care Facility      Collette Osei RN

## 2023-05-02 NOTE — PROGRESS NOTES
[facility]    PROGRESS NOTE - Hospitalist Service    ASSESSMENT AND PLAN     Principal Problem:    Alcohol dependence with intoxication with complication (H)  Active Problems:    Alcohol withdrawal syndrome without complication (H)    Failure to thrive in adult    Alcohol intoxication (H)    Familia Watson is a 72 year old male who was admitted 4/27 for alcohol dependence and withdrawals.  Was recently admitted from 4/19-4/26 for similar complaints.  During this admission patient did experience PEA arrest on on 4/29 and was extubated 5/1.    Acute metabolic encephalopathy related to alcohol withdrawal   Last drink 4/27.  On CIWA protocol.  Continue low-dose gabapentin.  Likely discontinue CIWA protocol tomorrow   Extubated 5/1   Continue vitamins   Speech evaluation today    Adjustment disorder with depression and anxiety   Grief reaction related to loss of wife   No suicidal ideation   Wants to remain full code   Was evaluated by psychiatry 4/25.  Recommended psychotherapy for grief management.  Referral to [facility] recovery, FABY program, rule 25 if needed    Chronic emphysema   Acute hypoxia-2 L.  Wean as able.   Incentive spirometry     Acute hypoxic respiratory failure with PEA arrest 4/29   Extubated 5/1.  Now on 2 L nasal cannula    Adult failure to thrive   PT/OT-placement    HTN   No home meds listed    HLD   Continue atorvastatin      Barriers to discharge: Hypoxia, placement?    Anticipated length of stay: 1 to 2 days      Present on Admission:    Failure to thrive in adult    Alcohol dependence with intoxication with complication (H)    Alcohol withdrawal syndrome without complication (H)    Alcohol intoxication (H)      Subjective:  Patient resting comfortably in bed.  Requesting Valium.  Slurring his words.  Some tremors noted.  Appears extremely comfortable.  No respiratory distress.  Oxygen saturations high 90s.    PHYSICAL EXAM  Temp:  [97.8  F (36.6  C)-99.1  F (37.3  C)]  "98.7  F (37.1  C)  Pulse:  [] 92  Resp:  [18-22] 22  BP: ()/(55-90) 160/80  SpO2:  [92 %-98 %] 95 %  Wt Readings from Last 1 Encounters:   05/01/23 56.2 kg (123 lb 12.8 oz)       Intake/Output Summary (Last 24 hours) at 5/2/2023 1339  Last data filed at 5/2/2023 0900  Gross per 24 hour   Intake 433 ml   Output 400 ml   Net 33 ml      Body mass index is 19.39 kg/m .    GENRL: Alert and answering questions. Slurred speech. Not in acute distress. Lying in bed   HEENT: no lymphadenopathy or thyromegaly  CHEST: Diminished sounds throughout.  Attempts to clear airways throughout exam- poor effort with coughing.    HEART: Regular rate   ABDMN: Soft. Non-tender, non-distended. No organomegaly. No guarding or rigidity. Bowel sounds present   EXTRM: No pedal edema, DP pulses 2+.   NEURO: Tremors noted.    PSYCH: flat affect and mood.  No suicidal ideation.  States \"I want to live\"  INTGM: No skin rash, no cyanosis or clubbing    Medical Decision Making       50 MINUTES SPENT BY ME on the date of service doing chart review, history, exam, documentation & further activities per the note.      PERTINENT LABS/IMAGING:  Results for orders placed or performed during the hospital encounter of 04/27/23   CT Head w/o Contrast    Impression    IMPRESSION:  1.  No acute transcortical infarct, intracranial hemorrhage, or mass effect.   2.  Small quantity of layering fluid in the right sphenoid sinus can be seen with acute sinusitis in the appropriate clinical context.  3.  Additional chronic and postsurgical intracranial findings, as detailed.       XR Chest Port 1 View    Impression    IMPRESSION: ET tube 3.5 cm from the vivienne. Some fibrotic change in the left upper lung similar to prior CT. No acute new findings.   XR Abdomen Port 1 View    Impression    IMPRESSION: Enteric tube not visualized with certainty on these images. Recommend correlation and/or repositioning.   Echocardiogram Complete   Result Value Ref Range    " LVEF  50-55%      Most Recent 3 CBC's:Recent Labs   Lab Test 05/02/23  0430 05/01/23  0522 04/30/23  1514   WBC 10.5 9.1 10.3   HGB 10.0* 9.9* 11.3*   * 106* 107*    286 325     Most Recent 3 BMP's:Recent Labs   Lab Test 05/02/23  0804 05/02/23  0430 05/01/23  2030 05/01/23  0737 05/01/23 0522 04/30/23  1846 04/30/23  1514   NA  --  132*  --   --  137  --  139   POTASSIUM  --  3.6  --   --  4.1  --  3.6   CHLORIDE  --  100  --   --  106  --  106   CO2  --  20*  --   --  23  --  22   BUN  --  7*  --   --  13  --  14   CR  --  0.53*  --   --  0.57*  --  0.71   ANIONGAP  --  12  --   --  8  --  11   MELLISA  --  8.5  --   --  8.3*  --  8.6   * 102 110*   < > 104   < > 125    < > = values in this interval not displayed.     Most Recent 2 LFT's:Recent Labs   Lab Test 04/30/23  1514 04/27/23  0655   * 64*   ALT 85* 61*   ALKPHOS 129* 133*   BILITOTAL 0.4 0.4       Recent Labs   Lab Test 04/19/21  1209   CHOL 170   HDL 55   LDL 92   TRIG 115     Recent Labs   Lab Test 04/19/21  1209 08/24/20  1420 07/03/19  1132   LDL 92 101 86     Recent Labs   Lab Test 05/02/23  0804 05/02/23  0430   NA  --  132*   POTASSIUM  --  3.6   CHLORIDE  --  100   CO2  --  20*   * 102   BUN  --  7*   CR  --  0.53*   GFRESTIMATED  --  >90   MELLISA  --  8.5     No results for input(s): A1C in the last 19986 hours.  Recent Labs   Lab Test 05/02/23  0430 05/01/23  0522 04/30/23  1514   HGB 10.0* 9.9* 11.3*     Recent Labs   Lab Test 11/20/22  1149 01/25/22  1429 05/12/21  1121   TROPONINI 0.02 <0.01 <0.01     Recent Labs   Lab Test 01/25/22  1429   BNP 22     Recent Labs   Lab Test 04/21/23  1149   TSH 0.91     Recent Labs   Lab Test 04/30/23  1514 01/25/22  1429 05/12/21  1121   INR 1.04 1.16* 1.07       Cassandra Louis DO  Hospitalist Service  [facility]  Text page via [facility] Paging/Directory

## 2023-05-02 NOTE — PROGRESS NOTES
"Clinical Swallow Evaluation with Speech Pathology     05/02/23 6166   Appointment Info   Signing Clinician's Name / Credentials (SLP) Nadine Raman MA, CCC-SLP   General Information   Onset of Illness/Injury or Date of Surgery 04/27/23   Referring Physician Cassandra Louis,    Pertinent History of Current Problem   Per ordering provider's progress note this PM, \"Familia Watson is a 72 year old male who was admitted 4/27 for alcohol dependence and withdrawals.  Was recently admitted from 4/19-4/26 for similar complaints.  During this admission patient did experience PEA arrest on on 4/29 and was extubated 5/1.\"  Patient was intubated 4/30 to 5/1. Speech Therapy consulted due to concerns for dysphagia.     General Observations Patient awake and cooperative. He appears very weak and is tremulous this PM.   Type of Evaluation   Type of Evaluation Swallow Evaluation   Oral Motor   Oral Musculature generally intact   Structural Abnormalities present  (Mandibular ammon noted under tongue)   Mucosal Quality adequate  (Oral cares were provided prior to PO trial)   Dentition (Oral Motor)   Comment, Dentition (Oral Motor) Patient's remaining teeth are in very poor condition.   Dentition (Oral Motor) significant number of missing teeth;poor condition;dental cavities apparent;dentition impacts chewing ability   Facial Symmetry (Oral Motor)   Facial Symmetry (Oral Motor) WNL   Lip Function (Oral Motor)   Lip Range of Motion (Oral Motor) WNL   Lip Strength (Oral Motor) WFL   Tongue Function (Oral Motor)   Tongue Strength (Oral Motor) strength decreased;bilateral   Tongue ROM (Oral Motor) lateralization is impaired   Lateralization, Tongue ROM Impairment (Oral Motor) bilateral;moderate impairment   Cough/Swallow/Gag Reflex (Oral Motor)   Volitional Throat Clear/Cough (Oral Motor) reduced strength   Volitional Swallow (Oral Motor) mildly delayed   Vocal Quality/Secretion Management (Oral Motor)   Vocal Quality (Oral " Motor) WFL   Secretion Management (Oral Motor) WNL   Comment, Vocal Quality/Secretion Management (Oral Motor) Patient's vocal quality is clear.   General Swallowing Observations   Past History of Dysphagia None per patient report and none per cursory review of previous records.   Respiratory Support (General Swallowing Observations) nasal cannula  (2 LPM)   Current Diet/Method of Nutritional Intake (General Swallowing Observations, NIS) clear liquid diet   Swallowing Evaluation Clinical swallow evaluation   Clinical Swallow Evaluation   Feeding Assistance frequent cues/help required   Clinical Swallow Evaluation Textures Trialed thin liquids;mildly thick liquids;moderately thick liquids/liquidized;pureed   Clinical Swallow Eval: Thin Liquid Texture Trial   Mode of Presentation, Thin Liquids cup;self-fed   Volume of Liquid or Food Presented 1 sip   Oral Phase of Swallow WFL   Pharyngeal Phase of Swallow impaired;coughing/choking;throat clearing;wet vocal quality after swallow   Diagnostic Statement Swallow response appeared delayed. Patient presented with slightly delayed aspiration-like cough with thin liquid.   Clinical Swallow Eval: Mildly Thick Liquids   Mode of Presentation spoon;fed by clinician;cup;self-fed   Volume Presented 5 sips  (3 by spoon; 2 by cup)   Oral Phase WFL   Pharyngeal Phase impaired;coughing/choking;wet vocal quality after swallow   Diagnostic Statement Patient presented with slightly delayed aspiration-like cough x1 each with sip by spoon and sip by cup.   Clinical Swallow Eval: Moderately Thick Liquids   Mode of Presentation spoon;fed by clinician   Volume Presented >2 ounces   Oral Phase WFL   Pharyngeal Phase intact  (No overt s/sx of aspiration)   Diagnostic Statement Swallow response appeared timely. No overt clinical s/sx of aspiration observed.   Clinical Swallow Evaluation: Puree Solid Texture Trial   Mode of Presentation, Puree spoon;fed by clinician   Volume of Puree Presented ~2  "ounces   Oral Phase, Puree residue in oral cavity  (Mild stasis noted on hard palate and base of tongue. This cleared with a liquid wash.)   Pharyngeal Phase, Puree intact  (No overt s/sx of aspiration)   Diagnostic Statement Oral clearance was incomplete, resulting in mild stasis on hard palate and base of tongue. This cleared with a liquid wash. No overt s/sx of aspiration observed with puree consistency.   Esophageal Phase of Swallow   Patient reports or presents with symptoms of esophageal dysphagia Yes   Esophageal comments Patient reports frequent \"heartburn\" and stomach pain.   Swallowing Recommendations   Diet Consistency Recommendations pureed (level 4);moderately thick liquids/liquidized (level 3)   Supervision Level for Intake 1:1 supervision needed  (Patient will need 1:1 assistance with feeding initially)   Mode of Delivery Recommendations bolus size, small;liquids via spoon only;slow rate of intake   Swallowing Maneuver Recommendations alternate food and liquid intake   Monitoring/Assistance Required (Eating/Swallowing) monitor for cough or change in vocal quality with intake;stop eating activities when fatigue is present;check mouth frequently for oral residue/pocketing   Recommended Feeding/Eating Techniques (Swallow Eval) maintain upright sitting position for eating;provide assist with feeding;moisten oral mucosa prior to intake;minimize distractions during oral intake   Medication Administration Recommendations, Swallowing (SLP) Give pills 1-2 at a time (whole), mixed in applesauce or pudding. Follow with sips of moderately thick liquid by spoon as needed.   Instrumental Assessment Recommendations instrumental evaluation not recommended at this time  (Patient is too weak and lethargic at present)   Comment, Swallowing Recommendations Pending clinical progress, anticipate possible video swallow study when patient is more consistently alert and able to participate for longer periods of time. "   General Therapy Interventions   Planned Therapy Interventions Dysphagia Treatment   Dysphagia treatment Modified diet education;Instruction of safe swallow strategies;Compensatory strategies for swallowing   Clinical Impression   Criteria for Skilled Therapeutic Interventions Met (SLP Eval) Yes, treatment indicated   SLP Diagnosis Oropharyngeal dysphagia  (in the setting of AMS and acute hypoxic respiratory failure requiring intubation)   Risks & Benefits of therapy have been explained evaluation/treatment results reviewed;care plan/treatment goals reviewed;risks/benefits reviewed;current/potential barriers reviewed;participants voiced agreement with care plan;participants included;patient   Clinical Impression Comments   Clinical swallow evaluation completed per provider order. Patient presents with mild oral dysphagia. Suspect at least mild pharyngeal dysphagia given presence of clinical s/sx of aspiration with thin and mildly thick liquid.     At this time, recommend diet of Pureed textures (Level 4) and moderately thick liquids (Level 3) with strict adherence to the safe swallowing/feeding strategies listed above. Speech Therapy will follow to monitor diet tolerance, reinforce safe swallowing/feeding precautions, and trial advanced textures and liquids as appropriate. Anticipate possible need for instrumental evaluation (i.e., video swallow study) pending clinical progress.     SLP Total Evaluation Time   Eval: oral/pharyngeal swallow function, clinical swallow Minutes (96418) 16   SLP Goals   Therapy Frequency (SLP Eval) 6 times/wk   SLP Predicted Duration/Target Date for Goal Attainment 05/12/23   SLP Goals Swallow   SLP: Safely tolerate diet without signs/symptoms of aspiration Soft & bite sized diet;Mildly thick liquids;With use of swallow precautions;With use of compensatory swallow strategies;With assistance/supervision   Interventions   Interventions Quick Adds Swallowing Dysfunction   Swallowing  Dysfunction &/or Oral Function for Feeding   Treatment of Swallowing Dysfunction &/or Oral Function for Feeding Minutes (75145) 13   Symptoms Noted During/After Treatment Fatigue   SLP Discharge Planning   SLP Plan Follow up re: diet tolerance. Trial advanced liquids and textures as ALIRIO improve. Determine need for VFSS.   SLP Discharge Recommendation Transitional Care Facility   SLP Rationale for DC Rec Swallow function is below baseline. Patient requires a modified diet at this time and needs frequent cues to use safe swallow strategies.   SLP Brief overview of current status  Patient appears appropriate for diet of Pureed textures and moderately thick liquids. He requires 1:1 assist w/ feeding at present. He should be alert and sitting fully upright for all intake. Staff to feed pt slowly (at his own pace) and offer small bites/sips. Give liquids by spoon only. Alternate bite of food with sip of liquid. Give pills 1-2 at a time (whole), mixed in puree.   Total Session Time   Total Session Time (sum of timed and untimed services) 00

## 2023-05-02 NOTE — PLAN OF CARE
Patient very drowsy during shift. Able to follow commands but very slow. Speech is garbled and when attempted to give fluids orally this AM patient aspirated and coughed. Vitally stable and on 2L nasal cannula. Voiding with urinal and is also incontinent. Up to bedside commode today to have successful bowel movement. Patient on a modified diet and tolerated dinner with feeding assistance. Refused to sit in chair so was in bed for most of shift with bed alarm on. Family (nephhank Saunders) at bedside in the evening and updated. Discussed abstinence from alcohol use which patient agreed with and was agreeable to receiving resources from social work.    Problem: Plan of Care - These are the overarching goals to be used throughout the patient stay.    Goal: Readiness for Transition of Care  Outcome: Progressing

## 2023-05-02 NOTE — PLAN OF CARE
Goal Outcome Evaluation:      Plan of Care Reviewed With: patient    Overall Patient Progress: improvingOverall Patient Progress: improving    Outcome Evaluation: Pt vitally stable, CIWA 4-18 this shift only requiring valium x1.  Received dilaudid PRN x1 for abdomen/chest pain r/t compressions/CPR, which was effective.  Pt has weak cough r/t this pain. Cough is loose. Pt tolerating liquid diet, enjoyed jello this shift. Pt A&Ox3. Some confusion with situation.      Problem: Risk for Delirium  Goal: Improved Behavioral Control  Outcome: Progressing  Intervention: Minimize Safety Risk  Recent Flowsheet Documentation  Taken 5/2/2023 0000 by Ness Duckworth, RN  Enhanced Safety Measures: room near unit station  Trust Relationship/Rapport:   care explained   choices provided     Problem: Alcohol Withdrawal  Goal: Optimal Neurologic Function  Outcome: Progressing  Intervention: Minimize or Manage Acute Neurologic Symptoms  Recent Flowsheet Documentation  Taken 5/2/2023 0000 by Ness Duckworth, RN  Sensory Stimulation Regulation:   television on   lighting decreased     Problem: Alcohol Withdrawal  Goal: Alcohol Withdrawal Symptom Control  Intervention: Minimize or Manage Alcohol Withdrawal Symptoms  Recent Flowsheet Documentation  Taken 5/2/2023 0000 by Ness Duckworth, RN  Sensory Stimulation Regulation:   television on   lighting decreased     Problem: Malnutrition  Goal: Improved Nutritional Intake  Outcome: Progressing

## 2023-05-02 NOTE — PROGRESS NOTES
05/02/23 1145   Appointment Info   Signing Clinician's Name / Credentials (OT) Fareed Randall OTR/L   Living Environment   People in Home alone   Current Living Arrangements apartment   Living Environment Comments Std toilet, Tub/shower with shower chair.   Self-Care   Usual Activity Tolerance moderate   Current Activity Tolerance fair   Equipment Currently Used at Home   (Pt reports no use of an assistive walking device.)   Fall history within last six months yes  (As noted by medical record.)   Instrumental Activities of Daily Living (IADL)   Previous Responsibilities housekeeping   IADL Comments Report his cousin helps with cooking and nephew helps with driving.   General Information   Onset of Illness/Injury or Date of Surgery 05/27/23   Referring Physician Dr. Cassandra Louis   Patient/Family Therapy Goal Statement (OT) None stated by Pt.   Additional Occupational Profile Info/Pertinent History of Current Problem Adm with Alcohol Dep with Intoxication.   Existing Precautions/Restrictions (S)  fall   Limitations/Impairments safety/cognitive   Cognitive Status Examination   Orientation Status person;place  (Stated mo as April.  Pt did know the correct year.)   Follows Commands WFL   Cognitive Status Comments Will need further cognitive testing.   Visual Perception   Visual Impairment/Limitations corrective lenses for reading   Bed Mobility   Bed Mobility supine-sit;rolling left;rolling right   Rolling Left [place] (Bed Mobility) minimum assist (75% patient effort)   Rolling Right [place] (Bed Mobility) minimum assist (75% patient effort)   Supine-Sit [place] (Bed Mobility) minimum assist (75% patient effort)   Assistive Device (Bed Mobility) bed rails   Transfers   Transfers bed-chair transfer;sit-stand transfer;toilet transfer   Transfer Skill: Bed to Chair/Chair to Bed   Bed-Chair [place] (Transfers) minimum assist (75% patient effort);2 person assist   Assistive Device  (Bed-Chair Transfers) rolling walker   Sit-Stand Transfer   Sit-Stand [place] (Transfers) minimum assist (75% patient effort);2 person assist   Assistive Device (Sit-Stand Transfers) walker, front-wheeled   Toilet Transfer   Type (Toilet Transfer) sit-stand;stand-sit   [place] Level (Toilet Transfer) minimum assist (75% patient effort);2 person assist   Assistive Device (Toilet Transfer) commode chair   Balance   Balance Assessment standing balance: dynamic   Balance Comments poor   Activities of Daily Living   BADL Assessment/Intervention lower body dressing   Lower Body Dressing Assessment/Training   Position (Lower Body Dressing) supported sitting;supported standing   [place] Level (Lower Body Dressing) maximum assist (25% patient effort)   Toileting   [place] Level (Toileting) maximum assist (25% patient effort)   Assistive Devices (Toileting) commode chair   Position (Toileting) supported sitting;supported standing   Clinical Impression   Criteria for Skilled Therapeutic Interventions Met (OT) Yes, treatment indicated   OT Diagnosis decreased indep with ADLs due to Alcohol Intoxication.   OT Problem List-Impairments impacting ADL problems related to;mobility   Assessment of Occupational Performance 3-5 Performance Deficits   Identified Performance Deficits dressing, bathing, G/H, toileting, trsfs, cognition   Planned Therapy Interventions (OT) ADL retraining;cognition   Clinical Decision Making Complexity (OT) moderate complexity   Risk & Benefits of therapy have been explained evaluation/treatment results reviewed;participants included;patient   OT Total Evaluation Time   OT Eval, Moderate Complexity Minutes (80740) 10   OT Goals   Therapy Frequency (OT) Daily   OT Predicted Duration/Target Date for Goal Attainment 05/09/23   OT Goals Lower Body Dressing;Transfers;Toilet Transfer/Toileting;Hygiene/Grooming;Cognition   OT: Hygiene/Grooming minimal assist   OT: Lower Body Dressing Minimal  assist   OT: Transfer Minimal assist   OT: Toilet Transfer/Toileting Minimal assist   OT: Cognitive Patient/caregiver will verbalize understanding of cognitive assessment results/recommendations as needed for safe discharge planning  (With VC as needed.)   Interventions   Interventions Quick Adds Self-Care/Home Management   Self-Care/Home Management   Self-Care/Home Mgmt/ADL, Compensatory, Meal Prep Minutes (37548) 25   Symptoms Noted During/After Treatment (Meal Preparation/Planning Training) fatigue   Treatment Detail/Skilled Intervention Pt in bed ICU with legs in flex position.  Tremors.  VC to relax his upper and lower extremities.   Worked on bed mobility, rolling side to side with min to mod assist of one.  Pt did use the bedside rail to assist.  Trsf from sidelying to sit with mod A of one.  Once sitting EOB, worked on lifting his head to attain better posture.  Pt having difficulty with coughing.  Worked on sit to stand from bed with min to mod A of 2 using a FWW.  Step by step direction to advance walker and to take steps to the chair.  Once in the chair, O2 sats checked at 96% while on 2L NC O2.  Worked on toileting using the commode.  Min A of 2 with trsf chair to commode.  Worked on dressing L/B with max A of one.  At end of session, Pt was on commode with NA present.   Lower Body Dressing Training Assistance maximum assist (25% patient effort)   Lower Body Dressing Training Assistance 1 person assist   [place] Level (Toilet Training) maximum assist (25% patient effort)   Assistance (Toilet Training) 1 person assist   OT Discharge Planning   OT Plan Work on Room trsf with walker.  Basic ADLs - G/H and Dressing.  When more awake - SLUMS.   OT Discharge Recommendation (DC Rec) (S)  Transitional Care Facility   OT Rationale for DC Rec Pt is not at his baseline in order to return home alone to take care of himself in his apt.  Will benefit from further OT to increase indep with all ADLs and trsfs.   Will need further cognitive testing as well.   OT Brief overview of current status Min to Mod A of 2 with trsfs using FWW.  Max A with dressing and toileting.  On 2L Nc O2.   Total Session Time   Timed Code Treatment Minutes 25   Total Session Time (sum of timed and untimed services) 35

## 2023-05-03 ENCOUNTER — APPOINTMENT (OUTPATIENT)
Dept: ULTRASOUND IMAGING | Facility: CLINIC | Age: 73
DRG: 896 | End: 2023-05-03
Attending: INTERNAL MEDICINE
Payer: MEDICARE

## 2023-05-03 ENCOUNTER — APPOINTMENT (OUTPATIENT)
Dept: RADIOLOGY | Facility: CLINIC | Age: 73
DRG: 896 | End: 2023-05-03
Attending: INTERNAL MEDICINE
Payer: MEDICARE

## 2023-05-03 ENCOUNTER — APPOINTMENT (OUTPATIENT)
Dept: CT IMAGING | Facility: CLINIC | Age: 73
DRG: 896 | End: 2023-05-03
Attending: INTERNAL MEDICINE
Payer: MEDICARE

## 2023-05-03 LAB
ANION GAP SERPL CALCULATED.3IONS-SCNC: 11 MMOL/L (ref 5–18)
BACTERIA SPT CULT: NORMAL
BASE EXCESS BLDA CALC-SCNC: -0.2 MMOL/L
BUN SERPL-MCNC: 9 MG/DL (ref 8–28)
CALCIUM SERPL-MCNC: 8.4 MG/DL (ref 8.5–10.5)
CHLORIDE BLD-SCNC: 98 MMOL/L (ref 98–107)
CO2 SERPL-SCNC: 22 MMOL/L (ref 22–31)
COHGB MFR BLD: 97.3 % (ref 95–96)
CREAT SERPL-MCNC: 0.63 MG/DL (ref 0.7–1.3)
GFR SERPL CREATININE-BSD FRML MDRD: >90 ML/MIN/1.73M2
GLUCOSE BLD-MCNC: 118 MG/DL (ref 70–125)
GRAM STAIN RESULT: NORMAL
HCO3 BLD-SCNC: 23 MMOL/L (ref 23–29)
MAGNESIUM SERPL-MCNC: 1.7 MG/DL (ref 1.8–2.6)
MRSA DNA SPEC QL NAA+PROBE: NEGATIVE
OXYHGB MFR BLD: 94 % (ref 95–96)
PCO2 BLD: 31 MM HG (ref 35–45)
PH BLD: 7.48 [PH] (ref 7.37–7.44)
PHOSPHATE SERPL-MCNC: 2.9 MG/DL (ref 2.5–4.5)
PLATELET # BLD AUTO: 233 10E3/UL (ref 150–450)
PO2 BLD: 75 MM HG (ref 75–85)
POTASSIUM BLD-SCNC: 3.4 MMOL/L (ref 3.5–5)
POTASSIUM BLD-SCNC: 3.9 MMOL/L (ref 3.5–5)
PROCALCITONIN SERPL-MCNC: 0.46 NG/ML (ref 0–0.49)
SA TARGET DNA: NEGATIVE
SODIUM SERPL-SCNC: 131 MMOL/L (ref 136–145)
TEMPERATURE: 37 DEGREES C

## 2023-05-03 PROCEDURE — 250N000011 HC RX IP 250 OP 636: Performed by: INTERNAL MEDICINE

## 2023-05-03 PROCEDURE — 82805 BLOOD GASES W/O2 SATURATION: CPT | Performed by: INTERNAL MEDICINE

## 2023-05-03 PROCEDURE — 93970 EXTREMITY STUDY: CPT

## 2023-05-03 PROCEDURE — 999N000178 HC STATISTIC SUCTION SPUTUM

## 2023-05-03 PROCEDURE — 272N000202 HC AEROBIKA WITH MANOMETER

## 2023-05-03 PROCEDURE — 83735 ASSAY OF MAGNESIUM: CPT | Performed by: INTERNAL MEDICINE

## 2023-05-03 PROCEDURE — 120N000004 HC R&B MS OVERFLOW

## 2023-05-03 PROCEDURE — 87040 BLOOD CULTURE FOR BACTERIA: CPT | Performed by: INTERNAL MEDICINE

## 2023-05-03 PROCEDURE — 250N000013 HC RX MED GY IP 250 OP 250 PS 637: Performed by: INTERNAL MEDICINE

## 2023-05-03 PROCEDURE — 85049 AUTOMATED PLATELET COUNT: CPT | Performed by: INTERNAL MEDICINE

## 2023-05-03 PROCEDURE — 84100 ASSAY OF PHOSPHORUS: CPT | Performed by: INTERNAL MEDICINE

## 2023-05-03 PROCEDURE — C9113 INJ PANTOPRAZOLE SODIUM, VIA: HCPCS | Performed by: INTERNAL MEDICINE

## 2023-05-03 PROCEDURE — 94640 AIRWAY INHALATION TREATMENT: CPT

## 2023-05-03 PROCEDURE — 31720 CLEARANCE OF AIRWAYS: CPT

## 2023-05-03 PROCEDURE — 87641 MR-STAPH DNA AMP PROBE: CPT | Performed by: INTERNAL MEDICINE

## 2023-05-03 PROCEDURE — 250N000009 HC RX 250: Performed by: INTERNAL MEDICINE

## 2023-05-03 PROCEDURE — 71045 X-RAY EXAM CHEST 1 VIEW: CPT

## 2023-05-03 PROCEDURE — 36600 WITHDRAWAL OF ARTERIAL BLOOD: CPT

## 2023-05-03 PROCEDURE — 87205 SMEAR GRAM STAIN: CPT | Performed by: INTERNAL MEDICINE

## 2023-05-03 PROCEDURE — 71275 CT ANGIOGRAPHY CHEST: CPT | Mod: MF

## 2023-05-03 PROCEDURE — 99233 SBSQ HOSP IP/OBS HIGH 50: CPT | Performed by: INTERNAL MEDICINE

## 2023-05-03 PROCEDURE — 94640 AIRWAY INHALATION TREATMENT: CPT | Mod: 76

## 2023-05-03 PROCEDURE — 258N000003 HC RX IP 258 OP 636: Performed by: INTERNAL MEDICINE

## 2023-05-03 PROCEDURE — 80048 BASIC METABOLIC PNL TOTAL CA: CPT | Performed by: INTERNAL MEDICINE

## 2023-05-03 PROCEDURE — 84145 PROCALCITONIN (PCT): CPT | Performed by: INTERNAL MEDICINE

## 2023-05-03 PROCEDURE — 999N000215 HC STATISTIC HFNC ADULT NON-CPAP

## 2023-05-03 PROCEDURE — 84132 ASSAY OF SERUM POTASSIUM: CPT | Performed by: INTERNAL MEDICINE

## 2023-05-03 PROCEDURE — 272N000054 HC CANNULA HIGH FLOW, ADULT

## 2023-05-03 PROCEDURE — 999N000157 HC STATISTIC RCP TIME EA 10 MIN

## 2023-05-03 RX ORDER — IOPAMIDOL 755 MG/ML
100 INJECTION, SOLUTION INTRAVASCULAR ONCE
Status: COMPLETED | OUTPATIENT
Start: 2023-05-03 | End: 2023-05-03

## 2023-05-03 RX ORDER — PIPERACILLIN SODIUM, TAZOBACTAM SODIUM 3; .375 G/15ML; G/15ML
3.38 INJECTION, POWDER, LYOPHILIZED, FOR SOLUTION INTRAVENOUS ONCE
Status: COMPLETED | OUTPATIENT
Start: 2023-05-03 | End: 2023-05-03

## 2023-05-03 RX ORDER — MAGNESIUM SULFATE HEPTAHYDRATE 40 MG/ML
2 INJECTION, SOLUTION INTRAVENOUS ONCE
Status: COMPLETED | OUTPATIENT
Start: 2023-05-03 | End: 2023-05-03

## 2023-05-03 RX ORDER — PIPERACILLIN SODIUM, TAZOBACTAM SODIUM 3; .375 G/15ML; G/15ML
3.38 INJECTION, POWDER, LYOPHILIZED, FOR SOLUTION INTRAVENOUS EVERY 8 HOURS
Status: COMPLETED | OUTPATIENT
Start: 2023-05-03 | End: 2023-05-08

## 2023-05-03 RX ORDER — MAGNESIUM OXIDE 400 MG/1
400 TABLET ORAL EVERY 4 HOURS
Status: DISCONTINUED | OUTPATIENT
Start: 2023-05-03 | End: 2023-05-03

## 2023-05-03 RX ORDER — POTASSIUM CHLORIDE 29.8 MG/ML
20 INJECTION INTRAVENOUS
Status: COMPLETED | OUTPATIENT
Start: 2023-05-03 | End: 2023-05-03

## 2023-05-03 RX ORDER — SODIUM CHLORIDE AND POTASSIUM CHLORIDE 150; 900 MG/100ML; MG/100ML
INJECTION, SOLUTION INTRAVENOUS CONTINUOUS
Status: DISPENSED | OUTPATIENT
Start: 2023-05-03 | End: 2023-05-04

## 2023-05-03 RX ORDER — IPRATROPIUM BROMIDE AND ALBUTEROL SULFATE 2.5; .5 MG/3ML; MG/3ML
3 SOLUTION RESPIRATORY (INHALATION)
Status: DISCONTINUED | OUTPATIENT
Start: 2023-05-03 | End: 2023-05-06

## 2023-05-03 RX ADMIN — VANCOMYCIN HYDROCHLORIDE 1000 MG: 5 INJECTION, POWDER, LYOPHILIZED, FOR SOLUTION INTRAVENOUS at 22:29

## 2023-05-03 RX ADMIN — GABAPENTIN 100 MG: 100 CAPSULE ORAL at 08:21

## 2023-05-03 RX ADMIN — POTASSIUM CHLORIDE 20 MEQ: 29.8 INJECTION, SOLUTION INTRAVENOUS at 12:56

## 2023-05-03 RX ADMIN — FOLIC ACID 1 MG: 5 INJECTION, SOLUTION INTRAMUSCULAR; INTRAVENOUS; SUBCUTANEOUS at 09:56

## 2023-05-03 RX ADMIN — IPRATROPIUM BROMIDE AND ALBUTEROL SULFATE 3 ML: .5; 3 SOLUTION RESPIRATORY (INHALATION) at 20:11

## 2023-05-03 RX ADMIN — VANCOMYCIN HYDROCHLORIDE 1000 MG: 5 INJECTION, POWDER, LYOPHILIZED, FOR SOLUTION INTRAVENOUS at 12:14

## 2023-05-03 RX ADMIN — THIAMINE HYDROCHLORIDE 100 MG: 100 INJECTION, SOLUTION INTRAMUSCULAR; INTRAVENOUS at 09:57

## 2023-05-03 RX ADMIN — POTASSIUM CHLORIDE AND SODIUM CHLORIDE: 900; 150 INJECTION, SOLUTION INTRAVENOUS at 23:05

## 2023-05-03 RX ADMIN — ACETAMINOPHEN 650 MG: 325 TABLET ORAL at 00:37

## 2023-05-03 RX ADMIN — Medication 400 MG: at 06:26

## 2023-05-03 RX ADMIN — HEPARIN SODIUM 5000 UNITS: 5000 INJECTION, SOLUTION INTRAVENOUS; SUBCUTANEOUS at 08:21

## 2023-05-03 RX ADMIN — PANTOPRAZOLE SODIUM 40 MG: 40 INJECTION, POWDER, LYOPHILIZED, FOR SOLUTION INTRAVENOUS at 06:31

## 2023-05-03 RX ADMIN — POTASSIUM CHLORIDE 20 MEQ: 29.8 INJECTION, SOLUTION INTRAVENOUS at 11:21

## 2023-05-03 RX ADMIN — HEPARIN SODIUM 5000 UNITS: 5000 INJECTION, SOLUTION INTRAVENOUS; SUBCUTANEOUS at 16:16

## 2023-05-03 RX ADMIN — IOPAMIDOL 100 ML: 755 INJECTION, SOLUTION INTRAVENOUS at 11:49

## 2023-05-03 RX ADMIN — KETOROLAC TROMETHAMINE 15 MG: 15 INJECTION, SOLUTION INTRAMUSCULAR; INTRAVENOUS at 02:27

## 2023-05-03 RX ADMIN — IPRATROPIUM BROMIDE AND ALBUTEROL SULFATE 3 ML: .5; 3 SOLUTION RESPIRATORY (INHALATION) at 13:51

## 2023-05-03 RX ADMIN — PIPERACILLIN AND TAZOBACTAM 3.38 G: 3; .375 INJECTION, POWDER, LYOPHILIZED, FOR SOLUTION INTRAVENOUS at 16:13

## 2023-05-03 RX ADMIN — POTASSIUM CHLORIDE AND SODIUM CHLORIDE: 900; 150 INJECTION, SOLUTION INTRAVENOUS at 09:51

## 2023-05-03 RX ADMIN — PIPERACILLIN AND TAZOBACTAM 3.38 G: 3; .375 INJECTION, POWDER, LYOPHILIZED, FOR SOLUTION INTRAVENOUS at 23:44

## 2023-05-03 RX ADMIN — PIPERACILLIN AND TAZOBACTAM 3.38 G: 3; .375 INJECTION, POWDER, LYOPHILIZED, FOR SOLUTION INTRAVENOUS at 10:43

## 2023-05-03 RX ADMIN — NICOTINE 1 PATCH: 14 PATCH, EXTENDED RELEASE TRANSDERMAL at 08:21

## 2023-05-03 RX ADMIN — MAGNESIUM SULFATE HEPTAHYDRATE 2 G: 40 INJECTION, SOLUTION INTRAVENOUS at 09:57

## 2023-05-03 RX ADMIN — HEPARIN SODIUM 5000 UNITS: 5000 INJECTION, SOLUTION INTRAVENOUS; SUBCUTANEOUS at 00:39

## 2023-05-03 ASSESSMENT — ACTIVITIES OF DAILY LIVING (ADL)
DIFFICULTY_EATING/SWALLOWING: NO
ADLS_ACUITY_SCORE: 47
ADLS_ACUITY_SCORE: 32
WEAR_GLASSES_OR_BLIND: NO
EQUIPMENT_CURRENTLY_USED_AT_HOME: WALKER, ROLLING
CONCENTRATING,_REMEMBERING_OR_MAKING_DECISIONS_DIFFICULTY: YES
ADLS_ACUITY_SCORE: 47
ADLS_ACUITY_SCORE: 33
ADLS_ACUITY_SCORE: 33
DIFFICULTY_COMMUNICATING: NO
WALKING_OR_CLIMBING_STAIRS_DIFFICULTY: NO
NUMBER_OF_TIMES_PATIENT_HAS_FALLEN_WITHIN_LAST_SIX_MONTHS: 2
HEARING_DIFFICULTY_OR_DEAF: NO
ADLS_ACUITY_SCORE: 32
CHANGE_IN_FUNCTIONAL_STATUS_SINCE_ONSET_OF_CURRENT_ILLNESS/INJURY: YES
ADLS_ACUITY_SCORE: 35
DRESSING/BATHING_DIFFICULTY: NO
ADLS_ACUITY_SCORE: 32
DOING_ERRANDS_INDEPENDENTLY_DIFFICULTY: NO
TOILETING_ISSUES: NO
ADLS_ACUITY_SCORE: 47
FALL_HISTORY_WITHIN_LAST_SIX_MONTHS: YES
ADLS_ACUITY_SCORE: 33

## 2023-05-03 NOTE — PHARMACY-VANCOMYCIN DOSING SERVICE
Pharmacy Vancomycin Initial Note  Date of Service May 3, 2023  Patient's  1950  72 year old, male    Indication: Aspiration Pneumonia    Current estimated CrCl = Estimated Creatinine Clearance: 84.9 mL/min (A) (based on SCr of 0.63 mg/dL (L)).    Creatinine for last 3 days  2023:  3:14 PM Creatinine 0.71 mg/dL  2023:  5:22 AM Creatinine 0.57 mg/dL  2023:  4:30 AM Creatinine 0.53 mg/dL  5/3/2023:  4:52 AM Creatinine 0.63 mg/dL    Recent Vancomycin Level(s) for last 3 days  No results found for requested labs within last 3 days.      Vancomycin IV Administrations (past 72 hours)      No vancomycin orders with administrations in past 72 hours.                Nephrotoxins and other renal medications (From now, onward)    Start     Dose/Rate Route Frequency Ordered Stop    23 1605  piperacillin-tazobactam (ZOSYN) 3.375 g vial to attach to  mL bag        Note to Pharmacy: Extended infusion dosing to start 6 hours after initial infusion.   See Hyperspace for full Linked Orders Report.    3.375 g  over 240 Minutes Intravenous EVERY 8 HOURS 23 1006      23 1100  vancomycin (VANCOCIN) 1,000 mg in 0.9% NaCl 250 mL intermittent infusion         1,000 mg  over 60 Minutes Intravenous EVERY 12 HOURS 23 1024      23 1030  piperacillin-tazobactam (ZOSYN) 3.375 g vial to attach to  mL bag        See Hyperspace for full Linked Orders Report.    3.375 g  over 30 Minutes Intravenous ONCE 23 1006      23 1511  ketorolac (TORADOL) injection 15 mg         15 mg Intravenous EVERY 6 HOURS PRN 23 1511 23 1510          Contrast Orders - past 72 hours (72h ago, onward)    Start     Dose/Rate Route Frequency Stop    23 0800  iopamidol (ISOVUE-370) solution 100 mL         100 mL Intravenous ONCE 23 1024    23 1800  perflutren lipid microsphere (DEFINITY) injection SUSP 6 mL         6 mL Intravenous ONCE 23 1755          Hypertension Diagnostics  Prediction of Planned Initial Vancomycin Regimen  Loading dose: N/A  Regimen: 1000 mg IV every 12 hours.  Start time: 10:24 on 05/03/2023  Exposure target: AUC24 (range)400-600 mg/L.hr   AUC24,ss: 573 mg/L.hr  Probability of AUC24 > 400: 85 %  Ctrough,ss: 17.3 mg/L  Probability of Ctrough,ss > 20: 38 %  Probability of nephrotoxicity (Lodise CEFERINO 2009): 13 %       Plan:  1. Start vancomycin  1000 mg (~17.9mg/kg) IV q12h.   2. Vancomycin monitoring method: AUC  3. Vancomycin therapeutic monitoring goal: 400-600 mg*h/L  4. Pharmacy will check vancomycin levels as appropriate in 1-3 Days.    5. Serum creatinine levels will be ordered daily for the first week of therapy and at least twice weekly for subsequent weeks.      Marah Salguero, PharmD

## 2023-05-03 NOTE — PROGRESS NOTES
Pt WOB increased, RR 32-40, NT suction done, pt has thick creamy white secretion. Placed pt on HFNC 55L/45% with O2 sats high 90's. BS coarse. ABG done. Pt not able to follow command do to flutter valve at this time. Duoneb will start at 1400 q6h.       Addendum: pt currently on HFNC 55L/30% FiO2 with O2 sats mid 90's.  Most Recent ABG:Recent Labs   Lab Test 05/03/23  1040   PH 7.48*   PO2 75   PCO2 31*   HCO3 23   LOLY -0.2   RT will continue to follow.    Aubrey Clark, RT

## 2023-05-03 NOTE — PROGRESS NOTES
Care Management Follow Up    Length of Stay (days): 4    Expected Discharge Date: 2023     Concerns to be Addressed: discharge planning  Coordination with home care provider - required.  Patient plan of care discussed at interdisciplinary rounds: Yes    Anticipated Discharge Disposition:  (TCU)  Disposition Comments: TCU  Anticipated Discharge Services: Transportation Services  Anticipated Discharge DME: None    Patient/family educated on Medicare website which has current facility and service quality ratings: yes (Currently open to a vendor he wishes to remain active with.)  Education Provided on the Discharge Plan: Yes (AVS per bedside RN)  Patient/Family in Agreement with the Plan: yes    Referrals Placed by CM/SW: Homecare (Currently open to a home care vendor he wishes to remain active with.)  Private pay costs discussed: transportation costs    Additional Information:  PAM and SAMARIA Intern met with Pt.  SW Intern asked Pt if he was interested and in CD resources and he declined.  PAM asked Pt when he started drinking and he stated after his wife  in December.  Pt thinks that if he could do a 2 week program that he would be able to stop drinking.  Pt is weak and needs TCU.  PAM has call out to Jostin with [facility] as he may have been accepted at [facility].   PAM consulted with [facility] and only 2 programs for CD for [facility] and [facility].        DRAKE Spencer

## 2023-05-03 NOTE — PLAN OF CARE
Problem: Risk for Delirium  Goal: Optimal Coping  Outcome: Progressing    Problem: Risk for Delirium  Goal: Improved Sleep  Outcome: Progressing    Problem: Malnutrition  Goal: Improved Nutritional Intake  Outcome: Progressing       Pt A&Ox4, able to make needs known. Speech garbled and pt is slow to respond. Reporting pain 7/10 and 9/10 this shift in chest and stomach, aromatherapy, PRN toradol and compazine given per request of the pt with some relief. Fever noted at 0000 vitals, PRN tylenol given with improvement. Towards the beginning of the shift, pt very tremulous, this improved later in the shift. Pt incontinent x1 this shift, otherwise external catheter in place. Q2 turns performed, pt sleeping between cares. No other acute changes noted this shift.    Niya Larson, RN  [phone]

## 2023-05-03 NOTE — PROGRESS NOTES
"[facility] [place] Pulmonary/Critical Care Consult Team Note    Familia Watson,  1950, MRN [id number]  Admitting Dx: Failure to thrive in adult [R62.7]  Alcohol dependence with intoxication with complication (H) [F10.229]  Date / Time of Admission:  2023  6:18 AM    Intake/Output last 3 shifts:  I/O last 3 completed shifts:  In: 540 [P.O.:240; I.V.:300]  Out: -     He reports he has been coughing, unable to cough it up  He has dyspnea  He denies chest pain    Assessment/Plan: Familia Watson is a 72 year old male with PMHx of etoh abuse, smoker, HLD, HTN, prior SDH, recent admission for etoh withdrawal and intoxication, readmitted  for same. Hospital stay c/b PEA arrest, possible seizure requiring compressions and intubation for airway protection and hypoxemic respiratory failure who was extubated .     Acute Hypoxic resp failure  Initially extubated to 2 L NC and now up to HFNC 45%  - Increased FiO2 needs with some RV changes on ECHO, high suspicion for PE  - LE dopplers  - CTA for PE  - CXR unchanged  - procal not elevated    Medical Care Time excluding procedures and family discussions greater than: 45 Minutes    Risk Factors Present on Admission:  Clinically Significant Risk Factors              # Hypoalbuminemia: Lowest albumin = 2.9 g/dL at 2023  3:14 PM, will monitor as appropriate            # Moderate Malnutrition: based on nutrition assessment, PRESENT ON ADMISSION              Ghazal Jefferson DO  Pulmonary and Critical Care Attending  pgr [phone]    No Known Allergies    Meds: See MAR    Physical Exam:  BP (!) 143/77 (BP Location: Left arm)   Pulse 103   Temp (!) 100.9  F (38.3  C) (Oral)   Resp (!) 32   Ht 1.702 m (5' 7\")   Wt 55.8 kg (123 lb 1.6 oz)   SpO2 99%   BMI 19.28 kg/m    Intake/Output this shift:  I/O this shift:  In: -   Out: 200 [Urine:200]  GEN: sitting up in bed, NAD  HEENT: MMD, poor dentition   CVS: regular rhythm, no murmurs  RESP: Coarse upper airway " noises, no wheezing  ABD: Soft, No abdominal pain with palpation, no guarding, no rigidity  EXT: Warm, well perfused, no edema  NEURO: moving all extremities, nonfocal  PSYCH: slow to answer    Pertinent Labs: Latest lab results in EHR personally reviewed.   CMP  Recent Labs   Lab 05/03/23  0452 05/02/23  0804 05/02/23  0430 05/01/23  2030 05/01/23  0737 05/01/23  0522 04/30/23  1846 04/30/23  1656 04/30/23  1514 04/27/23  0924 04/27/23  0655   *  --  132*  --   --  137  --   --  139   < > 136   POTASSIUM 3.4*  --  3.6  --   --  4.1  --   --  3.6   < > 4.2   CHLORIDE 98  --  100  --   --  106  --   --  106   < > 103   CO2 22  --  20*  --   --  23  --   --  22   < > 22   ANIONGAP 11  --  12  --   --  8  --   --  11   < > 11    100* 102 110*   < > 104   < >  --  125   < > 91   BUN 9  --  7*  --   --  13  --   --  14   < > 10   CR 0.63*  --  0.53*  --   --  0.57*  --   --  0.71   < > 0.54*   GFRESTIMATED >90  --  >90  --   --  >90  --   --  >90   < > >90   MELLISA 8.4*  --  8.5  --   --  8.3*  --   --  8.6   < > 8.8   MAG 1.7*  --  1.9  --   --  2.0  --  1.8 2.5   < > 1.8   PHOS 2.9  --  2.6  --   --  2.9  --   --  4.3  --  3.6   PROTTOTAL  --   --   --   --   --   --   --   --  6.2  --  6.5   ALBUMIN  --   --   --   --   --   --   --   --  2.9*  --  3.1*   BILITOTAL  --   --   --   --   --   --   --   --  0.4  --  0.4   ALKPHOS  --   --   --   --   --   --   --   --  129*  --  133*   AST  --   --   --   --   --   --   --   --  129*  --  64*   ALT  --   --   --   --   --   --   --   --  85*  --  61*    < > = values in this interval not displayed.     CBC  Recent Labs   Lab 05/03/23  0452 05/02/23  0430 05/01/23  0522 04/30/23  1514 04/29/23  1529   WBC  --  10.5 9.1 10.3 3.4*   RBC  --  2.94* 2.93* 3.30* 2.94*   HGB  --  10.0* 9.9* 11.3* 10.0*   HCT  --  29.7* 30.9* 35.2* 30.6*   MCV  --  101* 106* 107* 104*   MCH  --  34.0* 33.8* 34.2* 34.0*   MCHC  --  33.7 32.0 32.1 32.7   RDW  --  14.3 14.9 14.5 14.6   PLT  233 267 286 325 264     INR  Recent Labs   Lab 04/30/23  1514   INR 1.04     Arterial Blood Gas  Recent Labs   Lab 04/30/23  1702 04/30/23  1656   PH 7.38 7.33*   PCO2 41  --    PO2 117*  --    HCO3 24  --        Cultures: personally reviewed.   7-Day Micro Results    Collected Updated Procedure Result Status    05/03/2023 0827 05/03/2023 0830 Blood Culture Peripheral Blood [[id number]]   Peripheral Blood    In process Component Value   No component results             05/03/2023 0818 05/03/2023 0821 Blood Culture Line, venous [[id number]]   Blood from Line, venous    In process Component Value   No component results             05/01/2023 0524 05/03/2023 0835 Respiratory Aerobic Bacterial Culture with Gram Stain [[id number]]   Sputum from Endotracheal    Final result Component Value   Culture 4+ Normal mell   Gram Stain Result <10 Squamous epithelial cells/low power field    >25 PMNs/low power field    4+ Mixed mell             04/30/2023 1732 05/02/2023 2146 Blood Culture Hand, Left [[id number]]    Blood from Hand, Left    Preliminary result Component Value   Culture No growth after 2 days P             04/30/2023 1732 05/02/2023 2146 Blood Culture Hand, Right [[id number]]    Blood from Hand, Right    Preliminary result Component Value   Culture No growth after 2 days P          Imaging: personally reviewed.   Results for orders placed or performed during the hospital encounter of 04/27/23   CT Head w/o Contrast    Narrative    EXAM: CT HEAD W/O CONTRAST  LOCATION: [facility]  DATE/TIME: 4/29/2023 4:02 PM CDT    INDICATION: ETOH;  withdrawal and increased confusion. trauma hx not clear  COMPARISON: CT head 04/24/2023.  TECHNIQUE: Routine CT Head without IV contrast. Multiplanar reformats. Dose reduction techniques were used.    FINDINGS:    INTRACRANIAL CONTENTS: No acute transcortical infarct. Chronic lacunar infarct in the right posterior limb internal capsule  redemonstrated. No hemorrhage or extraaxial collection. No mass effect. Subarachnoid cisterns are patent. Patchy white matter   hypodensities are, while nonspecific, most compatible with chronic microvascular ischemic changes. Unchanged proportional prominence of the ventricles and sulci is compatible with diffuse cerebral volume loss.    VISUALIZED ORBITS/SINUSES/MASTOIDS: Left lens replacement. Small amount of layering fluid in the right sphenoid sinus partially visualized. No middle ear or mastoid effusion.    BONES/SOFT TISSUES: No acute abnormality. Left frontal craniotomy postsurgical changes, as before.      Impression    IMPRESSION:  1.  No acute transcortical infarct, intracranial hemorrhage, or mass effect.   2.  Small quantity of layering fluid in the right sphenoid sinus can be seen with acute sinusitis in the appropriate clinical context.  3.  Additional chronic and postsurgical intracranial findings, as detailed.       XR Chest Port 1 View    Narrative    EXAM: XR CHEST PORT 1 VIEW  LOCATION: [facility]  DATE/TIME: 4/30/2023 3:27 PM CDT    INDICATION: verify ett tube  COMPARISON: 12/29/2022 and CT 11/20/2022      Impression    IMPRESSION: ET tube 3.5 cm from the vivienne. Some fibrotic change in the left upper lung similar to prior CT. No acute new findings.   XR Abdomen Port 1 View    Narrative    EXAM: XR ABDOMEN PORT 1 VIEW  LOCATION: [facility]  DATE/TIME: 4/30/2023 11:43 PM CDT    INDICATION: NG placement confirmation  COMPARISON: 04/24/2023      Impression    IMPRESSION: Enteric tube not visualized with certainty on these images. Recommend correlation and/or repositioning.   XR Chest Port 1 View    Narrative    EXAM: XR CHEST PORT 1 VIEW  LOCATION: [facility]  DATE/TIME: 5/3/2023 9:12 AM CDT    INDICATION: Aspiration.  COMPARISON: 04/30/2023      Impression    IMPRESSION: Shallow inspiration. Faint opacities in the mid  and lower left perihilar lung parenchyma unchanged. Lungs otherwise clear. No visible pleural fusion. Heart size and pulmonary vascularity within normal limits. Right PICC tip RA/SVC junction.   Echocardiogram Complete     Value    LVEF  50-55%    Narrative    [id number]  [id number]  [id number]^[place] [address]     Name: DIMPLE FISHMAN  MRN: [id number]  : 1950  Study Date: 2023 05:31 PM  Age: 72 yrs  Gender: Male  Patient Location: [facility]  Reason For Study: Cardiac Arrest, Cardiorespiratory Failure  Ordering Physician: TERRI AGUILAR  Performed By: ACE     BSA: 1.6 m2  Height: 67 in  Weight: 117 lb  HR: 77  BP: 157/73 mmHg  ______________________________________________________________________________  Procedure  Complete Echo Adult. Definity (NDC #12273-325) given intravenously. 2mL given.  ______________________________________________________________________________  Interpretation Summary     The visual ejection fraction is 50-55%.  There is mild to moderate inferolateral wall hypokinesis.  Mildly decreased right ventricular systolic function  No significant valve disease.  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     Anemia, unspecified type     Alcohol withdrawal syndrome without complication (H)     Dyspnea, unspecified type     Depression, unspecified     Generalized muscle weakness     Hypophosphatemia     Anxiety      Failure to thrive in adult     Alcoholic intoxication without complication (H)     Alcohol dependence with intoxication with complication (H)     Alcohol intoxication (H)         Surgical History  He  has a past surgical history that includes LEFT VITRECTOMY POSTERIOR 25 GAUGE SYSTEM, MEMBRANE PEEL, AIR FLUID EXCHANGE  (Left, 08/31/2016); Subdural Hematoma Evacuation Via Craniotomy (1989); Cataract Extraction (Left); and PICC/Midline Placement (4/30/2023).    Family History  Reviewed, and family history includes Alcoholism (age of onset: 40) in his father; Alcoholism (age of onset: 45) in his brother; Lung Cancer in his sister; No Known Problems in his mother and sister.    Social History  Reviewed, and he  reports that he has been smoking. He has a 57.00 pack-year smoking history. He has never used smokeless tobacco. He reports that he does not currently use alcohol. He reports that he does not use drugs.    Allergies  No Known Allergies           Gahzal Jefferson, DO  Pulmonary and Critical Care Attending  [facility] [phone]    Securely message with the Vocera Web Console (learn more here)

## 2023-05-03 NOTE — PLAN OF CARE
Problem: Malnutrition  Goal: Improved Nutritional Intake  Outcome: Progressing   Goal Outcome Evaluation:    RD ordered chocolate ensure enlive - moderately thick BID.   Patient is on a pureed diet with moderately thick liquids (by spoon only).     Aspiration noted on 5/2/23 PM. Has been ordering regular meals. RD will monitor diet tolerance and oral intake.     Malena Marquez RDN, LD

## 2023-05-03 NOTE — PROGRESS NOTES
[facility]    PROGRESS NOTE - Hospitalist Service    ASSESSMENT AND PLAN     Principal Problem:    Alcohol dependence with intoxication with complication (H)  Active Problems:    Alcohol withdrawal syndrome without complication (H)    Failure to thrive in adult    Alcohol intoxication (H)    Familia Watson is a 72 year old male who was admitted 4/27 for alcohol dependence and withdrawals.  Was recently admitted from 4/19-4/26 for similar complaints.  During this admission patient did experience PEA arrest on on 4/29 and was extubated 5/1.     Acute hypoxic respiratory failure with PEA arrest 4/29              Extubated 5/1.     Worsening hypoxia 5/3- now on high flow. Concern for aspiration   Check ABG, procal and chest xr   Initiate zosyn/vanco   NPO. Speech following    Deep suctioning per Respiratory    Check MRSA screen   Discussed with pulmonology recommending CT PE study    Reconfirmed code status. Patient agreeable to vent if needed. Updated family- nephew Chip     Acute metabolic encephalopathy related to alcohol withdrawal              Last drink 4/27.     CIWA discontinued    Continue low-dose gabapentin- hold for sedation               Extubated 5/1              Continue vitamins                 Adjustment disorder with depression and anxiety              Grief reaction related to loss of wife              No suicidal ideation              Wants to remain full code              Was evaluated by psychiatry 4/25.  Recommended psychotherapy for grief management.  Referral to Senior recovery, FABY program, rule 25 if needed     Chronic emphysema              Incentive spirometry    Appreciate pulm input       Adult failure to thrive              PT/OT-placement     HTN              No home meds listed     HLD              Continue atorvastatin        Barriers to discharge: Hypoxia, placement TCU     Anticipated length of stay: 2-3 days    Present on Admission:    Failure to thrive  in adult    Alcohol dependence with intoxication with complication (H)    Alcohol withdrawal syndrome without complication (H)    Alcohol intoxication (H)      Subjective:  Patient more alert on first eval. Recalled to room around 0955 and patient was not handling secretions well and was more hypoxic. Patient notes it is hard to breath.     PHYSICAL EXAM  Temp:  [98.3  F (36.8  C)-102.1  F (38.9  C)] 100.9  F (38.3  C)  Pulse:  [] 103  Resp:  [20-32] 32  BP: (119-172)/(64-87) 143/77  FiO2 (%):  [44 %] 44 %  SpO2:  [91 %-99 %] 99 %  Wt Readings from Last 1 Encounters:   05/03/23 55.8 kg (123 lb 1.6 oz)       Intake/Output Summary (Last 24 hours) at 5/3/2023 1039  Last data filed at 5/3/2023 0930  Gross per 24 hour   Intake 240 ml   Output 200 ml   Net 40 ml      Body mass index is 19.28 kg/m .    GENRL: Sleepy, but answering questions. Slurred speech. mild respiratory distress.   CHEST: course breaths sounds to bilateral bases. Attempts to clear airways throughout exam- poor effort with coughing.    HEART: tachycardic   ABDMN: Soft. Non-tender, non-distended. No organomegaly. No guarding or rigidity. Bowel sounds present   EXTRM: No pedal edema, DP pulses 2+.   NEURO: Tremors noted.    PSYCH: flat affect and mood.    INTGM: No skin rash, no cyanosis or clubbing    Medical Decision Making       50 MINUTES SPENT BY ME on the date of service doing chart review, history, exam, documentation & further activities per the note.      PERTINENT LABS/IMAGING:  Results for orders placed or performed during the hospital encounter of 04/27/23   CT Head w/o Contrast    Impression    IMPRESSION:  1.  No acute transcortical infarct, intracranial hemorrhage, or mass effect.   2.  Small quantity of layering fluid in the right sphenoid sinus can be seen with acute sinusitis in the appropriate clinical context.  3.  Additional chronic and postsurgical intracranial findings, as detailed.       XR Chest Port 1 View    Impression     IMPRESSION: ET tube 3.5 cm from the vivienne. Some fibrotic change in the left upper lung similar to prior CT. No acute new findings.   XR Abdomen Port 1 View    Impression    IMPRESSION: Enteric tube not visualized with certainty on these images. Recommend correlation and/or repositioning.   XR Chest Port 1 View    Impression    IMPRESSION: Shallow inspiration. Faint opacities in the mid and lower left perihilar lung parenchyma unchanged. Lungs otherwise clear. No visible pleural fusion. Heart size and pulmonary vascularity within normal limits. Right PICC tip RA/SVC junction.   Echocardiogram Complete   Result Value Ref Range    LVEF  50-55%      Most Recent 3 CBC's:Recent Labs   Lab Test 05/03/23  0452 05/02/23  0430 05/01/23  0522 04/30/23  1514   WBC  --  10.5 9.1 10.3   HGB  --  10.0* 9.9* 11.3*   MCV  --  101* 106* 107*    267 286 325     Most Recent 3 BMP's:Recent Labs   Lab Test 05/03/23  0452 05/02/23  0804 05/02/23  0430 05/01/23  0737 05/01/23 0522   *  --  132*  --  137   POTASSIUM 3.4*  --  3.6  --  4.1   CHLORIDE 98  --  100  --  106   CO2 22  --  20*  --  23   BUN 9  --  7*  --  13   CR 0.63*  --  0.53*  --  0.57*   ANIONGAP 11  --  12  --  8   MELLISA 8.4*  --  8.5  --  8.3*    100* 102   < > 104    < > = values in this interval not displayed.     Most Recent 2 LFT's:Recent Labs   Lab Test 04/30/23  1514 04/27/23  0655   * 64*   ALT 85* 61*   ALKPHOS 129* 133*   BILITOTAL 0.4 0.4       Recent Labs   Lab Test 04/19/21  1209   CHOL 170   HDL 55   LDL 92   TRIG 115     Recent Labs   Lab Test 04/19/21  1209 08/24/20  1420 07/03/19  1132   LDL 92 101 86     Recent Labs   Lab Test 05/03/23  0452   *   POTASSIUM 3.4*   CHLORIDE 98   CO2 22      BUN 9   CR 0.63*   GFRESTIMATED >90   MELLISA 8.4*     No results for input(s): A1C in the last 72161 hours.  Recent Labs   Lab Test 05/02/23  0430 05/01/23  0522 04/30/23  1514   HGB 10.0* 9.9* 11.3*     Recent Labs   Lab Test  11/20/22  1149 01/25/22  1429 05/12/21  1121   TROPONINI 0.02 <0.01 <0.01     Recent Labs   Lab Test 01/25/22  1429   BNP 22     Recent Labs   Lab Test 04/21/23  1149   TSH 0.91     Recent Labs   Lab Test 04/30/23  1514 01/25/22  1429 05/12/21  1121   INR 1.04 1.16* 1.07       Cassandra Louis DO  Hospitalist Service  [facility]  Text page via [facility] Paging/Directory

## 2023-05-04 ENCOUNTER — APPOINTMENT (OUTPATIENT)
Dept: OCCUPATIONAL THERAPY | Facility: CLINIC | Age: 73
DRG: 896 | End: 2023-05-04
Payer: MEDICARE

## 2023-05-04 ENCOUNTER — APPOINTMENT (OUTPATIENT)
Dept: SPEECH THERAPY | Facility: CLINIC | Age: 73
DRG: 896 | End: 2023-05-04
Payer: MEDICARE

## 2023-05-04 ENCOUNTER — APPOINTMENT (OUTPATIENT)
Dept: PHYSICAL THERAPY | Facility: CLINIC | Age: 73
DRG: 896 | End: 2023-05-04
Payer: MEDICARE

## 2023-05-04 LAB
ALBUMIN SERPL-MCNC: 2.3 G/DL (ref 3.5–5)
ALP SERPL-CCNC: 90 U/L (ref 45–120)
ALT SERPL W P-5'-P-CCNC: 45 U/L (ref 0–45)
ANION GAP SERPL CALCULATED.3IONS-SCNC: 4 MMOL/L (ref 5–18)
AST SERPL W P-5'-P-CCNC: 29 U/L (ref 0–40)
BASOPHILS # BLD AUTO: 0 10E3/UL (ref 0–0.2)
BASOPHILS NFR BLD AUTO: 1 %
BILIRUB DIRECT SERPL-MCNC: 0.5 MG/DL
BILIRUB SERPL-MCNC: 1.2 MG/DL (ref 0–1)
BNP SERPL-MCNC: 169 PG/ML (ref 0–69)
BUN SERPL-MCNC: 9 MG/DL (ref 8–28)
CALCIUM SERPL-MCNC: 8.2 MG/DL (ref 8.5–10.5)
CHLORIDE BLD-SCNC: 105 MMOL/L (ref 98–107)
CO2 SERPL-SCNC: 22 MMOL/L (ref 22–31)
CREAT SERPL-MCNC: 0.52 MG/DL (ref 0.7–1.3)
CREAT UR-MCNC: 58 MG/DL
EOSINOPHIL # BLD AUTO: 0 10E3/UL (ref 0–0.7)
EOSINOPHIL NFR BLD AUTO: 1 %
ERYTHROCYTE [DISTWIDTH] IN BLOOD BY AUTOMATED COUNT: 13.8 % (ref 10–15)
GFR SERPL CREATININE-BSD FRML MDRD: >90 ML/MIN/1.73M2
GLUCOSE BLD-MCNC: 105 MG/DL (ref 70–125)
GLUCOSE BLDC GLUCOMTR-MCNC: 101 MG/DL (ref 70–99)
HCT VFR BLD AUTO: 23.9 % (ref 40–53)
HGB BLD-MCNC: 8 G/DL (ref 13.3–17.7)
IMM GRANULOCYTES # BLD: 0 10E3/UL
IMM GRANULOCYTES NFR BLD: 1 %
LYMPHOCYTES # BLD AUTO: 1 10E3/UL (ref 0.8–5.3)
LYMPHOCYTES NFR BLD AUTO: 18 %
MAGNESIUM SERPL-MCNC: 1.7 MG/DL (ref 1.8–2.6)
MCH RBC QN AUTO: 34 PG (ref 26.5–33)
MCHC RBC AUTO-ENTMCNC: 33.5 G/DL (ref 31.5–36.5)
MCV RBC AUTO: 102 FL (ref 78–100)
MONOCYTES # BLD AUTO: 0.5 10E3/UL (ref 0–1.3)
MONOCYTES NFR BLD AUTO: 9 %
NEUTROPHILS # BLD AUTO: 3.8 10E3/UL (ref 1.6–8.3)
NEUTROPHILS NFR BLD AUTO: 70 %
NRBC # BLD AUTO: 0 10E3/UL
NRBC BLD AUTO-RTO: 0 /100
OSMOLALITY UR: 670 MMOL/KG (ref 100–1200)
PHOSPHATE SERPL-MCNC: 2.6 MG/DL (ref 2.5–4.5)
PLATELET # BLD AUTO: 254 10E3/UL (ref 150–450)
POTASSIUM BLD-SCNC: 3.8 MMOL/L (ref 3.5–5)
PROT SERPL-MCNC: 5.4 G/DL (ref 6–8)
RBC # BLD AUTO: 2.35 10E6/UL (ref 4.4–5.9)
SODIUM SERPL-SCNC: 131 MMOL/L (ref 136–145)
WBC # BLD AUTO: 5.3 10E3/UL (ref 4–11)

## 2023-05-04 PROCEDURE — 83935 ASSAY OF URINE OSMOLALITY: CPT | Performed by: STUDENT IN AN ORGANIZED HEALTH CARE EDUCATION/TRAINING PROGRAM

## 2023-05-04 PROCEDURE — 94640 AIRWAY INHALATION TREATMENT: CPT

## 2023-05-04 PROCEDURE — 250N000009 HC RX 250: Performed by: INTERNAL MEDICINE

## 2023-05-04 PROCEDURE — 999N000157 HC STATISTIC RCP TIME EA 10 MIN

## 2023-05-04 PROCEDURE — 250N000011 HC RX IP 250 OP 636: Performed by: INTERNAL MEDICINE

## 2023-05-04 PROCEDURE — 82310 ASSAY OF CALCIUM: CPT | Performed by: INTERNAL MEDICINE

## 2023-05-04 PROCEDURE — 92526 ORAL FUNCTION THERAPY: CPT | Mod: GN

## 2023-05-04 PROCEDURE — 83880 ASSAY OF NATRIURETIC PEPTIDE: CPT | Performed by: STUDENT IN AN ORGANIZED HEALTH CARE EDUCATION/TRAINING PROGRAM

## 2023-05-04 PROCEDURE — 97129 THER IVNTJ 1ST 15 MIN: CPT | Mod: GO

## 2023-05-04 PROCEDURE — 99233 SBSQ HOSP IP/OBS HIGH 50: CPT | Performed by: STUDENT IN AN ORGANIZED HEALTH CARE EDUCATION/TRAINING PROGRAM

## 2023-05-04 PROCEDURE — C9113 INJ PANTOPRAZOLE SODIUM, VIA: HCPCS | Performed by: INTERNAL MEDICINE

## 2023-05-04 PROCEDURE — 80053 COMPREHEN METABOLIC PANEL: CPT | Performed by: INTERNAL MEDICINE

## 2023-05-04 PROCEDURE — 250N000013 HC RX MED GY IP 250 OP 250 PS 637: Performed by: INTERNAL MEDICINE

## 2023-05-04 PROCEDURE — 94640 AIRWAY INHALATION TREATMENT: CPT | Mod: 76

## 2023-05-04 PROCEDURE — 97116 GAIT TRAINING THERAPY: CPT | Mod: GP

## 2023-05-04 PROCEDURE — 97535 SELF CARE MNGMENT TRAINING: CPT | Mod: GO

## 2023-05-04 PROCEDURE — 82248 BILIRUBIN DIRECT: CPT | Performed by: INTERNAL MEDICINE

## 2023-05-04 PROCEDURE — 99232 SBSQ HOSP IP/OBS MODERATE 35: CPT | Performed by: INTERNAL MEDICINE

## 2023-05-04 PROCEDURE — 97110 THERAPEUTIC EXERCISES: CPT | Mod: GP

## 2023-05-04 PROCEDURE — 94799 UNLISTED PULMONARY SVC/PX: CPT

## 2023-05-04 PROCEDURE — 258N000003 HC RX IP 258 OP 636: Performed by: INTERNAL MEDICINE

## 2023-05-04 PROCEDURE — 250N000011 HC RX IP 250 OP 636: Performed by: STUDENT IN AN ORGANIZED HEALTH CARE EDUCATION/TRAINING PROGRAM

## 2023-05-04 PROCEDURE — 250N000013 HC RX MED GY IP 250 OP 250 PS 637: Performed by: STUDENT IN AN ORGANIZED HEALTH CARE EDUCATION/TRAINING PROGRAM

## 2023-05-04 PROCEDURE — 84100 ASSAY OF PHOSPHORUS: CPT | Performed by: INTERNAL MEDICINE

## 2023-05-04 PROCEDURE — 83735 ASSAY OF MAGNESIUM: CPT | Performed by: INTERNAL MEDICINE

## 2023-05-04 PROCEDURE — 120N000004 HC R&B MS OVERFLOW

## 2023-05-04 PROCEDURE — 82570 ASSAY OF URINE CREATININE: CPT | Performed by: STUDENT IN AN ORGANIZED HEALTH CARE EDUCATION/TRAINING PROGRAM

## 2023-05-04 PROCEDURE — 85025 COMPLETE CBC W/AUTO DIFF WBC: CPT | Performed by: INTERNAL MEDICINE

## 2023-05-04 RX ORDER — MAGNESIUM SULFATE HEPTAHYDRATE 40 MG/ML
2 INJECTION, SOLUTION INTRAVENOUS ONCE
Status: COMPLETED | OUTPATIENT
Start: 2023-05-04 | End: 2023-05-04

## 2023-05-04 RX ORDER — SODIUM CHLORIDE AND POTASSIUM CHLORIDE 150; 900 MG/100ML; MG/100ML
INJECTION, SOLUTION INTRAVENOUS CONTINUOUS
Status: DISCONTINUED | OUTPATIENT
Start: 2023-05-04 | End: 2023-05-05

## 2023-05-04 RX ORDER — LORAZEPAM 0.5 MG/1
0.5 TABLET ORAL EVERY 4 HOURS PRN
Status: COMPLETED | OUTPATIENT
Start: 2023-05-04 | End: 2023-05-04

## 2023-05-04 RX ORDER — LACTOBACILLUS RHAMNOSUS GG 10B CELL
1 CAPSULE ORAL 2 TIMES DAILY
Status: DISCONTINUED | OUTPATIENT
Start: 2023-05-04 | End: 2023-05-08 | Stop reason: HOSPADM

## 2023-05-04 RX ADMIN — PIPERACILLIN AND TAZOBACTAM 3.38 G: 3; .375 INJECTION, POWDER, LYOPHILIZED, FOR SOLUTION INTRAVENOUS at 16:13

## 2023-05-04 RX ADMIN — IPRATROPIUM BROMIDE AND ALBUTEROL SULFATE 3 ML: .5; 3 SOLUTION RESPIRATORY (INHALATION) at 13:12

## 2023-05-04 RX ADMIN — Medication 1 CAPSULE: at 20:37

## 2023-05-04 RX ADMIN — NICOTINE 1 PATCH: 14 PATCH, EXTENDED RELEASE TRANSDERMAL at 07:49

## 2023-05-04 RX ADMIN — MAGNESIUM SULFATE HEPTAHYDRATE 2 G: 40 INJECTION, SOLUTION INTRAVENOUS at 05:58

## 2023-05-04 RX ADMIN — CYANOCOBALAMIN TAB 1000 MCG 1000 MCG: 1000 TAB at 08:03

## 2023-05-04 RX ADMIN — HEPARIN SODIUM 5000 UNITS: 5000 INJECTION, SOLUTION INTRAVENOUS; SUBCUTANEOUS at 08:06

## 2023-05-04 RX ADMIN — IPRATROPIUM BROMIDE AND ALBUTEROL SULFATE 3 ML: .5; 3 SOLUTION RESPIRATORY (INHALATION) at 07:17

## 2023-05-04 RX ADMIN — PIPERACILLIN AND TAZOBACTAM 3.38 G: 3; .375 INJECTION, POWDER, LYOPHILIZED, FOR SOLUTION INTRAVENOUS at 08:03

## 2023-05-04 RX ADMIN — FOLIC ACID 1 MG: 5 INJECTION, SOLUTION INTRAMUSCULAR; INTRAVENOUS; SUBCUTANEOUS at 09:14

## 2023-05-04 RX ADMIN — PANTOPRAZOLE SODIUM 40 MG: 40 INJECTION, POWDER, LYOPHILIZED, FOR SOLUTION INTRAVENOUS at 05:58

## 2023-05-04 RX ADMIN — KETOROLAC TROMETHAMINE 15 MG: 15 INJECTION, SOLUTION INTRAMUSCULAR; INTRAVENOUS at 18:42

## 2023-05-04 RX ADMIN — LORAZEPAM 0.5 MG: 0.5 TABLET ORAL at 16:31

## 2023-05-04 RX ADMIN — ACETAMINOPHEN 650 MG: 325 TABLET ORAL at 20:37

## 2023-05-04 RX ADMIN — Medication 400 MG: at 08:03

## 2023-05-04 RX ADMIN — SODIUM PHOSPHATE, MONOBASIC, MONOHYDRATE AND SODIUM PHOSPHATE, DIBASIC, ANHYDROUS 9 MMOL: 276; 142 INJECTION, SOLUTION INTRAVENOUS at 08:10

## 2023-05-04 RX ADMIN — MULTIPLE VITAMINS W/ MINERALS TAB 1 TABLET: TAB at 08:03

## 2023-05-04 RX ADMIN — ACETAMINOPHEN 650 MG: 325 TABLET ORAL at 08:06

## 2023-05-04 RX ADMIN — Medication 25 MCG: at 08:03

## 2023-05-04 RX ADMIN — IPRATROPIUM BROMIDE AND ALBUTEROL SULFATE 3 ML: .5; 3 SOLUTION RESPIRATORY (INHALATION) at 19:25

## 2023-05-04 RX ADMIN — THIAMINE HYDROCHLORIDE 100 MG: 100 INJECTION, SOLUTION INTRAMUSCULAR; INTRAVENOUS at 08:08

## 2023-05-04 RX ADMIN — HEPARIN SODIUM 5000 UNITS: 5000 INJECTION, SOLUTION INTRAVENOUS; SUBCUTANEOUS at 00:44

## 2023-05-04 RX ADMIN — ATORVASTATIN CALCIUM 40 MG: 40 TABLET, FILM COATED ORAL at 21:44

## 2023-05-04 RX ADMIN — HEPARIN SODIUM 5000 UNITS: 5000 INJECTION, SOLUTION INTRAVENOUS; SUBCUTANEOUS at 16:13

## 2023-05-04 RX ADMIN — POTASSIUM CHLORIDE AND SODIUM CHLORIDE: 900; 150 INJECTION, SOLUTION INTRAVENOUS at 09:16

## 2023-05-04 ASSESSMENT — ACTIVITIES OF DAILY LIVING (ADL)
ADLS_ACUITY_SCORE: 35
ADLS_ACUITY_SCORE: 35
ADLS_ACUITY_SCORE: 31
ADLS_ACUITY_SCORE: 35
ADLS_ACUITY_SCORE: 31
ADLS_ACUITY_SCORE: 35

## 2023-05-04 NOTE — PLAN OF CARE
Problem: Risk for Delirium  Goal: Improved Sleep  Outcome: Not Progressing    Problem: Risk for Delirium  Goal: Optimal Coping  Outcome: Progressing    Goal: Improved Behavioral Control  Outcome: Progressing  Intervention: Minimize Safety Risk      Goal: Improved Attention and Thought Clarity  Outcome: Progressing  Intervention: Maximize Cognitive Function       Problem: Gas Exchange Impaired  Goal: Optimal Gas Exchange  Outcome: Progressing  Intervention: Optimize Oxygenation and Ventilation       Problem: Malnutrition  Goal: Improved Nutritional Intake  Outcome: Not Progressing    Pt remains NPO except ice chips.  Pt tolerated ice chips; No coughing noted.  Pt more alert now.  Less coughing.  Stronger cough.  Lung sounds clear now.  Pt weaned off HFNC to 2L O2.  Pt up most of the night and using his call light frequently.  Continue to monitor.

## 2023-05-04 NOTE — PLAN OF CARE
Pt resting comfortably. Assist of 1 w/ belt & walker OOB. Reporting mild anxiety w/ tremors relieved w/ prn ativan. Also reporting moderate pain to chest & abdomen relieved w/ prn toradol. Primofit in place, UA sent. All electrolytes AM rechecks. Plan to transfer to [facility] in upcoming days.

## 2023-05-04 NOTE — PROGRESS NOTES
"[facility] Pulmonary/Critical Care Consult Team Note    Familia Watson,  1950, MRN [id number]  Admitting Dx: Failure to thrive in adult [R62.7]  Alcohol dependence with intoxication with complication (H) [F10.229]  Date / Time of Admission:  2023  6:18 AM    Intake/Output last 3 shifts:  I/O last 3 completed shifts:  In: 2761.25 [P.O.:450; I.V.:2311.25]  Out: 825 [Urine:825]    He is feeling much better today  He is sitting up in a chair, on room air    Assessment/Plan: Familia Watson is a 72 year old male with PMHx of etoh abuse, smoker, HLD, HTN, prior SDH, recent admission for etoh withdrawal and intoxication, readmitted  for same. Hospital stay c/b PEA arrest, possible seizure requiring compressions and intubation for airway protection and hypoxemic respiratory failure who was extubated .     Acute Hypoxic resp failure due to poor secretion mobility - improved after RT suctioning and mobilization of secretions  Initially extubated to 2 L NC and now up to HFNC 45%  - Increased FiO2 needs with some RV changes on ECHO, high suspicion for PE  - LE dopplers - neg  - CTA for PE shows mucus plugging and atelectasis, no PE    Will sign off, please let our service know if any change in clinical condition or questions.      Medical Care Time excluding procedures and family discussions greater than: 45 Minutes    Risk Factors Present on Admission:  Clinically Significant Risk Factors              # Hypoalbuminemia: Lowest albumin = 2.3 g/dL at 2023  4:56 AM, will monitor as appropriate            # Moderate Malnutrition: based on nutrition assessment               Ghazal Jefferson DO  Pulmonary and Critical Care Attending  pgr [phone]    No Known Allergies    Meds: See MAR    Physical Exam:  /71 (BP Location: Left arm, Cuff Size: Adult Small)   Pulse 81   Temp 97.8  F (36.6  C) (Oral)   Resp 20   Ht 1.702 m (5' 7\")   Wt 55.8 kg (123 lb 1.6 oz)   SpO2 98%   BMI 19.28 kg/m  "   Intake/Output this shift:  I/O this shift:  In: 1045 [P.O.:300; I.V.:745]  Out: 350 [Urine:350]  GEN: sitting up in chair, NAD  HEENT: MMD, poor dentition   CVS: regular rhythm, no murmurs  RESP: Coarse upper airway noises, bilateral rhonchi at bases, no wheezing  ABD: Soft, No abdominal pain with palpation, no guarding, no rigidity  EXT: Warm, well perfused, no edema  NEURO: moving all extremities, nonfocal    Pertinent Labs: Latest lab results in EHR personally reviewed.   CMP  Recent Labs   Lab 05/04/23  0456 05/03/23  2355 05/03/23  1807 05/03/23  0452 05/02/23  0804 05/02/23  0430 05/01/23  0737 05/01/23  0522 04/30/23  1656 04/30/23  1514   *  --   --  131*  --  132*  --  137  --  139   POTASSIUM 3.8  --  3.9 3.4*  --  3.6  --  4.1  --  3.6   CHLORIDE 105  --   --  98  --  100  --  106  --  106   CO2 22  --   --  22  --  20*  --  23  --  22   ANIONGAP 4*  --   --  11  --  12  --  8  --  11    101*  --  118 100* 102   < > 104   < > 125   BUN 9  --   --  9  --  7*  --  13  --  14   CR 0.52*  --   --  0.63*  --  0.53*  --  0.57*  --  0.71   GFRESTIMATED >90  --   --  >90  --  >90  --  >90  --  >90   MELLISA 8.2*  --   --  8.4*  --  8.5  --  8.3*  --  8.6   MAG 1.7*  --   --  1.7*  --  1.9  --  2.0   < > 2.5   PHOS 2.6  --   --  2.9  --  2.6  --  2.9  --  4.3   PROTTOTAL 5.4*  --   --   --   --   --   --   --   --  6.2   ALBUMIN 2.3*  --   --   --   --   --   --   --   --  2.9*   BILITOTAL 1.2*  --   --   --   --   --   --   --   --  0.4   ALKPHOS 90  --   --   --   --   --   --   --   --  129*   AST 29  --   --   --   --   --   --   --   --  129*   ALT 45  --   --   --   --   --   --   --   --  85*    < > = values in this interval not displayed.     CBC  Recent Labs   Lab 05/04/23  0456 05/03/23  0452 05/02/23  0430 05/01/23  0522 04/30/23  1514   WBC 5.3  --  10.5 9.1 10.3   RBC 2.35*  --  2.94* 2.93* 3.30*   HGB 8.0*  --  10.0* 9.9* 11.3*   HCT 23.9*  --  29.7* 30.9* 35.2*   *  --  101* 106*  107*   MCH 34.0*  --  34.0* 33.8* 34.2*   MCHC 33.5  --  33.7 32.0 32.1   RDW 13.8  --  14.3 14.9 14.5    233 267 286 325     INR  Recent Labs   Lab 04/30/23  1514   INR 1.04     Arterial Blood Gas  Recent Labs   Lab 05/03/23  1040 04/30/23  1702 04/30/23  1656   PH 7.48* 7.38 7.33*   PCO2 31* 41  --    PO2 75 117*  --    HCO3 23 24  --        Cultures: personally reviewed.   7-Day Micro Results    Collected Updated Procedure Result Status    05/03/2023 0827 05/03/2023 0830 Blood Culture Peripheral Blood [[id number]]   Peripheral Blood    In process Component Value   No component results             05/03/2023 0818 05/03/2023 0821 Blood Culture Line, venous [[id number]]   Blood from Line, venous    In process Component Value   No component results             05/01/2023 0524 05/03/2023 0835 Respiratory Aerobic Bacterial Culture with Gram Stain [[id number]]   Sputum from Endotracheal    Final result Component Value   Culture 4+ Normal mell   Gram Stain Result <10 Squamous epithelial cells/low power field    >25 PMNs/low power field    4+ Mixed mell             04/30/2023 1732 05/02/2023 2146 Blood Culture Hand, Left [[id number]]    Blood from Hand, Left    Preliminary result Component Value   Culture No growth after 2 days P             04/30/2023 1732 05/02/2023 2146 Blood Culture Hand, Right [[id number]]    Blood from Hand, Right    Preliminary result Component Value   Culture No growth after 2 days P          Imaging: personally reviewed.   Results for orders placed or performed during the hospital encounter of 04/27/23   CT Head w/o Contrast    Narrative    EXAM: CT HEAD W/O CONTRAST  LOCATION: [facility]  DATE/TIME: 4/29/2023 4:02 PM CDT    INDICATION: ETOH;  withdrawal and increased confusion. trauma hx not clear  COMPARISON: CT head 04/24/2023.  TECHNIQUE: Routine CT Head without IV contrast. Multiplanar reformats. Dose reduction techniques were  used.    FINDINGS:    INTRACRANIAL CONTENTS: No acute transcortical infarct. Chronic lacunar infarct in the right posterior limb internal capsule redemonstrated. No hemorrhage or extraaxial collection. No mass effect. Subarachnoid cisterns are patent. Patchy white matter   hypodensities are, while nonspecific, most compatible with chronic microvascular ischemic changes. Unchanged proportional prominence of the ventricles and sulci is compatible with diffuse cerebral volume loss.    VISUALIZED ORBITS/SINUSES/MASTOIDS: Left lens replacement. Small amount of layering fluid in the right sphenoid sinus partially visualized. No middle ear or mastoid effusion.    BONES/SOFT TISSUES: No acute abnormality. Left frontal craniotomy postsurgical changes, as before.      Impression    IMPRESSION:  1.  No acute transcortical infarct, intracranial hemorrhage, or mass effect.   2.  Small quantity of layering fluid in the right sphenoid sinus can be seen with acute sinusitis in the appropriate clinical context.  3.  Additional chronic and postsurgical intracranial findings, as detailed.       XR Chest Port 1 View    Narrative    EXAM: XR CHEST PORT 1 VIEW  LOCATION: [facility]  DATE/TIME: 4/30/2023 3:27 PM CDT    INDICATION: verify ett tube  COMPARISON: 12/29/2022 and CT 11/20/2022      Impression    IMPRESSION: ET tube 3.5 cm from the vivienne. Some fibrotic change in the left upper lung similar to prior CT. No acute new findings.   XR Abdomen Port 1 View    Narrative    EXAM: XR ABDOMEN PORT 1 VIEW  LOCATION: [facility]  DATE/TIME: 4/30/2023 11:43 PM CDT    INDICATION: NG placement confirmation  COMPARISON: 04/24/2023      Impression    IMPRESSION: Enteric tube not visualized with certainty on these images. Recommend correlation and/or repositioning.   XR Chest Port 1 View    Narrative    EXAM: XR CHEST PORT 1 VIEW  LOCATION: [facility]  DATE/TIME:  5/3/2023 9:12 AM CDT    INDICATION: Aspiration.  COMPARISON: 2023      Impression    IMPRESSION: Shallow inspiration. Faint opacities in the mid and lower left perihilar lung parenchyma unchanged. Lungs otherwise clear. No visible pleural fusion. Heart size and pulmonary vascularity within normal limits. Right PICC tip RA/SVC junction.   Echocardiogram Complete     Value    LVEF  50-55%    [facility]    [id number]  [id number]  [id number]^LAUREN[place] [address]     Name: DIMPLE FISHMAN  MRN: [id number]  : 1950  Study Date: 2023 05:31 PM  Age: 72 yrs  Gender: Male  Patient Location: [facility]  Reason For Study: Cardiac Arrest, Cardiorespiratory Failure  Ordering Physician: TERRI AGUILAR  Performed By: ACE     BSA: 1.6 m2  Height: 67 in  Weight: 117 lb  HR: 77  BP: 157/73 mmHg  ______________________________________________________________________________  Procedure  Complete Echo Adult. Definity (NDC #48466-513) given intravenously. 2mL given.  ______________________________________________________________________________  Interpretation Summary     The visual ejection fraction is 50-55%.  There is mild to moderate inferolateral wall hypokinesis.  Mildly decreased right ventricular systolic function  No significant valve disease.  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     Anemia, unspecified type     Alcohol withdrawal  syndrome without complication (H)     Dyspnea, unspecified type     Depression, unspecified     Generalized muscle weakness     Hypophosphatemia     Anxiety     Failure to thrive in adult     Alcoholic intoxication without complication (H)     Alcohol dependence with intoxication with complication (H)     Alcohol intoxication (H)         Surgical History  He  has a past surgical history that includes LEFT VITRECTOMY POSTERIOR 25 GAUGE SYSTEM, MEMBRANE PEEL, AIR FLUID EXCHANGE  (Left, 08/31/2016); Subdural Hematoma Evacuation Via Craniotomy (1989); Cataract Extraction (Left); and PICC/Midline Placement (4/30/2023).    Family History  Reviewed, and family history includes Alcoholism (age of onset: 40) in his father; Alcoholism (age of onset: 45) in his brother; Lung Cancer in his sister; No Known Problems in his mother and sister.    Social History  Reviewed, and he  reports that he has been smoking. He has a 57.00 pack-year smoking history. He has never used smokeless tobacco. He reports that he does not currently use alcohol. He reports that he does not use drugs.    Allergies  No Known Allergies           Ghazal Jefferson, DO  Pulmonary and Critical Care Attending  [facility] [phone]    Securely message with the Voc[employer] Web Console (learn more here)

## 2023-05-04 NOTE — PROGRESS NOTES
Care Management Follow Up    Length of Stay (days): 5    Expected Discharge Date: 05/05/2023     Concerns to be Addressed: medical progression, discharge planning  Patient plan of care discussed at interdisciplinary rounds: Yes    Anticipated Discharge Disposition:  (TCU)  Disposition Comments: TCU  Anticipated Discharge Services: Transportation Services  Anticipated Discharge DME: None    Patient/family educated on Medicare website which has current facility and service quality ratings: yes (Currently open to a vendor he wishes to remain active with.)  Education Provided on the Discharge Plan: Yes (AVS per bedside RN)  Patient/Family in Agreement with the Plan: yes    Referrals Placed by CM/SW: Homecare (Currently open to a home care vendor he wishes to remain active with.)  Private pay costs discussed: Not applicable    Additional Information:  SAMARIA called and spoke with Jostin in admissions for [facility].  Pt accepted at [facility].  SAMARIA spoke with amparo DEL CID Pt will be ready for discharge in 2-3 days.  SAMARIA met with Pt to discuss discharge plan.  Pt is in agreement with plan, requests CM updates his nephew, Chip.  SAMARIA called Chip to provide update and address for facility.  CM to keep Jostin updated regarding when Pt will be ready for discharge.     Needs PAS.      DRAKE Anthony

## 2023-05-04 NOTE — PLAN OF CARE
VSS. Weaned to RA. NSR on tele. A&O but forgetful. Seen by PT/OT, SLUMS completed. Visited by niece and nephew, provider reviewed care plan at bedside. Pt currently agreeable to staying for a few days until medically stable and transferring to TCU. Se[facility] by SAMARIA[facility]

## 2023-05-04 NOTE — PROGRESS NOTES
"BP (!) 159/86 (BP Location: Left arm, Cuff Size: Adult Small)   Pulse 78   Temp 98.3  F (36.8  C) (Oral)   Resp 20   Ht 1.702 m (5' 7\")   Wt 55.8 kg (123 lb 1.6 oz)   SpO2 100%   BMI 19.28 kg/m      The PT was provided nebs per MD order. Also, I NTSX'd him once (large amount of thick yellow) and sent a sputum sample to lab. BS were generally coarse (both UAW and lung fields). While his BS did not change post neb, they did improve with SX'ing.     Given the frequency of the NTSX'ing, I place a 26fr nasal airway in the right nare. The PT tolerated this for over an hour, after which he pulled it out.    At the start of the shift, the PT was on a HFNC at 55 liters/35%. SAT'ing 100%, I began to wean FIO2. By 0200, he was on a 2 L/NC with SAT's in the high 90s.    RT will continue to follow as directed.  "

## 2023-05-04 NOTE — PROGRESS NOTES
[facility]    Medicine Progress Note - Hospitalist Service    Date of Admission:  4/27/2023    Assessment & Plan   Mr. Watson is a 72 years old man with past medical history significant for hypertension, alcohol use disorder, prior SDH, recurrent hospitalization for alcohol intoxication and withdrawals.  He presented to the hospital on 4/24 with possible alcohol overdose.  On 4/29 possible seizure, aspiration leading to PEA arrest and intubation.  He was extubated on 5/1.    #Acute hypoxic respiratory failure after PEA arrest on 4/29  #Aspiration pneumonia  -Extubated on 5/1  -Hypoxic on 5/3--> CTA ruled out PE which showed signs of aspiration pneumonia.  -Weaned off nasal cannula, currently saturating well on room air.  -Currently on vancomycin and Zosyn  -SLP evaluated the patient--> appreciate recommendation for diet--> plan for  VFSS on 5/5    Plan:  [] Discontinue vancomycin (MRSA nares negative)  [] Continue Zosyn.   [] Follow-up with sputum and blood cultures.   [] Follow-up with SLP evaluations      #Alcohol use disorder  -Recurrent hospitalization with alcohol intoxication/withdrawal  -Has been in the hospital for almost a week.  -No signs of alcohol withdrawal at this point.    Plan:  [] Continue folic acid and thiamine.  [] Attempt to find a TCU facility with chemical dependency rehab services.      #PTSD  -PTSD due to [place].  Also, the patient wife has passed few months ago.  -Was evaluated by psychiatry 4/25.  Recommended psychotherapy for grief management.  -Per family, the patient mental status significantly improved over the last 24 hours.  -No suicidal ideation.  -Initially patient was stating that he would like to leave AMA--> after further discussion with him and the family--> he agreed with being discharged to TCU.    Plan:  [] Continue Zyprexa and Haldol as needed.    #Hypertension  -Blood pressures currently around 130/70.    Plan:  [] Continue to  monitor.      #Hyponatremia  -Sodium dropped over the last few days from around 137-131.  -Appears close to euvolemia on physical examination.  -Unclear etiology.    Plan:  [] Continue normal saline with potassium  [] Check urine lytes    #Anemia  -Hemoglobin has dropped from around 10-8 over the last 3 days.  -No active source of bleeding.    Plan:  [] Continue to monitor         Diet: Snacks/Supplements Adult: Ensure Enlive; With Meals  Minced & Moist Diet (level 5) Mildly Thick (level 2) (meds in applesauce)    DVT Prophylaxis: Heparin SQ  Alberto Catheter: Not present  Lines: PRESENT      PICC 04/30/23 Triple Lumen Right Basilic-Site Assessment: WDL      Cardiac Monitoring: ACTIVE order. Indication: ICU  Code Status: Full Code      Clinically Significant Risk Factors              # Hypoalbuminemia: Lowest albumin = 2.3 g/dL at 5/4/2023  4:56 AM, will monitor as appropriate            # Moderate Malnutrition: based on nutrition assessment        Disposition Plan TCU     Expected Discharge Date: 05/08/2023      Destination: home with help/services  Discharge Comments: extubated 5/1 [facility] TCU referrals pending  needs swallow ginna ROSENBERG MD  Hospitalist Service  [facility]  Securely message with [employer] (more info)  Text page via [employer] Paging/Directory   ______________________________________________________________________    Interval History   No significant event.  Denies any significant chest pain or shortness of breath.  Initially was frustrated and requested that he leave the hospital.         Physical Exam   Vital Signs: Temp: 97.8  F (36.6  C) Temp src: Oral BP: 133/71 Pulse: 81   Resp: 20 SpO2: 98 % O2 Device: (S) None (Room air) Oxygen Delivery: 2 LPM  Weight: 123 lbs 1.6 oz    Physical Exam  Constitutional:       Appearance: He is ill-appearing.   Pulmonary:      Effort: Pulmonary effort is normal. No respiratory distress.      Comments: Rhett  crackles  Musculoskeletal:      Right lower leg: No edema.      Left lower leg: No edema.   Neurological:      Mental Status: He is alert.         Medical Decision Making       60 MINUTES SPENT BY ME on the date of service doing chart review, history, exam, documentation & further activities per the note.      Data   Imaging results reviewed over the past 24 hrs:   No results found for this or any previous visit (from the past 24 hour(s)).  Recent Labs   Lab 05/04/23  0456 05/03/23  2355 05/03/23  1807 05/03/23  0452 05/02/23  0804 05/02/23  0430 05/01/23  0737 05/01/23  0522 04/30/23  1846 04/30/23  1514   WBC 5.3  --   --   --   --  10.5  --  9.1  --  10.3   HGB 8.0*  --   --   --   --  10.0*  --  9.9*  --  11.3*   *  --   --   --   --  101*  --  106*  --  107*     --   --  233  --  267  --  286  --  325   INR  --   --   --   --   --   --   --   --   --  1.04   *  --   --  131*  --  132*  --  137  --  139   POTASSIUM 3.8  --  3.9 3.4*  --  3.6  --  4.1  --  3.6   CHLORIDE 105  --   --  98  --  100  --  106  --  106   CO2 22  --   --  22  --  20*  --  23  --  22   BUN 9  --   --  9  --  7*  --  13  --  14   CR 0.52*  --   --  0.63*  --  0.53*  --  0.57*  --  0.71   ANIONGAP 4*  --   --  11  --  12  --  8  --  11   MELLISA 8.2*  --   --  8.4*  --  8.5  --  8.3*  --  8.6    101*  --  118   < > 102   < > 104   < > 125   ALBUMIN 2.3*  --   --   --   --   --   --   --   --  2.9*   PROTTOTAL 5.4*  --   --   --   --   --   --   --   --  6.2   BILITOTAL 1.2*  --   --   --   --   --   --   --   --  0.4   ALKPHOS 90  --   --   --   --   --   --   --   --  129*   ALT 45  --   --   --   --   --   --   --   --  85*   AST 29  --   --   --   --   --   --   --   --  129*    < > = values in this interval not displayed.

## 2023-05-05 ENCOUNTER — APPOINTMENT (OUTPATIENT)
Dept: PHYSICAL THERAPY | Facility: CLINIC | Age: 73
DRG: 896 | End: 2023-05-05
Payer: MEDICARE

## 2023-05-05 ENCOUNTER — APPOINTMENT (OUTPATIENT)
Dept: SPEECH THERAPY | Facility: CLINIC | Age: 73
DRG: 896 | End: 2023-05-05
Payer: MEDICARE

## 2023-05-05 ENCOUNTER — APPOINTMENT (OUTPATIENT)
Dept: RADIOLOGY | Facility: CLINIC | Age: 73
DRG: 896 | End: 2023-05-05
Attending: STUDENT IN AN ORGANIZED HEALTH CARE EDUCATION/TRAINING PROGRAM
Payer: MEDICARE

## 2023-05-05 LAB
ANION GAP SERPL CALCULATED.3IONS-SCNC: 4 MMOL/L (ref 5–18)
BACTERIA BLD CULT: NO GROWTH
BACTERIA BLD CULT: NO GROWTH
BASOPHILS # BLD AUTO: 0 10E3/UL (ref 0–0.2)
BASOPHILS NFR BLD AUTO: 1 %
BUN SERPL-MCNC: 10 MG/DL (ref 8–28)
CALCIUM SERPL-MCNC: 7.5 MG/DL (ref 8.5–10.5)
CHLORIDE BLD-SCNC: 111 MMOL/L (ref 98–107)
CO2 SERPL-SCNC: 18 MMOL/L (ref 22–31)
CREAT SERPL-MCNC: 0.52 MG/DL (ref 0.7–1.3)
EOSINOPHIL # BLD AUTO: 0.2 10E3/UL (ref 0–0.7)
EOSINOPHIL NFR BLD AUTO: 3 %
ERYTHROCYTE [DISTWIDTH] IN BLOOD BY AUTOMATED COUNT: 13.9 % (ref 10–15)
GFR SERPL CREATININE-BSD FRML MDRD: >90 ML/MIN/1.73M2
GLUCOSE BLD-MCNC: 128 MG/DL (ref 70–125)
HCT VFR BLD AUTO: 21.2 % (ref 40–53)
HGB BLD-MCNC: 7 G/DL (ref 13.3–17.7)
HGB BLD-MCNC: 8.1 G/DL (ref 13.3–17.7)
IMM GRANULOCYTES # BLD: 0.1 10E3/UL
IMM GRANULOCYTES NFR BLD: 1 %
LYMPHOCYTES # BLD AUTO: 1 10E3/UL (ref 0.8–5.3)
LYMPHOCYTES NFR BLD AUTO: 19 %
MAGNESIUM SERPL-MCNC: 1.7 MG/DL (ref 1.8–2.6)
MCH RBC QN AUTO: 33.8 PG (ref 26.5–33)
MCHC RBC AUTO-ENTMCNC: 33 G/DL (ref 31.5–36.5)
MCV RBC AUTO: 102 FL (ref 78–100)
MONOCYTES # BLD AUTO: 0.4 10E3/UL (ref 0–1.3)
MONOCYTES NFR BLD AUTO: 8 %
NEUTROPHILS # BLD AUTO: 3.7 10E3/UL (ref 1.6–8.3)
NEUTROPHILS NFR BLD AUTO: 68 %
NRBC # BLD AUTO: 0 10E3/UL
NRBC BLD AUTO-RTO: 0 /100
PHOSPHATE SERPL-MCNC: 2.6 MG/DL (ref 2.5–4.5)
PLATELET # BLD AUTO: 230 10E3/UL (ref 150–450)
POTASSIUM BLD-SCNC: 4.8 MMOL/L (ref 3.5–5)
RBC # BLD AUTO: 2.07 10E6/UL (ref 4.4–5.9)
SODIUM SERPL-SCNC: 133 MMOL/L (ref 136–145)
WBC # BLD AUTO: 5.4 10E3/UL (ref 4–11)

## 2023-05-05 PROCEDURE — 92611 MOTION FLUOROSCOPY/SWALLOW: CPT | Mod: GN

## 2023-05-05 PROCEDURE — 250N000011 HC RX IP 250 OP 636: Performed by: INTERNAL MEDICINE

## 2023-05-05 PROCEDURE — 120N000004 HC R&B MS OVERFLOW

## 2023-05-05 PROCEDURE — 84100 ASSAY OF PHOSPHORUS: CPT | Performed by: INTERNAL MEDICINE

## 2023-05-05 PROCEDURE — 94640 AIRWAY INHALATION TREATMENT: CPT | Mod: 76

## 2023-05-05 PROCEDURE — 99233 SBSQ HOSP IP/OBS HIGH 50: CPT | Performed by: STUDENT IN AN ORGANIZED HEALTH CARE EDUCATION/TRAINING PROGRAM

## 2023-05-05 PROCEDURE — C9113 INJ PANTOPRAZOLE SODIUM, VIA: HCPCS | Performed by: INTERNAL MEDICINE

## 2023-05-05 PROCEDURE — 999N000033 HC STATISTIC CHRONIC PULMONARY DISEASE SPECIALIST

## 2023-05-05 PROCEDURE — G0463 HOSPITAL OUTPT CLINIC VISIT: HCPCS

## 2023-05-05 PROCEDURE — 83735 ASSAY OF MAGNESIUM: CPT | Performed by: INTERNAL MEDICINE

## 2023-05-05 PROCEDURE — 250N000013 HC RX MED GY IP 250 OP 250 PS 637: Performed by: INTERNAL MEDICINE

## 2023-05-05 PROCEDURE — 250N000013 HC RX MED GY IP 250 OP 250 PS 637: Performed by: STUDENT IN AN ORGANIZED HEALTH CARE EDUCATION/TRAINING PROGRAM

## 2023-05-05 PROCEDURE — 74230 X-RAY XM SWLNG FUNCJ C+: CPT

## 2023-05-05 PROCEDURE — 97110 THERAPEUTIC EXERCISES: CPT | Mod: GP

## 2023-05-05 PROCEDURE — 85025 COMPLETE CBC W/AUTO DIFF WBC: CPT | Performed by: STUDENT IN AN ORGANIZED HEALTH CARE EDUCATION/TRAINING PROGRAM

## 2023-05-05 PROCEDURE — 999N000157 HC STATISTIC RCP TIME EA 10 MIN

## 2023-05-05 PROCEDURE — 250N000009 HC RX 250: Performed by: INTERNAL MEDICINE

## 2023-05-05 PROCEDURE — 80048 BASIC METABOLIC PNL TOTAL CA: CPT | Performed by: STUDENT IN AN ORGANIZED HEALTH CARE EDUCATION/TRAINING PROGRAM

## 2023-05-05 PROCEDURE — 94640 AIRWAY INHALATION TREATMENT: CPT

## 2023-05-05 PROCEDURE — 92526 ORAL FUNCTION THERAPY: CPT | Mod: GN

## 2023-05-05 PROCEDURE — 94799 UNLISTED PULMONARY SVC/PX: CPT

## 2023-05-05 PROCEDURE — 97116 GAIT TRAINING THERAPY: CPT | Mod: GP

## 2023-05-05 PROCEDURE — 999N000032 HC STATISTIC CHRONIC DISEASE SPECIALIST RT CONSULT

## 2023-05-05 PROCEDURE — 250N000011 HC RX IP 250 OP 636: Performed by: STUDENT IN AN ORGANIZED HEALTH CARE EDUCATION/TRAINING PROGRAM

## 2023-05-05 PROCEDURE — 85018 HEMOGLOBIN: CPT | Performed by: STUDENT IN AN ORGANIZED HEALTH CARE EDUCATION/TRAINING PROGRAM

## 2023-05-05 RX ORDER — LIDOCAINE 4 G/G
2 PATCH TOPICAL DAILY
Status: DISCONTINUED | OUTPATIENT
Start: 2023-05-05 | End: 2023-05-08 | Stop reason: HOSPADM

## 2023-05-05 RX ORDER — GABAPENTIN 300 MG/1
300 CAPSULE ORAL 3 TIMES DAILY
Status: DISCONTINUED | OUTPATIENT
Start: 2023-05-05 | End: 2023-05-08 | Stop reason: HOSPADM

## 2023-05-05 RX ORDER — MAGNESIUM SULFATE HEPTAHYDRATE 40 MG/ML
2 INJECTION, SOLUTION INTRAVENOUS ONCE
Status: COMPLETED | OUTPATIENT
Start: 2023-05-05 | End: 2023-05-05

## 2023-05-05 RX ORDER — BARIUM SULFATE 400 MG/ML
SUSPENSION ORAL ONCE
Status: COMPLETED | OUTPATIENT
Start: 2023-05-05 | End: 2023-05-05

## 2023-05-05 RX ORDER — MAGNESIUM OXIDE 400 MG/1
400 TABLET ORAL EVERY 4 HOURS
Status: COMPLETED | OUTPATIENT
Start: 2023-05-05 | End: 2023-05-05

## 2023-05-05 RX ADMIN — IPRATROPIUM BROMIDE AND ALBUTEROL SULFATE 3 ML: .5; 3 SOLUTION RESPIRATORY (INHALATION) at 07:19

## 2023-05-05 RX ADMIN — POTASSIUM CHLORIDE AND SODIUM CHLORIDE: 900; 150 INJECTION, SOLUTION INTRAVENOUS at 00:37

## 2023-05-05 RX ADMIN — IPRATROPIUM BROMIDE AND ALBUTEROL SULFATE 3 ML: .5; 3 SOLUTION RESPIRATORY (INHALATION) at 19:49

## 2023-05-05 RX ADMIN — KETOROLAC TROMETHAMINE 15 MG: 15 INJECTION, SOLUTION INTRAMUSCULAR; INTRAVENOUS at 00:37

## 2023-05-05 RX ADMIN — IPRATROPIUM BROMIDE AND ALBUTEROL SULFATE 3 ML: .5; 3 SOLUTION RESPIRATORY (INHALATION) at 14:28

## 2023-05-05 RX ADMIN — MULTIPLE VITAMINS W/ MINERALS TAB 1 TABLET: TAB at 08:11

## 2023-05-05 RX ADMIN — GABAPENTIN 300 MG: 300 CAPSULE ORAL at 14:56

## 2023-05-05 RX ADMIN — KETOROLAC TROMETHAMINE 15 MG: 15 INJECTION, SOLUTION INTRAMUSCULAR; INTRAVENOUS at 06:39

## 2023-05-05 RX ADMIN — MAGNESIUM SULFATE HEPTAHYDRATE 2 G: 40 INJECTION, SOLUTION INTRAVENOUS at 06:39

## 2023-05-05 RX ADMIN — PIPERACILLIN AND TAZOBACTAM 3.38 G: 3; .375 INJECTION, POWDER, LYOPHILIZED, FOR SOLUTION INTRAVENOUS at 00:02

## 2023-05-05 RX ADMIN — Medication 400 MG: at 08:15

## 2023-05-05 RX ADMIN — GABAPENTIN 300 MG: 300 CAPSULE ORAL at 08:12

## 2023-05-05 RX ADMIN — HEPARIN SODIUM 5000 UNITS: 5000 INJECTION, SOLUTION INTRAVENOUS; SUBCUTANEOUS at 16:39

## 2023-05-05 RX ADMIN — ATORVASTATIN CALCIUM 40 MG: 40 TABLET, FILM COATED ORAL at 21:41

## 2023-05-05 RX ADMIN — THIAMINE HYDROCHLORIDE 100 MG: 100 INJECTION, SOLUTION INTRAMUSCULAR; INTRAVENOUS at 08:11

## 2023-05-05 RX ADMIN — Medication 1 CAPSULE: at 21:41

## 2023-05-05 RX ADMIN — Medication 400 MG: at 14:54

## 2023-05-05 RX ADMIN — BARIUM SULFATE: 400 SUSPENSION ORAL at 14:05

## 2023-05-05 RX ADMIN — Medication 5 MG: at 00:37

## 2023-05-05 RX ADMIN — NICOTINE 1 PATCH: 14 PATCH, EXTENDED RELEASE TRANSDERMAL at 08:11

## 2023-05-05 RX ADMIN — LIDOCAINE 2 PATCH: 246 PATCH TOPICAL at 14:54

## 2023-05-05 RX ADMIN — POTASSIUM & SODIUM PHOSPHATES POWDER PACK 280-160-250 MG 1 PACKET: 280-160-250 PACK at 08:12

## 2023-05-05 RX ADMIN — ACETAMINOPHEN 650 MG: 325 TABLET ORAL at 06:39

## 2023-05-05 RX ADMIN — HEPARIN SODIUM 5000 UNITS: 5000 INJECTION, SOLUTION INTRAVENOUS; SUBCUTANEOUS at 00:02

## 2023-05-05 RX ADMIN — PANTOPRAZOLE SODIUM 40 MG: 40 INJECTION, POWDER, LYOPHILIZED, FOR SOLUTION INTRAVENOUS at 08:11

## 2023-05-05 RX ADMIN — ACETAMINOPHEN 650 MG: 325 TABLET ORAL at 00:37

## 2023-05-05 RX ADMIN — POTASSIUM & SODIUM PHOSPHATES POWDER PACK 280-160-250 MG 1 PACKET: 280-160-250 PACK at 14:54

## 2023-05-05 RX ADMIN — FOLIC ACID 1 MG: 1 TABLET ORAL at 08:11

## 2023-05-05 RX ADMIN — Medication 25 MCG: at 08:15

## 2023-05-05 RX ADMIN — GABAPENTIN 300 MG: 300 CAPSULE ORAL at 21:41

## 2023-05-05 RX ADMIN — PIPERACILLIN AND TAZOBACTAM 3.38 G: 3; .375 INJECTION, POWDER, LYOPHILIZED, FOR SOLUTION INTRAVENOUS at 08:11

## 2023-05-05 RX ADMIN — Medication 1 CAPSULE: at 08:11

## 2023-05-05 RX ADMIN — PIPERACILLIN AND TAZOBACTAM 3.38 G: 3; .375 INJECTION, POWDER, LYOPHILIZED, FOR SOLUTION INTRAVENOUS at 16:39

## 2023-05-05 ASSESSMENT — ACTIVITIES OF DAILY LIVING (ADL)
ADLS_ACUITY_SCORE: 33
ADLS_ACUITY_SCORE: 31
ADLS_ACUITY_SCORE: 31
ADLS_ACUITY_SCORE: 33
ADLS_ACUITY_SCORE: 31
ADLS_ACUITY_SCORE: 29

## 2023-05-05 NOTE — CONSULTS
COPD Education Consult  5/5/2023, 2:18 PM  Reason for Consult: COPD education  Patient Admitted for: Failure to thrive in adult [R62.7]COPD education  Alcohol dependence with intoxication with complication (H) [F10.229] on 4/27/2023     History of Present Illness: Familia Watson is a 71 yo male with a history of HTN, Depression, Anxiety, alcohol dependence with recurrent hospitalizations for intoxication with withdrawals. He was admitted for intoxication and suffered a possible seizure resulting in PEA arrest on 4/29. Was resuscitated, intubated, and found to have asperated resulting in asperation pneumonia. He has since been extubated and is now on room air.  No previous diagnosis of COPD with no PFTs to confirm/rule out COPD diagnosis.. CT does indicate emphysema.  Patient has been seen by Pulmonary this admittance.    Last PFT:  None    Home Respiratory Medications:  None  Assessment: Patient is currently on room air and sitting up in his chair. He was able to speak in full sentences. Familia appeared to have a hard time following with conversation. He was distracted, difficult to keep on topic, and did not appear to have good comprehension.    Education:  Familia was not a good candidate for education at this time due to observed problems in staying on topic, distraction, and comprehension.  Recommendations:  -Continue current inpatient therapy, per [facility] protocols  -Outpatient appointment with pulmonology along with PFT/Spirometry testing to either confirm or rule out   Total time spend with patient 20 minutes and 35 minutes spent in chart review, care coordination, and documentation.    Rai Garcia, RT, Chronic Pulmonary Disease Specialist  Phone [phone]

## 2023-05-05 NOTE — PROGRESS NOTES
Care Management Follow Up    Length of Stay (days): 6    Expected Discharge Date: 05/08/2023     Concerns to be Addressed: medical progression   Patient plan of care discussed at interdisciplinary rounds: Yes    Anticipated Discharge Disposition: transitional care  Anticipated Discharge Services: Transportation Services  Anticipated Discharge DME: per treatment team     Patient/family educated on Medicare website which has current facility and service quality ratings: yes (Currently open to a vendor he wishes to remain active with.)  Education Provided on the Discharge Plan: Yes (AVS per bedside RN)  Patient/Family in Agreement with the Plan: yes    Referrals Placed by CM/SW: post acute care facilities   Private pay costs discussed: Not applicable    Additional Information:  SAMARIA discussed updates with MD, anticipate Pt will be ready for discharge Monday, 5/8. SAMARIA spoke with Jostin in admissions for [facility].  [facility]  Pt is in agreement with discharge plan.   [facility] reports they can accept Pt for admission Monday morning if appropriate.  Will need to arrange transport (unable to arrange over 48 hours in advance).  PAS completed and on chart.       DRAKE Anthony

## 2023-05-05 NOTE — PROGRESS NOTES
Pt slept about 1 hour this shift.Given Toradol, Tylenol, and melatonin for c/o chest wall discomfort & inability to sleep.  Insists all lights & TV be on in the room. VSS. IV@75cc/hr. Up in chair this am with 1 assist and a walker. Does need ques to transfer. Has had a runny nose this am causing pt to have a cough. Swallowing pills with applesauce and small sips water w/o issue. Primofit removed & brief placed as pt bending & crossing legs kinking it off.   06:50  Pt states he's scared with all his coughing. Sats are ok. Blowing his nose frequently and it's dripping. Given Tylenol & Toradol for chest wall pain 5/10.

## 2023-05-05 NOTE — PROGRESS NOTES
"Speech-Language Pathology: Video Swallow Study     05/05/23 1500   Appointment Info   Signing Clinician's Name / Credentials (SLP) Billie Garcia MA, CCC-SLP   General Information   Onset of Illness/Injury or Date of Surgery 04/27/23   Pertinent History of Current Problem Per ordering provider's progress note, \"Familia Watson is a 72 year old male who was admitted 4/27 for alcohol dependence and withdrawals.  Was recently admitted from 4/19-4/26 for similar complaints.  During this admission patient did experience PEA arrest on on 4/29 and was extubated 5/1.\"  Patient was intubated 4/30 to 5/1. Speech Therapy consulted due to concerns for dysphagia. VFSS recommended for ongoing assessment.   General Observations Patient is alert and cooperative   Type of Evaluation   Type of Evaluation Swallow Evaluation   General Swallowing Observations   Past History of Dysphagia None per patient report and none per cursory review of previous records.   Respiratory Support (General Swallowing Observations) none   Current Diet/Method of Nutritional Intake (General Swallowing Observations, NIS) minced & moist (level 5);mildly thick liquids (level 2)   Swallowing Evaluation Videofluoroscopic swallow study (VFSS)   VFSS Evaluation   Radiologist Dr. Phalen   Views Taken left lateral   Physical Location of Procedure [facility]   VFSS Textures Trialed thin liquids;mildly thick liquids;solid foods   VFSS Eval: Thin Liquid Texture Trial   Mode of Presentation, Thin Liquid cup;spoon;straw   Order of Presentation 1,2,3,4,5,6   Preparatory Phase WFL   Oral Phase, Thin Liquid premature pharyngeal entry;residue in oral cavity   Bolus Location When Swallow Triggered pyriforms;valleculae   Pharyngeal Phase, Thin Liquid impaired hyolaryngeal excursion;impaired tongue base retraction;residue in pyriform sinus  (residue is the result of pourover from oral cavity)   Rosenbek's Penetration Aspiration Scale: Thin Liquid Trial Results 4 - " contrast contacts vocal cords, no residue remains (penetration)   Strategies and Compensations double swallow   Diagnostic Statement Patient had x1 episode of trace deep penetration to the vocal cords via consecutive straw sips. This appeared to be the result of delayed initiation and stasis in the pyriform sinuses that had poured over from oral cavity after previous trial. Pt had no stasis with double swallow strategy.   VFSS Eval: Mildly Thick Liquids   Mode of Presentation cup   Order of Presentation 7,8   Preparatory Phase WFL   Oral Phase residue in oral cavity   Bolus Location When Swallow Triggered valleculae   Pharyngeal Phase impaired hyolaryngel excursion;impaired tongue base retraction;residue in pyriform sinus  (residue is the result of pourover from oral cavity)   Rosenbek's Penetration Aspiration Scale 7 - contrast passes glottis, visible subglottic residue remains despite patient's response (aspiration)   Response to Aspiration unproductive reflexive cough   Strategies and Compensations double swallow   Diagnostic Statement Patient had x1 episode of aspiration of moderate stasis in the pyriform sinuses that had poured over from oral cavity after swallow. Aspiration event is observed during initial solid trial.   VFSS Evaluation: Solid Food Texture Trial   Mode of Presentation, Solid spoon   Order of Presentation 9,10   Preparatory Phase insufficient mastication  (limited dentition, WFL)   Oral Phase, Solid WFL   Bolus Location When Swallow Triggered valleculae   Pharyngeal Phase, Solid impaired hyolaryngel excursion;impaired tongue base retraction   Rosenbek's Penetration Aspiration Scale: Solid Food Trial Results 1 - no aspiration, contrast does not enter airway   Diagnostic Statement Pt does have aspiration of mildly thick stasis in the pyriform sinuses during intial bolus of solid.   Swallowing Recommendations   Diet Consistency Recommendations minced & moist (level 5);thin liquids (level 0)    Supervision Level for Intake 1:1 supervision needed   Mode of Delivery Recommendations bolus size, small   Swallowing Maneuver Recommendations double dry swallow   Monitoring/Assistance Required (Eating/Swallowing) stop eating activities when fatigue is present;monitor for cough or change in vocal quality with intake   Recommended Feeding/Eating Techniques (Swallow Eval) maintain upright sitting position for eating   Medication Administration Recommendations, Swallowing (SLP) Give pills 1-2 at a time (whole), mixed in applesauce or pudding.   Comment, Swallowing Recommendations Patient is at risk for aspiration of liquids if he does not use strategy of single sips and double swallow. He will currently need direct supervision to ensure strategies are used.   General Therapy Interventions   Planned Therapy Interventions Dysphagia Treatment   Dysphagia treatment Modified diet education;Instruction of safe swallow strategies;Compensatory strategies for swallowing   Clinical Impression   Criteria for Skilled Therapeutic Interventions Met (SLP Eval) Yes, treatment indicated   SLP Diagnosis Oropharyngeal dysphagia   Risks & Benefits of therapy have been explained evaluation/treatment results reviewed;care plan/treatment goals reviewed;risks/benefits reviewed;current/potential barriers reviewed;participants voiced agreement with care plan;participants included;patient   Clinical Impression Comments Videofluoroscopic Swallow Study completed. Patient had x1 episode of trace deep penetration to the vocal cords via consecutive straw sips of thin. This appeared to be the result of delayed initiation and stasis in the pyriform sinuses that had poured over from oral cavity after previous trial. He also had x1 aspiration event with mildly thick as a result of oral stasis pouring over and filling pyriform sinuses after the swallow, then aspiration occurs with swallow of next bolus. Pt had no oral stasis/pourover with double  swallow strategy. Oral phase is mildly impaired with stasis of thin and mildly thick. Tongue base retraction was reduced. Swallow response was primarily initiated at the valleculae. More significant delay with large consecutive sips of thin. Epiglottic inversion was posterior on initial swallow and complete for the rest of evaluation.  Hyolaryngeal elevation was WNL and hyolaryngeal excursion was reduced.  Mastication slow, pt reported difficulty d/t limited dentition.   SLP Total Evaluation Time   Evaluation, videofluoroscopic eval of swallow function Minutes (75788) 12   SLP Discharge Planning   SLP Discharge Recommendation Transitional Care Facility   SLP Rationale for DC Rec Swallow function is below baseline. Patient requires a modified diet at this time and needs frequent cues to use safe swallow strategies.   SLP Brief overview of current status  Recommend Minced and Moist with Thin liquids. Pt must have 1:1 assist with meals to ensure he is upright, takes small bites/sips, and swallows x2 per sip. Direct handoff to RN via [place].   Total Session Time   Total Session Time (sum of timed and untimed services) 12

## 2023-05-05 NOTE — PROGRESS NOTES
[facility]    Medicine Progress Note - Hospitalist Service    Date of Admission:  4/27/2023    Assessment & Plan   Mr. Watson is a 72 years old man with past medical history significant for hypertension, alcohol use disorder, prior SDH, recurrent hospitalization for alcohol intoxication and withdrawals.  He presented to the hospital on 4/24 with possible alcohol intoxication. On 4/29 possible seizure, aspiration leading to PEA arrest and intubation.  He was extubated on 5/1.  Possible aspiration event on 5/3 currently on Zosyn.  Appears to be improving over the last 48 hours. Saturating well on room air. Pending discharge to TCU once medically stable (SLP VFSS, stable hemoglobin, stable respiratory status, improvement of Na) most likely will be ready within the next 2 days.      #Acute hypoxic respiratory failure after PEA arrest on 4/29  #Aspiration pneumonia  -Extubated on 5/1  -Hypoxic on 5/3--> CTA ruled out PE, it showed signs of aspiration pneumonia.  -Started on vancomycin and Zosyn.  -Currently saturating well on room air.  -5/4 vancomycin stopped (MRSA nares negative)  -Procalcitonin negative, sputum culture normal mell.   -SLP evaluated the patient--> appreciate recommendation for diet--> Plan for  VFSS on 5/5    Plan:  [] Continue Zosyn.  [] Follow-up with SLP evaluation.   [] Start lidocaine for sternal chest pain most likely due to CPR.    #Alcohol use disorder  -Recurrent hospitalization with alcohol intoxication/withdrawal  -No signs of alcohol withdrawal at this point.    Plan:  [] Continue folic acid and thiamine.  [] Pending TCU placement.     #PTSD  -PTSD due to [place].  Also, the patient's wife has passed few months ago.  -Was evaluated by psychiatry 4/25.  Recommended psychotherapy for grief management.  -Patient requested Ativan or Valium for pain control--> after further discussion explaining the risk of using benzos he agreed on holding off.    Plan:  []  Continue Zyprexa and Haldol as needed.  [] Restart home gabapentin    #Hypertension  -Blood pressures currently around 160/80.    Plan:  [] Continue to monitor.    #Hyponatremia  -Most likely hypovolemic hyponatremia.  -Was on normal saline with potassium.  -Sodium is currently around 133 with mild hypochloremia.    Plan:  [] Discontinue IV fluid  [] Encourage p.o. intake.    #Hypocalcemia  -Calcium 7.5 most likely due to hypoalbuminemia.    Plan:  [] No further intervention.      #Anemia  -Hemoglobin trending down over the last 3 days.  -This morning hemoglobin A1c 7.  -No active source of bleeding.    Plan:  [] Hemoglobin recheck at noon was 8.1  [] Continue to monitor         Diet: Snacks/Supplements Adult: Ensure Enlive; With Meals  Minced & Moist Diet (level 5) Mildly Thick (level 2) (meds in applesauce)    DVT Prophylaxis: Heparin SQ  Alberto Catheter: Not present  Lines: PRESENT      PICC 04/30/23 Triple Lumen Right Basilic-Site Assessment: WDL      Cardiac Monitoring: None  Code Status: Full Code      Clinically Significant Risk Factors              # Hypoalbuminemia: Lowest albumin = 2.3 g/dL at 5/4/2023  4:56 AM, will monitor as appropriate            # Moderate Malnutrition: based on nutrition assessment        Disposition Plan TCU     Expected Discharge Date: 05/08/2023      Destination: home with help/services  Discharge Comments: extubated 5/1 [facility] TCU referrals pending  needs swallow ginna ROSENBERG MD  Hospitalist Service  [facility]  Securely message with [employer] (more info)  Text page via [employer] Paging/Directory   ______________________________________________________________________    Interval History   No significant event.  Reported some substernal chest pain with palpation.  He denies any significant shortness of breath.  He denies any lightheadedness.  He stated that he was able to ambulate to the bathroom.    Physical Exam   Vital Signs: Temp: 98.7  F (37.1   C) Temp src: Oral BP: (!) 165/81 Pulse: 78   Resp: 20 SpO2: 99 % O2 Device: None (Room air)    Weight: 123 lbs 0 oz    Physical Exam  Constitutional:       General: He is not in acute distress.  Cardiovascular:      Rate and Rhythm: Normal rate.   Pulmonary:      Effort: Pulmonary effort is normal. No respiratory distress.      Breath sounds: No wheezing.      Comments: No crackles.  Musculoskeletal:      Right lower leg: No edema.      Left lower leg: No edema.   Skin:     General: Skin is warm and dry.   Neurological:      Mental Status: He is alert.         Medical Decision Making       60 MINUTES SPENT BY ME on the date of service doing chart review, history, exam, documentation & further activities per the note.      Data   Imaging results reviewed over the past 24 hrs:   No results found for this or any previous visit (from the past 24 hour(s)).  Recent Labs   Lab 05/05/23  1306 05/05/23  0447 05/04/23  0456 05/03/23  2355 05/03/23  1807 05/03/23  0452 05/02/23  0804 05/02/23  0430 04/30/23  1846 04/30/23  1514   WBC  --  5.4 5.3  --   --   --   --  10.5   < > 10.3   HGB 8.1* 7.0* 8.0*  --   --   --   --  10.0*   < > 11.3*   MCV  --  102* 102*  --   --   --   --  101*   < > 107*   PLT  --  230 254  --   --  233  --  267   < > 325   INR  --   --   --   --   --   --   --   --   --  1.04   NA  --  133* 131*  --   --  131*  --  132*   < > 139   POTASSIUM  --  4.8 3.8  --  3.9 3.4*  --  3.6   < > 3.6   CHLORIDE  --  111* 105  --   --  98  --  100   < > 106   CO2  --  18* 22  --   --  22  --  20*   < > 22   BUN  --  10 9  --   --  9  --  7*   < > 14   CR  --  0.52* 0.52*  --   --  0.63*  --  0.53*   < > 0.71   ANIONGAP  --  4* 4*  --   --  11  --  12   < > 11   MELLISA  --  7.5* 8.2*  --   --  8.4*  --  8.5   < > 8.6   GLC  --  128* 105 101*  --  118   < > 102   < > 125   ALBUMIN  --   --  2.3*  --   --   --   --   --   --  2.9*   PROTTOTAL  --   --  5.4*  --   --   --   --   --   --  6.2   BILITOTAL  --   --  1.2*  --    --   --   --   --   --  0.4   ALKPHOS  --   --  90  --   --   --   --   --   --  129*   ALT  --   --  45  --   --   --   --   --   --  85*   AST  --   --  29  --   --   --   --   --   --  129*    < > = values in this interval not displayed.

## 2023-05-05 NOTE — PROGRESS NOTES
RT Note:    Scheduled neb given per order. Pt on room air when seen. Discontinue HFNC per assessment. Will continue to monitor and assess pt.

## 2023-05-05 NOTE — PROGRESS NOTES
Pt on RA, O2 sats high 90's. BS diminished;crackle. Pt received Duoneb q6h. Flutter valve done qid. RT will continue to follow.    Aubrey Clark, RT

## 2023-05-06 ENCOUNTER — APPOINTMENT (OUTPATIENT)
Dept: PHYSICAL THERAPY | Facility: CLINIC | Age: 73
DRG: 896 | End: 2023-05-06
Payer: MEDICARE

## 2023-05-06 ENCOUNTER — APPOINTMENT (OUTPATIENT)
Dept: OCCUPATIONAL THERAPY | Facility: CLINIC | Age: 73
DRG: 896 | End: 2023-05-06
Payer: MEDICARE

## 2023-05-06 LAB
ANION GAP SERPL CALCULATED.3IONS-SCNC: 8 MMOL/L (ref 5–18)
BACTERIA SPT CULT: NORMAL
BASOPHILS # BLD AUTO: 0.1 10E3/UL (ref 0–0.2)
BASOPHILS NFR BLD AUTO: 1 %
BUN SERPL-MCNC: 6 MG/DL (ref 8–28)
CALCIUM SERPL-MCNC: 8.3 MG/DL (ref 8.5–10.5)
CHLORIDE BLD-SCNC: 102 MMOL/L (ref 98–107)
CO2 SERPL-SCNC: 23 MMOL/L (ref 22–31)
CREAT SERPL-MCNC: 0.59 MG/DL (ref 0.7–1.3)
EOSINOPHIL # BLD AUTO: 0.3 10E3/UL (ref 0–0.7)
EOSINOPHIL NFR BLD AUTO: 5 %
ERYTHROCYTE [DISTWIDTH] IN BLOOD BY AUTOMATED COUNT: 14 % (ref 10–15)
GFR SERPL CREATININE-BSD FRML MDRD: >90 ML/MIN/1.73M2
GLUCOSE BLD-MCNC: 89 MG/DL (ref 70–125)
GRAM STAIN RESULT: NORMAL
HCT VFR BLD AUTO: 24.5 % (ref 40–53)
HGB BLD-MCNC: 8.1 G/DL (ref 13.3–17.7)
IMM GRANULOCYTES # BLD: 0.1 10E3/UL
IMM GRANULOCYTES NFR BLD: 2 %
LYMPHOCYTES # BLD AUTO: 1.6 10E3/UL (ref 0.8–5.3)
LYMPHOCYTES NFR BLD AUTO: 24 %
MAGNESIUM SERPL-MCNC: 1.4 MG/DL (ref 1.8–2.6)
MAGNESIUM SERPL-MCNC: 2.1 MG/DL (ref 1.8–2.6)
MCH RBC QN AUTO: 33.2 PG (ref 26.5–33)
MCHC RBC AUTO-ENTMCNC: 33.1 G/DL (ref 31.5–36.5)
MCV RBC AUTO: 100 FL (ref 78–100)
MONOCYTES # BLD AUTO: 0.5 10E3/UL (ref 0–1.3)
MONOCYTES NFR BLD AUTO: 8 %
NEUTROPHILS # BLD AUTO: 4 10E3/UL (ref 1.6–8.3)
NEUTROPHILS NFR BLD AUTO: 60 %
NRBC # BLD AUTO: 0 10E3/UL
NRBC BLD AUTO-RTO: 0 /100
PHOSPHATE SERPL-MCNC: 4.1 MG/DL (ref 2.5–4.5)
PLATELET # BLD AUTO: 284 10E3/UL (ref 150–450)
POTASSIUM BLD-SCNC: 3.9 MMOL/L (ref 3.5–5)
RBC # BLD AUTO: 2.44 10E6/UL (ref 4.4–5.9)
SODIUM SERPL-SCNC: 133 MMOL/L (ref 136–145)
WBC # BLD AUTO: 6.6 10E3/UL (ref 4–11)

## 2023-05-06 PROCEDURE — 94640 AIRWAY INHALATION TREATMENT: CPT | Mod: 76

## 2023-05-06 PROCEDURE — C9113 INJ PANTOPRAZOLE SODIUM, VIA: HCPCS | Performed by: INTERNAL MEDICINE

## 2023-05-06 PROCEDURE — 250N000013 HC RX MED GY IP 250 OP 250 PS 637: Performed by: INTERNAL MEDICINE

## 2023-05-06 PROCEDURE — 99232 SBSQ HOSP IP/OBS MODERATE 35: CPT | Performed by: INTERNAL MEDICINE

## 2023-05-06 PROCEDURE — 84100 ASSAY OF PHOSPHORUS: CPT | Performed by: STUDENT IN AN ORGANIZED HEALTH CARE EDUCATION/TRAINING PROGRAM

## 2023-05-06 PROCEDURE — 83735 ASSAY OF MAGNESIUM: CPT | Performed by: HOSPITALIST

## 2023-05-06 PROCEDURE — 250N000011 HC RX IP 250 OP 636: Performed by: INTERNAL MEDICINE

## 2023-05-06 PROCEDURE — 250N000011 HC RX IP 250 OP 636: Performed by: HOSPITALIST

## 2023-05-06 PROCEDURE — 250N000013 HC RX MED GY IP 250 OP 250 PS 637: Performed by: STUDENT IN AN ORGANIZED HEALTH CARE EDUCATION/TRAINING PROGRAM

## 2023-05-06 PROCEDURE — 999N000157 HC STATISTIC RCP TIME EA 10 MIN

## 2023-05-06 PROCEDURE — 97116 GAIT TRAINING THERAPY: CPT | Mod: GP

## 2023-05-06 PROCEDURE — 120N000004 HC R&B MS OVERFLOW

## 2023-05-06 PROCEDURE — 85025 COMPLETE CBC W/AUTO DIFF WBC: CPT | Performed by: STUDENT IN AN ORGANIZED HEALTH CARE EDUCATION/TRAINING PROGRAM

## 2023-05-06 PROCEDURE — 80048 BASIC METABOLIC PNL TOTAL CA: CPT | Performed by: STUDENT IN AN ORGANIZED HEALTH CARE EDUCATION/TRAINING PROGRAM

## 2023-05-06 PROCEDURE — 97535 SELF CARE MNGMENT TRAINING: CPT | Mod: GO

## 2023-05-06 PROCEDURE — 250N000009 HC RX 250: Performed by: INTERNAL MEDICINE

## 2023-05-06 PROCEDURE — 83735 ASSAY OF MAGNESIUM: CPT | Performed by: STUDENT IN AN ORGANIZED HEALTH CARE EDUCATION/TRAINING PROGRAM

## 2023-05-06 PROCEDURE — 97530 THERAPEUTIC ACTIVITIES: CPT | Mod: GP

## 2023-05-06 RX ORDER — IPRATROPIUM BROMIDE AND ALBUTEROL SULFATE 2.5; .5 MG/3ML; MG/3ML
3 SOLUTION RESPIRATORY (INHALATION) EVERY 6 HOURS PRN
Status: DISCONTINUED | OUTPATIENT
Start: 2023-05-06 | End: 2023-05-06

## 2023-05-06 RX ORDER — OXYCODONE HYDROCHLORIDE 5 MG/1
5 TABLET ORAL 3 TIMES DAILY PRN
Status: DISCONTINUED | OUTPATIENT
Start: 2023-05-06 | End: 2023-05-07

## 2023-05-06 RX ORDER — MAGNESIUM SULFATE 4 G/50ML
4 INJECTION INTRAVENOUS ONCE
Status: COMPLETED | OUTPATIENT
Start: 2023-05-06 | End: 2023-05-06

## 2023-05-06 RX ADMIN — GABAPENTIN 300 MG: 300 CAPSULE ORAL at 14:58

## 2023-05-06 RX ADMIN — HEPARIN SODIUM 5000 UNITS: 5000 INJECTION, SOLUTION INTRAVENOUS; SUBCUTANEOUS at 01:12

## 2023-05-06 RX ADMIN — IPRATROPIUM BROMIDE AND ALBUTEROL SULFATE 3 ML: .5; 3 SOLUTION RESPIRATORY (INHALATION) at 02:16

## 2023-05-06 RX ADMIN — HEPARIN SODIUM 5000 UNITS: 5000 INJECTION, SOLUTION INTRAVENOUS; SUBCUTANEOUS at 16:24

## 2023-05-06 RX ADMIN — OXYCODONE HYDROCHLORIDE 5 MG: 5 TABLET ORAL at 21:13

## 2023-05-06 RX ADMIN — IPRATROPIUM BROMIDE AND ALBUTEROL SULFATE 3 ML: .5; 3 SOLUTION RESPIRATORY (INHALATION) at 12:18

## 2023-05-06 RX ADMIN — PIPERACILLIN AND TAZOBACTAM 3.38 G: 3; .375 INJECTION, POWDER, LYOPHILIZED, FOR SOLUTION INTRAVENOUS at 01:12

## 2023-05-06 RX ADMIN — HEPARIN SODIUM 5000 UNITS: 5000 INJECTION, SOLUTION INTRAVENOUS; SUBCUTANEOUS at 08:00

## 2023-05-06 RX ADMIN — ACETAMINOPHEN 650 MG: 325 TABLET ORAL at 12:44

## 2023-05-06 RX ADMIN — PIPERACILLIN AND TAZOBACTAM 3.38 G: 3; .375 INJECTION, POWDER, LYOPHILIZED, FOR SOLUTION INTRAVENOUS at 08:01

## 2023-05-06 RX ADMIN — GABAPENTIN 300 MG: 300 CAPSULE ORAL at 08:00

## 2023-05-06 RX ADMIN — ACETAMINOPHEN 650 MG: 325 TABLET ORAL at 20:47

## 2023-05-06 RX ADMIN — PANTOPRAZOLE SODIUM 40 MG: 40 INJECTION, POWDER, LYOPHILIZED, FOR SOLUTION INTRAVENOUS at 08:00

## 2023-05-06 RX ADMIN — FOLIC ACID 1 MG: 1 TABLET ORAL at 08:00

## 2023-05-06 RX ADMIN — MULTIPLE VITAMINS W/ MINERALS TAB 1 TABLET: TAB at 08:00

## 2023-05-06 RX ADMIN — NICOTINE 1 PATCH: 14 PATCH, EXTENDED RELEASE TRANSDERMAL at 08:01

## 2023-05-06 RX ADMIN — Medication 1 CAPSULE: at 20:45

## 2023-05-06 RX ADMIN — KETOROLAC TROMETHAMINE 15 MG: 15 INJECTION, SOLUTION INTRAMUSCULAR; INTRAVENOUS at 12:44

## 2023-05-06 RX ADMIN — MAGNESIUM SULFATE HEPTAHYDRATE 4 G: 4 INJECTION, SOLUTION INTRAVENOUS at 06:25

## 2023-05-06 RX ADMIN — ATORVASTATIN CALCIUM 40 MG: 40 TABLET, FILM COATED ORAL at 20:46

## 2023-05-06 RX ADMIN — LIDOCAINE 2 PATCH: 246 PATCH TOPICAL at 08:00

## 2023-05-06 RX ADMIN — Medication 1 CAPSULE: at 08:00

## 2023-05-06 RX ADMIN — GABAPENTIN 300 MG: 300 CAPSULE ORAL at 20:46

## 2023-05-06 RX ADMIN — PIPERACILLIN AND TAZOBACTAM 3.38 G: 3; .375 INJECTION, POWDER, LYOPHILIZED, FOR SOLUTION INTRAVENOUS at 16:24

## 2023-05-06 RX ADMIN — Medication 400 MG: at 08:00

## 2023-05-06 RX ADMIN — THIAMINE HYDROCHLORIDE 100 MG: 100 INJECTION, SOLUTION INTRAMUSCULAR; INTRAVENOUS at 08:00

## 2023-05-06 RX ADMIN — Medication 25 MCG: at 08:03

## 2023-05-06 RX ADMIN — OXYCODONE HYDROCHLORIDE 5 MG: 5 TABLET ORAL at 16:24

## 2023-05-06 ASSESSMENT — ACTIVITIES OF DAILY LIVING (ADL)
ADLS_ACUITY_SCORE: 34
ADLS_ACUITY_SCORE: 32
ADLS_ACUITY_SCORE: 29
ADLS_ACUITY_SCORE: 38
ADLS_ACUITY_SCORE: 34
ADLS_ACUITY_SCORE: 38
ADLS_ACUITY_SCORE: 32
ADLS_ACUITY_SCORE: 34
ADLS_ACUITY_SCORE: 37
ADLS_ACUITY_SCORE: 34
ADLS_ACUITY_SCORE: 38
ADLS_ACUITY_SCORE: 34

## 2023-05-06 NOTE — PROGRESS NOTES
[facility]    Medicine Progress Note - Hospitalist Service    Date of Admission:  4/27/2023    Assessment & Plan   Mr. Watson is a 72 years old man with past medical history significant for hypertension, alcohol use disorder, prior SDH, recurrent hospitalization for alcohol intoxication and withdrawals.  He presented to the hospital on 4/24 with possible alcohol intoxication. On 4/29 possible seizure, aspiration leading to PEA arrest and intubation.  He was extubated on 5/1.  Possible aspiration event on 5/3 currently on Zosyn.  Appears to be improving over the last 48 hours. Saturating well on room air. Pending discharge to TCU once medically stable (SLP VFSS, stable hemoglobin, stable respiratory status, improvement of Na) most likely will be ready within the next 2 days.      #Acute hypoxic respiratory failure after PEA arrest on 4/29  #Aspiration pneumonia  -Extubated on 5/1  -Hypoxic on 5/3--> CTA ruled out PE, it showed signs of aspiration pneumonia.  -Started on vancomycin and Zosyn.  -Currently saturating well on room air.  -5/4 vancomycin stopped (MRSA nares negative)  -Procalcitonin negative, sputum culture normal mell.   -SLP evaluated the patient--> appreciate recommendation for diet--> Plan for  VFSS on 5/5    Plan:  [] Continue Zosyn for 5 days  [] Follow-up with SLP evaluation.   [] Continue lidocaine for sternal chest pain most likely due to CPR.    #Alcohol use disorder  -Recurrent hospitalization with alcohol intoxication/withdrawal  -No signs of alcohol withdrawal at this point. [place]ell regarding cessation    Plan:  [] Continue folic acid and thiamine.  [] Pending TCU placement.     #PTSD  -PTSD due to [place].  Also, the patient's wife has passed few months ago.  -Was evaluated by psychiatry 4/25.  Recommended psychotherapy for grief management.  -Patient requested Ativan or Valium for pain control--> after further discussion explaining the risk of using benzos he  agreed on holding off.    Plan:  [] Continue Zyprexa and Haldol as needed.  [] Continue home gabapentin    #Hypertension Stable  -Blood pressures currently around 160/80.    Plan:  [] Continue to monitor.    #Hyponatremia  -Most likely hypovolemic hyponatremia and poor PO intake  -Was on normal saline with potassium.  -Sodium is currently around 133 with mild hypochloremia.    Plan:  [] Discontinue IV fluid  [] Encourage p.o. intake.    #Hypocalcemia  -Calcium 7.5 most likely due to hypoalbuminemia.    Plan:  [] No further intervention.      #Anemia  -Hemoglobin trending down over the last 3 days.  -This morning hemoglobin A1c 7.  -No active source of bleeding.    Plan:  [] Hemoglobin recheck at noon was 8.1  [] Continue to monitor         Diet: Snacks/Supplements Adult: Ensure Enlive; With Meals  Minced & Moist Diet (level 5) Thin Liquids (level 0) (meds in applesauce)    DVT Prophylaxis: Heparin SQ  Alberto Catheter: Not present  Lines: PRESENT      PICC 04/30/23 Triple Lumen Right Basilic-Site Assessment: WDL      Cardiac Monitoring: None  Code Status: Full Code      Clinically Significant Risk Factors            # Hypomagnesemia: Lowest Mg = 1.4 mg/dL in last 2 days, will replace as needed   # Hypoalbuminemia: Lowest albumin = 2.3 g/dL at 5/4/2023  4:56 AM, will monitor as appropriate            # Moderate Malnutrition: based on nutrition assessment        Disposition Plan TCU     Expected Discharge Date: Medically stable to be discharged pending bed availability at TCU.  Destination: home with help/services  Discharge Comments: extubated 5/1 [facility] TCU referrals pending  needs swallow ginna Anaya MD  Hospitalist Service  [facility]  Securely message with [employer] (more info)  Text page via [facility] Paging/Directory      Disclaimer: This note consists of symbols derived from keyboarding, dictation and/or voice recognition software. As a result, there may be errors in the script  that have gone undetected. Please consider this when interpreting information found in this chart.    ______________________________________________________________________    Interval History   No significant event reported overnight.  He reports that he was not able to sleep last night requesting medication to help him with his sleep.  Discussed with patient avoid taking any daytime naps so that he can get a consolidated sleep at night.     Physical Exam   Vital Signs: Temp: 97.8  F (36.6  C) Temp src: Oral BP: (!) 146/85 Pulse: 79   Resp: 18 SpO2: 97 % O2 Device: None (Room air)    Weight: 123 lbs 0 oz    Constitutional, chronically ill looking  HEENT head atraumatic, clear conjunctiva  Cardiac S1, S2 audible, regular rate and, no murmur noted  Respiratory bilateral clear to auscultate, no wheeze no rhonchi  Abdomen soft, nontender, bowel sound present  MSK no lower extremity edema, tremors in both arms.  Neuro alert oriented x3, slow to respond.    Medical Decision Making       40 MINUTES SPENT BY ME on the date of service doing chart review, history, exam, documentation & further activities per the note.      Data   Imaging results reviewed over the past 24 hrs:   No results found for this or any previous visit (from the past 24 hour(s)).  Recent Labs   Lab 05/06/23  0531 05/05/23  1306 05/05/23  0447 05/04/23  0456 04/30/23  1846 04/30/23  1514   WBC 6.6  --  5.4 5.3   < > 10.3   HGB 8.1* 8.1* 7.0* 8.0*   < > 11.3*     --  102* 102*   < > 107*     --  230 254   < > 325   INR  --   --   --   --   --  1.04   *  --  133* 131*   < > 139   POTASSIUM 3.9  --  4.8 3.8   < > 3.6   CHLORIDE 102  --  111* 105   < > 106   CO2 23  --  18* 22   < > 22   BUN 6*  --  10 9   < > 14   CR 0.59*  --  0.52* 0.52*   < > 0.71   ANIONGAP 8  --  4* 4*   < > 11   MELLISA 8.3*  --  7.5* 8.2*   < > 8.6   GLC 89  --  128* 105   < > 125   ALBUMIN  --   --   --  2.3*  --  2.9*   PROTTOTAL  --   --   --  5.4*  --  6.2    BILITOTAL  --   --   --  1.2*  --  0.4   ALKPHOS  --   --   --  90  --  129*   ALT  --   --   --  45  --  85*   AST  --   --   --  29  --  129*    < > = values in this interval not displayed.

## 2023-05-06 NOTE — PLAN OF CARE
Patient still has chest pain at the sternum and right lower ribs from cpr, lidocaine patches ordered. He is currently patch free with application set for 0900.  He required two full bed changes overnight, not because of incontinence, but more due to urgency.  If I was able to get to the room as soon as he hit the call bell, he was able to use the urinal with assistance.  Without assistance he could not manage urinal and was unable to wait.      K+ and phos protocols WNL and recheck scheduled for the morning. Mag was low, one 4g rider hung with recheck 2-4 hours after completion.

## 2023-05-06 NOTE — PLAN OF CARE
VSS. Remains on RA. Spoke with nephew, Jose; pt continues to call family members asking them for help leaving AMA. Spoke to pt about it and he said that after a nap he feels more clear headed and apologizes for 'how he was acting'.

## 2023-05-06 NOTE — PLAN OF CARE
Problem: Malnutrition  Goal: Improved Nutritional Intake  Outcome: Progressing     Problem: Gas Exchange Impaired  Goal: Optimal Gas Exchange  Outcome: Progressing     Patient alert, oriented but forgetful at times. C/o sternum pain controlled with geovany/prn pain medication. Working well with PT/OT. Ambulates with assist of 1, w/w and gb. Encouraging protein intake. Good appetite today. Vitally stable on room air.

## 2023-05-07 ENCOUNTER — APPOINTMENT (OUTPATIENT)
Dept: PHYSICAL THERAPY | Facility: CLINIC | Age: 73
DRG: 896 | End: 2023-05-07
Payer: MEDICARE

## 2023-05-07 ENCOUNTER — APPOINTMENT (OUTPATIENT)
Dept: OCCUPATIONAL THERAPY | Facility: CLINIC | Age: 73
DRG: 896 | End: 2023-05-07
Payer: MEDICARE

## 2023-05-07 LAB
MAGNESIUM SERPL-MCNC: 1.7 MG/DL (ref 1.8–2.6)
PHOSPHATE SERPL-MCNC: 3.9 MG/DL (ref 2.5–4.5)
POTASSIUM BLD-SCNC: 4.1 MMOL/L (ref 3.5–5)

## 2023-05-07 PROCEDURE — 250N000011 HC RX IP 250 OP 636: Performed by: INTERNAL MEDICINE

## 2023-05-07 PROCEDURE — 83735 ASSAY OF MAGNESIUM: CPT | Performed by: INTERNAL MEDICINE

## 2023-05-07 PROCEDURE — 97535 SELF CARE MNGMENT TRAINING: CPT | Mod: GO

## 2023-05-07 PROCEDURE — 97116 GAIT TRAINING THERAPY: CPT | Mod: GP

## 2023-05-07 PROCEDURE — 250N000013 HC RX MED GY IP 250 OP 250 PS 637: Performed by: STUDENT IN AN ORGANIZED HEALTH CARE EDUCATION/TRAINING PROGRAM

## 2023-05-07 PROCEDURE — 97530 THERAPEUTIC ACTIVITIES: CPT | Mod: GP

## 2023-05-07 PROCEDURE — 250N000013 HC RX MED GY IP 250 OP 250 PS 637: Performed by: INTERNAL MEDICINE

## 2023-05-07 PROCEDURE — 99232 SBSQ HOSP IP/OBS MODERATE 35: CPT | Performed by: INTERNAL MEDICINE

## 2023-05-07 PROCEDURE — 84100 ASSAY OF PHOSPHORUS: CPT | Performed by: HOSPITALIST

## 2023-05-07 PROCEDURE — 120N000001 HC R&B MED SURG/OB

## 2023-05-07 PROCEDURE — 250N000013 HC RX MED GY IP 250 OP 250 PS 637: Performed by: FAMILY MEDICINE

## 2023-05-07 PROCEDURE — 84132 ASSAY OF SERUM POTASSIUM: CPT | Performed by: HOSPITALIST

## 2023-05-07 RX ORDER — TRAZODONE HYDROCHLORIDE 50 MG/1
50 TABLET, FILM COATED ORAL
Status: DISCONTINUED | OUTPATIENT
Start: 2023-05-07 | End: 2023-05-08 | Stop reason: HOSPADM

## 2023-05-07 RX ORDER — MAGNESIUM SULFATE HEPTAHYDRATE 40 MG/ML
2 INJECTION, SOLUTION INTRAVENOUS ONCE
Status: DISCONTINUED | OUTPATIENT
Start: 2023-05-07 | End: 2023-05-07

## 2023-05-07 RX ORDER — MAGNESIUM OXIDE 400 MG/1
400 TABLET ORAL EVERY 4 HOURS
Status: DISCONTINUED | OUTPATIENT
Start: 2023-05-07 | End: 2023-05-07

## 2023-05-07 RX ORDER — MAGNESIUM SULFATE 4 G/50ML
4 INJECTION INTRAVENOUS ONCE
Status: COMPLETED | OUTPATIENT
Start: 2023-05-07 | End: 2023-05-07

## 2023-05-07 RX ADMIN — LIDOCAINE 2 PATCH: 246 PATCH TOPICAL at 20:10

## 2023-05-07 RX ADMIN — Medication 1 CAPSULE: at 20:05

## 2023-05-07 RX ADMIN — ACETAMINOPHEN 650 MG: 325 TABLET ORAL at 17:35

## 2023-05-07 RX ADMIN — NICOTINE 1 PATCH: 14 PATCH, EXTENDED RELEASE TRANSDERMAL at 09:15

## 2023-05-07 RX ADMIN — OXYCODONE HYDROCHLORIDE 2.5 MG: 5 TABLET ORAL at 09:17

## 2023-05-07 RX ADMIN — GABAPENTIN 300 MG: 300 CAPSULE ORAL at 20:05

## 2023-05-07 RX ADMIN — LIDOCAINE 2 PATCH: 246 PATCH TOPICAL at 09:08

## 2023-05-07 RX ADMIN — PIPERACILLIN AND TAZOBACTAM 3.38 G: 3; .375 INJECTION, POWDER, LYOPHILIZED, FOR SOLUTION INTRAVENOUS at 00:47

## 2023-05-07 RX ADMIN — MULTIPLE VITAMINS W/ MINERALS TAB 1 TABLET: TAB at 09:16

## 2023-05-07 RX ADMIN — PIPERACILLIN AND TAZOBACTAM 3.38 G: 3; .375 INJECTION, POWDER, LYOPHILIZED, FOR SOLUTION INTRAVENOUS at 22:37

## 2023-05-07 RX ADMIN — Medication 400 MG: at 09:16

## 2023-05-07 RX ADMIN — PIPERACILLIN AND TAZOBACTAM 3.38 G: 3; .375 INJECTION, POWDER, LYOPHILIZED, FOR SOLUTION INTRAVENOUS at 09:16

## 2023-05-07 RX ADMIN — FOLIC ACID 1 MG: 1 TABLET ORAL at 09:16

## 2023-05-07 RX ADMIN — Medication 5 MG: at 00:48

## 2023-05-07 RX ADMIN — THIAMINE HYDROCHLORIDE 100 MG: 100 INJECTION, SOLUTION INTRAMUSCULAR; INTRAVENOUS at 09:16

## 2023-05-07 RX ADMIN — GABAPENTIN 300 MG: 300 CAPSULE ORAL at 16:32

## 2023-05-07 RX ADMIN — HEPARIN SODIUM 5000 UNITS: 5000 INJECTION, SOLUTION INTRAVENOUS; SUBCUTANEOUS at 00:48

## 2023-05-07 RX ADMIN — Medication 1 CAPSULE: at 09:16

## 2023-05-07 RX ADMIN — GABAPENTIN 300 MG: 300 CAPSULE ORAL at 09:16

## 2023-05-07 RX ADMIN — MAGNESIUM SULFATE HEPTAHYDRATE 4 G: 4 INJECTION, SOLUTION INTRAVENOUS at 09:19

## 2023-05-07 RX ADMIN — TRAZODONE HYDROCHLORIDE 50 MG: 50 TABLET ORAL at 22:37

## 2023-05-07 RX ADMIN — ATORVASTATIN CALCIUM 40 MG: 40 TABLET, FILM COATED ORAL at 20:05

## 2023-05-07 RX ADMIN — OXYCODONE HYDROCHLORIDE 2.5 MG: 5 TABLET ORAL at 20:09

## 2023-05-07 RX ADMIN — KETOROLAC TROMETHAMINE 15 MG: 15 INJECTION, SOLUTION INTRAMUSCULAR; INTRAVENOUS at 09:10

## 2023-05-07 RX ADMIN — KETOROLAC TROMETHAMINE 15 MG: 15 INJECTION, SOLUTION INTRAMUSCULAR; INTRAVENOUS at 00:48

## 2023-05-07 RX ADMIN — OXYCODONE HYDROCHLORIDE 2.5 MG: 5 TABLET ORAL at 12:13

## 2023-05-07 RX ADMIN — HEPARIN SODIUM 5000 UNITS: 5000 INJECTION, SOLUTION INTRAVENOUS; SUBCUTANEOUS at 16:32

## 2023-05-07 RX ADMIN — PIPERACILLIN AND TAZOBACTAM 3.38 G: 3; .375 INJECTION, POWDER, LYOPHILIZED, FOR SOLUTION INTRAVENOUS at 16:32

## 2023-05-07 RX ADMIN — Medication 25 MCG: at 09:16

## 2023-05-07 RX ADMIN — HEPARIN SODIUM 5000 UNITS: 5000 INJECTION, SOLUTION INTRAVENOUS; SUBCUTANEOUS at 09:16

## 2023-05-07 ASSESSMENT — ACTIVITIES OF DAILY LIVING (ADL)
ADLS_ACUITY_SCORE: 30
ADLS_ACUITY_SCORE: 27
ADLS_ACUITY_SCORE: 25
ADLS_ACUITY_SCORE: 30
ADLS_ACUITY_SCORE: 27
ADLS_ACUITY_SCORE: 30
ADLS_ACUITY_SCORE: 25
ADLS_ACUITY_SCORE: 32
ADLS_ACUITY_SCORE: 25
ADLS_ACUITY_SCORE: 25
ADLS_ACUITY_SCORE: 27
ADLS_ACUITY_SCORE: 29

## 2023-05-07 NOTE — PROGRESS NOTES
[facility]    Medicine Progress Note - Hospitalist Service    Date of Admission:  4/27/2023    Assessment & Plan   Mr. Watson is a 72 years old man with past medical history significant for hypertension, alcohol use disorder, prior SDH, recurrent hospitalization for alcohol intoxication and withdrawals.  He presented to the hospital on 4/24 with possible alcohol intoxication. On 4/29 possible seizure, aspiration leading to PEA arrest and intubation.  He was extubated on 5/1.  Possible aspiration event on 5/3 currently on Zosyn.  Appears to be improving over the last 48 hours. Saturating well on room air. Pending discharge to TCU once medically stable (SLP VFSS, stable hemoglobin, stable respiratory status, improvement of Na) most likely will be ready within the next 2 days.      #Acute hypoxic respiratory failure after PEA arrest on 4/29  #Aspiration pneumonia  -Extubated on 5/1  -Hypoxic on 5/3--> CTA ruled out PE, it showed signs of aspiration pneumonia.  -Started on vancomycin and Zosyn.  -Currently saturating well on room air.  -5/4 vancomycin stopped (MRSA nares negative)  -Procalcitonin negative, sputum culture normal mell.   -SLP evaluated the patient--> appreciate recommendation for diet--> Plan for  VFSS on 5/5    Plan:  [] Continue Zosyn for 5 days  [] Continue lidocaine for sternal chest pain most likely due to CPR.    #Alcohol use disorder  -Recurrent hospitalization with alcohol intoxication/withdrawal  -No signs of alcohol withdrawal at this point. Consell regarding cessation    Plan:  [] Continue folic acid and thiamine.  [] Pending TCU placement.     #PTSD  -PTSD due to [place].  Also, the patient's wife has passed few months ago.  -Was evaluated by psychiatry 4/25.  Recommended psychotherapy for grief management.    Plan:  [] Continue Zyprexa and Haldol as needed.  [] Continue home gabapentin    #Elevated blood pressure stable without  medication.    #Hyponatremia  -Most likely hypovolemic hyponatremia and poor PO intake  -Was on normal saline with potassium.  -Sodium is currently around 133 with mild hypochloremia.    Plan:  [] Encourage p.o. intake.    #Hypocalcemia  -Calcium 7.5 most likely due to hypoalbuminemia.    Plan:  [] No further intervention.      #Anemia  -Hemoglobin is stable.  Next  or         Diet: Snacks/Supplements Adult: Ensure Enlive; With Meals  Minced & Moist Diet (level 5) Thin Liquids (level 0) (meds in applesauce)    DVT Prophylaxis: Heparin SQ  Alberto Catheter: Not present  Lines: PRESENT      PICC 04/30/23 Triple Lumen Right Basilic-Site Assessment: WDL      Cardiac Monitoring: None  Code Status: Full Code      Clinically Significant Risk Factors            # Hypomagnesemia: Lowest Mg = 1.4 mg/dL in last 2 days, will replace as needed   # Hypoalbuminemia: Lowest albumin = 2.3 g/dL at 5/4/2023  4:56 AM, will monitor as appropriate            # Moderate Malnutrition: based on nutrition assessment        Disposition Plan TCU     Expected Discharge Date: Medically stable to be discharged pending bed availability at TCU.  Destination: home with help/services  Discharge Comments: extubated 5/1 [facility] TCU referrals pending  needs swallow ginna Anaya MD  Hospitalist Service  [facility]  Securely message with [employer] (more info)  Text page via [facility] Paging/Directory      Disclaimer: This note consists of symbols derived from keyboarding, dictation and/or voice recognition software. As a result, there may be errors in the script that have gone undetected. Please consider this when interpreting information found in this chart.    ______________________________________________________________________    Interval History   No significant event reported overnight.  He was able to sleep last night.  Sitting up on recliner today.  Denies any pain, shortness of breath.    Discussed with patient that  he would benefit from evaluation by mental health team due to his grief from death of his wife 5 months ago., alcohol use disorder.     Physical Exam   Vital Signs: Temp: 98.4  F (36.9  C) Temp src: Oral BP: (!) 157/83 Pulse: 77   Resp: 18 SpO2: 98 % O2 Device: None (Room air)    Weight: 123 lbs 6.4 oz    Constitutional, chronically ill looking, sitting up on chair  HEENT head atraumatic, clear conjunctiva  Cardiac S1, S2 audible, regular rate and, no murmur noted  Respiratory bilateral clear to auscultate, no wheeze no rhonchi  Abdomen soft, nontender, bowel sound present  MSK no lower extremity edema, tremors in both arms.  Neuro alert oriented x3, slow to respond.    Medical Decision Making       40 MINUTES SPENT BY ME on the date of service doing chart review, history, exam, documentation & further activities per the note.      Data   Imaging results reviewed over the past 24 hrs:   No results found for this or any previous visit (from the past 24 hour(s)).  Recent Labs   Lab 05/07/23  0348 05/06/23  0531 05/05/23  1306 05/05/23  0447 05/04/23  0456 04/30/23  1846 04/30/23  1514   WBC  --  6.6  --  5.4 5.3   < > 10.3   HGB  --  8.1* 8.1* 7.0* 8.0*   < > 11.3*   MCV  --  100  --  102* 102*   < > 107*   PLT  --  284  --  230 254   < > 325   INR  --   --   --   --   --   --  1.04   NA  --  133*  --  133* 131*   < > 139   POTASSIUM 4.1 3.9  --  4.8 3.8   < > 3.6   CHLORIDE  --  102  --  111* 105   < > 106   CO2  --  23  --  18* 22   < > 22   BUN  --  6*  --  10 9   < > 14   CR  --  0.59*  --  0.52* 0.52*   < > 0.71   ANIONGAP  --  8  --  4* 4*   < > 11   MELLISA  --  8.3*  --  7.5* 8.2*   < > 8.6   GLC  --  89  --  128* 105   < > 125   ALBUMIN  --   --   --   --  2.3*  --  2.9*   PROTTOTAL  --   --   --   --  5.4*  --  6.2   BILITOTAL  --   --   --   --  1.2*  --  0.4   ALKPHOS  --   --   --   --  90  --  129*   ALT  --   --   --   --  45  --  85*   AST  --   --   --   --  29  --  129*    < > = values in this interval  not displayed.

## 2023-05-07 NOTE — PLAN OF CARE
"  Problem: Risk for Delirium  Goal: Improved Sleep  Outcome: Progressing     Problem: Malnutrition  Goal: Improved Nutritional Intake  Outcome: Progressing     Problem: Gas Exchange Impaired  Goal: Optimal Gas Exchange  Outcome: Progressing   Goal Outcome Evaluation:  Pt sleeping at intervals after Melatonin & Toradol given at [facility]. Up in chair all shift. Rating chest wall pain 2/10. VSS -160/90's. To commode to void with SBA, steady of feet. Intermittent tremors noted. Pt anxious at [facility] re: inability to contact family. \"Will you call them as they aren't answering the phone when I call\". Worried about not having clothes to go home in. \"They have all my keys  I think they are mad at me\". Suggested we address later this am & not at [facility]. Taking small amounts fluids and some applesauce this shift. Voiding in good amounts and passing flatus.                         "

## 2023-05-08 ENCOUNTER — APPOINTMENT (OUTPATIENT)
Dept: SPEECH THERAPY | Facility: CLINIC | Age: 73
DRG: 896 | End: 2023-05-08
Payer: MEDICARE

## 2023-05-08 VITALS
DIASTOLIC BLOOD PRESSURE: 69 MMHG | TEMPERATURE: 98.3 F | OXYGEN SATURATION: 97 % | HEIGHT: 67 IN | BODY MASS INDEX: 19.4 KG/M2 | WEIGHT: 123.6 LBS | RESPIRATION RATE: 16 BRPM | HEART RATE: 78 BPM | SYSTOLIC BLOOD PRESSURE: 143 MMHG

## 2023-05-08 LAB
BACTERIA BLD CULT: NO GROWTH
BACTERIA BLD CULT: NO GROWTH

## 2023-05-08 PROCEDURE — 250N000013 HC RX MED GY IP 250 OP 250 PS 637: Performed by: INTERNAL MEDICINE

## 2023-05-08 PROCEDURE — 99239 HOSP IP/OBS DSCHRG MGMT >30: CPT | Performed by: FAMILY MEDICINE

## 2023-05-08 PROCEDURE — 250N000011 HC RX IP 250 OP 636: Performed by: INTERNAL MEDICINE

## 2023-05-08 PROCEDURE — 250N000013 HC RX MED GY IP 250 OP 250 PS 637: Performed by: STUDENT IN AN ORGANIZED HEALTH CARE EDUCATION/TRAINING PROGRAM

## 2023-05-08 PROCEDURE — 92526 ORAL FUNCTION THERAPY: CPT | Mod: GN

## 2023-05-08 RX ORDER — FOLIC ACID 0.8 MG
800 TABLET ORAL DAILY
Qty: 20 TABLET | Refills: 0 | DISCHARGE
Start: 2023-05-08 | End: 2023-05-28

## 2023-05-08 RX ORDER — OXYCODONE HYDROCHLORIDE 5 MG/1
2.5 TABLET ORAL 3 TIMES DAILY PRN
Qty: 10 TABLET | Refills: 0 | Status: ON HOLD | OUTPATIENT
Start: 2023-05-08 | End: 2023-06-14

## 2023-05-08 RX ORDER — LIDOCAINE 4 G/G
2 PATCH TOPICAL DAILY
Qty: 28 PATCH | Refills: 0 | DISCHARGE
Start: 2023-05-09 | End: 2023-05-23

## 2023-05-08 RX ORDER — NICOTINE 21 MG/24HR
1 PATCH, TRANSDERMAL 24 HOURS TRANSDERMAL DAILY
Status: ON HOLD | DISCHARGE
Start: 2023-05-09 | End: 2023-06-14

## 2023-05-08 RX ORDER — LANOLIN ALCOHOL/MO/W.PET/CERES
100 CREAM (GRAM) TOPICAL DAILY
Qty: 10 TABLET | Refills: 0 | DISCHARGE
Start: 2023-05-08 | End: 2023-05-18

## 2023-05-08 RX ORDER — TRAZODONE HYDROCHLORIDE 50 MG/1
50 TABLET, FILM COATED ORAL
Status: ON HOLD | DISCHARGE
Start: 2023-05-08 | End: 2023-06-18

## 2023-05-08 RX ORDER — IPRATROPIUM BROMIDE AND ALBUTEROL SULFATE 2.5; .5 MG/3ML; MG/3ML
3 SOLUTION RESPIRATORY (INHALATION) EVERY 4 HOURS PRN
Qty: 90 ML | Status: ON HOLD | DISCHARGE
Start: 2023-05-08 | End: 2023-06-14

## 2023-05-08 RX ADMIN — Medication 25 MCG: at 08:24

## 2023-05-08 RX ADMIN — NICOTINE 1 PATCH: 14 PATCH, EXTENDED RELEASE TRANSDERMAL at 08:24

## 2023-05-08 RX ADMIN — THIAMINE HYDROCHLORIDE 100 MG: 100 INJECTION, SOLUTION INTRAMUSCULAR; INTRAVENOUS at 09:54

## 2023-05-08 RX ADMIN — MULTIPLE VITAMINS W/ MINERALS TAB 1 TABLET: TAB at 08:23

## 2023-05-08 RX ADMIN — OXYCODONE HYDROCHLORIDE 2.5 MG: 5 TABLET ORAL at 06:14

## 2023-05-08 RX ADMIN — HEPARIN SODIUM 5000 UNITS: 5000 INJECTION, SOLUTION INTRAVENOUS; SUBCUTANEOUS at 08:24

## 2023-05-08 RX ADMIN — Medication 1 CAPSULE: at 08:24

## 2023-05-08 RX ADMIN — HEPARIN SODIUM 5000 UNITS: 5000 INJECTION, SOLUTION INTRAVENOUS; SUBCUTANEOUS at 00:40

## 2023-05-08 RX ADMIN — PIPERACILLIN AND TAZOBACTAM 3.38 G: 3; .375 INJECTION, POWDER, LYOPHILIZED, FOR SOLUTION INTRAVENOUS at 08:24

## 2023-05-08 RX ADMIN — FOLIC ACID 1 MG: 1 TABLET ORAL at 08:24

## 2023-05-08 RX ADMIN — Medication 400 MG: at 08:24

## 2023-05-08 RX ADMIN — LIDOCAINE 2 PATCH: 246 PATCH TOPICAL at 08:24

## 2023-05-08 RX ADMIN — GABAPENTIN 300 MG: 300 CAPSULE ORAL at 08:23

## 2023-05-08 RX ADMIN — Medication 100 MG: at 08:23

## 2023-05-08 ASSESSMENT — ACTIVITIES OF DAILY LIVING (ADL)
ADLS_ACUITY_SCORE: 27
ADLS_ACUITY_SCORE: 27
ADLS_ACUITY_SCORE: 25
ADLS_ACUITY_SCORE: 27
ADLS_ACUITY_SCORE: 25

## 2023-05-08 NOTE — PROGRESS NOTES
Occupational Therapy Discharge Summary    Reason for therapy discharge:    Discharged to transitional care facility.    Progress towards therapy goal(s). See goals on Care Plan in [facility] electronic health record for goal details.  Goals not met.  Barriers to achieving goals:   discharge from facility.    Therapy recommendation(s):    Continued therapy is recommended.  Rationale/Recommendations:  At TCU to progress strength and fxl ind with ADLs.

## 2023-05-08 NOTE — PROGRESS NOTES
Physical Therapy Discharge Summary    Reason for therapy discharge:    Discharged to transitional care facility.    Progress towards therapy goal(s). See goals on Care Plan in [facility] electronic health record for goal details.  Goals partially met.  Barriers to achieving goals:   Did not try stairs.    Therapy recommendation(s):    Continued therapy is recommended.  Rationale/Recommendations:  Continue at TCU.

## 2023-05-08 NOTE — PLAN OF CARE
Problem: Plan of Care - These are the overarching goals to be used throughout the patient stay.    Goal: Optimal Comfort and Wellbeing  Intervention: Monitor Pain and Promote Comfort  Recent Flowsheet Documentation  Taken 5/7/2023 2054 by Leti De La Garza RN  Pain Management Interventions: declines   Goal Outcome Evaluation:       Pt. Will rest tonight with minimal rest ans sleep interruptions.

## 2023-05-08 NOTE — PROGRESS NOTES
Care Management Discharge Note    Discharge Date: 05/08/2023       Discharge Disposition: Transitional Care    Discharge Services: Transportation Services    Discharge DME: [facility]    Discharge Transportation: [facility] 10:35-11:25      PAS Confirmation Code: [id number]  Patient/family educated on Medicare website which has current facility and service quality ratings: yes (Currently open to a vendor he wishes to remain active with.)    Education Provided on the Discharge Plan: Yes  Persons Notified of Discharge Plans:  yes  Patient/Family in Agreement with the Plan: yes    Handoff Referral Completed: Yes    Additional Information:  Writer confirmed with [facility] Liaison for [facility], they are able to accept patient today.    Patient agrees discharge today [facility]. Discharge orders sent via [facility] and patient Nephew Chip updated with transport time today. Chip reports patient has been calling Chip too much so forced to call block patient.        Carito Quintero, CM

## 2023-05-08 NOTE — PLAN OF CARE
Speech Language Therapy Discharge Summary    Reason for therapy discharge:    Discharged to transitional care facility.    Progress towards therapy goal(s). See goals on Care Plan in [facility] electronic health record for goal details.  Goals partially met.  Barriers to achieving goals:   discharge from facility.    Therapy recommendation(s):    Continued therapy is recommended.  Rationale/Recommendations:  At time of discharge, recommended diet was Minced and Moist textures (Level 5) and thin liquids (Level 0).  Patient requires 1:1 assist/supervision with meals to ensure that he adheres to safe swallowing precautions, including sitting fully upright, taking one small bite/sip at a time, and swallowing twice with each sip of liquid to minimize aspiration risk.  Recommend ongoing Speech Therapy at TCU for continued dysphagia management.

## 2023-05-08 NOTE — DISCHARGE SUMMARY
[facility]  Hospitalist Discharge Summary      Date of Admission:  4/27/2023  Date of Discharge:  5/8/2023  Discharging Provider: Mirza Julien MD  Discharge Service: Hospitalist Service    Discharge Diagnoses   Acute hypoxic respiratory failure  PEA arrest  Aspiration pneumonia  Alcohol use disorder  Tobacco abuse  PTSD  Hyponatremia  Hypocalcemia  Anemia  Alcohol withdrawal seizure    Follow-ups Needed After Discharge   Follow-up Appointments     Follow Up and recommended labs and tests      Follow up with longterm physician.  The following labs/tests are   recommended: bmp, cbc.             Unresulted Labs Ordered in the Past 30 Days of this Admission     Date and Time Order Name Status Description    5/3/2023  7:54 AM Blood Culture Line, venous Preliminary     5/3/2023  7:54 AM Blood Culture Peripheral Blood Preliminary       These results will be followed up by hospital medicine and PCP    Discharge Disposition   Discharged to rehabilitation facility  Condition at discharge: Stable      Hospital Course   72-year-old male with a history of alcohol abuse, tobacco abuse, hypertension, and recent admission for alcohol withdrawal and intoxication admitted for weakness and falls on 4/27.  Likely had a seizure on 4/29 with aspiration and PEA arrest requiring intubation.    Acute hypoxic respiratory failure/PEA arrest/aspiration pneumonia  Patient was admitted and likely had a alcohol withdrawal seizure on 4/29 which resulted in aspiration and PEA arrest.  CPR was initiated and the patient had ROSC.  Patient was intubated and transferred to the ICU.  He was started on IV Zosyn and completed a course of this for aspiration pneumonia prior to discharge.  He was extubated on 5/1 but had persistent intermittent hypoxia with concern for PE.  CTA of the chest rule out pulmonary embolism.  Patient had continued improvement was able to discharge to TCU without supplemental oxygen.  He had  completed antibiotics prior to discharge.  Patient was seen by speech therapy.    Alcohol use disorder/tobacco abuse  Strongly encourage cessation.  Patient is discharging to TCU.  He was given resources etc. while here in the hospital.  Alcohol withdrawal protocol was used and patient will be discharged on thiamine and folic acid.    Hypertension  Likely due to alcohol withdrawal.  This normalized during the stay and the patient is not on any medication at discharge.    Anemia  No signs of losses.  Suspect due to alcohol abuse with marrow suppression.  Outpatient follow-up recommended.        Consultations This Hospital Stay   CARE MANAGEMENT / SOCIAL WORK IP CONSULT  NUTRITION SERVICES ADULT IP CONSULT  INTENSIVIST IP CONSULT  VASCULAR ACCESS ADULT IP CONSULT  PHYSICAL THERAPY ADULT IP CONSULT  OCCUPATIONAL THERAPY ADULT IP CONSULT  SPEECH LANGUAGE PATH ADULT IP CONSULT  PHARMACY TO DOSE VANCO  IP RESPIRATORY CARE CHRONIC PULMONARY DISEASE SPECIALIST  PULMONARY IP CONSULT  PHYSICAL THERAPY ADULT IP CONSULT  OCCUPATIONAL THERAPY ADULT IP CONSULT  IP RESPIRATORY CARE CHRONIC PULMONARY DISEASE SPECIALIST    Code Status   Full Code    Time Spent on this Encounter   IMirza MD, personally saw the patient today and spent greater than 30 minutes discharging this patient.       Mirza Julien MD  [address] [address]  Phone: [phone]  Fax: [phone]  ______________________________________________________________________    Physical Exam   Vital Signs: Temp: 98.3  F (36.8  C) Temp src: Oral BP: (!) 143/69 (Nurse Notified) Pulse: 78   Resp: 16 SpO2: 97 % O2 Device: None (Room air)    Weight: 123 lbs 9.6 oz  General Appearance: No apparent distress  Respiratory: Clear to auscultation bilaterally  Cardiovascular: Regular rate and rhythm  GI: Soft nontender  Skin: Visualized skin is normal  Other: Flat affect with good eye contact  in agreement with plan for discharge to TCU       Primary Care Physician   Daniel Martinez    Discharge Orders      General info for SNF    Length of Stay Estimate: Short Term Care: Estimated # of Days <30  Condition at Discharge: Improving  Level of care:skilled   Rehabilitation Potential: Good  Admission H&P remains valid and up-to-date: Yes  Recent Chemotherapy: N/A  Use Nursing Home Standing Orders: Yes     Mantoux instructions    Give two-step Mantoux (PPD) Per Facility Policy Yes     Follow Up and recommended labs and tests    Follow up with skilled nursing physician.  The following labs/tests are recommended: bmp, cbc.     Reason for your hospital stay    Weakness and falls with complication of cardiac arrest and aspiration pneumonia.     Activity - Up ad caitlin     Full Code     Physical Therapy Adult Consult    Evaluate and treat as clinically indicated.    Reason:  falls/gait     Occupational Therapy Adult Consult    Evaluate and treat as clinically indicated.    Reason:  Falls/ADLs     Fall precautions     Diet    Follow this diet upon discharge: Orders Placed This Encounter      Snacks/Supplements Adult: Ensure Enlive; With Meals      Minced & Moist Diet (level 5) Thin Liquids (level 0) (meds in applesauce)       Significant Results and Procedures   Most Recent 3 CBC's:Recent Labs   Lab Test 05/06/23  0531 05/05/23  1306 05/05/23  0447 05/04/23  0456   WBC 6.6  --  5.4 5.3   HGB 8.1* 8.1* 7.0* 8.0*     --  102* 102*     --  230 254     Most Recent 3 BMP's:Recent Labs   Lab Test 05/07/23  0348 05/06/23  0531 05/05/23  0447 05/04/23  0456   NA  --  133* 133* 131*   POTASSIUM 4.1 3.9 4.8 3.8   CHLORIDE  --  102 111* 105   CO2  --  23 18* 22   BUN  --  6* 10 9   CR  --  0.59* 0.52* 0.52*   ANIONGAP  --  8 4* 4*   MELLISA  --  8.3* 7.5* 8.2*   GLC  --  89 128* 105     Most Recent 2 LFT's:Recent Labs   Lab Test 05/04/23  0456 04/30/23  1514   AST 29 129*   ALT 45 85*   ALKPHOS 90 129*   BILITOTAL 1.2*  0.4     Most Recent 3 INR's:Recent Labs   Lab Test 04/30/23  1514 01/25/22  1429 05/12/21  1121   INR 1.04 1.16* 1.07   ,   Results for orders placed or performed during the hospital encounter of 04/27/23   CT Head w/o Contrast    Narrative    EXAM: CT HEAD W/O CONTRAST  LOCATION: [facility]  DATE/TIME: 4/29/2023 4:02 PM CDT    INDICATION: ETOH;  withdrawal and increased confusion. trauma hx not clear  COMPARISON: CT head 04/24/2023.  TECHNIQUE: Routine CT Head without IV contrast. Multiplanar reformats. Dose reduction techniques were used.    FINDINGS:    INTRACRANIAL CONTENTS: No acute transcortical infarct. Chronic lacunar infarct in the right posterior limb internal capsule redemonstrated. No hemorrhage or extraaxial collection. No mass effect. Subarachnoid cisterns are patent. Patchy white matter   hypodensities are, while nonspecific, most compatible with chronic microvascular ischemic changes. Unchanged proportional prominence of the ventricles and sulci is compatible with diffuse cerebral volume loss.    VISUALIZED ORBITS/SINUSES/MASTOIDS: Left lens replacement. Small amount of layering fluid in the right sphenoid sinus partially visualized. No middle ear or mastoid effusion.    BONES/SOFT TISSUES: No acute abnormality. Left frontal craniotomy postsurgical changes, as before.      Impression    IMPRESSION:  1.  No acute transcortical infarct, intracranial hemorrhage, or mass effect.   2.  Small quantity of layering fluid in the right sphenoid sinus can be seen with acute sinusitis in the appropriate clinical context.  3.  Additional chronic and postsurgical intracranial findings, as detailed.       XR Chest Port 1 View    Narrative    EXAM: XR CHEST PORT 1 VIEW  LOCATION: [facility]  DATE/TIME: 4/30/2023 3:27 PM CDT    INDICATION: verify ett tube  COMPARISON: 12/29/2022 and CT 11/20/2022      Impression    IMPRESSION: ET tube 3.5 cm from the vivienne. Some  fibrotic change in the left upper lung similar to prior CT. No acute new findings.   XR Abdomen Port 1 View    Narrative    EXAM: XR ABDOMEN PORT 1 VIEW  LOCATION: [facility]  DATE/TIME: 4/30/2023 11:43 PM CDT    INDICATION: NG placement confirmation  COMPARISON: 04/24/2023      Impression    IMPRESSION: Enteric tube not visualized with certainty on these images. Recommend correlation and/or repositioning.   XR Chest Port 1 View    Narrative    EXAM: XR CHEST PORT 1 VIEW  LOCATION: [facility]  DATE/TIME: 5/3/2023 9:12 AM CDT    INDICATION: Aspiration.  COMPARISON: 04/30/2023      Impression    IMPRESSION: Shallow inspiration. Faint opacities in the mid and lower left perihilar lung parenchyma unchanged. Lungs otherwise clear. No visible pleural fusion. Heart size and pulmonary vascularity within normal limits. Right PICC tip RA/SVC junction.   US Lower Extremity Venous Duplex Bilateral    Narrative    EXAM: US LOWER EXTREMITY VENOUS DUPLEX BILATERAL  LOCATION: [facility]  DATE/TIME: 5/3/2023 11:27 AM CDT    INDICATION: Leg swelling.  COMPARISON: None.  TECHNIQUE: Venous Duplex ultrasound of bilateral lower extremities with and without compression, augmentation and duplex. Color flow and spectral Doppler with waveform analysis performed.    FINDINGS: Exam mildly compromised from overlying bandages and swelling. Exam includes the common femoral, femoral, popliteal veins as well as segmentally visualized deep calf veins and greater saphenous vein.     RIGHT: No deep vein thrombosis. No superficial thrombophlebitis. No popliteal cyst.    LEFT: No deep vein thrombosis. No superficial thrombophlebitis. No popliteal cyst.      Impression    IMPRESSION:  1.  No deep venous thrombosis in the bilateral lower extremities.   CT Chest Pulmonary Embolism w Contrast    Narrative    EXAM: CT CHEST PULMONARY EMBOLISM W CONTRAST  LOCATION: [facility]  DATE/TIME: 5/3/2023 11:50 AM CDT    INDICATION: Increased FiO2 needs; male sex; no imaging to r o PE in the last 24 hours; pulmonary embolism rule out criteria (PERC) score > 0; revised [place] score (RGS) not >= 11; no D-dimer result available; D-dimer not ordered.  COMPARISON: CXR 5/3/2023 and CTA chest 11/20/2022.  TECHNIQUE: CT chest pulmonary angiogram during arterial phase injection of IV contrast. Multiplanar reformats and MIP reconstructions were performed. Dose reduction techniques were used.   CONTRAST: 100 mL Isovue 370     FINDINGS:  ANGIOGRAM CHEST: Pulmonary arteries are normal caliber with no pulmonary emboli. Normal caliber thoracic aorta with no CTA signs of acute aortic syndrome.    LUNGS AND PLEURA: Extensive endobronchial secretions throughout both lower lobes with consolidation involving the posterior basilar lower lobes, left greater than right, suspicious for aspiration pneumonia. Mild cylindrical bronchiectasis, paraseptal   emphysema and basilar fibrosis unchanged. Trace posterior bibasilar pleural fluid.    MEDIASTINUM/AXILLAE: Heart size within normal limits. No lymphadenopathy. Mild patulous dilatation of the esophagus likely age-related.    CORONARY ARTERY CALCIFICATION: Severe.    UPPER ABDOMEN: Diffuse hepatic steatosis.    MUSCULOSKELETAL: Mildly displaced acute fractures anterior aspect right ribs 2-5, nondisplaced acute fractures left ribs 2-5 and a nondisplaced fracture upper sternal body.      Impression    IMPRESSION:  1.  No pulmonary emboli. No CTA signs of acute aortic syndrome.  2.  Extensive endobronchial secretions throughout both lower lobes with consolidation involving the posterior basilar lower lobes, left greater than right, suspicious for aspiration pneumonia.   3.  Mildly displaced acute fractures anterior aspect right ribs 2-5, nondisplaced acute fractures anterior aspect left ribs 2-5 and a nondisplaced fracture upper sternal body.  4.  Mild  cylindrical bronchiectasis, paraseptal emphysema and basilar fibrosis unchanged.   XR Video Swallow with SLP or OT    Narrative    EXAM: XR VIDEO SWALLOW WITH SLP OR OT  LOCATION: [facility]  DATE/TIME: 2023 1:57 PM CDT    INDICATION: Difficulty swallowing.  COMPARISON: None.    TECHNIQUE: Routine swallow study with speech pathology using multiple barium thicknesses.    FINDINGS:   FLUOROSCOPIC TIME: 1.1  NUMBER OF IMAGES: 9    Swallow study with Speech Pathology using multiple barium thicknesses. Initial drinks with thin liquid by mouth demonstrated no penetration or aspiration. However, single episode of silent aspiration was noted with thin liquid and straw, which in part   may have been secondary to small amount of pyriform and vallecular sinus residuals. Aspiration of residuals also with recurrent while eating the cracker consistency. Otherwise, no penetration or aspiration.         Echocardiogram Complete     Value    LVEF  50-55%    Narrative    [id number]  [id number]  [id number]  [id number]^LAUREN^[place] [address]     Name: DIMPLE FISHMAN  MRN: [id number]  : 1950  Study Date: 2023 05:31 PM  Age: 72 yrs  Gender: Male  Patient Location: [facility]  Reason For Study: Cardiac Arrest, Cardiorespiratory Failure  Ordering Physician: TERRI AGUILAR  Performed By: ACE     BSA: 1.6 m2  Height: 67 in  Weight: 117 lb  HR: 77  BP: 157/73 mmHg  ______________________________________________________________________________  Procedure  Complete Echo Adult. Definity (NDC #30993-602) given intravenously. 2mL given.  ______________________________________________________________________________  Interpretation Summary     The visual ejection fraction is 50-55%.  There is mild to moderate inferolateral wall hypokinesis.  Mildly decreased right ventricular systolic function  No significant valve disease.  Trivial pericardial  effusion  ______________________________________________________________________________  Left Ventricle  The left ventricle is normal in size. There is normal left ventricular wall  thickness. The visual ejection fraction is 50-55%. There is mild to moderate  inferolateral wall hypokinesis.     Right Ventricle  The right ventricle is normal size. Mildly decreased right ventricular  systolic function.     Atria  The left atrium is not well visualized. Right atrium not well visualized.     Mitral Valve  The mitral valve leaflets appear thickened, but open well.     Tricuspid Valve  Tricuspid valve leaflets appear normal. There is trace tricuspid  regurgitation.     Aortic Valve  The aortic valve is trileaflet with aortic valve sclerosis. No aortic  regurgitation is present. No aortic stenosis is present.     Vessels  The aorta root is normal. IVC diameter and respiratory changes fall into an  intermediate range suggesting an RA pressure of 8 mmHg.     Pericardium  Trivial pericardial effusion.     ______________________________________________________________________________  MMode/2D Measurements & Calculations  IVSd: 0.54 cm  LVIDd: 3.7 cm  LVIDs: 2.8 cm  LVPWd: 0.81 cm  FS: 24.2 %  LV mass(C)d: 66.9 grams  LV mass(C)dI: 41.5 grams/m2  Ao root diam: 3.4 cm  LVOT diam: 2.1 cm  LVOT area: 3.5 cm2     RWT: 0.43     Doppler Measurements & Calculations  MV E max chintan: 60.3 cm/sec  MV A max chintan: 51.4 cm/sec  MV E/A: 1.2  MV dec slope: 623.0 cm/sec2  MV dec time: 0.10 sec  PA acc time: 0.09 sec  E/E': 8.5  E/E' av.8  Lateral E/e': 8.5  Medial E/e': 11.1  Peak E' Chintan: 7.1 cm/sec  RV S Chintan: 7.0 cm/sec     ______________________________________________________________________________  Report approved by: Alka Gurrola 2023 08:07 AM               Discharge Medications   Current Discharge Medication List      START taking these medications    Details   !! folic acid 800 MCG tablet Take 1 tablet (800 mcg) by mouth  daily for 20 days  Qty: 20 tablet, Refills: 0    Associated Diagnoses: Alcohol dependence with intoxication with complication (H)      ipratropium - albuterol 0.5 mg/2.5 mg/3 mL (DUONEB) 0.5-2.5 (3) MG/3ML neb solution Take 1 vial (3 mLs) by nebulization every 4 hours as needed for shortness of breath  Qty: 90 mL    Associated Diagnoses: Chronic bronchitis, unspecified chronic bronchitis type (H)      Lidocaine (LIDOCARE) 4 % Patch Place 2 patches onto the skin daily for 14 days To prevent lidocaine toxicity, patient should be patch free for 12 hrs daily.  Qty: 28 patch, Refills: 0    Associated Diagnoses: Chest wall pain      nicotine (NICODERM CQ) 14 MG/24HR 24 hr patch Place 1 patch onto the skin daily    Associated Diagnoses: Tobacco abuse      oxyCODONE (ROXICODONE) 5 MG tablet Take 0.5 tablets (2.5 mg) by mouth 3 times daily as needed for moderate pain  Qty: 10 tablet, Refills: 0    Associated Diagnoses: Chest wall pain      !! thiamine (B-1) 100 MG tablet Take 1 tablet (100 mg) by mouth daily for 10 days  Qty: 10 tablet, Refills: 0    Associated Diagnoses: Alcohol dependence with intoxication with complication (H)       !! - Potential duplicate medications found. Please discuss with provider.      CONTINUE these medications which have CHANGED    Details   traZODone (DESYREL) 50 MG tablet Take 1 tablet (50 mg) by mouth nightly as needed for sleep    Associated Diagnoses: Alcohol-induced insomnia (H)         CONTINUE these medications which have NOT CHANGED    Details   acetaminophen (TYLENOL) 325 MG tablet Take 650 mg by mouth every 4 hours as needed for mild pain or fever Not to exceed 3 grams in 24 hours.      aspirin-acetaminophen-caffeine (EXCEDRIN MIGRAINE) 250-250-65 MG tablet Take 1 tablet by mouth daily as needed for headaches  Qty: 7 tablet, Refills: 0    Associated Diagnoses: Tension headache      atorvastatin (LIPITOR) 40 MG tablet Take 40 mg by mouth At Bedtime      chlorhexidine (PERIDEX) 0.12 %  solution Swish and spit 15 mLs in mouth 4 times daily  Qty: 473 mL, Refills: 0    Associated Diagnoses: Gingivitis      cyanocobalamin (VITAMIN B-12) 1000 MCG tablet Take 1,000 mcg by mouth daily      !! folic acid (FOLVITE) 1 MG tablet Take 1 tablet (1 mg) by mouth daily  Qty: 30 tablet, Refills: 0    Associated Diagnoses: Alcoholic intoxication without complication (H)      gabapentin (NEURONTIN) 300 MG capsule Take 1 capsule (300 mg) by mouth 3 times daily    Associated Diagnoses: Neuropathy      magnesium oxide (MAG-OX) 400 MG tablet Take 400 mg by mouth daily      multivitamin w/minerals (THERA-VIT-M) tablet Take 1 tablet by mouth daily  Qty: 100 tablet, Refills: 0    Associated Diagnoses: Alcoholic intoxication without complication (H)      pantoprazole (PROTONIX) 40 MG EC tablet Take 1 tablet (40 mg) by mouth every morning (before breakfast)  Qty: 30 tablet, Refills: 0    Associated Diagnoses: Alcoholic intoxication without complication (H)      !! thiamine (B-1) 100 MG tablet Take 1 tablet (100 mg) by mouth daily  Qty: 10 tablet, Refills: 0    Associated Diagnoses: Alcohol dependence, uncomplicated (H)      Vitamin D3 (CHOLECALCIFEROL) 25 mcg (1000 units) tablet Take 1 tablet by mouth daily       !! - Potential duplicate medications found. Please discuss with provider.        Allergies   No Known Allergies

## 2023-05-13 ENCOUNTER — HOSPITAL ENCOUNTER (EMERGENCY)
Facility: CLINIC | Age: 73
Discharge: HOME | End: 2023-05-13
Attending: EMERGENCY MEDICINE | Admitting: EMERGENCY MEDICINE
Payer: MEDICARE

## 2023-05-13 ENCOUNTER — APPOINTMENT (OUTPATIENT)
Dept: CT IMAGING | Facility: CLINIC | Age: 73
End: 2023-05-13
Attending: EMERGENCY MEDICINE
Payer: MEDICARE

## 2023-05-13 ENCOUNTER — HOSPITAL ENCOUNTER (OUTPATIENT)
Facility: CLINIC | Age: 73
Setting detail: OBSERVATION
Discharge: HOME | End: 2023-05-16
Attending: STUDENT IN AN ORGANIZED HEALTH CARE EDUCATION/TRAINING PROGRAM | Admitting: HOSPITALIST
Payer: MEDICARE

## 2023-05-13 VITALS
OXYGEN SATURATION: 99 % | HEART RATE: 87 BPM | BODY MASS INDEX: 20.4 KG/M2 | RESPIRATION RATE: 20 BRPM | SYSTOLIC BLOOD PRESSURE: 119 MMHG | WEIGHT: 130 LBS | HEIGHT: 67 IN | DIASTOLIC BLOOD PRESSURE: 66 MMHG | TEMPERATURE: 96.8 F

## 2023-05-13 DIAGNOSIS — J69.0 ASPIRATION PNEUMONIA, UNSPECIFIED ASPIRATION PNEUMONIA TYPE, UNSPECIFIED LATERALITY, UNSPECIFIED PART OF LUNG (H): ICD-10-CM

## 2023-05-13 DIAGNOSIS — F32.A DEPRESSION, UNSPECIFIED DEPRESSION TYPE: ICD-10-CM

## 2023-05-13 DIAGNOSIS — R33.8 BENIGN PROSTATIC HYPERPLASIA WITH URINARY RETENTION: ICD-10-CM

## 2023-05-13 DIAGNOSIS — F10.920 ALCOHOLIC INTOXICATION WITHOUT COMPLICATION (H): Primary | ICD-10-CM

## 2023-05-13 DIAGNOSIS — F10.920 ALCOHOLIC INTOXICATION WITHOUT COMPLICATION (H): ICD-10-CM

## 2023-05-13 DIAGNOSIS — Z78.9 UNABLE TO CARE FOR SELF: ICD-10-CM

## 2023-05-13 DIAGNOSIS — F10.229 ALCOHOL DEPENDENCE WITH INTOXICATION WITH COMPLICATION (H): Chronic | ICD-10-CM

## 2023-05-13 DIAGNOSIS — S22.43XA CLOSED FRACTURE OF MULTIPLE RIBS OF BOTH SIDES, INITIAL ENCOUNTER: ICD-10-CM

## 2023-05-13 DIAGNOSIS — S22.42XD CLOSED FRACTURE OF MULTIPLE RIBS OF LEFT SIDE WITH ROUTINE HEALING, SUBSEQUENT ENCOUNTER: ICD-10-CM
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
## 2023-05-13 DIAGNOSIS — R62.7 ADULT FAILURE TO THRIVE: ICD-10-CM

## 2023-05-13 DIAGNOSIS — N40.1 BENIGN PROSTATIC HYPERPLASIA WITH URINARY RETENTION: ICD-10-CM

## 2023-05-13 LAB
ALBUMIN SERPL-MCNC: 3.2 G/DL (ref 3.5–5)
ALP SERPL-CCNC: 175 U/L (ref 45–120)
ALT SERPL W P-5'-P-CCNC: 36 U/L (ref 0–45)
ANION GAP SERPL CALCULATED.3IONS-SCNC: 10 MMOL/L (ref 5–18)
AST SERPL W P-5'-P-CCNC: 53 U/L (ref 0–40)
ATRIAL RATE - MUSE: 82 BPM
BASOPHILS # BLD AUTO: 0.1 10E3/UL (ref 0–0.2)
BASOPHILS NFR BLD AUTO: 1 %
BILIRUB DIRECT SERPL-MCNC: 0.1 MG/DL
BILIRUB SERPL-MCNC: 0.2 MG/DL (ref 0–1)
BUN SERPL-MCNC: 5 MG/DL (ref 8–28)
CALCIUM SERPL-MCNC: 8.7 MG/DL (ref 8.5–10.5)
CHLORIDE BLD-SCNC: 111 MMOL/L (ref 98–107)
CO2 SERPL-SCNC: 22 MMOL/L (ref 22–31)
CREAT SERPL-MCNC: 0.61 MG/DL (ref 0.7–1.3)
DIASTOLIC BLOOD PRESSURE - MUSE: 76 MMHG
EOSINOPHIL # BLD AUTO: 0.4 10E3/UL (ref 0–0.7)
EOSINOPHIL NFR BLD AUTO: 6 %
ERYTHROCYTE [DISTWIDTH] IN BLOOD BY AUTOMATED COUNT: 14.8 % (ref 10–15)
ETHANOL SERPL-MCNC: 266 MG/DL
GFR SERPL CREATININE-BSD FRML MDRD: >90 ML/MIN/1.73M2
GLUCOSE BLD-MCNC: 84 MG/DL (ref 70–125)
HCT VFR BLD AUTO: 34.7 % (ref 40–53)
HGB BLD-MCNC: 11.3 G/DL (ref 13.3–17.7)
IMM GRANULOCYTES # BLD: 0.3 10E3/UL
IMM GRANULOCYTES NFR BLD: 4 %
INTERPRETATION ECG - MUSE: NORMAL
LIPASE SERPL-CCNC: 90 U/L (ref 0–52)
LYMPHOCYTES # BLD AUTO: 2.1 10E3/UL (ref 0.8–5.3)
LYMPHOCYTES NFR BLD AUTO: 30 %
MAGNESIUM SERPL-MCNC: 1.8 MG/DL (ref 1.8–2.6)
MCH RBC QN AUTO: 33.4 PG (ref 26.5–33)
MCHC RBC AUTO-ENTMCNC: 32.6 G/DL (ref 31.5–36.5)
MCV RBC AUTO: 103 FL (ref 78–100)
MONOCYTES # BLD AUTO: 0.4 10E3/UL (ref 0–1.3)
MONOCYTES NFR BLD AUTO: 6 %
NEUTROPHILS # BLD AUTO: 3.7 10E3/UL (ref 1.6–8.3)
NEUTROPHILS NFR BLD AUTO: 53 %
NRBC # BLD AUTO: 0 10E3/UL
NRBC BLD AUTO-RTO: 0 /100
P AXIS - MUSE: 75 DEGREES
PHOSPHATE SERPL-MCNC: 3.7 MG/DL (ref 2.5–4.5)
PLATELET # BLD AUTO: 492 10E3/UL (ref 150–450)
POTASSIUM BLD-SCNC: 3.9 MMOL/L (ref 3.5–5)
PR INTERVAL - MUSE: 170 MS
PROT SERPL-MCNC: 7.3 G/DL (ref 6–8)
QRS DURATION - MUSE: 86 MS
QT - MUSE: 392 MS
QTC - MUSE: 457 MS
R AXIS - MUSE: 90 DEGREES
RBC # BLD AUTO: 3.38 10E6/UL (ref 4.4–5.9)
SODIUM SERPL-SCNC: 143 MMOL/L (ref 136–145)
SYSTOLIC BLOOD PRESSURE - MUSE: 128 MMHG
T AXIS - MUSE: 85 DEGREES
TROPONIN I SERPL-MCNC: <0.01 NG/ML (ref 0–0.29)
VENTRICULAR RATE- MUSE: 82 BPM
WBC # BLD AUTO: 7 10E3/UL (ref 4–11)

## 2023-05-13 PROCEDURE — 84484 ASSAY OF TROPONIN QUANT: CPT | Performed by: EMERGENCY MEDICINE

## 2023-05-13 PROCEDURE — 84100 ASSAY OF PHOSPHORUS: CPT | Performed by: EMERGENCY MEDICINE

## 2023-05-13 PROCEDURE — 36415 COLL VENOUS BLD VENIPUNCTURE: CPT | Performed by: EMERGENCY MEDICINE

## 2023-05-13 PROCEDURE — 87040 BLOOD CULTURE FOR BACTERIA: CPT | Mod: 91 | Performed by: EMERGENCY MEDICINE

## 2023-05-13 PROCEDURE — 82248 BILIRUBIN DIRECT: CPT | Performed by: EMERGENCY MEDICINE

## 2023-05-13 PROCEDURE — 93005 ELECTROCARDIOGRAM TRACING: CPT | Performed by: EMERGENCY MEDICINE

## 2023-05-13 PROCEDURE — 250N000011 HC RX IP 250 OP 636: Performed by: EMERGENCY MEDICINE

## 2023-05-13 PROCEDURE — 96365 THER/PROPH/DIAG IV INF INIT: CPT | Mod: 59

## 2023-05-13 PROCEDURE — 96366 THER/PROPH/DIAG IV INF ADDON: CPT

## 2023-05-13 PROCEDURE — 99285 EMERGENCY DEPT VISIT HI MDM: CPT | Mod: 25

## 2023-05-13 PROCEDURE — 74177 CT ABD & PELVIS W/CONTRAST: CPT | Mod: MF

## 2023-05-13 PROCEDURE — 80053 COMPREHEN METABOLIC PANEL: CPT | Performed by: EMERGENCY MEDICINE

## 2023-05-13 PROCEDURE — 85025 COMPLETE CBC W/AUTO DIFF WBC: CPT | Performed by: EMERGENCY MEDICINE

## 2023-05-13 PROCEDURE — G1010 CDSM STANSON: HCPCS

## 2023-05-13 PROCEDURE — 83735 ASSAY OF MAGNESIUM: CPT | Performed by: EMERGENCY MEDICINE

## 2023-05-13 PROCEDURE — 83690 ASSAY OF LIPASE: CPT | Performed by: EMERGENCY MEDICINE

## 2023-05-13 PROCEDURE — 258N000003 HC RX IP 258 OP 636: Performed by: EMERGENCY MEDICINE

## 2023-05-13 PROCEDURE — 82077 ASSAY SPEC XCP UR&BREATH IA: CPT | Performed by: EMERGENCY MEDICINE

## 2023-05-13 PROCEDURE — 250N000009 HC RX 250: Performed by: EMERGENCY MEDICINE

## 2023-05-13 PROCEDURE — 96367 TX/PROPH/DG ADDL SEQ IV INF: CPT

## 2023-05-13 RX ORDER — CLINDAMYCIN HCL 300 MG
300 CAPSULE ORAL 4 TIMES DAILY
Qty: 40 CAPSULE | Refills: 0 | Status: ON HOLD | OUTPATIENT
Start: 2023-05-13 | End: 2023-05-16

## 2023-05-13 RX ORDER — ASPIRIN 81 MG/1
324 TABLET, CHEWABLE ORAL ONCE
Status: DISCONTINUED | OUTPATIENT
Start: 2023-05-13 | End: 2023-05-13

## 2023-05-13 RX ORDER — CLINDAMYCIN PHOSPHATE 600 MG/50ML
600 INJECTION, SOLUTION INTRAVENOUS ONCE
Status: COMPLETED | OUTPATIENT
Start: 2023-05-13 | End: 2023-05-13

## 2023-05-13 RX ORDER — IOPAMIDOL 755 MG/ML
100 INJECTION, SOLUTION INTRAVASCULAR ONCE
Status: COMPLETED | OUTPATIENT
Start: 2023-05-13 | End: 2023-05-13

## 2023-05-13 RX ADMIN — IOPAMIDOL 100 ML: 755 INJECTION, SOLUTION INTRAVENOUS at 15:19

## 2023-05-13 RX ADMIN — SODIUM CHLORIDE 1000 ML: 9 INJECTION, SOLUTION INTRAVENOUS at 17:37

## 2023-05-13 RX ADMIN — CLINDAMYCIN PHOSPHATE 600 MG: 600 INJECTION, SOLUTION INTRAVENOUS at 18:13

## 2023-05-13 RX ADMIN — FOLIC ACID: 5 INJECTION, SOLUTION INTRAMUSCULAR; INTRAVENOUS; SUBCUTANEOUS at 14:45

## 2023-05-13 ASSESSMENT — ACTIVITIES OF DAILY LIVING (ADL)
ADLS_ACUITY_SCORE: 35

## 2023-05-13 NOTE — DISCHARGE INSTRUCTIONS
You are having chest wall pain because you have rib fractures after your heart stopped and they had to do CPR.  Please take acetaminophen as needed for your rib fractures that are healing.

## 2023-05-13 NOTE — ED TRIAGE NOTES
"Pt presents to the ED with c/o worsening depression, CP, and ETHO. Pt states his wife passed away in December and since has felt down and been drinking. Pt c/o CP for \"a while\".       "
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
## 2023-05-14 ENCOUNTER — APPOINTMENT (OUTPATIENT)
Dept: OCCUPATIONAL THERAPY | Facility: CLINIC | Age: 73
End: 2023-05-14
Attending: HOSPITALIST
Payer: MEDICARE

## 2023-05-14 ENCOUNTER — APPOINTMENT (OUTPATIENT)
Dept: PHYSICAL THERAPY | Facility: CLINIC | Age: 73
End: 2023-05-14
Attending: HOSPITALIST
Payer: MEDICARE

## 2023-05-14 PROBLEM — R62.7 ADULT FAILURE TO THRIVE: Status: ACTIVE | Noted: 2023-05-14

## 2023-05-14 PROBLEM — Z78.9 UNABLE TO CARE FOR SELF: Status: ACTIVE | Noted: 2023-05-14

## 2023-05-14 LAB
ALBUMIN SERPL-MCNC: 3 G/DL (ref 3.5–5)
ALP SERPL-CCNC: 160 U/L (ref 45–120)
ALT SERPL W P-5'-P-CCNC: 40 U/L (ref 0–45)
ANION GAP SERPL CALCULATED.3IONS-SCNC: 11 MMOL/L (ref 5–18)
AST SERPL W P-5'-P-CCNC: 79 U/L (ref 0–40)
BILIRUB DIRECT SERPL-MCNC: 0.1 MG/DL
BILIRUB SERPL-MCNC: 0.2 MG/DL (ref 0–1)
BUN SERPL-MCNC: 8 MG/DL (ref 8–28)
CALCIUM SERPL-MCNC: 9 MG/DL (ref 8.5–10.5)
CHLORIDE BLD-SCNC: 109 MMOL/L (ref 98–107)
CK SERPL-CCNC: 48 U/L (ref 30–190)
CO2 SERPL-SCNC: 24 MMOL/L (ref 22–31)
CREAT SERPL-MCNC: 0.72 MG/DL (ref 0.7–1.3)
ERYTHROCYTE [DISTWIDTH] IN BLOOD BY AUTOMATED COUNT: 15 % (ref 10–15)
ETHANOL SERPL-MCNC: 259 MG/DL
GFR SERPL CREATININE-BSD FRML MDRD: >90 ML/MIN/1.73M2
GLUCOSE BLD-MCNC: 83 MG/DL (ref 70–125)
HCT VFR BLD AUTO: 33.6 % (ref 40–53)
HGB BLD-MCNC: 10.7 G/DL (ref 13.3–17.7)
LACTATE SERPL-SCNC: 1.4 MMOL/L (ref 0.7–2)
LACTATE SERPL-SCNC: 2.2 MMOL/L (ref 0.7–2)
LACTATE SERPL-SCNC: 3 MMOL/L (ref 0.7–2)
MAGNESIUM SERPL-MCNC: 1.5 MG/DL (ref 1.8–2.6)
MAGNESIUM SERPL-MCNC: 1.7 MG/DL (ref 1.8–2.6)
MCH RBC QN AUTO: 33.9 PG (ref 26.5–33)
MCHC RBC AUTO-ENTMCNC: 31.8 G/DL (ref 31.5–36.5)
MCV RBC AUTO: 106 FL (ref 78–100)
PHOSPHATE SERPL-MCNC: 2.9 MG/DL (ref 2.5–4.5)
PLATELET # BLD AUTO: 465 10E3/UL (ref 150–450)
POTASSIUM BLD-SCNC: 4 MMOL/L (ref 3.5–5)
PROT SERPL-MCNC: 6.7 G/DL (ref 6–8)
RBC # BLD AUTO: 3.16 10E6/UL (ref 4.4–5.9)
SODIUM SERPL-SCNC: 144 MMOL/L (ref 136–145)
WBC # BLD AUTO: 13 10E3/UL (ref 4–11)

## 2023-05-14 PROCEDURE — 83735 ASSAY OF MAGNESIUM: CPT | Performed by: STUDENT IN AN ORGANIZED HEALTH CARE EDUCATION/TRAINING PROGRAM

## 2023-05-14 PROCEDURE — 36415 COLL VENOUS BLD VENIPUNCTURE: CPT | Performed by: HOSPITALIST

## 2023-05-14 PROCEDURE — 99207 PR APP CREDIT; MD BILLING SHARED VISIT: CPT | Mod: FS | Performed by: STUDENT IN AN ORGANIZED HEALTH CARE EDUCATION/TRAINING PROGRAM

## 2023-05-14 PROCEDURE — 82550 ASSAY OF CK (CPK): CPT | Performed by: STUDENT IN AN ORGANIZED HEALTH CARE EDUCATION/TRAINING PROGRAM

## 2023-05-14 PROCEDURE — 97535 SELF CARE MNGMENT TRAINING: CPT | Mod: GO

## 2023-05-14 PROCEDURE — 82077 ASSAY SPEC XCP UR&BREATH IA: CPT | Performed by: STUDENT IN AN ORGANIZED HEALTH CARE EDUCATION/TRAINING PROGRAM

## 2023-05-14 PROCEDURE — 96374 THER/PROPH/DIAG INJ IV PUSH: CPT

## 2023-05-14 PROCEDURE — 99223 1ST HOSP IP/OBS HIGH 75: CPT | Mod: AI | Performed by: HOSPITALIST

## 2023-05-14 PROCEDURE — 250N000009 HC RX 250: Performed by: HOSPITALIST

## 2023-05-14 PROCEDURE — G0378 HOSPITAL OBSERVATION PER HR: HCPCS

## 2023-05-14 PROCEDURE — 85027 COMPLETE CBC AUTOMATED: CPT | Performed by: STUDENT IN AN ORGANIZED HEALTH CARE EDUCATION/TRAINING PROGRAM

## 2023-05-14 PROCEDURE — 97166 OT EVAL MOD COMPLEX 45 MIN: CPT | Mod: GO

## 2023-05-14 PROCEDURE — 83605 ASSAY OF LACTIC ACID: CPT | Performed by: STUDENT IN AN ORGANIZED HEALTH CARE EDUCATION/TRAINING PROGRAM

## 2023-05-14 PROCEDURE — 250N000013 HC RX MED GY IP 250 OP 250 PS 637: Performed by: STUDENT IN AN ORGANIZED HEALTH CARE EDUCATION/TRAINING PROGRAM

## 2023-05-14 PROCEDURE — 258N000003 HC RX IP 258 OP 636: Performed by: STUDENT IN AN ORGANIZED HEALTH CARE EDUCATION/TRAINING PROGRAM

## 2023-05-14 PROCEDURE — 84100 ASSAY OF PHOSPHORUS: CPT | Performed by: HOSPITALIST

## 2023-05-14 PROCEDURE — 97162 PT EVAL MOD COMPLEX 30 MIN: CPT | Mod: GP | Performed by: PHYSICAL THERAPIST

## 2023-05-14 PROCEDURE — 83735 ASSAY OF MAGNESIUM: CPT | Performed by: HOSPITALIST

## 2023-05-14 PROCEDURE — 82248 BILIRUBIN DIRECT: CPT | Performed by: STUDENT IN AN ORGANIZED HEALTH CARE EDUCATION/TRAINING PROGRAM

## 2023-05-14 PROCEDURE — 80053 COMPREHEN METABOLIC PANEL: CPT | Performed by: STUDENT IN AN ORGANIZED HEALTH CARE EDUCATION/TRAINING PROGRAM

## 2023-05-14 PROCEDURE — 96375 TX/PRO/DX INJ NEW DRUG ADDON: CPT

## 2023-05-14 PROCEDURE — 97116 GAIT TRAINING THERAPY: CPT | Mod: GP | Performed by: PHYSICAL THERAPIST

## 2023-05-14 PROCEDURE — 250N000013 HC RX MED GY IP 250 OP 250 PS 637: Performed by: HOSPITALIST

## 2023-05-14 PROCEDURE — 36415 COLL VENOUS BLD VENIPUNCTURE: CPT | Performed by: STUDENT IN AN ORGANIZED HEALTH CARE EDUCATION/TRAINING PROGRAM

## 2023-05-14 PROCEDURE — 258N000003 HC RX IP 258 OP 636: Performed by: HOSPITALIST

## 2023-05-14 PROCEDURE — 83605 ASSAY OF LACTIC ACID: CPT | Performed by: HOSPITALIST

## 2023-05-14 PROCEDURE — 250N000011 HC RX IP 250 OP 636: Performed by: HOSPITALIST

## 2023-05-14 PROCEDURE — 97530 THERAPEUTIC ACTIVITIES: CPT | Mod: GP | Performed by: PHYSICAL THERAPIST

## 2023-05-14 RX ORDER — LANOLIN ALCOHOL/MO/W.PET/CERES
3 CREAM (GRAM) TOPICAL
Status: DISCONTINUED | OUTPATIENT
Start: 2023-05-14 | End: 2023-05-16 | Stop reason: HOSPADM

## 2023-05-14 RX ORDER — HYDROMORPHONE HYDROCHLORIDE 2 MG/1
2-4 TABLET ORAL
Status: DISCONTINUED | OUTPATIENT
Start: 2023-05-14 | End: 2023-05-15

## 2023-05-14 RX ORDER — NALOXONE HYDROCHLORIDE 0.4 MG/ML
0.2 INJECTION, SOLUTION INTRAMUSCULAR; INTRAVENOUS; SUBCUTANEOUS
Status: DISCONTINUED | OUTPATIENT
Start: 2023-05-14 | End: 2023-05-16 | Stop reason: HOSPADM

## 2023-05-14 RX ORDER — FOLIC ACID 1 MG/1
1 TABLET ORAL DAILY
Status: DISCONTINUED | OUTPATIENT
Start: 2023-05-15 | End: 2023-05-16 | Stop reason: HOSPADM

## 2023-05-14 RX ORDER — HALOPERIDOL 5 MG/ML
2.5-5 INJECTION INTRAMUSCULAR EVERY 6 HOURS PRN
Status: DISCONTINUED | OUTPATIENT
Start: 2023-05-14 | End: 2023-05-16 | Stop reason: HOSPADM

## 2023-05-14 RX ORDER — SODIUM CHLORIDE 9 MG/ML
INJECTION, SOLUTION INTRAVENOUS CONTINUOUS
Status: DISCONTINUED | OUTPATIENT
Start: 2023-05-14 | End: 2023-05-15

## 2023-05-14 RX ORDER — TRAZODONE HYDROCHLORIDE 50 MG/1
50 TABLET, FILM COATED ORAL
Status: DISCONTINUED | OUTPATIENT
Start: 2023-05-14 | End: 2023-05-16 | Stop reason: HOSPADM

## 2023-05-14 RX ORDER — LORAZEPAM 1 MG/1
1-2 TABLET ORAL EVERY 30 MIN PRN
Status: DISCONTINUED | OUTPATIENT
Start: 2023-05-14 | End: 2023-05-16 | Stop reason: HOSPADM

## 2023-05-14 RX ORDER — PROCHLORPERAZINE MALEATE 5 MG
5 TABLET ORAL EVERY 6 HOURS PRN
Status: DISCONTINUED | OUTPATIENT
Start: 2023-05-14 | End: 2023-05-16 | Stop reason: HOSPADM

## 2023-05-14 RX ORDER — ONDANSETRON 2 MG/ML
4 INJECTION INTRAMUSCULAR; INTRAVENOUS EVERY 6 HOURS PRN
Status: DISCONTINUED | OUTPATIENT
Start: 2023-05-14 | End: 2023-05-16 | Stop reason: HOSPADM

## 2023-05-14 RX ORDER — GABAPENTIN 300 MG/1
300 CAPSULE ORAL 3 TIMES DAILY
Status: DISCONTINUED | OUTPATIENT
Start: 2023-05-14 | End: 2023-05-16 | Stop reason: HOSPADM

## 2023-05-14 RX ORDER — LIDOCAINE 4 G/G
2 PATCH TOPICAL
Status: DISCONTINUED | OUTPATIENT
Start: 2023-05-14 | End: 2023-05-16

## 2023-05-14 RX ORDER — ACETAMINOPHEN 325 MG/1
650 TABLET ORAL EVERY 4 HOURS PRN
Status: DISCONTINUED | OUTPATIENT
Start: 2023-05-14 | End: 2023-05-16 | Stop reason: HOSPADM

## 2023-05-14 RX ORDER — ONDANSETRON 4 MG/1
4 TABLET, ORALLY DISINTEGRATING ORAL EVERY 6 HOURS PRN
Status: DISCONTINUED | OUTPATIENT
Start: 2023-05-14 | End: 2023-05-16 | Stop reason: HOSPADM

## 2023-05-14 RX ORDER — IPRATROPIUM BROMIDE AND ALBUTEROL SULFATE 2.5; .5 MG/3ML; MG/3ML
3 SOLUTION RESPIRATORY (INHALATION) EVERY 4 HOURS PRN
Status: DISCONTINUED | OUTPATIENT
Start: 2023-05-14 | End: 2023-05-16 | Stop reason: HOSPADM

## 2023-05-14 RX ORDER — MULTIPLE VITAMINS W/ MINERALS TAB 9MG-400MCG
1 TAB ORAL DAILY
Status: DISCONTINUED | OUTPATIENT
Start: 2023-05-15 | End: 2023-05-16 | Stop reason: HOSPADM

## 2023-05-14 RX ORDER — FLUMAZENIL 0.1 MG/ML
0.2 INJECTION, SOLUTION INTRAVENOUS
Status: DISCONTINUED | OUTPATIENT
Start: 2023-05-14 | End: 2023-05-16 | Stop reason: HOSPADM

## 2023-05-14 RX ORDER — NALOXONE HYDROCHLORIDE 0.4 MG/ML
0.4 INJECTION, SOLUTION INTRAMUSCULAR; INTRAVENOUS; SUBCUTANEOUS
Status: DISCONTINUED | OUTPATIENT
Start: 2023-05-14 | End: 2023-05-16 | Stop reason: HOSPADM

## 2023-05-14 RX ORDER — SODIUM CHLORIDE, SODIUM LACTATE, POTASSIUM CHLORIDE, CALCIUM CHLORIDE 600; 310; 30; 20 MG/100ML; MG/100ML; MG/100ML; MG/100ML
INJECTION, SOLUTION INTRAVENOUS CONTINUOUS
Status: DISCONTINUED | OUTPATIENT
Start: 2023-05-14 | End: 2023-05-15

## 2023-05-14 RX ORDER — PROCHLORPERAZINE 25 MG
12.5 SUPPOSITORY, RECTAL RECTAL EVERY 12 HOURS PRN
Status: DISCONTINUED | OUTPATIENT
Start: 2023-05-14 | End: 2023-05-16 | Stop reason: HOSPADM

## 2023-05-14 RX ORDER — LORAZEPAM 2 MG/ML
1-2 INJECTION INTRAMUSCULAR EVERY 30 MIN PRN
Status: DISCONTINUED | OUTPATIENT
Start: 2023-05-14 | End: 2023-05-16 | Stop reason: HOSPADM

## 2023-05-14 RX ORDER — DOCUSATE SODIUM 100 MG/1
100 CAPSULE, LIQUID FILLED ORAL 2 TIMES DAILY PRN
Status: DISCONTINUED | OUTPATIENT
Start: 2023-05-14 | End: 2023-05-16 | Stop reason: HOSPADM

## 2023-05-14 RX ADMIN — HYDROMORPHONE HYDROCHLORIDE 4 MG: 2 TABLET ORAL at 23:48

## 2023-05-14 RX ADMIN — HYDROMORPHONE HYDROCHLORIDE 2 MG: 2 TABLET ORAL at 02:44

## 2023-05-14 RX ADMIN — SODIUM CHLORIDE: 9 INJECTION, SOLUTION INTRAVENOUS at 05:56

## 2023-05-14 RX ADMIN — HYDROMORPHONE HYDROCHLORIDE 2 MG: 2 TABLET ORAL at 01:59

## 2023-05-14 RX ADMIN — HYDROMORPHONE HYDROCHLORIDE 4 MG: 2 TABLET ORAL at 20:33

## 2023-05-14 RX ADMIN — LIDOCAINE 2 PATCH: 246 PATCH TOPICAL at 19:48

## 2023-05-14 RX ADMIN — FOLIC ACID: 5 INJECTION, SOLUTION INTRAMUSCULAR; INTRAVENOUS; SUBCUTANEOUS at 01:59

## 2023-05-14 RX ADMIN — ONDANSETRON 4 MG: 2 INJECTION INTRAMUSCULAR; INTRAVENOUS at 23:54

## 2023-05-14 RX ADMIN — ACETAMINOPHEN 650 MG: 325 TABLET ORAL at 22:49

## 2023-05-14 RX ADMIN — GABAPENTIN 300 MG: 300 CAPSULE ORAL at 16:08

## 2023-05-14 RX ADMIN — AMOXICILLIN AND CLAVULANATE POTASSIUM 1 TABLET: 875; 125 TABLET, FILM COATED ORAL at 19:48

## 2023-05-14 RX ADMIN — TRAZODONE HYDROCHLORIDE 50 MG: 50 TABLET ORAL at 22:49

## 2023-05-14 RX ADMIN — GABAPENTIN 300 MG: 300 CAPSULE ORAL at 19:48

## 2023-05-14 RX ADMIN — ACETAMINOPHEN 650 MG: 325 TABLET ORAL at 16:08

## 2023-05-14 RX ADMIN — HYDROMORPHONE HYDROCHLORIDE 2 MG: 2 TABLET ORAL at 12:06

## 2023-05-14 RX ADMIN — HYDROMORPHONE HYDROCHLORIDE 2 MG: 2 TABLET ORAL at 17:18

## 2023-05-14 RX ADMIN — SODIUM CHLORIDE, POTASSIUM CHLORIDE, SODIUM LACTATE AND CALCIUM CHLORIDE: 600; 310; 30; 20 INJECTION, SOLUTION INTRAVENOUS at 17:18

## 2023-05-14 ASSESSMENT — ACTIVITIES OF DAILY LIVING (ADL)
DEPENDENT_IADLS:: TRANSPORTATION
ADLS_ACUITY_SCORE: 39
ADLS_ACUITY_SCORE: 35
ADLS_ACUITY_SCORE: 39
ADLS_ACUITY_SCORE: 39
ADLS_ACUITY_SCORE: 43
ADLS_ACUITY_SCORE: 35
ADLS_ACUITY_SCORE: 43
ADLS_ACUITY_SCORE: 39
ADLS_ACUITY_SCORE: 39
ADLS_ACUITY_SCORE: 35
ADLS_ACUITY_SCORE: 43
ADLS_ACUITY_SCORE: 35

## 2023-05-14 NOTE — PROGRESS NOTES
PRIMARY DIAGNOSIS: GENERALIZED WEAKNESS    OUTPATIENT/OBSERVATION GOALS TO BE MET BEFORE DISCHARGE  1. Orthostatic performed: No    2. Tolerating PO medications: Yes    3. Return to near baseline physical activity: No    4. Cleared for discharge by consultants (if involved): No    Discharge Planner Nurse   Safe discharge environment identified: No  Barriers to discharge: Yes TCU placement and possible outpatient for chemical dependcy.        Entered by: Livia Howe RN 05/14/2023 12:56 PM     Please review provider order for any additional goals.   Nurse to notify provider when observation goals have been met and patient is ready for discharge.

## 2023-05-14 NOTE — CONSULTS
Care Management Initial Consult    General Information  Assessment completed with: Patient,    Type of CM/SW Visit: Initial Assessment    Primary Care Provider verified and updated as needed: Yes   Readmission within the last 30 days: previous discharge plan unsuccessful         Advance Care Planning:            Communication Assessment  Patient's communication style: spoken language (English or Bilingual)    Hearing Difficulty or Deaf: no        Cognitive  Cognitive/Neuro/Behavioral: WDL  Level of Consciousness: alert  Arousal Level: opens eyes spontaneously  Orientation: oriented x 4  Mood/Behavior: calm, cooperative     Speech: rambling, word-finding difficulty    Living Environment:   People in home: alone     Current living Arrangements: apartment      Able to return to prior arrangements: yes       Family/Social Support:  Care provided by: self  Provides care for: no one, unable/limited ability to care for self  Marital Status:   Limited to          Description of Support System: Supportive         Current Resources:   Patient receiving home care services: No     Community Resources: None  Equipment currently used at home:    Supplies currently used at home: None    Employment/Financial:  Employment Status: retired        Financial Concerns: No concerns identified   Referral to Financial Worker: No       Does the patient's insurance plan have a 3 day qualifying hospital stay waiver?  No    Lifestyle & Psychosocial Needs:  Social Determinants of Health     Tobacco Use: High Risk (5/14/2023)    Patient History      Smoking Tobacco Use: Every Day      Smokeless Tobacco Use: Never      Passive Exposure: Not on file   Alcohol Use: Not on file   Financial Resource Strain: Not on file   Food Insecurity: Not on file   Transportation Needs: Not on file   Physical Activity: Not on file   Stress: Not on file   Social Connections: Not on file   Intimate Partner Violence: Not on file   Depression: Not on file    Housing Stability: Not on file       Functional Status:  Prior to admission patient needed assistance:   Dependent ADLs:: Ambulation-walker  Dependent IADLs:: Transportation       Mental Health Status:  No           Chemical Dependency Status: yes                   Values/Beliefs:  Spiritual, Cultural Beliefs, Anabaptist Practices, Values that affect care: no               Additional Information:  PAM met and introduced self and CM services to Pt. Pt lives in an apartment. Pt recently at [facility] and went home and came back to hospital.  Pt wife passed away in December.  Pt is requesting Home Care for RN, HHA, PT, OT and SW.  Referral sent to [employer] [facility]. JENNIFER completed with Pt.     DRAKE Spencer

## 2023-05-14 NOTE — PHARMACY-ADMISSION MEDICATION HISTORY
Pharmacist Admission Medication History    Admission medication history is complete. The information provided in this note is only as accurate as the sources available at the time of the update.    Medication reconciliation/reorder completed by provider prior to medication history? No    Information Source(s): Patient and CareEverywhere/SureScripts via in-person    Pertinent Information: Patient just discharged from [facility] 4/27/23 and went to a TCU.  He discharged from TCU recently and patient said he didn't take any medications. I left the [facility] rec the same.    Changes made to PTA medication list:    Added: None    Deleted: None    Changed: None     Allergies reviewed with patient and updates made in EHR: yes    Medication History Completed By: Kayli Vincent RPH 5/14/2023 8:30 AM    Prior to Admission medications    Medication Sig Last Dose Taking? Auth Provider Long Term End Date   acetaminophen (TYLENOL) 325 MG tablet Take 650 mg by mouth every 4 hours as needed for mild pain or fever Not to exceed 3 grams in 24 hours. Unknown Yes Reported, Patient     aspirin-acetaminophen-caffeine (EXCEDRIN MIGRAINE) 250-250-65 MG tablet Take 1 tablet by mouth daily as needed for headaches Unknown Yes Luis Manuel Vegas DO     atorvastatin (LIPITOR) 40 MG tablet Take 40 mg by mouth At Bedtime Unknown Yes Unknown, Entered By History     chlorhexidine (PERIDEX) 0.12 % solution Swish and spit 15 mLs in mouth 4 times daily Unknown Yes Roberto Yang MD     cyanocobalamin (VITAMIN B-12) 1000 MCG tablet Take 1,000 mcg by mouth daily Unknown Yes Unknown, Entered By History     folic acid 800 MCG tablet Take 1 tablet (800 mcg) by mouth daily for 20 days Unknown Yes Mirza Julien MD  5/28/23   gabapentin (NEURONTIN) 300 MG capsule Take 1 capsule (300 mg) by mouth 3 times daily Unknown Yes Eve Morton, APRN CNP Yes    ipratropium - albuterol 0.5 mg/2.5 mg/3 mL (DUONEB) 0.5-2.5 (3) MG/3ML neb solution Take 1 vial (3  mLs) by nebulization every 4 hours as needed for shortness of breath Unknown Yes Mirza Julien MD Yes    Lidocaine (LIDOCARE) 4 % Patch Place 2 patches onto the skin daily for 14 days To prevent lidocaine toxicity, patient should be patch free for 12 hrs daily. Unknown Yes Mirza Julien MD  5/23/23   magnesium oxide (MAG-OX) 400 MG tablet Take 400 mg by mouth daily Unknown Yes Unknown, Entered By History     multivitamin w/minerals (THERA-VIT-M) tablet Take 1 tablet by mouth daily Unknown Yes Roberto Yang MD     nicotine (NICODERM CQ) 14 MG/24HR 24 hr patch Place 1 patch onto the skin daily Unknown Yes Mirza Julien MD     oxyCODONE (ROXICODONE) 5 MG tablet Take 0.5 tablets (2.5 mg) by mouth 3 times daily as needed for moderate pain Unknown Yes Mirza Julien MD No    pantoprazole (PROTONIX) 40 MG EC tablet Take 1 tablet (40 mg) by mouth every morning (before breakfast) Unknown Yes Roberto Yang MD     thiamine (B-1) 100 MG tablet Take 1 tablet (100 mg) by mouth daily for 10 days Unknown Yes Mirza Julien MD  5/18/23   traZODone (DESYREL) 50 MG tablet Take 1 tablet (50 mg) by mouth nightly as needed for sleep Unknown Yes Mirza Julien MD Yes    Vitamin D3 (CHOLECALCIFEROL) 25 mcg (1000 units) tablet Take 25 mcg by mouth daily Unknown Yes Unknown, Entered By History     clindamycin (CLEOCIN) 300 MG capsule Take 1 capsule (300 mg) by mouth 4 times daily for 10 days   Silvia Mansfield MD  5/23/23

## 2023-05-14 NOTE — PROGRESS NOTES
Occupational Therapy Discharge Summary    Reason for therapy discharge:    Discharged to home with home therapy.    Progress towards therapy goal(s). See goals on Care Plan in [facility] electronic health record for goal details.  Goals met    Therapy recommendation(s):    Continued therapy is recommended.  Rationale/Recommendations:  Rec home OT to increase indep with all ADLs and trsfs, and promote a safe d/c.

## 2023-05-14 NOTE — UTILIZATION REVIEW
Continued stay Observation     Concurrent stay review; Secondary Review Determination         Under the authority of the Utilization Management Committee, the utilization review process indicated a secondary review on the above patient.  The review outcome is based on review of the medical records, discussions with staff, and applying clinical experience noted on the date of the review.          (x) Observation Status Appropriate - Concurrent stay review    RATIONALE FOR DETERMINATION   72 years old male with past medical history significant for alcohol dependence, recent hospitalization from 4/27 through 5/8 with alcohol withdrawal seizure, aspiration, cardiac arrest, resuscitation, rib fractures.  Also has a history of COPD, hypertension, hyperlipidemia presented with trouble caring for himself at home.  Was seen earlier in the day at the emergency department and was sent home at that time.  Returned to the emergency department via EMS as he was unable to care for himself at home.  Admitted for alcohol abuse and intoxication, and inability to care for self at home.      Patient is clinically improving and there is no clear indication to change patient's status to inpatient. The severity of illness, intensity of service provided, expected LOS and risk for adverse outcome make the care appropriate for observation.    The information on this document is developed by the utilization review team in order for the business office to ensure compliance.  This only denotes the appropriateness of proper admission status and does not reflect the quality of care rendered.         The definitions of Inpatient Status and Observation Status used in making the determination above are those provided in the CMS Coverage Manual, Chapter 1 and Chapter 6, section 70.4.      Sincerely,   Ray Cantu MD    Utilization Review  Physician Advisor  [facility].

## 2023-05-14 NOTE — ED NOTES
Pt has been refusing getting VS and requesting to go home. Pt has been able to ambulate unassisted and ate dinner with no choking issues.

## 2023-05-14 NOTE — PROGRESS NOTES
23 0900   Appointment Info   Signing Clinician's Name / Credentials (OT) Shannon Chang, OTR/L   Quick Adds   Quick Adds Certification   Living Environment   People in Home alone   Current Living Arrangements apartment   Home Accessibility stairs to enter home   Number of Stairs, Main Entrance 6   Self-Care   Usual Activity Tolerance moderate   Current Activity Tolerance moderate   Activity/Exercise/Self-Care Comment Per OT on 23: Pt reports that he has been very weak and not taking good care of himself, he has been drinking everyday since his wife  this past December. Reports that his nephew will help him as needed and does the driving of errands. He gave his nephew his car.   Instrumental Activities of Daily Living (IADL)   IADL Comments nephew to assist as needed; rec meals on wheels.   General Information   Onset of Illness/Injury or Date of Surgery 23   Referring Physician Dr. Pascual Beyer   Patient/Family Therapy Goal Statement (OT) Take better care of myself   Additional Occupational Profile Info/Pertinent History of Current Problem Pt is a 73 yo male presenting with failure to thrive and PMH: alcohol dependence, withdrawl seizures, aspiration, cardiac arrest, resuscitatin, rib Fxs, COPD, HTN, HLD.   Existing Precautions/Restrictions fall;seizures   Limitations/Impairments safety/cognitive   Cognitive Status Examination   Orientation Status person;place   Visual Perception   Visual Impairment/Limitations corrective lenses for reading   Sensory   Sensory Quick Adds sensation intact   Pain Assessment   Patient Currently in Pain Yes, see Vital Sign flowsheet   Posture   Posture forward head position   Range of Motion Comprehensive   General Range of Motion bilateral upper extremity ROM WNL   Strength Comprehensive (MMT)   General Manual Muscle Testing (MMT) Assessment no strength deficits identified   Muscle Tone Assessment   Muscle Tone Quick Adds No deficits were identified    Coordination   Upper Extremity Coordination No deficits were identified   Bed Mobility   Bed Mobility supine-sit;rolling left;rolling right   Rolling Left [place] (Bed Mobility) independent   Rolling Right [place] (Bed Mobility) independent   Supine-Sit [place] (Bed Mobility) modified independence   Comment (Bed Mobility) HOB elevated per seizure precautions   Transfers   Transfer Comments DNT d/t bed rest orders   Balance   Balance Assessment sitting balance: dynamic   Balance Comments WNLs   Activities of Daily Living   BADL Assessment/Intervention lower body dressing   Lower Body Dressing Assessment/Training   [place] Level (Lower Body Dressing) modified independence   Position (Lower Body Dressing) long sitting   Clinical Impression   Criteria for Skilled Therapeutic Interventions Met (OT) Yes, treatment indicated   OT Diagnosis decreased fxl ability   OT Problem List-Impairments impacting ADL mobility;cognition   Assessment of Occupational Performance 3-5 Performance Deficits   Identified Performance Deficits dressing, bathing, G/H, toileting, trsfs, cognition   Planned Therapy Interventions (OT) ADL retraining;bed mobility training   Clinical Decision Making Complexity (OT) moderate complexity   Anticipated Equipment Needs Upon Discharge (OT) shower chair;raised toilet seat   Risk & Benefits of therapy have been explained evaluation/treatment results reviewed;participants included;patient   OT Total Evaluation Time   OT Eval, Moderate Complexity Minutes (17855) 15   Therapy Certification   Medical Diagnosis deconditioning   Start of Care Date 05/14/23   Certification date from 05/14/23   Certification date to 06/14/23   OT Goals   Therapy Frequency (OT) One time eval and treatment   OT Predicted Duration/Target Date for Goal Attainment 05/14/23   OT Goals Lower Body Dressing;Bed Mobility   OT: Lower Body Dressing Modified independent;Completed;Goal Met   OT: Bed Mobility Modified  independent;Completed;Goal Met   Self-Care/Home Management   Self-Care/Home Mgmt/ADL, Compensatory, Meal Prep Minutes (55227) 9   Symptoms Noted During/After Treatment (Meal Preparation/Planning Training) none   Treatment Detail/Skilled Intervention Pt in bed and agreeable to OT sesssion. Pt displayed B UEs WFLs. Pt completed bed mob and LB dressing with MOD I. Defer to PT regarding mobility and fxl txrs due to bedrest orders. Pt left in bed with call bell in reach.   OT Discharge Planning   OT Plan d/c   OT Discharge Recommendation (DC Rec) (S)  home with home care occupational therapy   OT Rationale for DC Rec Pt presents with fxl ability to complete ADLs and bed mob with MOD I. Pt would benefit from CM support in order to return home alone to take care of himself in his apt (e.g. meals on wheels). Pt reported nephew will assist with IADLs. Rec home OT to increase indep with all ADLs and trsfs. Defer to PT regarding fxl txrs and mobility.   Total Session Time   Timed Code Treatment Minutes 9   Total Session Time (sum of timed and untimed services) 24    [facility]  OUTPATIENT OCCUPATIONAL THERAPY  EVALUATION  PLAN OF TREATMENT FOR OUTPATIENT REHABILITATION  (COMPLETE FOR INITIAL CLAIMS ONLY)  Patient's Last Name, First Name, M.I.  YOB: 1950  Familia Watson                          Provider's Name  [facility] Medical Record No.  [id number]                             Onset Date:  05/13/23   Start of Care Date:  05/14/23   Type:     ___PT   _X_OT   ___SLP Medical Diagnosis:  deconditioning                    OT Diagnosis:  decreased fxl ability Visits from SOC:  1     See note for plan of treatment, functional goals and certification details    I CERTIFY THE NEED FOR THESE SERVICES FURNISHED UNDER        THIS PLAN OF TREATMENT AND WHILE UNDER MY CARE     (Physician co-signature of this document indicates review and certification of the  therapy plan).

## 2023-05-14 NOTE — PROGRESS NOTES
PRIMARY DIAGNOSIS: GENERALIZED WEAKNESS    OUTPATIENT/OBSERVATION GOALS TO BE MET BEFORE DISCHARGE  1. Orthostatic performed: No    2. Tolerating PO medications: Yes    3. Return to near baseline physical activity: No    4. Cleared for discharge by consultants (if involved): No    Discharge Planner Nurse   Safe discharge environment identified: No  Barriers to discharge: Yes       Entered by: Livia Howe RN 05/14/2023 10:39 AM     Please review provider order for any additional goals.   Nurse to notify provider when observation goals have been met and patient is ready for discharge.

## 2023-05-14 NOTE — ED TRIAGE NOTES
Patient was seen here earlier for chest wall pain, diagnosed with rib fractures after CPR on 4/29 after aspiration from a withdrawal seizure. Here with rib pain and stating he is unable to take care of himself at home, lives by himself.   Verbalizing wanting TCU placement.   Patient reports going home after being seen in the ED today and drank 1 pint of vodka. Unknown fall today. Bilateral knees appear red, reports a fall a few weeks ago.      Triage Assessment       Row Name 05/13/23 4399       Triage Assessment (Adult)    Airway WDL WDL       Respiratory WDL    Respiratory WDL WDL       Skin Circulation/Temperature WDL    Skin Circulation/Temperature WDL WDL       Cardiac WDL    Cardiac WDL X;chest pain  chest wall pain, rib fractures       Chest Pain Assessment    Chest Pain Location midsternal       Peripheral/Neurovascular WDL    Peripheral Neurovascular WDL WDL       Cognitive/Neuro/Behavioral WDL    Cognitive/Neuro/Behavioral WDL WDL
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
## 2023-05-14 NOTE — PLAN OF CARE
"PRIMARY DIAGNOSIS: \"GENERIC\" NURSING  OUTPATIENT/OBSERVATION GOALS TO BE MET BEFORE DISCHARGE:  1. ADLs back to baseline: No    2. Activity and level of assistance: bedrest    3. Pain status: Improved-controlled with oral pain medications.    4. Return to near baseline physical activity: No     Discharge Planner Nurse   Safe discharge environment identified: No  Barriers to discharge: Patient continues to require acute medical attention. Patient lactic acid elevated.        Entered by: Katherine Thompson RN 05/14/2023 3:43 AM     Please review provider order for any additional goals.   Nurse to notify provider when observation goals have been met and patient is ready for discharge.  "

## 2023-05-14 NOTE — PLAN OF CARE
H&P completed on 5/14 please refer for further details.    Mr. Watson is a 72 years old man with past medical history significant for hypertension, alcohol use disorder, prior SDH, recurrent hospitalization over the last year with alcohol intoxication and withdrawal after his wife passed away from cancer a few months ago.  Patient had a recent prolonged hospitalization from 4/24-5/8 with alcohol intoxication (complicated with possible seizure, PEA arrest, aspiration pneumonia) he was eventually discharged to TCU.  Per patient he spent a few days to TCU before going home.  At home he was drinking alcohol again.  He was unable to take care of himself.  He believes that he would benefit from home care. He called 911, EMS suggested that he goes to the hospital in order for him to get reevaluated and possibly get home care set up.  Patient denies any fever, chills, abdominal pain, nausea, vomiting or any other concerning symptoms.  Reported fall 2 days prior to this hospitalization.      #Alcohol use disorder  #Alcohol intoxication  #Recurrent hospitalization for alcohol withdrawal intoxication  -Patient's wife passed away from cancer a few months ago.  -Over the last few months, patient has been depressed and unable to take care of himself.  -Recurrent hospitalization for alcohol intoxication and withdrawal the last 1 earlier this month.  -Alcohol level on presentation around 260    Plan:  [] Continue CIWA protocol  [] Continue folic acid and thiamine  [] Seizure precaution (history of alcohol withdrawal seizures)      #PTSD  -PTSD due to [place].  Also, the patient's wife has passed few months ago.  -Was evaluated by psychiatry 4/25.  Recommended psychotherapy for grief management.       Plan:  [] Consult psychiatry again as I believe that the patient will return to the hospital within a few days without addressing the main concern of his depression and alcohol dependency  [] Continue Zyprexa and Haldol as  needed.  [] Restart home gabapentin      #Recent history of PEA arrest status post chest compression on 4/29  #Bilateral rib fractures.  -Intermittent chest pain with certain movement and cough.    Plan:  [] Pain management with Tylenol, Dilaudid and lidocaine patch    #History of migraine headache  -Denies any headache.      #Elevated lactic acid  #Mild leukocytosis  #History of recurrent aspiration  -Unclear etiology of mild leukocytosis and mildly elevated lactic acid--> reported falls--> possible seizure given history versus sinusitis or recurrent of aspiration pneumonia  -CT cervical spine and head showed possible sinusitis,  decreased bibasilar consolidation and resolution of previously seen pleural effusions. Persistent endobronchial opacification suggesting aspiration.    Plan:  [] Start Augmentin to cover for possible sinusitis and pneumonia  [] Evaluated by SLP in the past--> patient does not want to follow a certain diet.  [] Check urinalysis  [] Check CK  [] Recheck lactic acid  [] LR at 75 cc/h      #Hypertension  -Blood pressures currently around 160/80.     Plan:  [] Continue to monitor.       #Hypomagnesemia  -Most likely due to chronic alcohol use    Plan:  [] Replete as per protocol

## 2023-05-14 NOTE — PROGRESS NOTES
"PRIMARY DIAGNOSIS: GENERALIZED WEAKNESS    OUTPATIENT/OBSERVATION GOALS TO BE MET BEFORE DISCHARGE  1. Orthostatic performed: No    2. Tolerating PO medications: Yes    3. Return to near baseline physical activity: No    4. Cleared for discharge by consultants (if involved): No    Discharge Planner Nurse   Safe discharge environment identified: No  Barriers to discharge: Yes       Entered by: Livia Howe RN 05/14/2023 4:56 PM     Please review provider order for any additional goals.   Nurse to notify provider when observation goals have been met and patient is ready for discharge.  Pt on CIWAA protocol 0800 pt scored a 1, at 1200 pt scored a 1 pt having some mild tremors in upper extremities. PRN dilaudid 2mg given at 1718 for pain in his chest/rib cage from pervious hosplization on 4/29 when pt was in cardiac arrest and CPR was performed. Pt has LR running at 75/ml, lactic acid is trending down. Pt possibly may discharge home with home care services per \" pt request doesn't want TCU\". Reassed pt's CIWAA pt appearing to have increased tremors in both arms when extended pt now scoring a 4.   Livia Howe RN on 5/14/2023 at 6:14 PM  "

## 2023-05-14 NOTE — ED PROVIDER NOTES
"EMERGENCY DEPARTMENT ENCOUNTER       ED Course & Medical Decision Making     11:55 PM I met with the patient for an initial encounter and evaluation.    Final Impression  72 year old male brought in by EMS for evaluation of rib pain and alcohol intoxication.  Per EMS patient called them because he \"cannot take care of himself anymore\".  Patient recently admitted at this hospital on  for alcohol intoxication, failure to thrive, and alcohol withdrawal seizure during that hospitalization with subsequent cardiac arrest and need for intubation.  Patient states that he was discharged to a TCU for a few days, though then was discharged home and states he wants/needs home health care.  Patient seen in this ED earlier today for being scared at home by himself and being unable to take care of himself, EMS had to break down the door and found patient wedged between the coffee table and a couch.  Patient appears to be failing to thrive at home.  Long documented history of alcohol abuse.  On discussion with patient, his wife is , has no children, the only close contact he has is a nephew who recently moved and is no longer available to help take care of him.  Patient appears clinically intoxicated on exam, blood alcohol 259.  Will be admitted for adult failure to thrive, weakness, inability to care for self and alcohol intoxication with a repeat visit to this ED today.  Spoke with Dr. Gongora the hospitalist regarding admission.    Patient expressed interest in having a home health aide, though states he would not like to go to a nursing home.    Prior to making a final disposition on this patient the results of patient's tests and other diagnostic studies were discussed with the patient. All questions were answered. Patient expressed understanding of the plan and was amenable to it.    Medical Decision Making    History:    Supplemental history from: EMS    External Record(s) reviewed as documented below;  o 23 " "admission at [facility] admission note, patient was admitted for alcohol intoxication, adult failure to thrive and need for TCU admission, during that admission patient had an alcohol withdrawal seizure, aspirated, went to cardiac arrest and required CPR, sustained multiple rib fractures, was ultimately discharged to a rehab facility    Work Up:    Chart documentation includes differential considered and any EKGs or imaging independently interpreted by provider, where specified.    External consultation:    Discussion of management with another provider: Hospitalist    Complicating factors:    Care impacted by chronic illness: Smoking/nicotine use, chronic alcohol abuse    Care affected by social determinants of health: No Support for Care at Home    Disposition considerations: Admit.      Medications - No data to display    New Prescriptions    No medications on file       Final Impression     1. Alcoholic intoxication without complication (H)    2. Adult failure to thrive    3. Unable to care for self        Chief Complaint     Chief Complaint   Patient presents with     Rib Pain     Alcohol Intoxication     HPI     Familia Watson is a 72 year old male who presents for evaluation of rib pain and alcohol intoxication.    Patient reports he \"can't take care of [him]self anymore. He notes needing a \"home health care\", but refuses admission. He states other nursing homes weren't \"friendly\". Patient reports calling 911. However, he would like to go home now. Doesn't have a primary care provider. No other medical concerns are expressed at this time.     I, Dalia Lao am serving as a scribe to document services personally performed by Dr. Chaz Berg MD, based on my observation and the provider's statements to me. I, Dr. Chaz Berg MD attest that Dalia Lao is acting in a scribe capacity, has observed my performance of the services and has documented them in accordance with my " "direction.    Physical Exam     /58   Pulse 98   Temp 97.7  F (36.5  C) (Temporal)   Resp 18   Ht 1.702 m (5' 7\")   Wt 59 kg (130 lb)   SpO2 96%   BMI 20.36 kg/m    Constitutional: Awake, alert, in no acute distress.  Head: Normocephalic, atraumatic.  ENT: Mucous membranes moist.  Eyes: Conjunctiva normal.  Respiratory: Respirations even, unlabored, in no acute respiratory distress.  Cardiovascular: Regular rate and rhythm. Good peripheral perfusion.  GI: Abdomen soft, non-tender.  Musculoskeletal: Moves all 4 extremities equally.  Integument: Warm, dry.  Neurologic: Alert though clinically intoxicated, appears to have very little insight into his situation. Normal speech. Grossly normal motor and sensory function. No focal deficits noted.    Labs & Imaging     Results for orders placed or performed during the hospital encounter of 05/13/23   Alcohol level blood   Result Value Ref Range    Alcohol, Blood 259 (H) None detected mg/dL   CBC (+ platelets, no diff)   Result Value Ref Range    WBC Count 13.0 (H) 4.0 - 11.0 10e3/uL    RBC Count 3.16 (L) 4.40 - 5.90 10e6/uL    Hemoglobin 10.7 (L) 13.3 - 17.7 g/dL    Hematocrit 33.6 (L) 40.0 - 53.0 %     (H) 78 - 100 fL    MCH 33.9 (H) 26.5 - 33.0 pg    MCHC 31.8 31.5 - 36.5 g/dL    RDW 15.0 10.0 - 15.0 %    Platelet Count 465 (H) 150 - 450 10e3/uL   Basic metabolic panel   Result Value Ref Range    Sodium 144 136 - 145 mmol/L    Potassium 4.0 3.5 - 5.0 mmol/L    Chloride 109 (H) 98 - 107 mmol/L    Carbon Dioxide (CO2) 24 22 - 31 mmol/L    Anion Gap 11 5 - 18 mmol/L    Urea Nitrogen 8 8 - 28 mg/dL    Creatinine 0.72 0.70 - 1.30 mg/dL    Calcium 9.0 8.5 - 10.5 mg/dL    Glucose 83 70 - 125 mg/dL    GFR Estimate >90 >60 mL/min/1.73m2   Hepatic function panel   Result Value Ref Range    Bilirubin Total 0.2 0.0 - 1.0 mg/dL    Bilirubin Direct 0.1 <=0.5 mg/dL    Protein Total 6.7 6.0 - 8.0 g/dL    Albumin 3.0 (L) 3.5 - 5.0 g/dL    Alkaline Phosphatase 160 (H) " 45 - 120 U/L    AST 79 (H) 0 - 40 U/L    ALT 40 0 - 45 U/L          Chaz Berg MD  05/14/23 8036

## 2023-05-15 LAB
ALBUMIN UR-MCNC: NEGATIVE MG/DL
APPEARANCE UR: CLEAR
BILIRUB UR QL STRIP: NEGATIVE
COLOR UR AUTO: COLORLESS
ERYTHROCYTE [DISTWIDTH] IN BLOOD BY AUTOMATED COUNT: 14.6 % (ref 10–15)
GLUCOSE UR STRIP-MCNC: NEGATIVE MG/DL
HCT VFR BLD AUTO: 29.4 % (ref 40–53)
HGB BLD-MCNC: 9.5 G/DL (ref 13.3–17.7)
HGB UR QL STRIP: NEGATIVE
HOLD SPECIMEN: NORMAL
IRON BINDING CAPACITY (ROCHE): 195 UG/DL (ref 240–430)
IRON SATN MFR SERPL: 36 % (ref 15–46)
IRON SERPL-MCNC: 71 UG/DL (ref 61–157)
KETONES UR STRIP-MCNC: NEGATIVE MG/DL
LEUKOCYTE ESTERASE UR QL STRIP: NEGATIVE
MAGNESIUM SERPL-MCNC: 1.6 MG/DL (ref 1.8–2.6)
MCH RBC QN AUTO: 33.6 PG (ref 26.5–33)
MCHC RBC AUTO-ENTMCNC: 32.3 G/DL (ref 31.5–36.5)
MCV RBC AUTO: 104 FL (ref 78–100)
NITRATE UR QL: NEGATIVE
PH UR STRIP: 6 [PH] (ref 5–7)
PLATELET # BLD AUTO: 409 10E3/UL (ref 150–450)
RBC # BLD AUTO: 2.83 10E6/UL (ref 4.4–5.9)
RBC URINE: 0 /HPF
SP GR UR STRIP: 1 (ref 1–1.03)
UROBILINOGEN UR STRIP-MCNC: <2 MG/DL
WBC # BLD AUTO: 6.5 10E3/UL (ref 4–11)
WBC URINE: <1 /HPF

## 2023-05-15 PROCEDURE — 250N000013 HC RX MED GY IP 250 OP 250 PS 637: Performed by: HOSPITALIST

## 2023-05-15 PROCEDURE — 96375 TX/PRO/DX INJ NEW DRUG ADDON: CPT

## 2023-05-15 PROCEDURE — 250N000013 HC RX MED GY IP 250 OP 250 PS 637: Performed by: FAMILY MEDICINE

## 2023-05-15 PROCEDURE — 36415 COLL VENOUS BLD VENIPUNCTURE: CPT | Performed by: STUDENT IN AN ORGANIZED HEALTH CARE EDUCATION/TRAINING PROGRAM

## 2023-05-15 PROCEDURE — 250N000009 HC RX 250: Performed by: FAMILY MEDICINE

## 2023-05-15 PROCEDURE — 258N000003 HC RX IP 258 OP 636: Performed by: STUDENT IN AN ORGANIZED HEALTH CARE EDUCATION/TRAINING PROGRAM

## 2023-05-15 PROCEDURE — 83550 IRON BINDING TEST: CPT | Performed by: FAMILY MEDICINE

## 2023-05-15 PROCEDURE — 99232 SBSQ HOSP IP/OBS MODERATE 35: CPT | Performed by: FAMILY MEDICINE

## 2023-05-15 PROCEDURE — 250N000011 HC RX IP 250 OP 636: Performed by: FAMILY MEDICINE

## 2023-05-15 PROCEDURE — 85027 COMPLETE CBC AUTOMATED: CPT | Performed by: FAMILY MEDICINE

## 2023-05-15 PROCEDURE — 36415 COLL VENOUS BLD VENIPUNCTURE: CPT | Performed by: FAMILY MEDICINE

## 2023-05-15 PROCEDURE — 81001 URINALYSIS AUTO W/SCOPE: CPT | Performed by: STUDENT IN AN ORGANIZED HEALTH CARE EDUCATION/TRAINING PROGRAM

## 2023-05-15 PROCEDURE — 83735 ASSAY OF MAGNESIUM: CPT | Performed by: STUDENT IN AN ORGANIZED HEALTH CARE EDUCATION/TRAINING PROGRAM

## 2023-05-15 PROCEDURE — 96372 THER/PROPH/DIAG INJ SC/IM: CPT | Performed by: FAMILY MEDICINE

## 2023-05-15 PROCEDURE — 250N000013 HC RX MED GY IP 250 OP 250 PS 637: Performed by: STUDENT IN AN ORGANIZED HEALTH CARE EDUCATION/TRAINING PROGRAM

## 2023-05-15 PROCEDURE — G0378 HOSPITAL OBSERVATION PER HR: HCPCS

## 2023-05-15 RX ORDER — ACETAMINOPHEN 325 MG/1
975 TABLET ORAL 3 TIMES DAILY
Status: DISCONTINUED | OUTPATIENT
Start: 2023-05-15 | End: 2023-05-16 | Stop reason: HOSPADM

## 2023-05-15 RX ORDER — LANOLIN ALCOHOL/MO/W.PET/CERES
1000 CREAM (GRAM) TOPICAL DAILY
Status: DISCONTINUED | OUTPATIENT
Start: 2023-05-15 | End: 2023-05-16 | Stop reason: HOSPADM

## 2023-05-15 RX ORDER — PANTOPRAZOLE SODIUM 40 MG/1
40 TABLET, DELAYED RELEASE ORAL
Status: DISCONTINUED | OUTPATIENT
Start: 2023-05-15 | End: 2023-05-16 | Stop reason: HOSPADM

## 2023-05-15 RX ORDER — LIDOCAINE 4 G/G
1 PATCH TOPICAL
Status: DISCONTINUED | OUTPATIENT
Start: 2023-05-15 | End: 2023-05-16 | Stop reason: HOSPADM

## 2023-05-15 RX ORDER — CITALOPRAM HYDROBROMIDE 10 MG/1
10 TABLET ORAL DAILY
Status: DISCONTINUED | OUTPATIENT
Start: 2023-05-15 | End: 2023-05-16 | Stop reason: HOSPADM

## 2023-05-15 RX ORDER — ATORVASTATIN CALCIUM 40 MG/1
40 TABLET, FILM COATED ORAL AT BEDTIME
Status: DISCONTINUED | OUTPATIENT
Start: 2023-05-15 | End: 2023-05-16 | Stop reason: HOSPADM

## 2023-05-15 RX ORDER — MAGNESIUM SULFATE 4 G/50ML
4 INJECTION INTRAVENOUS ONCE
Status: COMPLETED | OUTPATIENT
Start: 2023-05-15 | End: 2023-05-15

## 2023-05-15 RX ORDER — TAMSULOSIN HYDROCHLORIDE 0.4 MG/1
0.4 CAPSULE ORAL DAILY
Status: DISCONTINUED | OUTPATIENT
Start: 2023-05-15 | End: 2023-05-15

## 2023-05-15 RX ORDER — HEPARIN SODIUM 5000 [USP'U]/.5ML
5000 INJECTION, SOLUTION INTRAVENOUS; SUBCUTANEOUS EVERY 12 HOURS
Status: DISCONTINUED | OUTPATIENT
Start: 2023-05-15 | End: 2023-05-16 | Stop reason: HOSPADM

## 2023-05-15 RX ADMIN — LIDOCAINE 2 PATCH: 246 PATCH TOPICAL at 19:56

## 2023-05-15 RX ADMIN — OXYCODONE HYDROCHLORIDE 2.5 MG: 5 TABLET ORAL at 09:35

## 2023-05-15 RX ADMIN — MAGNESIUM SULFATE HEPTAHYDRATE 4 G: 80 INJECTION, SOLUTION INTRAVENOUS at 09:35

## 2023-05-15 RX ADMIN — GABAPENTIN 300 MG: 300 CAPSULE ORAL at 19:47

## 2023-05-15 RX ADMIN — OXYCODONE HYDROCHLORIDE 5 MG: 5 TABLET ORAL at 21:03

## 2023-05-15 RX ADMIN — LIDOCAINE 1 PATCH: 246 PATCH TOPICAL at 19:56

## 2023-05-15 RX ADMIN — OXYCODONE HYDROCHLORIDE 2.5 MG: 5 TABLET ORAL at 13:47

## 2023-05-15 RX ADMIN — ATORVASTATIN CALCIUM 40 MG: 40 TABLET, FILM COATED ORAL at 21:03

## 2023-05-15 RX ADMIN — HYDROMORPHONE HYDROCHLORIDE 4 MG: 2 TABLET ORAL at 02:44

## 2023-05-15 RX ADMIN — GABAPENTIN 300 MG: 300 CAPSULE ORAL at 13:47

## 2023-05-15 RX ADMIN — Medication 5 MG: at 02:45

## 2023-05-15 RX ADMIN — ACETAMINOPHEN 975 MG: 325 TABLET ORAL at 21:03

## 2023-05-15 RX ADMIN — ALUMINUM HYDROXIDE, MAGNESIUM HYDROXIDE, AND DIMETHICONE 30 ML: 200; 20; 200 SUSPENSION ORAL at 09:36

## 2023-05-15 RX ADMIN — HEPARIN SODIUM 5000 UNITS: 5000 INJECTION, SOLUTION INTRAVENOUS; SUBCUTANEOUS at 17:18

## 2023-05-15 RX ADMIN — AMOXICILLIN AND CLAVULANATE POTASSIUM 1 TABLET: 875; 125 TABLET, FILM COATED ORAL at 07:37

## 2023-05-15 RX ADMIN — FOLIC ACID 1 MG: 1 TABLET ORAL at 07:34

## 2023-05-15 RX ADMIN — OXYCODONE HYDROCHLORIDE 2.5 MG: 5 TABLET ORAL at 17:18

## 2023-05-15 RX ADMIN — CITALOPRAM HYDROBROMIDE 10 MG: 10 TABLET, FILM COATED ORAL at 13:47

## 2023-05-15 RX ADMIN — SODIUM CHLORIDE, POTASSIUM CHLORIDE, SODIUM LACTATE AND CALCIUM CHLORIDE: 600; 310; 30; 20 INJECTION, SOLUTION INTRAVENOUS at 07:34

## 2023-05-15 RX ADMIN — GABAPENTIN 300 MG: 300 CAPSULE ORAL at 07:34

## 2023-05-15 RX ADMIN — Medication 100 MG: at 07:34

## 2023-05-15 RX ADMIN — CYANOCOBALAMIN TAB 1000 MCG 1000 MCG: 1000 TAB at 09:35

## 2023-05-15 RX ADMIN — AMOXICILLIN AND CLAVULANATE POTASSIUM 1 TABLET: 875; 125 TABLET, FILM COATED ORAL at 19:47

## 2023-05-15 RX ADMIN — MULTIPLE VITAMINS W/ MINERALS TAB 1 TABLET: TAB at 07:34

## 2023-05-15 RX ADMIN — LORAZEPAM 1 MG: 1 TABLET ORAL at 19:47

## 2023-05-15 RX ADMIN — HYDROMORPHONE HYDROCHLORIDE 4 MG: 2 TABLET ORAL at 06:20

## 2023-05-15 ASSESSMENT — ACTIVITIES OF DAILY LIVING (ADL)
ADLS_ACUITY_SCORE: 35
ADLS_ACUITY_SCORE: 33
ADLS_ACUITY_SCORE: 35
ADLS_ACUITY_SCORE: 33
ADLS_ACUITY_SCORE: 33
ADLS_ACUITY_SCORE: 35
ADLS_ACUITY_SCORE: 33
ADLS_ACUITY_SCORE: 33
ADLS_ACUITY_SCORE: 35
ADLS_ACUITY_SCORE: 35

## 2023-05-15 NOTE — CONSULTS
"Triage and Transition - Consult and Liaison     Familia AUGUSTIN Shirley  May 15, 2023    Session start: 9:00 am  Session end: 9:31 am  Session duration in minutes: 31  CPT utilized: 24640 - Brief diagnostic assessment (modifier 52)  Patient was seen virtually ([employer] cart or other teleconferencing device).    Diagnosis:   Adjustment Disorders  309.0 (F43.21) With depressed mood, present and by hx.;  Remote hx of MDD, PTSD 2003, per chart review.  Alcohol use      Plan/Recommendations:     Pt needs a CD consult, discussed this at length, he reports he is agreeable to some sort of CD plan/senior recovery.    Writers role is to address sx, hx, dx, before psychiatry provider sees pt.    Pt is not on any psych meds, may benefit from psych med eval/review, will ask psychiatry to follow-up.    Per chart review pt with many unsuccessful discharge plans, may benefit from higher LOC.     Maintain current transition plan. Next steps include: Psychiatry provider follow-up.        Reason for consult: Psychiatry consult was requested due to \"depression, ptsd, recurrent hospitalizations for alch intox\" -per consult reason. Patient was seen by Triage and Transition Consult & Liaison team.     Identifying information: Familia is 72 year old  or   male   followed related to \"depression, ptsd, recurrent hospitalizations for alch intox\" -per consult reason.    Summary of Patient Situation  Pt is sitting up in bed, he is agreeable to meet via  today.  He state he is here at the hospital for \"drinking too much alcohol\".  He states his current mood as \"pretty good, a little sleepy, didn't sleep well last night\".  Pt is a .  Pt reports he has been eating and drinking well.  Per chart review pt with a remote hx of PTSD and MDD (2003), more recently adjustment with mixed sx, and anxiety, with information available in epic ehr at the time of this note.    Today, pt presents with anxiety and alcohol use, he denies " "any depression sx, he states \"no im not depressed\", when asked about his wife passing he states \"I got through it, Jennifer delt with it on my own\" \"I haven't been feeling depressed\" \"If I talk about it, it brings up few minutes of sadness, then goes away, I am not depressed\".  Pt denies SI,HI,AH/VH.  Pt reports he is hopeful looking forward to getting home.      Discussed at length pt alcohol use and past failed hospital discharge plans, addressed alcohol use and a CD tx plan, he reports he thinks his alcohol is a problem, he is agreeable to meet with CD consult team.    He also appears to lack supports, previously he reports his \"nephew Chip and Niece\" were supports and helping him, today, he states \"Yeah my nephew and niece got mad at me and abandon me\", upon inquiry he does not expand.  Per chart review, past unsuccessful discharge plans, pt may benefit from higher LOC, see there is PT/OT consults pending.      Pt reports he does not see a therapist and upon inquiry he expresses no desire to do so \"for what, im not depressed\".  Pt reports he was taking something for sleep, then reports he is not sleeping well.  Per chart review pt appears to be on CIWA.  Psychiatry seen pt 4/25, will ask psychiatry provider to follow up, left message.            Collateral information:   Reviewed chart, coordinated with care team, and coordinated with Psychiatry provider for pt follow-up, left message.      Mental Status Exam   Affect: Appropriate  Appearance: Appropriate   Attention Span/Concentration: Attentive    Eye Contact: Engaged  Fund of Knowledge: Other: appears confused at times   Language /Speech Content: Fluent  Language /Speech Volume: Normal   Language /Speech Rate/Productions: Normal   Recent Memory: Variable  Remote Memory: Variable  Mood: Anxious   Orientation:   Person: Yes   Place: Yes  Time of Day: Yes   Date: Answer: .   Situation (Do they understand why they are here?): Yes   Psychomotor Behavior: Normal "   Thought Content: Clear denies SI,HI,AH/VH  Thought Form: Other: appears confused at times    Current medications: Per EHR at time of this note.  Current Facility-Administered Medications   Medication     acetaminophen (TYLENOL) tablet 650 mg     amoxicillin-clavulanate (AUGMENTIN) 875-125 MG per tablet 1 tablet     atorvastatin (LIPITOR) tablet 40 mg     cyanocobalamin (VITAMIN B-12) tablet 1,000 mcg     docusate sodium (COLACE) capsule 100 mg     flumazenil (ROMAZICON) injection 0.2 mg     folic acid (FOLVITE) tablet 1 mg     gabapentin (NEURONTIN) capsule 300 mg     OLANZapine zydis (zyPREXA) ODT half-tab 5-10 mg    Or     haloperidol lactate (HALDOL) injection 2.5-5 mg     ipratropium - albuterol 0.5 mg/2.5 mg/3 mL (DUONEB) neb solution 3 mL     Lidocaine (LIDOCARE) 4 % Patch 2 patch    And     lidocaine patch in PLACE     lidocaine (viscous) (XYLOCAINE) 2 % 15 mL, alum & mag hydroxide-simethicone (MAALOX) 15 mL GI Cocktail     LORazepam (ATIVAN) tablet 1-2 mg    Or     LORazepam (ATIVAN) injection 1-2 mg     magnesium sulfate 4 g in 50 mL sterile water intermittent infusion     melatonin tablet 3 mg     melatonin tablet 5 mg     multivitamin w/minerals (THERA-VIT-M) tablet 1 tablet     naloxone (NARCAN) injection 0.2 mg    Or     naloxone (NARCAN) injection 0.4 mg    Or     naloxone (NARCAN) injection 0.2 mg    Or     naloxone (NARCAN) injection 0.4 mg     ondansetron (ZOFRAN ODT) ODT tab 4 mg    Or     ondansetron (ZOFRAN) injection 4 mg     oxyCODONE IR (ROXICODONE) half-tab 2.5 mg     pantoprazole (PROTONIX) EC tablet 40 mg     prochlorperazine (COMPAZINE) injection 5 mg    Or     prochlorperazine (COMPAZINE) tablet 5 mg    Or     prochlorperazine (COMPAZINE) suppository 12.5 mg     thiamine (B-1) tablet 100 mg     traZODone (DESYREL) tablet 50 mg        Therapeutic intervention and progress:  Therapeutic intervention consisted of building therapeutic rapport, active listening, validation, thought reframing  and CBT concepts.       Adolfo ARNDT Psychotherapist Trainee  Triage and Transition - Consult and Liaison   [phone]

## 2023-05-15 NOTE — PROGRESS NOTES
PRIMARY DIAGNOSIS: VERTIGO    OUTPATIENT/OBSERVATION GOALS TO BE MET BEFORE DISCHARGE  1. Orthostatic performed: No    2. Completion of appropriate imaging: N/A    3. Tolerating PO medications: Yes    4. Return to near baseline physical activity: No    5. Cleared for discharge by consultants (if involved): No    Discharge Planner Nurse   Safe discharge environment identified: Yes  Barriers to discharge: Yes       Entered by: Margarita Becerra RN 05/15/2023 3:05 AM     Please review provider order for any additional goals.   Nurse to notify provider when observation goals have been met and patient is ready for discharge.    Pt reported nauseated and light headache, managed with MAR. Pt was able to verbally teach back what to do when feeling light headache such as taking time to balance before getting up.  Continue POC.

## 2023-05-15 NOTE — PLAN OF CARE
PRIMARY DIAGNOSIS: PNEUMONIA/ETOH  OUTPATIENT/OBSERVATION GOALS TO BE MET BEFORE DISCHARGE:  Dyspnea improved and O2 sats >88% on RA or back to baseline O2 levels: Yes   SpO2: 98 %, O2 Device: None (Room air)    Tolerating oral abx or appropriate plans made outpatient infusion: Yes    Vitals signs normal or return to baseline: Yes    Short term supplemental O2 needed with activity at home: No    Tolerate oral intake to maintain hydration: Yes    Return to near baseline physical activity: Yes    Discharge Planner Nurse   Safe discharge environment identified: No   Barriers to discharge: Yes    Discharge destination still TBD. Chemical Dependency Consult pending. CIWA scores low. Chest pain from rib fractures is patient's biggest complaint. Changed to oxycodone from Dilaudid, had to increase frequency to maintain pain control. Awaiting discharge plan.        Entered by: Jennifer Francois RN 05/15/2023 5:32 PM     Please review provider order for any additional goals.   Nurse to notify provider when observation goals have been met and patient is ready for discharge.

## 2023-05-15 NOTE — PROGRESS NOTES
Care Management Follow Up    Length of Stay (days): 0    Expected Discharge Date: 05/16/2023     Concerns to be Addressed: discharge planning     Patient plan of care discussed at interdisciplinary rounds: Yes    Anticipated Discharge Disposition:       Anticipated Discharge Services:    Anticipated Discharge DME:      Patient/family educated on Medicare website which has current facility and service quality ratings:    Education Provided on the Discharge Plan:    Patient/Family in Agreement with the Plan: yes    Referrals Placed by CM/SW:    Private pay costs discussed: Not applicable    Additional Information:  3:42 PM  SW requested hospitalist place chemical dependency IP consult per  provider recommendation.  SW will remain available to assist with any further needs.      JO Warner

## 2023-05-15 NOTE — PROGRESS NOTES
[facility] MEDICINE PROGRESS NOTE      Identification/Summary: Familia Watson is a 72 year old male with a past medical history of dyslipidemia, GERD, alcohol dependence, recent hospitalization 4/27-- >5/8 with alcohol intoxication and withdrawal, alcohol withdrawal seizure, recent history of PEA arrest status post chest compression and ventilation on 4/29, bilateral rib fractures, depression, PTSD, rehabed at TCU.  Started drinking alcohol again and was unable to take care of himself.  EMS brought to the hospital on 5/13 found to have alcohol intoxication, alcohol withdrawal and aspiration pneumonia.  CT scan revealed bibasilar consolidation and persistent endobronchial opacification suggesting aspiration.  Started on soft diet and mildly thick liquid.  On Augmentin. Afebrile.  Mild leukocytosis at 13.  Lactic acidosis corrected with hydration, 3-->1.4. Has tremors.  Getting PT and OT.  Will be discharging home with home care in a.m. Getting electrolyte replacement.  Has been drinking alcohol due to depression.  Started on Celexa 10 mg daily.      Assessment and Plan:  Alcohol dependence  Alcohol intoxication  Alcohol withdrawal  Absolute alcohol cessation  WA protocol  Has minimal alcohol withdrawal at present  Folic acid and thiamine supplement  Gabapentin 300 mg 3 times a day  Recent alcohol withdrawal seizure, seizure precaution  Tremors, PT and OT  Will be discharging home with home PT and OT    Depression  Situational and anxiety  History of PTSD due to [place]  Wife has passed few months ago  Celexa 10 mg daily started  Consider outpatient psychotherapy    Recent PEA arrest responded with CPR and ventilator support on 4/29  Bilateral rib fracture, supportive care    Aspiration pneumonia  Recurrent aspiration  Started on Augmentin  Soft diet with thickened liquid     Dyslipidemia  Lipitor 40 mg daily    GERD  Resume PPI    Hypomagnesemia, resume replace    Hypoalbuminemia:  Lowest albumin = 3 g/dL at 5/14/2023 12:10 AM, will monitor as appropriate     Macrocytic anemia  Iron study    Failure to thrive  Moderate malnutrition Body mass index is 19.54 kg/m .  Increase p.o. protein intake    Code full  DVT prophylaxis  Subcu heparin 5000 unit twice a day  Barrier to discharge  Awaiting for more clinical improvement.  Anticipated discharge in 1 day with home care    Alis Gage MD  [facility]  Phone: #[phone]  Securely message with the Vocera Web Console (learn more here)  Text page via [employer] Paging/Directory     Interval History/Subjective:  Resting comfortably.  Has tremors.  No seizures.  Minimal alcohol withdrawal symptoms.  Appetite improves.  No other concern.    Physical Exam/Objective:  Temp:  [97.8  F (36.6  C)-98.2  F (36.8  C)] 97.8  F (36.6  C)  Pulse:  [79-90] 90  Resp:  [18-20] 18  BP: (121-167)/(76-96) 121/77  SpO2:  [95 %-98 %] 98 %  Body mass index is 19.54 kg/m .    GENERAL:  Alert, appears comfortable, in no acute distress, appears stated age   HEAD:  Normocephalic, without obvious abnormality, atraumatic   EYES:  PERRL, conjunctiva  clear,   EOM's intact   NOSE: Nares normal,  mucosa normal, no drainage   THROAT: Lips, mucosa,   gums normal, mouth moist   NECK: Supple, symmetrical, trachea midline   BACK:   Symmetric, no curvature, ROM normal   LUNGS:   Clear to auscultation bilaterally, no rales, rhonchi, or wheezing, symmetric chest rise on inhalation, respirations unlabored   CHEST WALL:  No tenderness or deformity   HEART:  Regular rate and rhythm, S1 and S2 normal, no murmur     ABDOMEN:   Soft, non-tender, bowel sounds active , no masses, no organomegaly, no rebound or guarding   EXTREMITIES: No  edema    SKIN: No rashes   NEURO: Alert, oriented x3, moves all four extremities freely   PSYCH: Cooperative, behavior is appropriate      Data reviewed today: I personally reviewed all new medications,  labs, imaging/diagnostics reports over the past 24 hours. Pertinent findings include:    Imaging:   ABDOMEN CT  1.  No acute pulmonary embolism or secondary signs of right heart strain.  2.  Decreased bibasilar consolidation and resolution of previously seen pleural effusions. Persistent endobronchial opacification suggesting aspiration.  3.  Unchanged bilateral rib fractures.  4.  Hepatic steatosis.    HEAD CT:  1.  No acute traumatic intracranial abnormality.   2.  Layering aerated secretions in the right sphenoid sinus can be seen with acute sinusitis in the appropriate clinical context.  3.  Additional similar chronic and postsurgical findings, as before.     CERVICAL SPINE CT:  1.  No acute osseous abnormality of the cervical spine.   2.  Multilevel degenerative changes with mild-moderate multifocal foraminal stenosis.    Labs:  Most Recent 3 CBC's:Recent Labs   Lab Test 05/14/23  0010 05/13/23  1422 05/06/23  0531   WBC 13.0* 7.0 6.6   HGB 10.7* 11.3* 8.1*   * 103* 100   * 492* 284     Most Recent 3 BMP's:Recent Labs   Lab Test 05/14/23  0010 05/13/23  1422 05/07/23  0348 05/06/23  0531    143  --  133*   POTASSIUM 4.0 3.9 4.1 3.9   CHLORIDE 109* 111*  --  102   CO2 24 22  --  23   BUN 8 5*  --  6*   CR 0.72 0.61*  --  0.59*   ANIONGAP 11 10  --  8   MELLISA 9.0 8.7  --  8.3*   GLC 83 84  --  89     Most Recent 2 LFT's:Recent Labs   Lab Test 05/14/23  0010 05/13/23  1422   AST 79* 53*   ALT 40 36   ALKPHOS 160* 175*   BILITOTAL 0.2 0.2       Medications:   Personally Reviewed.  Medications       amoxicillin-clavulanate  1 tablet Oral Q12H IGNACIA (08/20)     atorvastatin  40 mg Oral At Bedtime     cyanocobalamin  1,000 mcg Oral Daily     folic acid  1 mg Oral Daily     gabapentin  300 mg Oral TID     lidocaine  2 patch Transdermal Q24h    And     lidocaine   Transdermal Q8H IGNACIA     magnesium sulfate  4 g Intravenous Once     multivitamin w/minerals  1 tablet Oral Daily     pantoprazole  40 mg Oral  ERIC EMERSON     thiamine  100 mg Oral Daily        Total time spent 50 min on the date of service doing chart review, history, exam, documentation & further activities per the note

## 2023-05-15 NOTE — PROGRESS NOTES
PRIMARY DIAGNOSIS: CHEST PAIN  OUTPATIENT/OBSERVATION GOALS TO BE MET BEFORE DISCHARGE:  1. Ruled out acute coronary syndrome (negative or stable Troponin):  Yes  2. Pain Status: Improved-controlled with oral pain medications.  3. Appropriate provocative testing performed: N/A  - Stress Test Procedure: N/A  - Interpretation of cardiac rhythm per telemetry tech: NSR    4. Cleared by Consultants (if applicable):No  5. Return to near baseline physical activity: No  Discharge Planner Nurse   Safe discharge environment identified: Yes  Barriers to discharge: Yes       Entered by: Margarita Becerra RN 05/15/2023 4:55 AM     Please review provider order for any additional goals.   Nurse to notify provider when observation goals have been met and patient is ready for discharge.

## 2023-05-15 NOTE — PROGRESS NOTES
PRIMARY DIAGNOSIS: GENERALIZED WEAKNESS    OUTPATIENT/OBSERVATION GOALS TO BE MET BEFORE DISCHARGE  1. Orthostatic performed: N/A    2. Tolerating PO medications: Yes    3. Return to near baseline physical activity: No    4. Cleared for discharge by consultants (if involved): No    Discharge Planner Nurse   Safe discharge environment identified: Yes  Barriers to discharge: Yes        Entered by: Margarita Becerra RN 05/15/2023 3:04 AM     Please review provider order for any additional goals.   Nurse to notify provider when observation goals have been met and patient is ready for discharge.

## 2023-05-15 NOTE — PROGRESS NOTES
"SPIRITUAL HEALTH SERVICES Progress Note      Saw pt Familia A Harleton due to follow up after last admission.    Patient/Family Understanding of Illness and Goals of Care - Familia admitted due to struggling to care for himself.  He complained how pain in his ribs that he associated with having had chest compressions to prolong/save his life    Distress and Loss - Familia shows signs of depression and his actively grieving the loss of his wife, his independence, is longing for home and his loss of sense of place.     Strengths, Coping, and Resources - Familia was very reflective about aging and being looked at as an Elder and shared stories where he extended support and [place] to those struggling.  He name a desire to get a Cat to care for, feeling this would help him look out of himself.      Meaning, Beliefs, and Spirituality - Not explored this visit but in past visits he shared he finds meaning in his [place]/native spirituality and its connection to nature.  He reflected on death as part of life but stated he needed \"to keep going\".  He named the value of human connections    Plan of Care - Cont to assess and support though duration of stay      Chaz Allen M.Div., [facility]  Staff    [phone]    "

## 2023-05-16 ENCOUNTER — APPOINTMENT (OUTPATIENT)
Dept: PHYSICAL THERAPY | Facility: CLINIC | Age: 73
End: 2023-05-16
Attending: FAMILY MEDICINE
Payer: MEDICARE

## 2023-05-16 VITALS
BODY MASS INDEX: 19.48 KG/M2 | TEMPERATURE: 97.6 F | RESPIRATION RATE: 16 BRPM | SYSTOLIC BLOOD PRESSURE: 150 MMHG | WEIGHT: 124.1 LBS | OXYGEN SATURATION: 99 % | DIASTOLIC BLOOD PRESSURE: 94 MMHG | HEIGHT: 67 IN | HEART RATE: 94 BPM

## 2023-05-16 LAB — MAGNESIUM SERPL-MCNC: 1.8 MG/DL (ref 1.8–2.6)

## 2023-05-16 PROCEDURE — G0378 HOSPITAL OBSERVATION PER HR: HCPCS

## 2023-05-16 PROCEDURE — 250N000013 HC RX MED GY IP 250 OP 250 PS 637: Performed by: FAMILY MEDICINE

## 2023-05-16 PROCEDURE — 250N000013 HC RX MED GY IP 250 OP 250 PS 637: Performed by: HOSPITALIST

## 2023-05-16 PROCEDURE — 83735 ASSAY OF MAGNESIUM: CPT | Performed by: FAMILY MEDICINE

## 2023-05-16 PROCEDURE — 250N000011 HC RX IP 250 OP 636: Performed by: HOSPITALIST

## 2023-05-16 PROCEDURE — 99239 HOSP IP/OBS DSCHRG MGMT >30: CPT | Performed by: FAMILY MEDICINE

## 2023-05-16 PROCEDURE — 36415 COLL VENOUS BLD VENIPUNCTURE: CPT | Performed by: FAMILY MEDICINE

## 2023-05-16 PROCEDURE — 99215 OFFICE O/P EST HI 40 MIN: CPT | Performed by: NURSE PRACTITIONER

## 2023-05-16 PROCEDURE — 97116 GAIT TRAINING THERAPY: CPT | Mod: GP

## 2023-05-16 PROCEDURE — 250N000013 HC RX MED GY IP 250 OP 250 PS 637: Performed by: STUDENT IN AN ORGANIZED HEALTH CARE EDUCATION/TRAINING PROGRAM

## 2023-05-16 PROCEDURE — 96372 THER/PROPH/DIAG INJ SC/IM: CPT | Performed by: FAMILY MEDICINE

## 2023-05-16 PROCEDURE — 250N000011 HC RX IP 250 OP 636: Performed by: FAMILY MEDICINE

## 2023-05-16 RX ORDER — LANOLIN ALCOHOL/MO/W.PET/CERES
3 CREAM (GRAM) TOPICAL AT BEDTIME
Qty: 30 TABLET | Refills: 0 | Status: ON HOLD | OUTPATIENT
Start: 2023-05-16 | End: 2023-06-14

## 2023-05-16 RX ORDER — LIDOCAINE 50 MG/G
1 PATCH TOPICAL EVERY 24 HOURS
Qty: 10 PATCH | Refills: 0 | Status: ON HOLD | OUTPATIENT
Start: 2023-05-16 | End: 2023-06-14

## 2023-05-16 RX ORDER — CITALOPRAM HYDROBROMIDE 10 MG/1
10 TABLET ORAL DAILY
Qty: 30 TABLET | Refills: 0 | Status: ON HOLD | OUTPATIENT
Start: 2023-05-17 | End: 2023-06-14

## 2023-05-16 RX ADMIN — ACETAMINOPHEN 650 MG: 325 TABLET ORAL at 05:26

## 2023-05-16 RX ADMIN — GABAPENTIN 300 MG: 300 CAPSULE ORAL at 08:41

## 2023-05-16 RX ADMIN — ONDANSETRON 4 MG: 4 TABLET, ORALLY DISINTEGRATING ORAL at 01:15

## 2023-05-16 RX ADMIN — CYANOCOBALAMIN TAB 1000 MCG 1000 MCG: 1000 TAB at 08:41

## 2023-05-16 RX ADMIN — Medication 100 MG: at 08:42

## 2023-05-16 RX ADMIN — OXYCODONE HYDROCHLORIDE 5 MG: 5 TABLET ORAL at 05:26

## 2023-05-16 RX ADMIN — OXYCODONE HYDROCHLORIDE 5 MG: 5 TABLET ORAL at 01:15

## 2023-05-16 RX ADMIN — MULTIPLE VITAMINS W/ MINERALS TAB 1 TABLET: TAB at 08:41

## 2023-05-16 RX ADMIN — PANTOPRAZOLE SODIUM 40 MG: 40 TABLET, DELAYED RELEASE ORAL at 08:42

## 2023-05-16 RX ADMIN — ACETAMINOPHEN 975 MG: 325 TABLET ORAL at 08:42

## 2023-05-16 RX ADMIN — CITALOPRAM HYDROBROMIDE 10 MG: 10 TABLET, FILM COATED ORAL at 08:44

## 2023-05-16 RX ADMIN — LORAZEPAM 1 MG: 1 TABLET ORAL at 01:15

## 2023-05-16 RX ADMIN — HEPARIN SODIUM 5000 UNITS: 5000 INJECTION, SOLUTION INTRAVENOUS; SUBCUTANEOUS at 05:22

## 2023-05-16 RX ADMIN — OXYCODONE HYDROCHLORIDE 2.5 MG: 5 TABLET ORAL at 08:42

## 2023-05-16 RX ADMIN — FOLIC ACID 1 MG: 1 TABLET ORAL at 08:41

## 2023-05-16 RX ADMIN — AMOXICILLIN AND CLAVULANATE POTASSIUM 1 TABLET: 875; 125 TABLET, FILM COATED ORAL at 08:41

## 2023-05-16 ASSESSMENT — ACTIVITIES OF DAILY LIVING (ADL)
ADLS_ACUITY_SCORE: 33

## 2023-05-16 NOTE — PROGRESS NOTES
Pt adamant on leaving. Okay for pt to leave per Dr. Gage. Not willing to wait to speak to chemical dependency team. Orders placed. AVS printed. Pt not wanting discharge paperwork gone over with him. AVS given to pt by staff RN. Cab company called and ride set up per pt request. Pt stated he is okay with paying the cab fee, which cab company stated would be a $20 minimum.    Taylor R Schoenecker, RN

## 2023-05-16 NOTE — PROGRESS NOTES
PRIMARY DIAGNOSIS: FTT  OUTPATIENT/OBSERVATION GOALS TO BE MET BEFORE DISCHARGE:  1. ADLs back to baseline: No    2. Activity and level of assistance: Up with standby assistance.    3. Pain status: Improved-controlled with oral pain medications.    4. Return to near baseline physical activity: No     Discharge Planner Nurse   Safe discharge environment identified: Yes  Barriers to discharge: Pending clinical improvement       Entered by: Nirmala Gary RN 05/15/2023 10:35 PM     Please review provider order for any additional goals.   Nurse to notify provider when observation goals have been met and patient is ready for discharge.

## 2023-05-16 NOTE — DISCHARGE SUMMARY
[facility] MEDICINE  DISCHARGE SUMMARY     Primary Care Physician: Daniel Martinez  Admission Date: 5/13/2023   Discharge Provider: Alis Gage MD Discharge Date: 5/16/2023   Diet:   Snacks/Supplements Adult: Ensure Enlive; Between Meals   Combination Diet Regular Diet; Mechanical/Dental Soft Diet; Mildly Thick (level 2)      Code Status: Full Code   Activity: DCACTIVITY: Activity as tolerated        Condition at Discharge: Stable     REASON FOR PRESENTATION(See Admission Note for Details)   Alcohol intoxication    PRINCIPAL & ACTIVE DISCHARGE DIAGNOSES   Alcohol dependence  Alcohol intoxication  Alcohol withdrawal  Recent alcohol withdrawal seizure, 2 weeks ago  History of major depression and PTSD  Adjustment disorder with anxiety and depression in the setting of poor functionality, difficulty coping  History of PTSD  Recent PEA arrest after seizure disorder responded with CPR and ventilator support on 4/29  Bilateral rib fractures  Aspiration pneumonia  Recurrent aspiration  Dyslipidemia  Failure to thrive  Moderate malnutrition, BMI 19.54 kg/m   Hypoalbuminemia  Hypomagnesemia, replaced  Macrocytic anemia    PENDING LABS     Unresulted Labs Ordered in the Past 30 Days of this Admission     Date and Time Order Name Status Description    5/13/2023  4:25 PM Blood Culture Peripheral Blood Preliminary     5/13/2023  4:25 PM Blood Culture Peripheral Blood Preliminary            PROCEDURES ( this hospitalization only)      None    RECOMMENDATIONS TO OUTPATIENT PROVIDER FOR F/U VISIT     Follow-up Appointments     Follow-up and recommended labs and tests       Follow-up with primary MD in 1 week  Outpatient chemical dependency treatment  Absolute alcohol cessation  Follow-up with psychiatry in 2 weeks             DISPOSITION     Home with home care    SUMMARY OF HOSPITAL COURSE:      Mr.Gordon CHARLI Watson is a 72 year old male with a past medical history of dyslipidemia, GERD,  alcohol dependence, recent hospitalization 4/27-- >5/8 with alcohol intoxication and withdrawal, alcohol withdrawal seizure, recent history of PEA arrest status post chest compression and ventilation on 4/29, bilateral rib fractures, depression, PTSD, rehabed at TCU.  Started drinking alcohol again after returning home and was unable to take care of himself.  EMS brought to the hospital on 5/13 found to have alcohol intoxication with alcohol level 269, alcohol withdrawal and aspiration pneumonia.  CT scan revealed bibasilar consolidation and persistent endobronchial opacification suggesting aspiration.  Started on soft diet and mildly thick liquid.    Continue Augmentin to complete 1 week course. Afebrile.  Mild leukocytosis at 13-->6.5.  Lactic acidosis corrected with hydration, 3-->1.4.     History of major depression and PTSD.  Has adjustment disorder with depression and anxiety in the setting of poor functionality, difficulty with the running of household errands.  Has multiple hospitalization with alcohol intoxication.  Recent hospitalization complicated with alcohol withdrawal seizure, PEA required CPR and ventilation.  Develops bilateral rib fractures from CPR.  On oxycodone and lidocaine patch for pain control.  He has grief and bereavement in the setting of passing of his wife recently.  Started on Celexa 10 mg daily.  Evaluated by psychiatry.  Will follow-up with psychiatry closely as outpatient.  Absolute alcohol cessation is advised.  Chemical dependency assessment for outpatient supportive treatment.  Patient did not want to wait for inpatient CD consultation.  Can call [facility] at [phone] for an Outpatient FABY Assessment. Resume folic acid and thiamine supplement.  Has significant malnutrition with BMI 19.54 kg/m .  Increase p.o. protein intake.      Discharge Medications with Med changes:     Current Discharge Medication List      START taking these medications    Details    amoxicillin-clavulanate (AUGMENTIN) 875-125 MG tablet Take 1 tablet by mouth every 12 hours for 6 days  Qty: 12 tablet, Refills: 0    Associated Diagnoses: Aspiration pneumonia, unspecified aspiration pneumonia type, unspecified laterality, unspecified part of lung (H)      citalopram (CELEXA) 10 MG tablet Take 1 tablet (10 mg) by mouth daily  Qty: 30 tablet, Refills: 0    Associated Diagnoses: Depression, unspecified depression type      lidocaine (LIDODERM) 5 % patch Place 1 patch onto the skin every 24 hours To prevent lidocaine toxicity, patient should be patch free for 12 hrs daily.  Qty: 10 patch, Refills: 0    Associated Diagnoses: Closed fracture of multiple ribs of left side with routine healing, subsequent encounter      melatonin 3 MG tablet Take 1 tablet (3 mg) by mouth At Bedtime  Qty: 30 tablet, Refills: 0    Associated Diagnoses: Depression, unspecified depression type         CONTINUE these medications which have NOT CHANGED    Details   acetaminophen (TYLENOL) 325 MG tablet Take 650 mg by mouth every 4 hours as needed for mild pain or fever Not to exceed 3 grams in 24 hours.      aspirin-acetaminophen-caffeine (EXCEDRIN MIGRAINE) 250-250-65 MG tablet Take 1 tablet by mouth daily as needed for headaches  Qty: 7 tablet, Refills: 0    Associated Diagnoses: Tension headache      atorvastatin (LIPITOR) 40 MG tablet Take 40 mg by mouth At Bedtime      cyanocobalamin (VITAMIN B-12) 1000 MCG tablet Take 1,000 mcg by mouth daily      folic acid 800 MCG tablet Take 1 tablet (800 mcg) by mouth daily for 20 days  Qty: 20 tablet, Refills: 0    Associated Diagnoses: Alcohol dependence with intoxication with complication (H)      gabapentin (NEURONTIN) 300 MG capsule Take 1 capsule (300 mg) by mouth 3 times daily    Associated Diagnoses: Neuropathy      ipratropium - albuterol 0.5 mg/2.5 mg/3 mL (DUONEB) 0.5-2.5 (3) MG/3ML neb solution Take 1 vial (3 mLs) by nebulization every 4 hours as needed for shortness of  breath  Qty: 90 mL    Associated Diagnoses: Chronic bronchitis, unspecified chronic bronchitis type (H)      Lidocaine (LIDOCARE) 4 % Patch Place 2 patches onto the skin daily for 14 days To prevent lidocaine toxicity, patient should be patch free for 12 hrs daily.  Qty: 28 patch, Refills: 0    Associated Diagnoses: Chest wall pain      magnesium oxide (MAG-OX) 400 MG tablet Take 400 mg by mouth daily      multivitamin w/minerals (THERA-VIT-M) tablet Take 1 tablet by mouth daily  Qty: 100 tablet, Refills: 0    Associated Diagnoses: Alcoholic intoxication without complication (H)      nicotine (NICODERM CQ) 14 MG/24HR 24 hr patch Place 1 patch onto the skin daily    Associated Diagnoses: Tobacco abuse      oxyCODONE (ROXICODONE) 5 MG tablet Take 0.5 tablets (2.5 mg) by mouth 3 times daily as needed for moderate pain  Qty: 10 tablet, Refills: 0    Associated Diagnoses: Chest wall pain      pantoprazole (PROTONIX) 40 MG EC tablet Take 1 tablet (40 mg) by mouth every morning (before breakfast)  Qty: 30 tablet, Refills: 0    Associated Diagnoses: Alcoholic intoxication without complication (H)      thiamine (B-1) 100 MG tablet Take 1 tablet (100 mg) by mouth daily for 10 days  Qty: 10 tablet, Refills: 0    Associated Diagnoses: Alcohol dependence with intoxication with complication (H)      traZODone (DESYREL) 50 MG tablet Take 1 tablet (50 mg) by mouth nightly as needed for sleep    Associated Diagnoses: Alcohol-induced insomnia (H)      Vitamin D3 (CHOLECALCIFEROL) 25 mcg (1000 units) tablet Take 25 mcg by mouth daily         STOP taking these medications       chlorhexidine (PERIDEX) 0.12 % solution Comments:   Reason for Stopping:         clindamycin (CLEOCIN) 300 MG capsule Comments:   Reason for Stopping:                     Rationale for medication changes:        Celexa 10 mg daily  Melatonin 3 mg at bedtime  Oxycodone and lidocaine patch for chest wall pain from recent rib fractures        Consults    Psychiatry    Immunizations given this encounter     Most Recent Immunizations   Administered Date(s) Administered     COVID-19 Bivalent 18+ (Moderna) 12/09/2022     COVID-19 Monovalent 18+ (Moderna) 05/22/2021     Influenza (intradermal) 10/11/2017     Pneumo Conj 13-V (2010&after) 10/11/2017     Pneumococcal 23 valent 07/03/2019     TDAP Vaccine (Boostrix) 12/18/2009           Anticoagulation Information    None      SIGNIFICANT IMAGING FINDINGS   Chest and abdomen ct  1.  No acute pulmonary embolism or secondary signs of right heart strain.  2.  Decreased bibasilar consolidation and resolution of previously seen pleural effusions. Persistent endobronchial opacification suggesting aspiration.  3.  Unchanged bilateral rib fractures.  4.  Hepatic steatosis.    HEAD CT:  1.  No acute traumatic intracranial abnormality.   2.  Layering aerated secretions in the right sphenoid sinus can be seen with acute sinusitis in the appropriate clinical context.  3.  Additional similar chronic and postsurgical findings, as before.     CERVICAL SPINE CT:  1.  No acute osseous abnormality of the cervical spine.   2.  Multilevel degenerative changes with mild-moderate multifocal foraminal stenosis.    SIGNIFICANT LABORATORY FINDINGS     WBC 6.5, hemoglobin 9.5, platelet 409  Total iron 71, iron saturation 36  AST 79, ALT 40, alkaline phosphatase 160, albumin 3  Alcohol 259 on admission  Blood  culture have no growth  Magnesium 1.8  Discharge Orders        Home Care Referral      Adult [facility] Referral      Reason for your hospital stay    Alcohol intoxication     Follow-up and recommended labs and tests     Follow-up with primary MD in 1 week  Outpatient chemical dependency treatment  Absolute alcohol cessation  Follow-up with psychiatry in 2 weeks     Activity    Your activity upon discharge: activity as tolerated     Diet    Follow this diet upon discharge: soft diet with thickened liquid       Examination    Physical Exam   Temp:  [49.3  F (9.6  C)-98.3  F (36.8  C)] 97.6  F (36.4  C)  Pulse:  [75-94] 94  Resp:  [16-18] 16  BP: (131-164)/(72-96) 150/94  SpO2:  [96 %-99 %] 99 %  Wt Readings from Last 1 Encounters:   05/16/23 56.3 kg (124 lb 1.6 oz)     GENERAL:  Alert, appears comfortable, in no acute distress, appears stated age, underweight   HEAD:  Normocephalic, without obvious abnormality, atraumatic   EYES:  PERRL, conjunctiva  clear,   EOM's intact   NOSE: Nares normal,  mucosa normal, no drainage   THROAT: Lips, mucosa,   gums normal, mouth moist   NECK: Supple, symmetrical, trachea midline   BACK:   Symmetric, no curvature, ROM normal   LUNGS:   Clear to auscultation bilaterally, no rales, rhonchi, or wheezing, symmetric chest rise on inhalation, respirations unlabored   CHEST WALL:  No tenderness or deformity   HEART:  Regular rate and rhythm, S1 and S2 normal, no murmur     ABDOMEN:   Soft, non-tender, bowel sounds active , no masses, no organomegaly, no rebound or guarding   EXTREMITIES: No  edema    SKIN: No rashes   NEURO: Alert, oriented x3, moves all four extremities freely   PSYCH: Cooperative, behavior is appropriate, getting anxious easily         Please see EMR for more detailed significant labs, imaging, consultant notes etc.    IAlis MD, personally saw the patient today and spent greater than 30 minutes discharging this patient.    Alis Gage MD  [facility]    CC:Daniel Martinez

## 2023-05-16 NOTE — PROGRESS NOTES
Physical Therapy Discharge Summary    Reason for therapy discharge:    Discharged to home with home therapy.    Progress towards therapy goal(s). See goals on Care Plan in [facility] electronic health record for goal details.  Goals met    Therapy recommendation(s):    Continued therapy is recommended.  Rationale/Recommendations:  Patient not at functional mobiiltiy baseline..

## 2023-05-16 NOTE — PROGRESS NOTES
Patient demanded to discharge immediately. Discharge AVS provided to patient as he refused to be educated on information. IV removed and primary RN Livia notified.    - Follow-up with your pediatrician within 48 hours of discharge.   Routine Home Care Instructions:  - Please call us for help if you feel sad, blue or overwhelmed for more than a few days after discharge    - Umbilical cord care:        - Please keep your baby's cord clean and dry (do not apply alcohol)        - Please keep your baby's diaper below the umbilical cord until it has fallen off (~10-14 days)        - Please do not submerge your baby in a bath until the cord has fallen off (sponge bath instead)    - Continue feeding your child on demand at all times. Your child should have 8-12 proper feedings each day.  - Breastfeeding babies generally regain their birth-weight within 2 weeks. Thus, it is important for you to follow-up with your pediatrician within 48 hours of discharge and then again at 2 weeks of birth in order to make sure your baby has passed his/her birth-weight.  Please contact your pediatrician and return to the hospital if you notice any of the following:   - Fever  (T > 100.4)  - Reduced amount of wet diapers (< 5-6 per day) or no wet diaper in 12 hours  - Increased fussiness, irritability, or crying inconsolably  - Lethargy (excessively sleepy, difficult to arouse)  - Breathing difficulties (noisy breathing, breathing fast, using belly and neck muscles to breath)  - Changes in the baby’s color (yellow, blue, pale, gray)  - Seizure or loss of consciousness

## 2023-05-16 NOTE — PROGRESS NOTES
"PRIMARY DIAGNOSIS: ACUTE PAIN  OUTPATIENT/OBSERVATION GOALS TO BE MET BEFORE DISCHARGE:  1. Pain Status: Improved-controlled with oral pain medications.    2. Return to near baseline physical activity: Yes    3. Cleared for discharge by consultants (if involved): Yes    Discharge Planner Nurse   Safe discharge environment identified: Yes  Barriers to discharge: No       Entered by: Livia Howe RN 05/16/2023 9:13 AM     Please review provider order for any additional goals.   Nurse to notify provider when observation goals have been met and patient is ready for discharge.    Pt scored a 1 on CIWAA assessment at 8am. Pt hopeful to go back home with home services. Care manager to come see pt about serivices. MD said pt could go home today after chemical dependency consult sees pt. Pt still has pain on side of ribs and the sternal columned but states \" oral oxy and lidicoine patches have been reliving some pain\"   Livia Hoew RN on 5/16/2023 at 9:17 AM  "

## 2023-05-16 NOTE — CONSULTS
5/16/2023  I called pt's room twice, but no answer. I called the nursing station and was informed pt was adamant about discharging now. Pt is unable to be seen at this time for a FABY CA. However, patient can call [facility] at [phone] for an Outpatient FABY Assessment.    Carlotta Nguyen MA [facility]  [phone]

## 2023-05-16 NOTE — PROGRESS NOTES
Care Management Discharge Note    Discharge Date: 05/16/2023       Discharge Disposition:      Discharge Services:      Discharge DME:      Discharge Transportation: family or friend will provide    Private pay costs discussed: Not applicable    Does the patient's insurance plan have a 3 day qualifying hospital stay waiver?  No    PAS Confirmation Code:  N/A  Patient/family educated on Medicare website which has current facility and service quality ratings:  NO     Education Provided on the Discharge Plan:  Yes   Persons Notified of Discharge Plans:   Patient/Family in Agreement with the Plan: yes    Handoff Referral Completed: Yes    Additional Information:  Pt was supposed to have Chemical Dependency assessment today at 12:30 PM. Pt discharged prior to assessment.  PAM did send discharge orders to [facility] who accepted Pt for Home Care for PT, OT. INDER, SAMARIA and RN.  Pt was sent home via a cab.         DRAKE Spencer

## 2023-05-16 NOTE — PROGRESS NOTES
PRIMARY DIAGNOSIS: FTT  OUTPATIENT/OBSERVATION GOALS TO BE MET BEFORE DISCHARGE:  1. ADLs back to baseline: No    2. Activity and level of assistance: Up with standby assistance.    3. Pain status: Not improved/controlled with oral pain medications, pt reported same score 8/8 after pain meds.    4. Return to near baseline physical activity: No     Discharge Planner Nurse   Safe discharge environment identified: Yes  Barriers to discharge: Pending clinical improvement       Entered by: Dora Reyes, RN 05/16/2023 6:55 AM     Please review provider order for any additional goals.   Nurse to notify provider when observation goals have been met and patient is ready for discharge.

## 2023-05-18 LAB
BACTERIA BLD CULT: NO GROWTH
BACTERIA BLD CULT: NO GROWTH

## 2023-05-20 ENCOUNTER — HOSPITAL ENCOUNTER (EMERGENCY)
Facility: CLINIC | Age: 73
Discharge: HOME | End: 2023-05-20
Attending: EMERGENCY MEDICINE | Admitting: EMERGENCY MEDICINE
Payer: MEDICARE

## 2023-05-20 ENCOUNTER — APPOINTMENT (OUTPATIENT)
Dept: RADIOLOGY | Facility: CLINIC | Age: 73
End: 2023-05-20
Attending: EMERGENCY MEDICINE
Payer: MEDICARE

## 2023-05-20 VITALS
WEIGHT: 145 LBS | OXYGEN SATURATION: 98 % | RESPIRATION RATE: 16 BRPM | DIASTOLIC BLOOD PRESSURE: 86 MMHG | TEMPERATURE: 98.7 F | HEIGHT: 68 IN | BODY MASS INDEX: 21.98 KG/M2 | SYSTOLIC BLOOD PRESSURE: 186 MMHG | HEART RATE: 81 BPM

## 2023-05-20 DIAGNOSIS — F10.929 ALCOHOLIC INTOXICATION WITH COMPLICATION (H): ICD-10-CM

## 2023-05-20 PROBLEM — F33.42 RECURRENT MAJOR DEPRESSION IN FULL REMISSION (H): Status: ACTIVE | Noted: 2023-05-20

## 2023-05-20 LAB
ALBUMIN SERPL-MCNC: 4.1 G/DL (ref 3.5–5)
ALP SERPL-CCNC: 168 U/L (ref 45–120)
ALT SERPL W P-5'-P-CCNC: 53 U/L (ref 0–45)
ANION GAP SERPL CALCULATED.3IONS-SCNC: 13 MMOL/L (ref 5–18)
AST SERPL W P-5'-P-CCNC: 108 U/L (ref 0–40)
ATRIAL RATE - MUSE: 71 BPM
BASOPHILS # BLD AUTO: 0.1 10E3/UL (ref 0–0.2)
BASOPHILS NFR BLD AUTO: 1 %
BILIRUB SERPL-MCNC: 0.6 MG/DL (ref 0–1)
BUN SERPL-MCNC: 4 MG/DL (ref 8–28)
CALCIUM SERPL-MCNC: 9.5 MG/DL (ref 8.5–10.5)
CHLORIDE BLD-SCNC: 102 MMOL/L (ref 98–107)
CO2 SERPL-SCNC: 24 MMOL/L (ref 22–31)
CREAT SERPL-MCNC: 0.62 MG/DL (ref 0.7–1.3)
DIASTOLIC BLOOD PRESSURE - MUSE: 58 MMHG
EOSINOPHIL # BLD AUTO: 0.3 10E3/UL (ref 0–0.7)
EOSINOPHIL NFR BLD AUTO: 6 %
ERYTHROCYTE [DISTWIDTH] IN BLOOD BY AUTOMATED COUNT: 14.8 % (ref 10–15)
ETHANOL SERPL-MCNC: 215 MG/DL
GFR SERPL CREATININE-BSD FRML MDRD: >90 ML/MIN/1.73M2
GLUCOSE BLD-MCNC: 79 MG/DL (ref 70–125)
HCT VFR BLD AUTO: 34.7 % (ref 40–53)
HGB BLD-MCNC: 11.5 G/DL (ref 13.3–17.7)
IMM GRANULOCYTES # BLD: 0 10E3/UL
IMM GRANULOCYTES NFR BLD: 0 %
INTERPRETATION ECG - MUSE: NORMAL
LACTATE SERPL-SCNC: 2 MMOL/L (ref 0.7–2)
LACTATE SERPL-SCNC: 2.8 MMOL/L (ref 0.7–2)
LACTATE SERPL-SCNC: 3.5 MMOL/L (ref 0.7–2)
LIPASE SERPL-CCNC: 148 U/L (ref 0–52)
LYMPHOCYTES # BLD AUTO: 1.7 10E3/UL (ref 0.8–5.3)
LYMPHOCYTES NFR BLD AUTO: 37 %
MAGNESIUM SERPL-MCNC: 1.8 MG/DL (ref 1.8–2.6)
MCH RBC QN AUTO: 33.6 PG (ref 26.5–33)
MCHC RBC AUTO-ENTMCNC: 33.1 G/DL (ref 31.5–36.5)
MCV RBC AUTO: 102 FL (ref 78–100)
MONOCYTES # BLD AUTO: 0.3 10E3/UL (ref 0–1.3)
MONOCYTES NFR BLD AUTO: 6 %
NEUTROPHILS # BLD AUTO: 2.3 10E3/UL (ref 1.6–8.3)
NEUTROPHILS NFR BLD AUTO: 50 %
NRBC # BLD AUTO: 0 10E3/UL
NRBC BLD AUTO-RTO: 0 /100
P AXIS - MUSE: 50 DEGREES
PLATELET # BLD AUTO: 401 10E3/UL (ref 150–450)
POTASSIUM BLD-SCNC: 3.5 MMOL/L (ref 3.5–5)
PR INTERVAL - MUSE: 172 MS
PROT SERPL-MCNC: 8.2 G/DL (ref 6–8)
QRS DURATION - MUSE: 90 MS
QT - MUSE: 420 MS
QTC - MUSE: 456 MS
R AXIS - MUSE: 89 DEGREES
RBC # BLD AUTO: 3.42 10E6/UL (ref 4.4–5.9)
SODIUM SERPL-SCNC: 139 MMOL/L (ref 136–145)
SYSTOLIC BLOOD PRESSURE - MUSE: 116 MMHG
T AXIS - MUSE: 84 DEGREES
VENTRICULAR RATE- MUSE: 71 BPM
WBC # BLD AUTO: 4.5 10E3/UL (ref 4–11)

## 2023-05-20 PROCEDURE — 83735 ASSAY OF MAGNESIUM: CPT | Performed by: EMERGENCY MEDICINE

## 2023-05-20 PROCEDURE — 96365 THER/PROPH/DIAG IV INF INIT: CPT

## 2023-05-20 PROCEDURE — 250N000013 HC RX MED GY IP 250 OP 250 PS 637: Performed by: EMERGENCY MEDICINE

## 2023-05-20 PROCEDURE — 83690 ASSAY OF LIPASE: CPT | Performed by: EMERGENCY MEDICINE

## 2023-05-20 PROCEDURE — 96375 TX/PRO/DX INJ NEW DRUG ADDON: CPT

## 2023-05-20 PROCEDURE — 71046 X-RAY EXAM CHEST 2 VIEWS: CPT

## 2023-05-20 PROCEDURE — 36415 COLL VENOUS BLD VENIPUNCTURE: CPT | Performed by: EMERGENCY MEDICINE

## 2023-05-20 PROCEDURE — 80053 COMPREHEN METABOLIC PANEL: CPT | Performed by: EMERGENCY MEDICINE

## 2023-05-20 PROCEDURE — 93005 ELECTROCARDIOGRAM TRACING: CPT | Performed by: EMERGENCY MEDICINE

## 2023-05-20 PROCEDURE — 83605 ASSAY OF LACTIC ACID: CPT | Performed by: EMERGENCY MEDICINE

## 2023-05-20 PROCEDURE — 250N000011 HC RX IP 250 OP 636: Performed by: EMERGENCY MEDICINE

## 2023-05-20 PROCEDURE — 82077 ASSAY SPEC XCP UR&BREATH IA: CPT | Performed by: EMERGENCY MEDICINE

## 2023-05-20 PROCEDURE — 96366 THER/PROPH/DIAG IV INF ADDON: CPT

## 2023-05-20 PROCEDURE — 258N000003 HC RX IP 258 OP 636: Performed by: EMERGENCY MEDICINE

## 2023-05-20 PROCEDURE — 85025 COMPLETE CBC W/AUTO DIFF WBC: CPT | Performed by: EMERGENCY MEDICINE

## 2023-05-20 PROCEDURE — 99285 EMERGENCY DEPT VISIT HI MDM: CPT | Mod: 25

## 2023-05-20 RX ORDER — KETOROLAC TROMETHAMINE 15 MG/ML
10 INJECTION, SOLUTION INTRAMUSCULAR; INTRAVENOUS ONCE
Status: COMPLETED | OUTPATIENT
Start: 2023-05-20 | End: 2023-05-20

## 2023-05-20 RX ORDER — ACETAMINOPHEN 325 MG/1
650 TABLET ORAL ONCE
Status: COMPLETED | OUTPATIENT
Start: 2023-05-20 | End: 2023-05-20

## 2023-05-20 RX ADMIN — SODIUM CHLORIDE 1000 ML: 9 INJECTION, SOLUTION INTRAVENOUS at 17:18

## 2023-05-20 RX ADMIN — KETOROLAC TROMETHAMINE 10 MG: 15 INJECTION, SOLUTION INTRAMUSCULAR; INTRAVENOUS at 16:27

## 2023-05-20 RX ADMIN — DEXTROSE AND SODIUM CHLORIDE: 5; 900 INJECTION, SOLUTION INTRAVENOUS at 16:26

## 2023-05-20 RX ADMIN — ACETAMINOPHEN 650 MG: 325 TABLET ORAL at 16:27

## 2023-05-20 ASSESSMENT — ACTIVITIES OF DAILY LIVING (ADL)
ADLS_ACUITY_SCORE: 35

## 2023-05-20 NOTE — ED NOTES
Pt reports that he does not have a ride home at this time. Pt requested to order more food. Dinner ordered for pt. Pt contently resting on bed watching TV.

## 2023-05-20 NOTE — ED TRIAGE NOTES
Pt presents to ED via EMS. EMS reports FTT and ETOH. Pt reports last drink 1 hour ago. Drinks ~ 1/2 pint of vodka per day. Denies hx of withdrawal seizures. Pt complaining of generalized chest pain with inspiration and states was diagnosed with pneumonia recently. Pt also endorses multiple recent falls. No other reported pain.      Triage Assessment     Row Name 05/20/23 1523       Triage Assessment (Adult)    Airway WDL WDL       Respiratory WDL    Respiratory WDL WDL       Skin Circulation/Temperature WDL    Skin Circulation/Temperature WDL WDL       Cardiac WDL    Cardiac WDL WDL       Peripheral/Neurovascular WDL    Peripheral Neurovascular WDL WDL       Cognitive/Neuro/Behavioral WDL    Cognitive/Neuro/Behavioral WDL X;orientation    Orientation oriented x 4  ETOH       Pupils (CN II)    Pupil PERRLA yes    Pupil Size Left 2 mm    Pupil Size Right 2 mm

## 2023-05-20 NOTE — CONSULTS
"ED Provider requested this CM to review patient's chart on last visit. Patient has been admitted for consecutive visits from 04/27/23 to 05/16/23. Patient was discharged to home with [facility] to provide PT, OT, HHA, RN, and SW.     This writer reviewed chart, contacted [facility], and spoke with after hours service who verified that [facility] had initial visit with patient at his house on Thursday 05/18/23. CM spoke with patient who concurred that he was visited by home care agency and patient stated that he is to receive a next visit by [facility] on Monday 05/22/23.    Patient states that last time he had home services it was helpful for him. He said 'I hope that things work out the same for me this time again\". ED Provider updated on patient feeling comfortable with continued home services on discharge.      PHILLIP UMLTANI, MECCA/CM  "
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
## 2023-05-20 NOTE — ED NOTES
Bed: [facility]-05  Expected date: 5/20/23  Expected time: 3:06 PM  Means of arrival: Ambulance  Comments:  71 yo male   Serena etoh

## 2023-05-20 NOTE — ED NOTES
With RN in room pt attempted to get out of bed multiple times. Bed alarm was placed for pt safety. After pain meds and ordering food, pt more compliant. Will continue to assess need for 1:1 sitter.

## 2023-05-21 NOTE — DISCHARGE INSTRUCTIONS
Care management has arranged outpatient nursing care  Occupational Therapy and physical therapy should begin next week

## 2023-05-21 NOTE — ED NOTES
Pt lactic acid checked. Reviewed discharge. To wheel chair for wheel chair transport back to apt. Home health arranged for pt..

## 2023-06-12 ENCOUNTER — APPOINTMENT (OUTPATIENT)
Dept: RADIOLOGY | Facility: CLINIC | Age: 73
End: 2023-06-12
Attending: FAMILY MEDICINE
Payer: MEDICARE

## 2023-06-12 ENCOUNTER — APPOINTMENT (OUTPATIENT)
Dept: CT IMAGING | Facility: CLINIC | Age: 73
End: 2023-06-12
Attending: FAMILY MEDICINE
Payer: MEDICARE

## 2023-06-12 ENCOUNTER — HOSPITAL ENCOUNTER (EMERGENCY)
Facility: CLINIC | Age: 73
Discharge: ANOTHER HOSPITAL | End: 2023-06-13
Attending: FAMILY MEDICINE | Admitting: FAMILY MEDICINE
Payer: MEDICARE

## 2023-06-12 DIAGNOSIS — F10.220 ALCOHOL DEPENDENCE WITH UNCOMPLICATED INTOXICATION (H): ICD-10-CM

## 2023-06-12 DIAGNOSIS — E87.20 LACTIC ACIDOSIS: ICD-10-CM

## 2023-06-12 DIAGNOSIS — E87.29 ALCOHOLIC KETOACIDOSIS: ICD-10-CM

## 2023-06-12 LAB
ALBUMIN SERPL-MCNC: 3.7 G/DL (ref 3.5–5)
ALBUMIN UR-MCNC: 50 MG/DL
ALP SERPL-CCNC: 114 U/L (ref 45–120)
ALT SERPL W P-5'-P-CCNC: 41 U/L (ref 0–45)
ANION GAP SERPL CALCULATED.3IONS-SCNC: 24 MMOL/L (ref 5–18)
APPEARANCE UR: CLEAR
AST SERPL W P-5'-P-CCNC: 62 U/L (ref 0–40)
ATRIAL RATE - MUSE: 87 BPM
BASE EXCESS BLDV CALC-SCNC: -12 MMOL/L
BASOPHILS # BLD AUTO: 0.1 10E3/UL (ref 0–0.2)
BASOPHILS NFR BLD AUTO: 1 %
BILIRUB DIRECT SERPL-MCNC: 0.2 MG/DL
BILIRUB SERPL-MCNC: 0.5 MG/DL (ref 0–1)
BILIRUB UR QL STRIP: NEGATIVE
BUN SERPL-MCNC: 10 MG/DL (ref 8–28)
CALCIUM SERPL-MCNC: 8.9 MG/DL (ref 8.5–10.5)
CHLORIDE BLD-SCNC: 98 MMOL/L (ref 98–107)
CK SERPL-CCNC: 81 U/L (ref 30–190)
CO2 SERPL-SCNC: 12 MMOL/L (ref 22–31)
COLOR UR AUTO: ABNORMAL
CREAT SERPL-MCNC: 0.81 MG/DL (ref 0.7–1.3)
DIASTOLIC BLOOD PRESSURE - MUSE: 73 MMHG
EOSINOPHIL # BLD AUTO: 0.1 10E3/UL (ref 0–0.7)
EOSINOPHIL NFR BLD AUTO: 1 %
ERYTHROCYTE [DISTWIDTH] IN BLOOD BY AUTOMATED COUNT: 15 % (ref 10–15)
ETHANOL SERPL-MCNC: 238 MG/DL
GFR SERPL CREATININE-BSD FRML MDRD: >90 ML/MIN/1.73M2
GLUCOSE BLD-MCNC: 67 MG/DL (ref 70–125)
GLUCOSE BLDC GLUCOMTR-MCNC: 73 MG/DL (ref 70–99)
GLUCOSE UR STRIP-MCNC: NEGATIVE MG/DL
HCO3 BLDV-SCNC: 15 MMOL/L (ref 24–30)
HCT VFR BLD AUTO: 37.4 % (ref 40–53)
HGB BLD-MCNC: 12.2 G/DL (ref 13.3–17.7)
HGB UR QL STRIP: NEGATIVE
HYALINE CASTS: 20 /LPF
IMM GRANULOCYTES # BLD: 0.1 10E3/UL
IMM GRANULOCYTES NFR BLD: 1 %
INTERPRETATION ECG - MUSE: NORMAL
KETONES BLD-SCNC: 9.59 MMOL/L
KETONES UR STRIP-MCNC: 150 MG/DL
LACTATE SERPL-SCNC: 2.6 MMOL/L (ref 0.7–2)
LACTATE SERPL-SCNC: 2.9 MMOL/L (ref 0.7–2)
LEUKOCYTE ESTERASE UR QL STRIP: NEGATIVE
LYMPHOCYTES # BLD AUTO: 1.5 10E3/UL (ref 0.8–5.3)
LYMPHOCYTES NFR BLD AUTO: 14 %
MAGNESIUM SERPL-MCNC: 2.3 MG/DL (ref 1.8–2.6)
MCH RBC QN AUTO: 32.6 PG (ref 26.5–33)
MCHC RBC AUTO-ENTMCNC: 32.6 G/DL (ref 31.5–36.5)
MCV RBC AUTO: 100 FL (ref 78–100)
MONOCYTES # BLD AUTO: 0.4 10E3/UL (ref 0–1.3)
MONOCYTES NFR BLD AUTO: 4 %
MUCOUS THREADS #/AREA URNS LPF: PRESENT /LPF
NEUTROPHILS # BLD AUTO: 8.5 10E3/UL (ref 1.6–8.3)
NEUTROPHILS NFR BLD AUTO: 79 %
NITRATE UR QL: NEGATIVE
NRBC # BLD AUTO: 0 10E3/UL
NRBC BLD AUTO-RTO: 0 /100
OXYHGB MFR BLDV: 34.5 % (ref 70–75)
P AXIS - MUSE: 84 DEGREES
PCO2 BLDV: 32 MM HG (ref 35–50)
PH BLDV: 7.27 [PH] (ref 7.35–7.45)
PH UR STRIP: 5.5 [PH] (ref 5–7)
PLATELET # BLD AUTO: 410 10E3/UL (ref 150–450)
PO2 BLDV: 24 MM HG (ref 25–47)
POTASSIUM BLD-SCNC: 4.1 MMOL/L (ref 3.5–5)
PR INTERVAL - MUSE: 178 MS
PROT SERPL-MCNC: 7.4 G/DL (ref 6–8)
QRS DURATION - MUSE: 90 MS
QT - MUSE: 388 MS
QTC - MUSE: 466 MS
R AXIS - MUSE: 91 DEGREES
RBC # BLD AUTO: 3.74 10E6/UL (ref 4.4–5.9)
RBC URINE: <1 /HPF
SAO2 % BLDV: 35.9 % (ref 70–75)
SODIUM SERPL-SCNC: 134 MMOL/L (ref 136–145)
SP GR UR STRIP: 1.02 (ref 1–1.03)
SQUAMOUS EPITHELIAL: <1 /HPF
SYSTOLIC BLOOD PRESSURE - MUSE: 125 MMHG
T AXIS - MUSE: 88 DEGREES
TROPONIN I SERPL-MCNC: 0.01 NG/ML (ref 0–0.29)
UROBILINOGEN UR STRIP-MCNC: <2 MG/DL
VENTRICULAR RATE- MUSE: 87 BPM
WBC # BLD AUTO: 10.7 10E3/UL (ref 4–11)
WBC URINE: 1 /HPF

## 2023-06-12 PROCEDURE — 80053 COMPREHEN METABOLIC PANEL: CPT | Performed by: FAMILY MEDICINE

## 2023-06-12 PROCEDURE — 96365 THER/PROPH/DIAG IV INF INIT: CPT

## 2023-06-12 PROCEDURE — 36415 COLL VENOUS BLD VENIPUNCTURE: CPT | Performed by: FAMILY MEDICINE

## 2023-06-12 PROCEDURE — 99291 CRITICAL CARE FIRST HOUR: CPT | Mod: 25

## 2023-06-12 PROCEDURE — 71046 X-RAY EXAM CHEST 2 VIEWS: CPT

## 2023-06-12 PROCEDURE — 82248 BILIRUBIN DIRECT: CPT | Performed by: FAMILY MEDICINE

## 2023-06-12 PROCEDURE — 82550 ASSAY OF CK (CPK): CPT | Performed by: FAMILY MEDICINE

## 2023-06-12 PROCEDURE — 258N000003 HC RX IP 258 OP 636: Performed by: FAMILY MEDICINE

## 2023-06-12 PROCEDURE — 93005 ELECTROCARDIOGRAM TRACING: CPT | Performed by: FAMILY MEDICINE

## 2023-06-12 PROCEDURE — 250N000009 HC RX 250: Performed by: FAMILY MEDICINE

## 2023-06-12 PROCEDURE — 83735 ASSAY OF MAGNESIUM: CPT | Performed by: FAMILY MEDICINE

## 2023-06-12 PROCEDURE — 82962 GLUCOSE BLOOD TEST: CPT

## 2023-06-12 PROCEDURE — 82010 KETONE BODYS QUAN: CPT | Performed by: FAMILY MEDICINE

## 2023-06-12 PROCEDURE — 70450 CT HEAD/BRAIN W/O DYE: CPT | Mod: MA

## 2023-06-12 PROCEDURE — 82077 ASSAY SPEC XCP UR&BREATH IA: CPT | Performed by: FAMILY MEDICINE

## 2023-06-12 PROCEDURE — 84484 ASSAY OF TROPONIN QUANT: CPT | Performed by: FAMILY MEDICINE

## 2023-06-12 PROCEDURE — 250N000011 HC RX IP 250 OP 636: Performed by: FAMILY MEDICINE

## 2023-06-12 PROCEDURE — 99292 CRITICAL CARE ADDL 30 MIN: CPT

## 2023-06-12 PROCEDURE — 82805 BLOOD GASES W/O2 SATURATION: CPT | Performed by: FAMILY MEDICINE

## 2023-06-12 PROCEDURE — 85025 COMPLETE CBC W/AUTO DIFF WBC: CPT | Performed by: FAMILY MEDICINE

## 2023-06-12 PROCEDURE — 96361 HYDRATE IV INFUSION ADD-ON: CPT

## 2023-06-12 PROCEDURE — 83605 ASSAY OF LACTIC ACID: CPT | Performed by: FAMILY MEDICINE

## 2023-06-12 PROCEDURE — 81003 URINALYSIS AUTO W/O SCOPE: CPT | Performed by: FAMILY MEDICINE

## 2023-06-12 RX ADMIN — FOLIC ACID: 5 INJECTION, SOLUTION INTRAMUSCULAR; INTRAVENOUS; SUBCUTANEOUS at 21:32

## 2023-06-12 RX ADMIN — DEXTROSE AND SODIUM CHLORIDE: 5; 900 INJECTION, SOLUTION INTRAVENOUS at 23:02

## 2023-06-12 ASSESSMENT — ENCOUNTER SYMPTOMS
APPETITE CHANGE: 0
VOMITING: 0
DIARRHEA: 0

## 2023-06-12 ASSESSMENT — ACTIVITIES OF DAILY LIVING (ADL)
ADLS_ACUITY_SCORE: 35
ADLS_ACUITY_SCORE: 35

## 2023-06-13 ENCOUNTER — APPOINTMENT (OUTPATIENT)
Dept: PHYSICAL THERAPY | Facility: CLINIC | Age: 73
DRG: 896 | End: 2023-06-13
Attending: INTERNAL MEDICINE
Payer: MEDICARE

## 2023-06-13 ENCOUNTER — HOSPITAL ENCOUNTER (INPATIENT)
Facility: CLINIC | Age: 73
LOS: 5 days | Discharge: HOME HEALTH | DRG: 896 | End: 2023-06-18
Attending: INTERNAL MEDICINE | Admitting: FAMILY MEDICINE
Payer: MEDICARE

## 2023-06-13 VITALS
HEART RATE: 96 BPM | WEIGHT: 160 LBS | OXYGEN SATURATION: 100 % | DIASTOLIC BLOOD PRESSURE: 57 MMHG | BODY MASS INDEX: 24.25 KG/M2 | HEIGHT: 68 IN | TEMPERATURE: 97.6 F | SYSTOLIC BLOOD PRESSURE: 126 MMHG | RESPIRATION RATE: 18 BRPM

## 2023-06-13 DIAGNOSIS — R62.7 FAILURE TO THRIVE IN ADULT: Primary | ICD-10-CM

## 2023-06-13 PROBLEM — F10.939 ALCOHOL WITHDRAWAL (H): Status: ACTIVE | Noted: 2023-06-13

## 2023-06-13 LAB
ALBUMIN SERPL BCG-MCNC: 3.2 G/DL (ref 3.5–5.2)
ALBUMIN SERPL BCG-MCNC: 3.5 G/DL (ref 3.5–5.2)
ALP SERPL-CCNC: 100 U/L (ref 40–129)
ALP SERPL-CCNC: 96 U/L (ref 40–129)
ALT SERPL W P-5'-P-CCNC: 30 U/L (ref 0–70)
ALT SERPL W P-5'-P-CCNC: 35 U/L (ref 10–50)
ANION GAP SERPL CALCULATED.3IONS-SCNC: 12 MMOL/L (ref 7–15)
ANION GAP SERPL CALCULATED.3IONS-SCNC: 19 MMOL/L (ref 7–15)
AST SERPL W P-5'-P-CCNC: 47 U/L (ref 0–45)
AST SERPL W P-5'-P-CCNC: 59 U/L (ref 10–50)
BASE EXCESS BLDV CALC-SCNC: -6.6 MMOL/L (ref -7.7–1.9)
BASOPHILS # BLD AUTO: 0.1 10E3/UL (ref 0–0.2)
BASOPHILS NFR BLD AUTO: 1 %
BILIRUB SERPL-MCNC: 0.5 MG/DL
BILIRUB SERPL-MCNC: 0.5 MG/DL
BUN SERPL-MCNC: 5.5 MG/DL (ref 8–23)
BUN SERPL-MCNC: 8.7 MG/DL (ref 8–23)
CALCIUM SERPL-MCNC: 8.1 MG/DL (ref 8.8–10.2)
CALCIUM SERPL-MCNC: 8.3 MG/DL (ref 8.8–10.2)
CHLORIDE SERPL-SCNC: 101 MMOL/L (ref 98–107)
CHLORIDE SERPL-SCNC: 98 MMOL/L (ref 98–107)
CREAT SERPL-MCNC: 0.59 MG/DL (ref 0.67–1.17)
CREAT SERPL-MCNC: 0.64 MG/DL (ref 0.67–1.17)
DEPRECATED HCO3 PLAS-SCNC: 17 MMOL/L (ref 22–29)
DEPRECATED HCO3 PLAS-SCNC: 22 MMOL/L (ref 22–29)
EOSINOPHIL # BLD AUTO: 0.3 10E3/UL (ref 0–0.7)
EOSINOPHIL NFR BLD AUTO: 3 %
ERYTHROCYTE [DISTWIDTH] IN BLOOD BY AUTOMATED COUNT: 14.7 % (ref 10–15)
ERYTHROCYTE [DISTWIDTH] IN BLOOD BY AUTOMATED COUNT: 14.7 % (ref 10–15)
FOLATE SERPL-MCNC: 34.6 NG/ML (ref 4.6–34.8)
GFR SERPL CREATININE-BSD FRML MDRD: >90 ML/MIN/1.73M2
GFR SERPL CREATININE-BSD FRML MDRD: >90 ML/MIN/1.73M2
GLUCOSE SERPL-MCNC: 140 MG/DL (ref 70–99)
GLUCOSE SERPL-MCNC: 80 MG/DL (ref 70–99)
HCO3 BLDV-SCNC: 18 MMOL/L (ref 21–28)
HCT VFR BLD AUTO: 27.5 % (ref 40–53)
HCT VFR BLD AUTO: 28.6 % (ref 40–53)
HGB BLD-MCNC: 9.5 G/DL (ref 13.3–17.7)
HGB BLD-MCNC: 9.7 G/DL (ref 13.3–17.7)
IMM GRANULOCYTES # BLD: 0.1 10E3/UL
IMM GRANULOCYTES NFR BLD: 1 %
LACTATE SERPL-SCNC: 0.9 MMOL/L (ref 0.7–2)
LYMPHOCYTES # BLD AUTO: 1.5 10E3/UL (ref 0.8–5.3)
LYMPHOCYTES NFR BLD AUTO: 15 %
MAGNESIUM SERPL-MCNC: 1.6 MG/DL (ref 1.7–2.3)
MAGNESIUM SERPL-MCNC: 1.9 MG/DL (ref 1.7–2.3)
MCH RBC QN AUTO: 32.4 PG (ref 26.5–33)
MCH RBC QN AUTO: 32.5 PG (ref 26.5–33)
MCHC RBC AUTO-ENTMCNC: 33.9 G/DL (ref 31.5–36.5)
MCHC RBC AUTO-ENTMCNC: 34.5 G/DL (ref 31.5–36.5)
MCV RBC AUTO: 94 FL (ref 78–100)
MCV RBC AUTO: 96 FL (ref 78–100)
MONOCYTES # BLD AUTO: 0.5 10E3/UL (ref 0–1.3)
MONOCYTES NFR BLD AUTO: 5 %
NEUTROPHILS # BLD AUTO: 7.4 10E3/UL (ref 1.6–8.3)
NEUTROPHILS NFR BLD AUTO: 75 %
NRBC # BLD AUTO: 0 10E3/UL
NRBC BLD AUTO-RTO: 0 /100
O2/TOTAL GAS SETTING VFR VENT: 21 %
PCO2 BLDV: 31 MM HG (ref 40–50)
PH BLDV: 7.37 [PH] (ref 7.32–7.43)
PHOSPHATE SERPL-MCNC: 1.2 MG/DL (ref 2.5–4.5)
PHOSPHATE SERPL-MCNC: 2.1 MG/DL (ref 2.5–4.5)
PHOSPHATE SERPL-MCNC: 2.2 MG/DL (ref 2.5–4.5)
PHOSPHATE SERPL-MCNC: 2.3 MG/DL (ref 2.5–4.5)
PLATELET # BLD AUTO: 266 10E3/UL (ref 150–450)
PLATELET # BLD AUTO: 289 10E3/UL (ref 150–450)
PO2 BLDV: 55 MM HG (ref 25–47)
POTASSIUM SERPL-SCNC: 3.6 MMOL/L (ref 3.4–5.3)
POTASSIUM SERPL-SCNC: 3.8 MMOL/L (ref 3.4–5.3)
PROT SERPL-MCNC: 5.6 G/DL (ref 6.4–8.3)
PROT SERPL-MCNC: 6.1 G/DL (ref 6.4–8.3)
RBC # BLD AUTO: 2.92 10E6/UL (ref 4.4–5.9)
RBC # BLD AUTO: 2.99 10E6/UL (ref 4.4–5.9)
SODIUM SERPL-SCNC: 134 MMOL/L (ref 136–145)
SODIUM SERPL-SCNC: 135 MMOL/L (ref 136–145)
VIT B12 SERPL-MCNC: 1771 PG/ML (ref 232–1245)
WBC # BLD AUTO: 8.4 10E3/UL (ref 4–11)
WBC # BLD AUTO: 9.8 10E3/UL (ref 4–11)

## 2023-06-13 PROCEDURE — 36415 COLL VENOUS BLD VENIPUNCTURE: CPT | Performed by: INTERNAL MEDICINE

## 2023-06-13 PROCEDURE — 84100 ASSAY OF PHOSPHORUS: CPT

## 2023-06-13 PROCEDURE — 258N000003 HC RX IP 258 OP 636: Performed by: STUDENT IN AN ORGANIZED HEALTH CARE EDUCATION/TRAINING PROGRAM

## 2023-06-13 PROCEDURE — 250N000009 HC RX 250: Performed by: INTERNAL MEDICINE

## 2023-06-13 PROCEDURE — 97530 THERAPEUTIC ACTIVITIES: CPT | Mod: GP

## 2023-06-13 PROCEDURE — 82746 ASSAY OF FOLIC ACID SERUM: CPT

## 2023-06-13 PROCEDURE — 250N000013 HC RX MED GY IP 250 OP 250 PS 637: Performed by: STUDENT IN AN ORGANIZED HEALTH CARE EDUCATION/TRAINING PROGRAM

## 2023-06-13 PROCEDURE — 82803 BLOOD GASES ANY COMBINATION: CPT

## 2023-06-13 PROCEDURE — 96361 HYDRATE IV INFUSION ADD-ON: CPT

## 2023-06-13 PROCEDURE — 85025 COMPLETE CBC W/AUTO DIFF WBC: CPT

## 2023-06-13 PROCEDURE — HZ2ZZZZ DETOXIFICATION SERVICES FOR SUBSTANCE ABUSE TREATMENT: ICD-10-PCS | Performed by: FAMILY MEDICINE

## 2023-06-13 PROCEDURE — 97161 PT EVAL LOW COMPLEX 20 MIN: CPT | Mod: GP

## 2023-06-13 PROCEDURE — 99222 1ST HOSP IP/OBS MODERATE 55: CPT | Mod: AI | Performed by: FAMILY MEDICINE

## 2023-06-13 PROCEDURE — 258N000003 HC RX IP 258 OP 636

## 2023-06-13 PROCEDURE — 36415 COLL VENOUS BLD VENIPUNCTURE: CPT | Performed by: STUDENT IN AN ORGANIZED HEALTH CARE EDUCATION/TRAINING PROGRAM

## 2023-06-13 PROCEDURE — 83735 ASSAY OF MAGNESIUM: CPT | Performed by: STUDENT IN AN ORGANIZED HEALTH CARE EDUCATION/TRAINING PROGRAM

## 2023-06-13 PROCEDURE — 82607 VITAMIN B-12: CPT

## 2023-06-13 PROCEDURE — 250N000009 HC RX 250: Performed by: STUDENT IN AN ORGANIZED HEALTH CARE EDUCATION/TRAINING PROGRAM

## 2023-06-13 PROCEDURE — 258N000003 HC RX IP 258 OP 636: Performed by: INTERNAL MEDICINE

## 2023-06-13 PROCEDURE — 85014 HEMATOCRIT: CPT

## 2023-06-13 PROCEDURE — 80053 COMPREHEN METABOLIC PANEL: CPT

## 2023-06-13 PROCEDURE — 84450 TRANSFERASE (AST) (SGOT): CPT | Performed by: STUDENT IN AN ORGANIZED HEALTH CARE EDUCATION/TRAINING PROGRAM

## 2023-06-13 PROCEDURE — 84100 ASSAY OF PHOSPHORUS: CPT | Performed by: INTERNAL MEDICINE

## 2023-06-13 PROCEDURE — 36415 COLL VENOUS BLD VENIPUNCTURE: CPT

## 2023-06-13 PROCEDURE — 97110 THERAPEUTIC EXERCISES: CPT | Mod: GP

## 2023-06-13 PROCEDURE — 83605 ASSAY OF LACTIC ACID: CPT

## 2023-06-13 PROCEDURE — 83735 ASSAY OF MAGNESIUM: CPT

## 2023-06-13 PROCEDURE — 250N000011 HC RX IP 250 OP 636

## 2023-06-13 PROCEDURE — 120N000002 HC R&B MED SURG/OB UMMC

## 2023-06-13 PROCEDURE — 250N000013 HC RX MED GY IP 250 OP 250 PS 637

## 2023-06-13 PROCEDURE — 84100 ASSAY OF PHOSPHORUS: CPT | Performed by: STUDENT IN AN ORGANIZED HEALTH CARE EDUCATION/TRAINING PROGRAM

## 2023-06-13 PROCEDURE — 250N000011 HC RX IP 250 OP 636: Performed by: INTERNAL MEDICINE

## 2023-06-13 RX ORDER — PANTOPRAZOLE SODIUM 40 MG/1
40 TABLET, DELAYED RELEASE ORAL
Status: DISCONTINUED | OUTPATIENT
Start: 2023-06-13 | End: 2023-06-18 | Stop reason: HOSPADM

## 2023-06-13 RX ORDER — PROCHLORPERAZINE 25 MG
12.5 SUPPOSITORY, RECTAL RECTAL EVERY 12 HOURS PRN
Status: DISCONTINUED | OUTPATIENT
Start: 2023-06-13 | End: 2023-06-18 | Stop reason: HOSPADM

## 2023-06-13 RX ORDER — ACETAMINOPHEN 325 MG/1
650 TABLET ORAL EVERY 6 HOURS PRN
Status: DISCONTINUED | OUTPATIENT
Start: 2023-06-13 | End: 2023-06-18 | Stop reason: HOSPADM

## 2023-06-13 RX ORDER — FLUMAZENIL 0.1 MG/ML
0.2 INJECTION, SOLUTION INTRAVENOUS
Status: DISCONTINUED | OUTPATIENT
Start: 2023-06-13 | End: 2023-06-18 | Stop reason: HOSPADM

## 2023-06-13 RX ORDER — GABAPENTIN 100 MG/1
100 CAPSULE ORAL EVERY 8 HOURS
Status: DISCONTINUED | OUTPATIENT
Start: 2023-06-20 | End: 2023-06-13

## 2023-06-13 RX ORDER — ACETAMINOPHEN 650 MG/1
650 SUPPOSITORY RECTAL EVERY 6 HOURS PRN
Status: DISCONTINUED | OUTPATIENT
Start: 2023-06-13 | End: 2023-06-18 | Stop reason: HOSPADM

## 2023-06-13 RX ORDER — GABAPENTIN 300 MG/1
600 CAPSULE ORAL EVERY 8 HOURS
Status: DISCONTINUED | OUTPATIENT
Start: 2023-06-16 | End: 2023-06-16

## 2023-06-13 RX ORDER — DIAZEPAM 10 MG/2ML
5-10 INJECTION, SOLUTION INTRAMUSCULAR; INTRAVENOUS EVERY 30 MIN PRN
Status: DISCONTINUED | OUTPATIENT
Start: 2023-06-13 | End: 2023-06-17

## 2023-06-13 RX ORDER — GABAPENTIN 300 MG/1
300 CAPSULE ORAL EVERY 8 HOURS
Status: DISCONTINUED | OUTPATIENT
Start: 2023-06-18 | End: 2023-06-13

## 2023-06-13 RX ORDER — LIDOCAINE 40 MG/G
CREAM TOPICAL
Status: DISCONTINUED | OUTPATIENT
Start: 2023-06-13 | End: 2023-06-18 | Stop reason: HOSPADM

## 2023-06-13 RX ORDER — MAGNESIUM SULFATE HEPTAHYDRATE 40 MG/ML
2 INJECTION, SOLUTION INTRAVENOUS ONCE
Status: COMPLETED | OUTPATIENT
Start: 2023-06-13 | End: 2023-06-13

## 2023-06-13 RX ORDER — NICOTINE 21 MG/24HR
1 PATCH, TRANSDERMAL 24 HOURS TRANSDERMAL DAILY
Status: DISCONTINUED | OUTPATIENT
Start: 2023-06-13 | End: 2023-06-18 | Stop reason: HOSPADM

## 2023-06-13 RX ORDER — AMOXICILLIN 250 MG
1 CAPSULE ORAL 2 TIMES DAILY PRN
Status: DISCONTINUED | OUTPATIENT
Start: 2023-06-13 | End: 2023-06-18 | Stop reason: HOSPADM

## 2023-06-13 RX ORDER — VITAMIN B COMPLEX
25 TABLET ORAL DAILY
Status: DISCONTINUED | OUTPATIENT
Start: 2023-06-13 | End: 2023-06-18 | Stop reason: HOSPADM

## 2023-06-13 RX ORDER — CITALOPRAM HYDROBROMIDE 10 MG/1
10 TABLET ORAL DAILY
Status: DISCONTINUED | OUTPATIENT
Start: 2023-06-13 | End: 2023-06-13

## 2023-06-13 RX ORDER — GABAPENTIN 300 MG/1
900 CAPSULE ORAL EVERY 8 HOURS
Status: COMPLETED | OUTPATIENT
Start: 2023-06-13 | End: 2023-06-16

## 2023-06-13 RX ORDER — MULTIPLE VITAMINS W/ MINERALS TAB 9MG-400MCG
1 TAB ORAL DAILY
Status: DISCONTINUED | OUTPATIENT
Start: 2023-06-13 | End: 2023-06-18 | Stop reason: HOSPADM

## 2023-06-13 RX ORDER — GABAPENTIN 600 MG/1
1200 TABLET ORAL ONCE
Status: COMPLETED | OUTPATIENT
Start: 2023-06-13 | End: 2023-06-13

## 2023-06-13 RX ORDER — PROCHLORPERAZINE MALEATE 5 MG
5 TABLET ORAL EVERY 6 HOURS PRN
Status: DISCONTINUED | OUTPATIENT
Start: 2023-06-13 | End: 2023-06-18 | Stop reason: HOSPADM

## 2023-06-13 RX ORDER — IPRATROPIUM BROMIDE AND ALBUTEROL SULFATE 2.5; .5 MG/3ML; MG/3ML
3 SOLUTION RESPIRATORY (INHALATION) EVERY 4 HOURS PRN
Status: DISCONTINUED | OUTPATIENT
Start: 2023-06-13 | End: 2023-06-18 | Stop reason: HOSPADM

## 2023-06-13 RX ORDER — DIAZEPAM 10 MG
10 TABLET ORAL EVERY 30 MIN PRN
Status: DISCONTINUED | OUTPATIENT
Start: 2023-06-13 | End: 2023-06-17

## 2023-06-13 RX ORDER — OLANZAPINE 5 MG/1
5-10 TABLET, ORALLY DISINTEGRATING ORAL EVERY 6 HOURS PRN
Status: DISCONTINUED | OUTPATIENT
Start: 2023-06-13 | End: 2023-06-18 | Stop reason: HOSPADM

## 2023-06-13 RX ORDER — POTASSIUM CHLORIDE 1500 MG/1
20 TABLET, EXTENDED RELEASE ORAL ONCE
Status: COMPLETED | OUTPATIENT
Start: 2023-06-13 | End: 2023-06-13

## 2023-06-13 RX ORDER — ONDANSETRON 4 MG/1
4 TABLET, ORALLY DISINTEGRATING ORAL EVERY 6 HOURS PRN
Status: DISCONTINUED | OUTPATIENT
Start: 2023-06-13 | End: 2023-06-18 | Stop reason: HOSPADM

## 2023-06-13 RX ORDER — ONDANSETRON 2 MG/ML
4 INJECTION INTRAMUSCULAR; INTRAVENOUS EVERY 6 HOURS PRN
Status: DISCONTINUED | OUTPATIENT
Start: 2023-06-13 | End: 2023-06-18 | Stop reason: HOSPADM

## 2023-06-13 RX ORDER — FOLIC ACID 1 MG/1
1 TABLET ORAL DAILY
Status: DISCONTINUED | OUTPATIENT
Start: 2023-06-13 | End: 2023-06-18 | Stop reason: HOSPADM

## 2023-06-13 RX ORDER — MAGNESIUM SULFATE HEPTAHYDRATE 40 MG/ML
2 INJECTION, SOLUTION INTRAVENOUS ONCE
Status: COMPLETED | OUTPATIENT
Start: 2023-06-14 | End: 2023-06-14

## 2023-06-13 RX ORDER — HALOPERIDOL 5 MG/ML
2.5-5 INJECTION INTRAMUSCULAR EVERY 6 HOURS PRN
Status: DISCONTINUED | OUTPATIENT
Start: 2023-06-13 | End: 2023-06-18 | Stop reason: HOSPADM

## 2023-06-13 RX ORDER — CLONIDINE HYDROCHLORIDE 0.1 MG/1
0.1 TABLET ORAL EVERY 8 HOURS
Status: DISCONTINUED | OUTPATIENT
Start: 2023-06-13 | End: 2023-06-18 | Stop reason: HOSPADM

## 2023-06-13 RX ORDER — AMOXICILLIN 250 MG
2 CAPSULE ORAL 2 TIMES DAILY PRN
Status: DISCONTINUED | OUTPATIENT
Start: 2023-06-13 | End: 2023-06-18 | Stop reason: HOSPADM

## 2023-06-13 RX ORDER — GABAPENTIN 300 MG/1
300 CAPSULE ORAL EVERY 8 HOURS
Status: DISCONTINUED | OUTPATIENT
Start: 2023-06-18 | End: 2023-06-16

## 2023-06-13 RX ORDER — TRAZODONE HYDROCHLORIDE 50 MG/1
50 TABLET, FILM COATED ORAL
Status: DISCONTINUED | OUTPATIENT
Start: 2023-06-13 | End: 2023-06-18 | Stop reason: HOSPADM

## 2023-06-13 RX ADMIN — SODIUM PHOSPHATE, MONOBASIC, MONOHYDRATE AND SODIUM PHOSPHATE, DIBASIC, ANHYDROUS 9 MMOL: 276; 142 INJECTION, SOLUTION INTRAVENOUS at 14:05

## 2023-06-13 RX ADMIN — CLONIDINE HYDROCHLORIDE 0.1 MG: 0.1 TABLET ORAL at 02:34

## 2023-06-13 RX ADMIN — PANTOPRAZOLE SODIUM 40 MG: 40 TABLET, DELAYED RELEASE ORAL at 09:08

## 2023-06-13 RX ADMIN — DIAZEPAM 10 MG: 10 TABLET ORAL at 18:36

## 2023-06-13 RX ADMIN — SERTRALINE HYDROCHLORIDE 50 MG: 50 TABLET ORAL at 09:08

## 2023-06-13 RX ADMIN — THIAMINE HYDROCHLORIDE 500 MG: 100 INJECTION, SOLUTION INTRAMUSCULAR; INTRAVENOUS at 04:15

## 2023-06-13 RX ADMIN — THIAMINE HYDROCHLORIDE 500 MG: 100 INJECTION, SOLUTION INTRAMUSCULAR; INTRAVENOUS at 13:05

## 2023-06-13 RX ADMIN — CLONIDINE HYDROCHLORIDE 0.1 MG: 0.1 TABLET ORAL at 18:07

## 2023-06-13 RX ADMIN — MULTIPLE VITAMINS W/ MINERALS TAB 1 TABLET: TAB at 09:08

## 2023-06-13 RX ADMIN — GABAPENTIN 1200 MG: 600 TABLET, FILM COATED ORAL at 02:34

## 2023-06-13 RX ADMIN — NICOTINE 1 PATCH: 14 PATCH, EXTENDED RELEASE TRANSDERMAL at 09:08

## 2023-06-13 RX ADMIN — DIAZEPAM 10 MG: 10 TABLET ORAL at 03:37

## 2023-06-13 RX ADMIN — DIAZEPAM 10 MG: 10 TABLET ORAL at 14:10

## 2023-06-13 RX ADMIN — DEXTROSE AND SODIUM CHLORIDE 1000 ML: 5; 900 INJECTION, SOLUTION INTRAVENOUS at 06:04

## 2023-06-13 RX ADMIN — SODIUM PHOSPHATE, MONOBASIC, MONOHYDRATE AND SODIUM PHOSPHATE, DIBASIC, ANHYDROUS 9 MMOL: 276; 142 INJECTION, SOLUTION INTRAVENOUS at 21:31

## 2023-06-13 RX ADMIN — SODIUM PHOSPHATE, MONOBASIC, MONOHYDRATE AND SODIUM PHOSPHATE, DIBASIC, ANHYDROUS 15 MMOL: 276; 142 INJECTION, SOLUTION INTRAVENOUS at 04:14

## 2023-06-13 RX ADMIN — GABAPENTIN 900 MG: 300 CAPSULE ORAL at 10:00

## 2023-06-13 RX ADMIN — Medication 25 MCG: at 09:08

## 2023-06-13 RX ADMIN — THIAMINE HYDROCHLORIDE 500 MG: 100 INJECTION, SOLUTION INTRAMUSCULAR; INTRAVENOUS at 19:54

## 2023-06-13 RX ADMIN — MAGNESIUM SULFATE HEPTAHYDRATE 2 G: 40 INJECTION, SOLUTION INTRAVENOUS at 10:01

## 2023-06-13 RX ADMIN — GABAPENTIN 900 MG: 300 CAPSULE ORAL at 18:07

## 2023-06-13 RX ADMIN — CLONIDINE HYDROCHLORIDE 0.1 MG: 0.1 TABLET ORAL at 10:00

## 2023-06-13 RX ADMIN — FOLIC ACID 1 MG: 1 TABLET ORAL at 09:08

## 2023-06-13 RX ADMIN — THIAMINE HYDROCHLORIDE 500 MG: 100 INJECTION, SOLUTION INTRAMUSCULAR; INTRAVENOUS at 09:06

## 2023-06-13 RX ADMIN — DIAZEPAM 10 MG: 10 TABLET ORAL at 20:04

## 2023-06-13 RX ADMIN — POTASSIUM CHLORIDE 20 MEQ: 1500 TABLET, EXTENDED RELEASE ORAL at 14:07

## 2023-06-13 ASSESSMENT — ACTIVITIES OF DAILY LIVING (ADL)
ADLS_ACUITY_SCORE: 30
WEAR_GLASSES_OR_BLIND: NO
DRESS: 0-->INDEPENDENT
DEPENDENT_IADLS:: TRANSPORTATION
DRESS: 0-->INDEPENDENT
ADLS_ACUITY_SCORE: 35
ADLS_ACUITY_SCORE: 35
BATHING: 1-->ASSISTANCE NEEDED
ADLS_ACUITY_SCORE: 35
CONCENTRATING,_REMEMBERING_OR_MAKING_DECISIONS_DIFFICULTY: YES
CHANGE_IN_FUNCTIONAL_STATUS_SINCE_ONSET_OF_CURRENT_ILLNESS/INJURY: YES
TOILETING_ISSUES: NO
DOING_ERRANDS_INDEPENDENTLY_DIFFICULTY: YES
NUMBER_OF_TIMES_PATIENT_HAS_FALLEN_WITHIN_LAST_SIX_MONTHS: 3
DIFFICULTY_EATING/SWALLOWING: NO
WALKING_OR_CLIMBING_STAIRS_DIFFICULTY: YES
TRANSFERRING: 1-->ASSISTANCE (EQUIPMENT/PERSON) NEEDED
ADLS_ACUITY_SCORE: 30
WALKING_OR_CLIMBING_STAIRS: OTHER (SEE COMMENTS)
ADLS_ACUITY_SCORE: 35
ADLS_ACUITY_SCORE: 35
FALL_HISTORY_WITHIN_LAST_SIX_MONTHS: YES
TRANSFERRING: 1-->ASSISTANCE (EQUIPMENT/PERSON) NEEDED (NOT DEVELOPMENTALLY APPROPRIATE)
ADLS_ACUITY_SCORE: 35
ADLS_ACUITY_SCORE: 30
ADLS_ACUITY_SCORE: 35
DRESSING/BATHING_DIFFICULTY: YES
ADLS_ACUITY_SCORE: 35
ADLS_ACUITY_SCORE: 35
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
## 2023-06-13 NOTE — UTILIZATION REVIEW
Inpatient appropriate    Admission Status; Secondary Review Determination      Under the authority of the Utilization Management Committee, the utilization review process indicated a secondary review on the above patient. The review outcome is based on review of the medical records, discussions with staff, and applying clinical experience noted on the date of the review.    (x) Inpatient Status Appropriate - This patient's medical care is consistent with medical management for inpatient care and reasonable inpatient medical practice.    RATIONALE FOR DETERMINATION   72-year-old male with history of alcohol use disorder, withdrawal seizures, COPD, hypertension, depression and anxiety presented to the emergency room on 6/12/2023 with generalized weakness, falls and was found to have acute alcohol intoxication, alcoholic ketoacidosis and lactic acidosis.  He is also being treated for alcohol withdrawal with both benzodiazepine and benzodiazepine sparing regimen.  With concerns for Warnicke's is also being empirically treated with high-dose IV thiamine.  Patient will need ongoing evaluation and treatment in the hospital for withdrawal symptoms, correction of ketoacidosis until safe disposition plan established.  Inpatient care is appropriate at this time.    At the time of admission with the information available to the attending physician more than 2 nights Hospital complex care was anticipated, based on patient risk of adverse outcome if treated as outpatient and complex care required. Inpatient admission is appropriate based on the Medicare guidelines.    This document was produced using voice recognition software      The information on this document is developed by the utilization review team in order for the business office to ensure compliance. This only denotes the appropriateness of proper admission status and does not reflect the quality of care rendered.  The definitions of Inpatient Status and Observation  Status used in making the determination above are those provided in the CMS Coverage Manual, Chapter 1 and Chapter 6, section 70.4.    Sincerely,    Utilization Review  Physician Advisor  [facility].

## 2023-06-13 NOTE — CONSULTS
6/13/2023    CD consult completed.   Pt sitting in bed eating precious crackers. Pt reports he wants to go home. Pt said he does not drive, has no means to get to outpatient treatment. Pt not sure about residential treatment. Pt then stated he is not in a state of mind to make decisions. Left resources with patient just in case.   Reading through EMR, physical therapy met with pt and recommends possible ASF/LTC due to pt's failure to thrive and multiple admissions for alcohol use and failure to thrive. Given his current state, I highly doubt he'd be accepted to admit to a residential FABY program. Pt also has straight Medicare insurance, so there are only 2 FABY treatment program options for patient.   Pt's had 9 ED visits/hospitalizations since 04/19/2023 alone (2 month period). Pt has been recommended TCU within this time frame. Last SLUMs score this writer could find was from 11/2022 when he scored a 11/28.   CD consult closing at this time. If pt improved cognitively and physically, and would like formal substance use treatment, CD consult can be reordered.     Cristine Ocampo Sent[facility] Princess Anne [email or web address] [email or web address]  Direct phone: [phone]

## 2023-06-13 NOTE — PLAN OF CARE
VS: VSS   O2: >90% on RA   Output: Voiding via urinal   Last BM: 6/11   Activity: WBAT; 1A with GB/FWW. Ambulated in HW with PT   Up for meals? Declined   Skin: Blanchable redness on coccyx, scattered bruising, scar R FA   Pain: Denies   CMS: Intact   Dressing: None   Diet: Did not eat anything besides 1 precious cracker until ~1600. Pt then placed NPO besides meds d/t risk for refeeding syndrome.   LDA: R hand PIV infusing, L PIV SL   Plan: TBD   Additional Info: Nicotine patch R shoulder  CIWA 3-8 this shift.  Phos replaced x2, K and Mag replaced x1
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
## 2023-06-13 NOTE — PROGRESS NOTES
[facility]    Progress Note - [facility] Medicine Service       Date of Admission:  6/13/2023    Major plans for today:  - Continue CIWA with Diazepam   - Refeeding syndrome: RN-managed HIGH K/Mg/Ph protocol, BID lytes checks   - Cardiac telemetry 2/2 electrolyte abnormalities   - Nutrition, PT/OT, and Chem Dep evaluations     Assessment & Plan   Familia Watson is a 72-year-old male PMH severe AUD with history of withdrawal seizure (4/2023; complicated by PEA arrest), COPD, HTN, HLD, and depression/anxiety admitted 6/13/23 for acute alcohol withdrawal and generalized weakness suspected secondary to malnutrition vs. pulmonary lesion. Found to have electrolyte abnormalities consistent with refeeding syndrome.      # Alcohol use disorder, severe   # Acute alcohol withdrawal   # History of alcohol withdrawal seizure, witnessed (4/2023) with PEA arrest   Patient with long history of alcohol use. Reports drinking one pint vodka daily. Last drink reported 6/12 @ 0200. Positive MATT (238) on admission. Notable history of witnessed alcohol withdrawal seizure complicated by PEA arrest secondary to hypoxia. Currently denies withdrawal symptoms. Scoring 3-6 on CIWA. Last benzodiazepine administration 6/13 @ 0330. Reports motivation for sobriety.   - Consults: Chemical Dependency, Nutrition; PT/OT; appreciate recommendations   - CIWA Protocol:   - Diazepam; last 6/13 @ 0330   - Gabapentin taper to PTA dose of 300 mg TID   - Clonidine 0.1 mg 3 times daily   - [place] level thiamine replacement   - Daily multivitamin    # Concern for refeeding syndrome   # Poor PO intake   # Generalized weakness   # Falls at home   Patient reports limited to no oral intake for several days in the setting of severe alcohol use disorder and depression. Admitted after several falls at home secondary to generalized weakness. Suspect weakness related to malnutrition vs. pulmonary lesion as below.  On admission, patient was given 1L D5NS for lactic acidosis. Subsequent electrolyte abnormalities consistent with refeeding syndrome. Will need continued close monitoring with increased oral intake.   - Consults: Nutrition; PT/OT; appreciate recommendations   - Electrolytes: BID CMP, Mg, Phos; RN-managed Mg/Ph/K HIGH replacement protocol.   - Cardiac monitoring: Telemetry.   - Precautions: Fall precautions, up with assist.   - Negative workup: Normal B12/folate; negative CK; recent TSH WNL; negative troponin, reassuring ECG; CT Head without acute changes.      # Cavitary lung lesion, BUSTER  # Tobacco use disorder  # COPD   Smokes 1ppd. CT PE (5/25/23) with BUSTER infiltrate. Recommended repeat imaging to differentiate infectious sequelae vs malignancy. Currently on RA. No wheezing on exam.   - Nicotine patch daily   - PTA DuoNeb PRN   - Repeat CT Chest in 4-6 weeks     # Depression  # History of SI, SIB (cutting)  Patient reports several life stressors including recent death of spouse (1/2023). Recent hospitalization (5/24-5/31) following suicide attempt. Initiated on SSI at that time. Intermittent adherence. On admission, reported slightly improved mood and denied SI.   - Continue PTA Sertraline 50 mg daily  - Consider addition of Remeron for appetite stimulation as well as depression treatment  - No indication for bedside attendant currently    Chronic/resolved/stable:    # Lactic acidosis, resolved   # Anion gap metabolic acidosis, resolved   # Alcohol ketoacidosis  Admitted with AGMA and lactate elevation (2.6). Resolved with fluids. Suspect alcohol ketoacidosis vs. Starvation ketoacidosis given history. Low concern for infection/DKA.     # HLD   - HOLD PTA Atorvastatin        Diet: Regular diet   DVT Prophylaxis: Pneumatic Compression Devices  Alberto Catheter: Not present  Fluids: PO  Lines: None     Cardiac Monitoring: None  Code Status: Full Code      Clinically Significant Risk Factors Present on Admission           # Hypocalcemia: Lowest Ca = 8.3 mg/dL in last 2 days, will monitor and replace as appropriate   # Hypomagnesemia: Lowest Mg = 1.6 mg/dL in last 2 days, will replace as needed  # Anion Gap Metabolic Acidosis: Highest Anion Gap = 24 mmol/L in last 2 days, will monitor and treat as appropriate                    Disposition Plan      Expected Discharge Date: 06/16/2023                The patient's care was discussed with the Attending Physician, Dr. Tafoya.    Yecenia Malik MD  [facility]  [facility]  Securely message with Vocera (more info)  Text page via [facility] Paging/Directory   See signed in provider for up to date coverage information  ______________________________________________________________________    Interval History     Overnight: Admitted. No acute events. Last CIWA 3 at 0745, last diazepam at 0330.     Subjective: Patient sleeping and tired on multiple attempts at examination. Reportedly up with PT in between attempts, reassuring against sedation. On afternoon check, patient more alert. Denies withdrawal symptoms including anxiety/nausea at this time. Has not eaten anything. Endorses feeling weak for past few days. Endorses having a hard year.     Physical Exam   Vital Signs: Temp: 98  F (36.7  C) Temp src: Oral BP: 127/61 Pulse: 84     SpO2: 100 %      Weight: 0 lbs 0 oz    Constitutional: Laying in bed, no apparent distress  Respiratory: No increased work of breathing, good air exchange, clear to auscultation bilaterally, no crackles or wheezing  Cardiovascular: Normal apical impulse, regular rate and rhythm, normal S1 and S2, no S3 or S4, and no murmur noted  GI: No scars, normal bowel sounds, soft, non-distended, non-tender, no masses palpated, no hepatosplenomegally  Skin: No bruising or bleeding  Musculoskeletal: No lower extremity pitting edema present    Medical Decision Making   Please see A&P for additional details of  medical decision making.      Data   ------------------------- PAST 24 HR DATA REVIEWED -----------------------------------------------    I have personally reviewed the following data over the past 24 hrs:    9.8  \   9.7 (L)   / 289     134 (L) 98 8.7 /  80   3.8 17 (L) 0.64 (L) \       ALT: 35 AST: 59 (H) AP: 100 TBILI: 0.5   ALB: 3.5 TOT PROTEIN: 6.1 (L) LIPASE: N/A       Procal: N/A CRP: N/A Lactic Acid: 0.9         Imaging results reviewed over the past 24 hrs:   Recent Results (from the past 24 hour(s))   Head CT w/o contrast    Narrative    EXAM: CT HEAD W/O CONTRAST  LOCATION: [facility]  DATE/TIME: 6/12/2023 8:12 PM CDT    INDICATION: Etoh, fall  COMPARISON: None.  TECHNIQUE: Routine CT Head without IV contrast. Multiplanar reformats. Dose reduction techniques were used.    FINDINGS:  INTRACRANIAL CONTENTS: No evidence of acute intrarenal hemorrhage or mass effect. Scattered foci of decreased attenuation within the cerebral hemispheric white matter which are nonspecific, though most commonly ascribed to chronic small vessel ischemic   disease. The ventricles and sulci are prominent consistent with moderate brain parenchymal volume loss. Gray-white matter differentiation is maintained. The basilar cisterns are patent.    VISUALIZED ORBITS/SINUSES/MASTOIDS: Left cataract surgery. The partially imaged globes are otherwise unremarkable. The partially imaged paranasal sinuses and mastoid air cells are unremarkable.     BONES/SOFT TISSUES: Left frontal craniotomy. The visualized skull base and calvarium are otherwise unremarkable.      Impression    IMPRESSION:    1.  No evidence of acute intracranial hemorrhage or mass effect.  2.  Mild nonspecific white matter changes.  3.  Moderate brain parenchymal volume loss.     Chest XR,  PA & LAT    Narrative    EXAM: XR CHEST 2 VIEWS  LOCATION: [facility]  DATE/TIME: 6/12/2023 8:18 PM CDT    INDICATION:  weakness  COMPARISON: Chest x-ray 05/24/2023 and CT 05/25/2023      Impression    IMPRESSION: Stable chronic appearing fibrotic change in the left lung apex and both lung bases similar to prior imaging.    No acute new findings.

## 2023-06-13 NOTE — PROGRESS NOTES
"CLINICAL NUTRITION SERVICES - ASSESSMENT NOTE     Nutrition Prescription    RECOMMENDATIONS FOR MDs/PROVIDERS TO ORDER:  - none at present.     Malnutrition Status:    Severe malnutrition in the context of chronic illness    Recommendations already ordered by Registered Dietitian (RD):  - requested weight.   - will hold off on adding ONS until refeeding syndrome improves and if he isn't meeting at least the low end of his needs.      Future/Additional Recommendations:  - Follow electrolytes, weights, and po intake for adequacy.  Add ONS (chocolate Ensure Enlive) if needed to meet needs once electrolytes are stabilized with currently good po intake.      Information obtained from the chart as pt is noted to be confused with noted low SLUM scorein past. Asking the nurses if he was getting the \"water with fluoride in it\" that he was supposed to be getting.  Possibly referring to the IVF that included sodium chloride in the past.      Pt discussed per interdisciplinary rounds.  Provider anticipates significant refeeding and potential need for TCU for strengthening determined after effects of electrolyte imbalance and withdrawals subside over the next few days.     REASON FOR ASSESSMENT  Familia CHARLI Watson is a/an 72 year old male assessed by the dietitian for Provider Order - \"weakness, poor appetite, cachectic patient\"    Chart reviewed.  Per chart, history of alcohol use disorder, complicated withdrawal w/ seizure (04/23; also suffered aspiration PNA and PEA arrest), COPD, HTN, HLD, and depression/anxiety and is admitted for weakness and detox--transferred from [facility].  Noted suicide attempt recently by slashing his R forearm with a knife per [facility] RD note 5/26/23.  Noted BUSTER cavitary lung lesion--malignancy not yet ruled out.      NUTRITION HISTORY  Pt was just seen by IP RD in the [facility] system 5/26/23.  Per her note: \" He reports that he has been having a hard time with eating. He says that he lost his wife in " "December and since, he has been drinking and not eating well. He reports that he was about 140# December 2022 and now about 120#. RD will order weight to be taken to get accurate weight with admission weight stated. We discussed how to help him eat while he is here in the hospital. Will have kitchen cut up his foods and pour liquids into cup. Will also send soft foods menu as he reports that he doesn't have great teeth to chew foods. Will also send ensure supplements at snack times and thrive at meal times to optimize his oral intake. Denies nausea, however reports that he had been having diarrhea PTA. He reports a regular BM yesterday. Will continue to monitor nutrition needs.\" Pt was also seen by IP RD on 5/1/23 at [facility] and was previously trying to take 1 chocolate Ensure at home and was sent Ensure Enlive BID with that admission.     Pt lives alone since wife's death with limited social/emotional support.  He also does not drive. Per CD consult today, last SLUM score was 11/28 from Nov 2022.      Per MD note, pt admits to minimal intake x 3 days with heavy alcohol use PTA.    CURRENT NUTRITION ORDERS  Diet: NPO as of 6/13 PM, was on regular diet earlier in day. 6/14--updated to soft & bite sized diet (Level 6) with thin liquids.    Intake/Tolerance: Pt ate 100% of something 6/13 at 4pm.  Pt had a late afternoon meal of eggs and sausage, pears and coffee providing 503 kcal and 27 g protein.     Pt ate 100% of breakfast 6/14--similar meal as 6/13 afternoon.  He found the beef pot roast too \"big\" or hard to chew sent with lunch.  Provider adjusted diet to above Level 6 diet per RN request.     This writer took pt's order for dinner 6/14 and entered it in [employer].     LABS  Labs reviewed  6/13:   Na 134  K+ 3.8 (later 3.2 on 6/14)  Cr 0.64--low likely due to low muscle mass  Mg 1.5  Phos 1.2 to 2.1, 6/14 back to 2.3  Folate 34.6 wnl  LA wnl after IVF given  B12 1771 (likely had been receiving supplementation " "during his recent admission to [facility] system).  Hgb 9.7  UA on 6/12--Ketones 150.     MEDICATIONS  Medications reviewed  1 L D5NS bolused early AM 6/13  Folic acid,   theravit w/ minerals,   High dose Thiamine 500 mg IV TID x 6 days, then 250 mg IV x 5 days, then 100 mg orally daily.   protonix EC   Vitamin D3 25 mcg  Pt receiving Mg, KCl and IV phosphate for HIGH replacement protocols for Mg++, K+ and Phos.  Hgb- 9.7 to 9 in 2 days with rehydration.     ANTHROPOMETRICS  Height: 5'8\" per [facility] records.  [facility] records show 5'6\".   Most Recent Weight:  54.4 kg (119 lb 14.9 oz) 6/13  IBW: 70 kg (77.8% IBW)  BMI: 18.24--underweight  Weight History: wt down 3.4% in past month--not significant.  No significant wt loss over past 3 months. . Suspect most > 6 months ago were stated/guestimates so won't use for possible % wt loss over 6 and 12 months.  Wt Readings from Last 25 Encounters:   06/13/23 54.4 kg (119 lb 14.9 oz)   06/12/23 72.6 kg (160 lb) presume stated, inaccurate   05/20/23 65.8 kg (145 lb) presume stated, inaccurate   05/16/23 56.3 kg (124 lb 1.6 oz)   05/13/23 59 kg (130 lb)   05/08/23 56.1 kg (123 lb 9.6 oz)   04/26/23 54.9 kg (121 lb 1.6 oz)   12/07/22 56.2 kg (123 lb 12.8 oz)   12/02/22 59 kg (130 lb) suspect stated, inaccurate   11/28/22 54.4 kg (120 lb)   11/23/22 56.1 kg (123 lb 11.2 oz)   09/15/22 61.2 kg (135 lb)   08/09/22 61.2 kg (135 lb)   07/17/22 59 kg (130 lb)   05/12/22 63.5 kg (140 lb)   04/07/22 59 kg (130 lb)   03/02/22 62.1 kg (137 lb)   01/25/22 58.2 kg (128 lb 3.2 oz)     Dosing Weight: 54.4 kg (using 6/13 wt--77.8% IBW)    ASSESSED NUTRITION NEEDS  Estimated Energy Needs: 1632 - 1904 - 2176 kcals/day (30 -35-40 kcals/kg)  Justification: to promote anabolism with some hypermetabolism w/ COPD and possible malignancy  Estimated Protein Needs: 65 - 82 grams protein/day (1.2-1.5 grams of pro/kg)  Justification: repletion with potential increased needs with potential malignancy vs " pulmonary infiltrate.  Estimated Fluid Needs: 1632+ mL/day (30 +mL/kg)   Justification: Maintenance and Per provider pending fluid status     PHYSICAL FINDINGS  See malnutrition section below.    MALNUTRITION  % Intake: </=75% for >/= 1 month (severe)  % Weight Loss: Weight loss does not meet criteria  Subcutaneous Fat Loss: Facial region:  severe, Upper arm:  sevvere and Thoracic/intercostal:  severe  Muscle Loss: Temporal,  Facial & jaw region,  Scapular bone, Thoracic region (clavicle, acromium bone, deltoid, trapezius, pectoral), Upper arm (bicep, tricep), , Dorsal hand, Upper leg (quadricep, hamstring),  Patellar region and Posterior calf:  All severe  Fluid Accumulation/Edema: None noted  Malnutrition Diagnosis: Severe malnutrition in the context of chronic illness    NUTRITION DIAGNOSIS  Malnutrition related to poor intake, poor dentition, inability to care for self with alcoholism and depression since death of wife in December as evidenced by BMI of 18.6 and severe fat and muscle wasting.       INTERVENTIONS  Implementation  Nutrition Education: Not appropriate at this time due to patient condition   Medical food supplement therapy--hold for now until refeeding syndrome stabilizes and if needed to meet needs.     Goals  Patient to consume % of nutritionally adequate meal trays TID, or the equivalent with supplements/snacks.     Monitoring/Evaluation  Progress toward goals will be monitored and evaluated per protocol.    Opal Ray RD (Becky), LD   6B and 8A Med/surg (M-F) Pager: [phone]  Weekend/holiday pager: [phone]

## 2023-06-13 NOTE — ED TRIAGE NOTES
"Pt presents via EMS from home because of increased weakness over the last couple weeks and today states his \"legs gave out\". Denies falling today but has been falling more recently. He has been drinking 1 pint of vodka/day for as long as he can remember. Last drink was \"a couple hours ago\".BG=73. Has not eaten/drank anything in roughly 2 days. Would be interested in detox.       "

## 2023-06-13 NOTE — PLAN OF CARE
6MS ADMISSION    D: Patient admitted/transferred from [facility] via EMS for alcoholic ketoacidosis, signs of malnutrition, recurrent falls, weakness, and ETOH withdrawal.     I: Upon arrival to the unit patient was oriented to room, unit, and call light. Patient s height and vital signs were obtained. Allergies reviewed and allergy band applied. Provider notified of patient s arrival on the unit. Adult AVS completed. Head to toe assessment completed. Education assessment completed. Care plan initiated.    A: Vital signs stable upon admission. Patient rates pain at 0/10. Two RN skin assessment incomplete due to patient being fatigued and wanting to sleep upon arrival. Visible skin intact, and skin assessment can occur when patient is awake. Bed alarm on due to recurrent fall history.     P: Continue to monitor patient s VS, withdrawal symptoms, and mood/behavior, and intervene as needed. Continue with plan of care. Notify provider with any concerns or changes in patient status.

## 2023-06-13 NOTE — DISCHARGE SUMMARY
"[facility]  Discharge Summary - Medicine & Pediatrics       Date of Admission:  6/13/2023  Date of Discharge:  6/18/2023  Discharging Provider: Niya Tafoya DO  Discharge Service: [facility]    Discharge Diagnoses   # Alcohol use disorder, severe   # History of alcohol withdrawal seizure, witnessed (4/2023) with PEA arrest  # Persistent somnolence 2/2 delirium   # Possible acute pancreatitis on admission  # Acute alcohol withdrawal, resolved   # Epigastric and lower abdominal pain, resolved  # Visual hallucinations, resolved  # Left-sided migraines, persistent  # Poor PO intake   # Anemia, chronic disease vs iron deficiency  # Generalized weakness   # Falls at home   # Refeeding syndrome, improving  # Cavitary lung lesion, BUSTER  # Tobacco use disorder  # COPD   # Depression  # History of SI, SIB (cutting)  # Social isolation  # Recent eviction    Clinically Significant Risk Factors     # Cachexia: Estimated body mass index is 18.6 kg/m  as calculated from the following:    Height as of 6/12/23: 1.727 m (5' 8\").    Weight as of this encounter: 55.5 kg (122 lb 4.8 oz).  # Severe Malnutrition: based on nutrition assessment      Follow-ups Needed After Discharge   Follow up on getting Familia to inpatient treatment.    Unresulted Labs Ordered in the Past 30 Days of this Admission     No orders found from 5/14/2023 to 6/14/2023.        Discharge Disposition   Discharged to home  Condition at discharge: Stable    Hospital Course   Familia Watson is a 72-year-old male PMH severe AUD with history of withdrawal seizure (4/2023; complicated by PEA arrest), COPD, HTN, HLD, and depression/anxiety admitted 6/13/23 for acute alcohol withdrawal and generalized weakness suspected secondary to malnutrition vs. pulmonary lesion. Found to have electrolyte abnormalities consistent with refeeding syndrome.      # Alcohol use disorder, severe   # History of alcohol " "withdrawal seizure, witnessed (4/2023) with PEA arrest  # Persistent somnolence 2/2 delirium   # Possible acute pancreatitis on admission  # Acute alcohol withdrawal, resolved   # Epigastric and lower abdominal pain, resolved  # Visual hallucinations, resolved  Patient with long history of alcohol use. Reports drinking one pint vodka daily. Last drink reported 6/12 @ 0200. Positive MATT (238) on admission. Notable history of witnessed alcohol withdrawal seizure complicated by PEA arrest secondary to hypoxia. Currently denies withdrawal symptoms. CIWA was discontinued on 6/17/2023. Chem dep was re-consulted, but Familia was insistent on leaving before they could speak to him. He was informed about the risks of leaving without an appropriate outpatient plan. Familia was able to state that he understood the risks but would still like to return home.  - Clonidine 0.1 mg 3 times daily  - [place]ike level thiamine replacement  - Folic acid 1 mg daily  - Daily multivitamin  - Continue PTA Vitamin D 3 25 mcg daily         # Left-sided migraines, persistent  Familia reports that he has been having migraines since \"[place],\" per night team. He has had daily headaches that have persistent despite resolution of withdrawal symptoms. Two CT Head's were performed during his stay and were unremarkable for acute intracranial pathology. No neurologic deficits on physical exam, but his bilateral trapezii are particularly hypertonic, especially his left trapezius. It is possible that the etiology of his headache is muscular. Although the migraines are persistent, he states that this feels no different than his typical migraine. Sumatriptan and toradol only mildly helpful. Headache now resolved after a full meal and full night's sleep. Will continue to monitor.     # Poor PO intake   # Anemia, chronic disease vs iron deficiency  # Generalized weakness   # Falls at home   # Refeeding syndrome, improving  Patient reports limited to no oral " intake for several days in the setting of severe alcohol use disorder and depression. Admitted after several falls at home secondary to generalized weakness. Normal B12/folate; negative CK; recent TSH WNL; negative troponin, reassuring ECG; CT Head without acute changes. Suspect weakness related to malnutrition vs. pulmonary lesion as below. Subsequent electrolyte abnormalities consistent with refeeding syndrome.  Will need continued close monitoring with increased oral intake as electrolytes have not yet normalized. Unclear how stable patient is on feet as this is the first day he is more awake and alert, will have PT assess today especially considering patient is anxious to discharge home.   - Iron supplementation every other day  - Magnesium 30 mg daily     # Cavitary lung lesion, BUSTER  # Tobacco use disorder  # COPD   Smokes 1ppd. CT PE (5/25/23) with BUSTER infiltrate. Recommended repeat imaging to differentiate infectious sequelae vs malignancy. Currently on RA. No wheezing on exam.   - Nicotine patch daily   - PTA DuoNeb PRN   - Repeat CT Chest in 4-6 weeks      # Depression  # History of SI, SIB (cutting)  # Social isolation  # Recent eviction  Patient reports several life stressors including recent death of spouse (12/2022). Recent hospitalization (5/24-5/31) following suicide attempt. Initiated on SSI at that time. Intermittent adherence. On admission, reported slightly improved mood and denied SI. No indication for bedside attendant currently. Familia reports that he is going to be evicted from his home and needs to go clear out his belongings. On 6/17, he reported that he has 30 days to do so. He has no relatives in the area and has no place to go. SW aware. TCU will not accept him. His only option is to go to [facility]. [facility] could potentially be [facility]. He is at high risk of relapse as is, and homelessness raises the risk of relapse an dpossible rehospitalization.  - PTA Sertraline 50  mg daily  - Mirtazapine 7.5 mg at bedtime  - Discontinue Trazodone to prevent increased somnolence     Chronic/resolved/stable:  # Alcohol ketoacidosis, resolved  # Lactic acidosis, resolved   # Anion gap metabolic acidosis, resolved   Admitted with AGMA and lactate elevation (2.6). Resolved with fluids. Suspect alcohol ketoacidosis vs. Starvation ketoacidosis given history. Low concern for infection/DKA.      # HLD   - PTA Atorvastatin     Consultations This Hospital Stay   PHYSICAL THERAPY ADULT IP CONSULT  OCCUPATIONAL THERAPY ADULT IP CONSULT  NUTRITION SERVICES ADULT IP CONSULT  CHEMICAL DEPENDENCY IP CONSULT  CARE MANAGEMENT / SOCIAL WORK IP CONSULT  SPIRITUAL HEALTH SERVICES IP CONSULT  CHEMICAL DEPENDENCY IP CONSULT    Code Status   Full Code     The patient was discussed with Dr. Tafoya.    Katie Ryan DO, MPH  [facility]  [facility] MED SURG  [address] [place] [id number]  Phone: [phone]  Fax: [phone]  ______________________________________________________________________    Physical Exam   Vital Signs: Temp: 97.5  F (36.4  C) Temp src: Oral BP: 123/69 Pulse: 70   Resp: 16 SpO2: 100 % O2 Device: None (Room air)    Weight: 122 lbs 4.8 oz     Per Dr. Ballard's exam on 6/18/2023:  Constitutional: Laying in bed, no apparent distress. Alert. Poor dentition. PERRL. AOx3.  Respiratory: No increased work of breathing, good air exchange, clear to auscultation bilaterally, no crackles or wheezing  Cardiovascular: Normal apical impulse, regular rate and rhythm, normal S1 and S2, no S3 or S4, and no murmur noted  GI: No tenderness to palpation. No scars, normal bowel sounds, soft, non-distended, no masses palpated, no hepatosplenomegally  Skin: No bruising or bleeding  Musculoskeletal: Able to move all extremities.      Primary Care Physician   Daniel Martinez    Discharge Orders      Primary Care - Care Coordination Referral      Reason for your  hospital stay    You were seen in the hospital for alcohol withdrawal and low electrolytes. You safely went through alcohol withdrawal with treatment and your electrolytes were replaced as needed. You felt better and went home.     Activity    Your activity upon discharge: activity as tolerated     Adult [facility] Follow-up and recommended labs and tests    Follow up with primary care provider, Daniel Martinez, within 7 days for follow up on drinking.  The following labs/tests are recommended: Mag, phos, BMP.      Appointments on [place] and/or [facility] (with [facility] provider or service). Call [phone] if you haven't heard regarding these appointments within 7 days of discharge.     Resume Home Care Services    [facility]- Please resume home care services at discharge.     Diet    Follow this diet upon discharge: Orders Placed This Encounter      Snacks/Supplements Adult: Ensure Enlive; With Meals      Soft & Bite Sized Diet (level 6) Thin Liquids (level 0)       Significant Results and Procedures   Most Recent 3 CBC's:  Recent Labs   Lab Test 06/19/23  0641 06/18/23 2058 06/18/23  0914   WBC 5.6 8.4 5.1   HGB 9.0* 8.7* 9.2*    97 101*    278 247     Most Recent 3 BMP's:  Recent Labs   Lab Test 06/19/23  0641 06/19/23  0003 06/18/23 2058    138 134*   POTASSIUM 4.2 4.2  4.2 3.9   CHLORIDE 106 105 99   CO2 25 22 24   BUN 11 11 13   CR 0.67* 0.69* 0.74   ANIONGAP 7 11 11   MELLISA 8.5 8.6 8.6   GLC 95 92 94   ,   Results for orders placed or performed during the hospital encounter of 06/13/23   CT Head w/o Contrast    Narrative    CT HEAD W/O CONTRAST 6/16/2023 10:39 AM    History: intractable migraine headache with photophobia and blurry  vision in the setting of alcohol withdrawal and multiple falls PTA     Comparison: Head CT 6/12/2023    Technique: Using multidetector thin collimation helical acquisition  technique, axial, coronal and sagittal CT images from the  skull base  to the vertex were obtained without intravenous contrast.   (topogram) image(s) also obtained and reviewed.    Findings: There is no intracranial hemorrhage, mass effect, or midline  shift. Gray/white matter differentiation in both cerebral hemispheres  is preserved. Stable patchy periventricular hypoattenuation and  chronic lacunar infarct of the right thalamus. Stable diffuse  parenchymal atrophy. Atherosclerotic calcifications of the carotid  siphons and the intradural vertebral arteries, left greater than  right. Ventricles are proportionate to the cerebral sulci. The basal  cisterns are clear.    Old left frontal craniotomies. Paranasal sinuses and mastoid air cells  are clear.      Impression    Impression:  1. No acute intracranial pathology.   2. Unchanged chronic ischemic white matter disease and diffuse  parenchymal atrophy.    I have personally reviewed the examination and initial interpretation  and I agree with the findings.    RENEE ENRIQUEZ MD         SYSTEM ID:  [id number]       Discharge Medications   Cannot display discharge medications since this patient is expected but has not yet arrived.    Allergies   Allergies   Allergen Reactions     Simvastatin Diarrhea     Varenicline      Weird dreams

## 2023-06-13 NOTE — PROGRESS NOTES
06/13/23 1038   Appointment Info   Signing Clinician's Name / Credentials (PT) [email or web address] Caal   Student Supervision On-site supervision provided;Direct supervision provided;Direct Patient Contact Provided;Therapy services provided with the co-signing licensed therapist guiding and directing the services, and providing the skilled judgement and assessment throughout the session       Present no   Language English   Living Environment   People in Home alone   Current Living Arrangements apartment   Home Accessibility stairs to enter home   Number of Stairs, Main Entrance 6   Stair Railings, Main Entrance railings on both sides of stairs   Transportation Anticipated family or friend will provide   Self-Care   Usual Activity Tolerance moderate   Current Activity Tolerance fair   Regular Exercise No   Equipment Currently Used at Home walker, rolling   Fall history within last six months yes   Number of times patient has fallen within last six months 4  (while drinking, no injuries)   General Information   Onset of Illness/Injury or Date of Surgery 06/12/23   Referring Physician Lakisha Ballard MD   Patient/Family Therapy Goals Statement (PT) get home   Pertinent History of Current Problem (include personal factors and/or comorbidities that impact the POC) 72 year old male; has a history of alcohol use disorder, complicated withdrawal w/ seizure (04/23; also suffered aspiration PNA and PEA arrest), COPD, HTN, HLD, and depression/anxiety and is admitted for weakness and detox   Existing Precautions/Restrictions fall   Weight-Bearing Status - LUE full weight-bearing   Weight-Bearing Status - RUE full weight-bearing   Weight-Bearing Status - LLE full weight-bearing   Weight-Bearing Status - RLE full weight-bearing   General Observations Supine in bed upon arrival, bed alarm on   Cognition   Affect/Mental Status (Cognition) WFL   Orientation Status (Cognition) oriented x 3   Follows Commands  (Cognition) WNL   Pain Assessment   Patient Currently in Pain Yes, see Vital Sign flowsheet   Integumentary/Edema   Integumentary/Edema no deficits were identifed   Posture    Posture Forward head position;Protracted shoulders;Kyphosis   Range of Motion (ROM)   Range of Motion ROM is WFL   Strength (Manual Muscle Testing)   Strength (Manual Muscle Testing) strength is WFL   Strength Comments Patient presents with some overall deconditioning   Bed Mobility   Comment, (Bed Mobility) Pt. is SBA-Brant for supine<>sit transfer   Transfers   Comment, (Transfers) Pt. is SBA for transfers sit<>stand with walker   Gait/Stairs (Locomotion)   Comment, (Gait/Stairs) Pt. is CGA with walker for ambulation   Balance   Balance no deficits were identified   Balance Comments Patient independent with sitting static/dynamic balance, CGA for standing balance with UE support from walker   Sensory Examination   Sensory Perception patient reports no sensory changes   Clinical Impression   Criteria for Skilled Therapeutic Intervention Yes, treatment indicated   PT Diagnosis (PT) impaired tolerance for functional activities   Influenced by the following impairments withdrawal, pain, decreased activity tolerance, decreased standing balance   Functional limitations due to impairments impaired tolerance for functional activities   Clinical Presentation (PT Evaluation Complexity) Stable/Uncomplicated   Clinical Presentation Rationale per clinical judgement   Clinical Decision Making (Complexity) low complexity   Planned Therapy Interventions (PT) balance training;gait training;bed mobility training;home exercise program;patient/family education;strengthening;stair training;transfer training;risk factor education;progressive activity/exercise;home program guidelines   Risk & Benefits of therapy have been explained evaluation/treatment results reviewed;care plan/treatment goals reviewed;risks/benefits reviewed;current/potential barriers reviewed    PT Total Evaluation Time   PT Eval, Low Complexity Minutes (93000) 7   Physical Therapy Goals   PT Frequency Daily   PT Predicted Duration/Target Date for Goal Attainment 06/20/23   PT Goals Bed Mobility;Transfers;Gait;Stairs   PT: Bed Mobility Independent;Supine to/from sit   PT: Transfers Modified independent;Assistive device;Bed to/from chair;Sit to/from stand   PT: Gait Modified independent;Assistive device;Greater than 200 feet   PT: Stairs 6 stairs;Rail on both sides;Modified independent;Assistive device   Therapeutic Procedure/Exercise   Ther. Procedure: strength, endurance, ROM, flexibillity Minutes (72294) 8   Treatment Detail/Skilled Intervention Pt. was instructed in BLE strengthening exercises in seated including AP, marches, LAQ 2x10 with rest between sets. Pt. tolerated exercises well.   Therapeutic Activity   Therapeutic Activities: dynamic activities to improve functional performance Minutes (80091) 10   Symptoms Noted During/After Treatment Increased pain   Treatment Detail/Skilled Intervention Pt. was received supine in bed, agreeable to participate in PT session. Pt. performs supine to sit transfer to EOB and sits EOB for several minutes. Increased time needed for supine to sit transfer due to lethargy. Pt. then transfers sit to stand with CGA, walker and ambulates in hallway about 100' this date. Pt. returns to room, performs stand to sit transfer with walker, SBA and rests at EOB. Pt. then performs sit to supine transfer with SBA and ends session in bed with needs met and call light in reach and alarm on.   PT Discharge Planning   PT Plan Continue with bed mobility, transfers, gait with walker. Progress to stairs per home setup   PT Discharge Recommendation (DC Rec) home with assist;home with home care physical therapy;Long term care facility   PT Rationale for DC Rec Pt. has decreased tolerance for functional activities, pending length of stay anticipate will progress with mobility to safely  returning home however given patient's history and multiple admissions with failure to care for self patient may be best suited in correction/LTC facility.   PT Brief overview of current status Pt. is SBA for bed mobility, transfers, CGA for ambulation with walker   Total Session Time   Timed Code Treatment Minutes 18   Total Session Time (sum of timed and untimed services) 25

## 2023-06-13 NOTE — CONSULTS
6/13/2023    CD consult pending.  Entered pt's room after knocking, pt did not awaken from knocking or writer calling his name. Will follow back up later today.    Cristine Ocampo [facility] HealthcareMA[facility] [email or web address]  Direct phone: [phone]

## 2023-06-13 NOTE — CONSULTS
Care Management Initial Consult    General Information  Assessment completed with: Patient,    Type of CM/SW Visit: Initial Assessment    Primary Care Provider verified and updated as needed: Yes   Readmission within the last 30 days: current reason for admission unrelated to previous admission   Return Category: Exacerbation of disease  Reason for Consult: discharge planning, substance use concerns  Advance Care Planning: Advance Care Planning Reviewed: no concerns identified          Communication Assessment  Patient's communication style: spoken language (English or Bilingual)    Hearing Difficulty or Deaf: yes   Wear Glasses or Blind: no (observed holding things close to face and squinting, denies using visual aids)    Cognitive  Cognitive/Neuro/Behavioral: .WDL except, mood/behavior  Level of Consciousness: alert  Arousal Level: arouses to touch/gentle shaking  Orientation: disoriented to, time     Best Language: 0 - No aphasia  Speech: clear, spontaneous, logical    Living Environment:   People in home: alone     Current living Arrangements: apartment      Able to return to prior arrangements: yes       Family/Social Support:  Care provided by: self  Provides care for: no one  Marital Status:   None (Patient states that he has no support system.)          Description of Support System: Uninvolved    Support Assessment: Lacks adequate emotional support, Limited social contact and support    Current Resources:   Patient receiving home care services: No     Community Resources: None  Equipment currently used at home: walker, rolling, shower chair  Supplies currently used at home: None    Employment/Financial:  Employment Status: retired        Financial Concerns: No concerns identified   Referral to Financial Worker: No       Does the patient's insurance plan have a 3 day qualifying hospital stay waiver?  No    Lifestyle & Psychosocial Needs:  Social Determinants of Health     Tobacco Use: High Risk  (6/12/2023)    Patient History      Smoking Tobacco Use: Every Day      Smokeless Tobacco Use: Never      Passive Exposure: Not on file   Alcohol Use: Not on file   Financial Resource Strain: Not on file   Food Insecurity: Not on file   Transportation Needs: Not on file   Physical Activity: Not on file   Stress: Not on file   Social Connections: Not on file   Intimate Partner Violence: Not on file   Depression: Not on file   Housing Stability: Not on file       Functional Status:  Prior to admission patient needed assistance:   Dependent ADLs:: Ambulation-walker  Dependent IADLs:: Transportation  Assesssment of Functional Status:  (unable to assess)    Mental Health Status:  Mental Health Status: No Current Concerns  Mental Health Management: Medication (Patient states that he is on anti-depressants but is not sure who prescribes them.)    Chemical Dependency Status:  Chemical Dependency Status: Current Concern  Chemical Dependency Management: Previous treatment          Values/Beliefs:  Spiritual, Cultural Beliefs, [employer] Practices, Values that affect care: no               Additional Information:    SW met with patient at bedside and introduced self/role. SW completed Initial Assessment. Please see above for assessment information.    Patient states that his hope is that he is going to discharge home. He is agreeable to chatting with an LADC regarding treatment options. He had no further questions for SW at this time.         Eliana Pantoja, GIACOMOW, LSW  8 Med Surg   [facility]  Phone: [phone]  Pager: [phone]
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
## 2023-06-13 NOTE — ED NOTES
Pt attached to continuous 5 lead cardiac monitoring, continuous pulse oximetry and changed into gown. Denies pain.

## 2023-06-13 NOTE — PROGRESS NOTES
Transfer Type: [facility]  Transfer Triage Note    Date of call: 06/12/23  Time of call: 10:19 PM    Current Patient Location:  [facility]  Current Level of Care: ED    Vitals: BP:142/76 HR: 76 O2 Sats: 100% on RA  T 97.6  Diagnosis:  Alcoholic ketoacidosis.  H/o WD seizures, PEA arrest..    Is COVID-19 a concern? No  Reason for requested transfer: Procedure can be done here and not at referring hospital (no beds)  Isolation Needs: None    Outside Records: Available  Additional records may be faxed to [phone].    Transfer accepted: Yes  Stability of Patient: Patient is vitally stable, with no critical labs, and will likely remain stable throughout the transfer process  Level of Care Needed: Med Surg  Telemetry Needed:  Med (Remote) Telemetry  Expected Time of Arrival for Transfer: 0-8 hours  Arrival Location:  [facility]    Recommendations for Management and Stabilization: Given    Additional Comments: 72-year-old male with history of alcohol abuse/dependence, history of alcohol withdrawal seizure complicated by aspiration pneumonia and PEA arrest, lactic acidosis attributed to alcohol abuse, COPD hypertension and hyperlipidemia referred from [facility] ED where patient presented with inability to stand (since 6/2) reduced appetite and recurrent fall in the setting of ongoing alcohol use.  Denied focal weakness or paresthesia.  No chest pain dyspnea abdominal pain nausea vomiting diarrhea or voiding symptoms.  Fell a couple of days ago.  ER evaluation included vital signs as above.  Sodium 134 CO2 reduced at 12 with elevated anion gap of 24.  Troponin 0 glucose 67 venous gas demonstrated a pH of 7.27 with PCO2 of 32 white count 10.7 with hemoglobin of 12.2 and platelets of 410,000.  Lactic acid mildly elevated 2.9 with elevated ketones at 9.59.  Chest x-ray no active disease.  Head CT no evidence of acute intracranial hemorrhage or mass effect.  UA  presence of ketones.  Alcohol level 238.  No withdrawal symptoms at present.  Patient has required up to 60 mg of Valium with prior admission.  Prior history of suicide ideation with self cutting to the upper extremity.  Up at bedside without apparent need for sitter.  Clinically patient presents with laboratory findings secondary to alcoholic ketoacidosis.  Has received thiamine with IV fluids containing dextrose.  ER to call for further elevation in lactic acid on repeat.  Potential for withdrawal related seizure.  Plan med/surge bed with telemetry [facility].    Romario Nuñez MD

## 2023-06-14 ENCOUNTER — APPOINTMENT (OUTPATIENT)
Dept: PHYSICAL THERAPY | Facility: CLINIC | Age: 73
DRG: 896 | End: 2023-06-14
Attending: INTERNAL MEDICINE
Payer: MEDICARE

## 2023-06-14 ENCOUNTER — APPOINTMENT (OUTPATIENT)
Dept: OCCUPATIONAL THERAPY | Facility: CLINIC | Age: 73
DRG: 896 | End: 2023-06-14
Attending: INTERNAL MEDICINE
Payer: MEDICARE

## 2023-06-14 LAB
ALBUMIN SERPL BCG-MCNC: 3 G/DL (ref 3.5–5.2)
ALBUMIN SERPL BCG-MCNC: 3.2 G/DL (ref 3.5–5.2)
ALP SERPL-CCNC: 91 U/L (ref 40–129)
ALP SERPL-CCNC: 96 U/L (ref 40–129)
ALT SERPL W P-5'-P-CCNC: 27 U/L (ref 0–70)
ALT SERPL W P-5'-P-CCNC: 29 U/L (ref 0–70)
ANION GAP SERPL CALCULATED.3IONS-SCNC: 10 MMOL/L (ref 7–15)
ANION GAP SERPL CALCULATED.3IONS-SCNC: 10 MMOL/L (ref 7–15)
AST SERPL W P-5'-P-CCNC: 39 U/L (ref 0–45)
AST SERPL W P-5'-P-CCNC: 45 U/L (ref 0–45)
BASOPHILS # BLD AUTO: 0.1 10E3/UL (ref 0–0.2)
BASOPHILS NFR BLD AUTO: 1 %
BILIRUB SERPL-MCNC: 0.3 MG/DL
BILIRUB SERPL-MCNC: 0.3 MG/DL
BUN SERPL-MCNC: 4.2 MG/DL (ref 8–23)
BUN SERPL-MCNC: 5.9 MG/DL (ref 8–23)
CALCIUM SERPL-MCNC: 8.1 MG/DL (ref 8.8–10.2)
CALCIUM SERPL-MCNC: 8.1 MG/DL (ref 8.8–10.2)
CHLORIDE SERPL-SCNC: 103 MMOL/L (ref 98–107)
CHLORIDE SERPL-SCNC: 103 MMOL/L (ref 98–107)
CREAT SERPL-MCNC: 0.52 MG/DL (ref 0.67–1.17)
CREAT SERPL-MCNC: 0.66 MG/DL (ref 0.67–1.17)
DEPRECATED HCO3 PLAS-SCNC: 23 MMOL/L (ref 22–29)
DEPRECATED HCO3 PLAS-SCNC: 23 MMOL/L (ref 22–29)
EOSINOPHIL # BLD AUTO: 0.3 10E3/UL (ref 0–0.7)
EOSINOPHIL NFR BLD AUTO: 5 %
ERYTHROCYTE [DISTWIDTH] IN BLOOD BY AUTOMATED COUNT: 14.8 % (ref 10–15)
GFR SERPL CREATININE-BSD FRML MDRD: >90 ML/MIN/1.73M2
GFR SERPL CREATININE-BSD FRML MDRD: >90 ML/MIN/1.73M2
GLUCOSE SERPL-MCNC: 111 MG/DL (ref 70–99)
GLUCOSE SERPL-MCNC: 123 MG/DL (ref 70–99)
HCT VFR BLD AUTO: 27.9 % (ref 40–53)
HGB BLD-MCNC: 9 G/DL (ref 13.3–17.7)
HOLD SPECIMEN: NORMAL
IMM GRANULOCYTES # BLD: 0 10E3/UL
IMM GRANULOCYTES NFR BLD: 1 %
LIPASE SERPL-CCNC: 173 U/L (ref 13–60)
LYMPHOCYTES # BLD AUTO: 1.4 10E3/UL (ref 0.8–5.3)
LYMPHOCYTES NFR BLD AUTO: 27 %
MAGNESIUM SERPL-MCNC: 1.7 MG/DL (ref 1.7–2.3)
MAGNESIUM SERPL-MCNC: 1.9 MG/DL (ref 1.7–2.3)
MCH RBC QN AUTO: 31.5 PG (ref 26.5–33)
MCHC RBC AUTO-ENTMCNC: 32.3 G/DL (ref 31.5–36.5)
MCV RBC AUTO: 98 FL (ref 78–100)
MONOCYTES # BLD AUTO: 0.3 10E3/UL (ref 0–1.3)
MONOCYTES NFR BLD AUTO: 5 %
NEUTROPHILS # BLD AUTO: 3.3 10E3/UL (ref 1.6–8.3)
NEUTROPHILS NFR BLD AUTO: 61 %
NRBC # BLD AUTO: 0 10E3/UL
NRBC BLD AUTO-RTO: 0 /100
PHOSPHATE SERPL-MCNC: 2.3 MG/DL (ref 2.5–4.5)
PHOSPHATE SERPL-MCNC: 2.5 MG/DL (ref 2.5–4.5)
PLATELET # BLD AUTO: 246 10E3/UL (ref 150–450)
POTASSIUM SERPL-SCNC: 3.2 MMOL/L (ref 3.4–5.3)
POTASSIUM SERPL-SCNC: 3.9 MMOL/L (ref 3.4–5.3)
POTASSIUM SERPL-SCNC: 3.9 MMOL/L (ref 3.4–5.3)
PROT SERPL-MCNC: 5.5 G/DL (ref 6.4–8.3)
PROT SERPL-MCNC: 5.7 G/DL (ref 6.4–8.3)
RBC # BLD AUTO: 2.86 10E6/UL (ref 4.4–5.9)
SODIUM SERPL-SCNC: 136 MMOL/L (ref 136–145)
SODIUM SERPL-SCNC: 136 MMOL/L (ref 136–145)
WBC # BLD AUTO: 5.4 10E3/UL (ref 4–11)

## 2023-06-14 PROCEDURE — 36415 COLL VENOUS BLD VENIPUNCTURE: CPT | Performed by: STUDENT IN AN ORGANIZED HEALTH CARE EDUCATION/TRAINING PROGRAM

## 2023-06-14 PROCEDURE — 84155 ASSAY OF PROTEIN SERUM: CPT | Performed by: STUDENT IN AN ORGANIZED HEALTH CARE EDUCATION/TRAINING PROGRAM

## 2023-06-14 PROCEDURE — 84450 TRANSFERASE (AST) (SGOT): CPT | Performed by: STUDENT IN AN ORGANIZED HEALTH CARE EDUCATION/TRAINING PROGRAM

## 2023-06-14 PROCEDURE — 85025 COMPLETE CBC W/AUTO DIFF WBC: CPT

## 2023-06-14 PROCEDURE — 258N000003 HC RX IP 258 OP 636

## 2023-06-14 PROCEDURE — 250N000013 HC RX MED GY IP 250 OP 250 PS 637

## 2023-06-14 PROCEDURE — 250N000013 HC RX MED GY IP 250 OP 250 PS 637: Performed by: STUDENT IN AN ORGANIZED HEALTH CARE EDUCATION/TRAINING PROGRAM

## 2023-06-14 PROCEDURE — 97530 THERAPEUTIC ACTIVITIES: CPT | Mod: GP

## 2023-06-14 PROCEDURE — 97165 OT EVAL LOW COMPLEX 30 MIN: CPT | Mod: GO

## 2023-06-14 PROCEDURE — 83735 ASSAY OF MAGNESIUM: CPT | Performed by: STUDENT IN AN ORGANIZED HEALTH CARE EDUCATION/TRAINING PROGRAM

## 2023-06-14 PROCEDURE — 97535 SELF CARE MNGMENT TRAINING: CPT | Mod: GO

## 2023-06-14 PROCEDURE — 250N000011 HC RX IP 250 OP 636: Performed by: STUDENT IN AN ORGANIZED HEALTH CARE EDUCATION/TRAINING PROGRAM

## 2023-06-14 PROCEDURE — 120N000002 HC R&B MED SURG/OB UMMC

## 2023-06-14 PROCEDURE — 83690 ASSAY OF LIPASE: CPT

## 2023-06-14 PROCEDURE — 250N000013 HC RX MED GY IP 250 OP 250 PS 637: Performed by: FAMILY MEDICINE

## 2023-06-14 PROCEDURE — 84100 ASSAY OF PHOSPHORUS: CPT | Performed by: STUDENT IN AN ORGANIZED HEALTH CARE EDUCATION/TRAINING PROGRAM

## 2023-06-14 PROCEDURE — 84132 ASSAY OF SERUM POTASSIUM: CPT

## 2023-06-14 PROCEDURE — 97110 THERAPEUTIC EXERCISES: CPT | Mod: GP

## 2023-06-14 PROCEDURE — 99232 SBSQ HOSP IP/OBS MODERATE 35: CPT | Mod: GC | Performed by: FAMILY MEDICINE

## 2023-06-14 PROCEDURE — 250N000011 HC RX IP 250 OP 636

## 2023-06-14 PROCEDURE — 36415 COLL VENOUS BLD VENIPUNCTURE: CPT

## 2023-06-14 RX ORDER — IBUPROFEN 600 MG/1
600 TABLET, FILM COATED ORAL EVERY 6 HOURS PRN
Status: ON HOLD | COMMUNITY
End: 2023-06-22

## 2023-06-14 RX ORDER — TRAMADOL HYDROCHLORIDE 50 MG/1
.5-1 TABLET ORAL EVERY 6 HOURS PRN
Status: ON HOLD | COMMUNITY
End: 2023-06-18

## 2023-06-14 RX ORDER — POTASSIUM CHLORIDE 1500 MG/1
20 TABLET, EXTENDED RELEASE ORAL ONCE
Status: COMPLETED | OUTPATIENT
Start: 2023-06-14 | End: 2023-06-14

## 2023-06-14 RX ORDER — ASPIRIN 325 MG
325 TABLET, DELAYED RELEASE (ENTERIC COATED) ORAL DAILY
COMMUNITY

## 2023-06-14 RX ORDER — MAGNESIUM OXIDE 400 MG/1
400 TABLET ORAL EVERY 4 HOURS
Status: COMPLETED | OUTPATIENT
Start: 2023-06-14 | End: 2023-06-14

## 2023-06-14 RX ORDER — MAGNESIUM OXIDE 400 MG/1
400 TABLET ORAL EVERY 4 HOURS
Status: COMPLETED | OUTPATIENT
Start: 2023-06-14 | End: 2023-06-15

## 2023-06-14 RX ORDER — POTASSIUM CHLORIDE 1500 MG/1
40 TABLET, EXTENDED RELEASE ORAL ONCE
Status: COMPLETED | OUTPATIENT
Start: 2023-06-14 | End: 2023-06-14

## 2023-06-14 RX ADMIN — ACETAMINOPHEN, ASPIRIN, CAFFEINE 1 TABLET: 250; 65; 250 TABLET, FILM COATED ORAL at 13:06

## 2023-06-14 RX ADMIN — DIAZEPAM 10 MG: 10 TABLET ORAL at 19:08

## 2023-06-14 RX ADMIN — CLONIDINE HYDROCHLORIDE 0.1 MG: 0.1 TABLET ORAL at 02:36

## 2023-06-14 RX ADMIN — GABAPENTIN 900 MG: 300 CAPSULE ORAL at 17:40

## 2023-06-14 RX ADMIN — ACETAMINOPHEN 650 MG: 325 TABLET, FILM COATED ORAL at 09:35

## 2023-06-14 RX ADMIN — ONDANSETRON 4 MG: 4 TABLET, ORALLY DISINTEGRATING ORAL at 16:18

## 2023-06-14 RX ADMIN — POTASSIUM, SODIUM PHOSPHATES 280 MG-160 MG-250 MG ORAL POWDER PACKET 1 PACKET: POWDER IN PACKET at 16:17

## 2023-06-14 RX ADMIN — SERTRALINE HYDROCHLORIDE 50 MG: 50 TABLET ORAL at 08:17

## 2023-06-14 RX ADMIN — DIAZEPAM 10 MG: 10 TABLET ORAL at 13:07

## 2023-06-14 RX ADMIN — CLONIDINE HYDROCHLORIDE 0.1 MG: 0.1 TABLET ORAL at 17:40

## 2023-06-14 RX ADMIN — POTASSIUM CHLORIDE 40 MEQ: 1500 TABLET, EXTENDED RELEASE ORAL at 09:35

## 2023-06-14 RX ADMIN — DIAZEPAM 10 MG: 10 TABLET ORAL at 01:19

## 2023-06-14 RX ADMIN — THIAMINE HYDROCHLORIDE 500 MG: 100 INJECTION, SOLUTION INTRAMUSCULAR; INTRAVENOUS at 08:39

## 2023-06-14 RX ADMIN — Medication 25 MCG: at 08:17

## 2023-06-14 RX ADMIN — MAGNESIUM SULFATE HEPTAHYDRATE 2 G: 40 INJECTION, SOLUTION INTRAVENOUS at 00:54

## 2023-06-14 RX ADMIN — NICOTINE 1 PATCH: 14 PATCH, EXTENDED RELEASE TRANSDERMAL at 08:19

## 2023-06-14 RX ADMIN — CLONIDINE HYDROCHLORIDE 0.1 MG: 0.1 TABLET ORAL at 10:28

## 2023-06-14 RX ADMIN — SENNOSIDES AND DOCUSATE SODIUM 2 TABLET: 50; 8.6 TABLET ORAL at 16:18

## 2023-06-14 RX ADMIN — OLANZAPINE 5 MG: 5 TABLET, ORALLY DISINTEGRATING ORAL at 19:09

## 2023-06-14 RX ADMIN — POTASSIUM & SODIUM PHOSPHATES POWDER PACK 280-160-250 MG 1 PACKET: 280-160-250 PACK at 09:35

## 2023-06-14 RX ADMIN — GABAPENTIN 900 MG: 300 CAPSULE ORAL at 02:37

## 2023-06-14 RX ADMIN — DIAZEPAM 10 MG: 10 TABLET ORAL at 08:17

## 2023-06-14 RX ADMIN — THIAMINE HYDROCHLORIDE 500 MG: 100 INJECTION, SOLUTION INTRAMUSCULAR; INTRAVENOUS at 13:36

## 2023-06-14 RX ADMIN — MULTIPLE VITAMINS W/ MINERALS TAB 1 TABLET: TAB at 08:17

## 2023-06-14 RX ADMIN — DIAZEPAM 10 MG: 10 TABLET ORAL at 11:17

## 2023-06-14 RX ADMIN — Medication 400 MG: at 21:08

## 2023-06-14 RX ADMIN — GABAPENTIN 900 MG: 300 CAPSULE ORAL at 10:27

## 2023-06-14 RX ADMIN — PROCHLORPERAZINE MALEATE 5 MG: 5 TABLET ORAL at 19:08

## 2023-06-14 RX ADMIN — FOLIC ACID 1 MG: 1 TABLET ORAL at 08:17

## 2023-06-14 RX ADMIN — DIAZEPAM 10 MG: 10 TABLET ORAL at 19:35

## 2023-06-14 RX ADMIN — MAGNESIUM OXIDE TAB 400 MG (241.3 MG ELEMENTAL MG) 400 MG: 400 (241.3 MG) TAB at 09:35

## 2023-06-14 RX ADMIN — PANTOPRAZOLE SODIUM 40 MG: 40 TABLET, DELAYED RELEASE ORAL at 08:17

## 2023-06-14 RX ADMIN — DIAZEPAM 10 MG: 10 TABLET ORAL at 16:18

## 2023-06-14 RX ADMIN — POTASSIUM & SODIUM PHOSPHATES POWDER PACK 280-160-250 MG 1 PACKET: 280-160-250 PACK at 13:06

## 2023-06-14 RX ADMIN — POTASSIUM, SODIUM PHOSPHATES 280 MG-160 MG-250 MG ORAL POWDER PACKET 1 PACKET: POWDER IN PACKET at 21:08

## 2023-06-14 RX ADMIN — TRAZODONE HYDROCHLORIDE 50 MG: 50 TABLET ORAL at 19:35

## 2023-06-14 RX ADMIN — DIAZEPAM 10 MG: 10 TABLET ORAL at 18:06

## 2023-06-14 RX ADMIN — POTASSIUM CHLORIDE 20 MEQ: 1500 TABLET, EXTENDED RELEASE ORAL at 02:37

## 2023-06-14 RX ADMIN — MAGNESIUM OXIDE TAB 400 MG (241.3 MG ELEMENTAL MG) 400 MG: 400 (241.3 MG) TAB at 13:07

## 2023-06-14 ASSESSMENT — ACTIVITIES OF DAILY LIVING (ADL)
ADLS_ACUITY_SCORE: 35

## 2023-06-14 NOTE — PHARMACY-ADMISSION MEDICATION HISTORY
Pharmacy Intern Admission Medication History    Admission medication history is complete. The information provided in this note is only as accurate as the sources available at the time of the update.    Information Source(s): [facility] and dispense report via N/A    Pertinent Information: Patient was asleep 3 times when trying to perform MedHx. Patient's dispense report did not show any fills in 2023, so this list [facility]/24/23)    Changes made to PTA medication list:    Added: Ibuprofen 600 mg tablet  o Sertraline 50 mg tablet  o Tramadol 50 mg tablet  o Aspirin 325 mg tablet    Deleted: acetaminophen 325 mg tablet, take 2 tablets PO Q4H PRN mild pain or fever  o Aspirin-acetaminophen-caffeine 250-250-65 mg tablet, take 1 tablet PO daily PRN headaches  o Citalopram 10 mg tablet, take 1 tablet PO once daily  o Ipratropium-albeuterol 0.5mg/2.5mg/3mL (DUONEB) neb solution, take 1 vial by nebulization Q4H PRN shortness of breath  o Lidocaine 5% patch, place 1 patch onto the skin Q24H. To prevent lidocaine toxicity, patient should be patch free for 12 hrs daily  o Melatonin 3 mg tablet, take 1 tablet PO HS  o Multivitamin tablet, take 1 tablet PO once daily  o Nicotine 14 mg /24hr patch, place 1 patch onto the skin daily  o Oxycodone 5 mg tablet, take 0.5 tablets (2.5mg) PO TID PRN moderate pain  o Pantoprazole 40 mg EC tablet, take 1 tablet PO QAM (before breakfast)     Changed: None    Medication Affordability: did not discuss  Allergies reviewed with patient and updates made in EHR: unable to assess    Medication History Completed By: Wang Esparza 6/14/2023 2:42 PM    Prior to Admission medications    Medication Sig Last Dose Taking? Auth Provider Long Term End Date   aspirin (ASA) 325 MG EC tablet Take 325 mg by mouth daily Unknown Yes Unknown, Entered By History     atorvastatin (LIPITOR) 40 MG tablet Take 40 mg by  mouth At Bedtime Unknown Yes Unknown, Entered By History     cyanocobalamin (VITAMIN B-12) 1000 MCG tablet Take 1,000 mcg by mouth daily Unknown Yes Unknown, Entered By History     gabapentin (NEURONTIN) 300 MG capsule Take 1 capsule (300 mg) by mouth 3 times daily Unknown Yes Eve Morton, APRN CNP Yes    ibuprofen (ADVIL/MOTRIN) 600 MG tablet Take 600 mg by mouth every 6 hours as needed for moderate pain Maximum of 3200 mg in 24 hours Unknown Yes Unknown, Entered By History     magnesium oxide (MAG-OX) 400 MG tablet Take 400 mg by mouth daily Unknown Yes Unknown, Entered By History     sertraline (ZOLOFT) 50 MG tablet Take 50 mg by mouth every morning Unknown Yes Unknown, Entered By History     traMADol (ULTRAM) 50 MG tablet Take 0.5-1 tablets by mouth every 6 hours as needed for severe pain Unknown Yes Unknown, Entered By History     traZODone (DESYREL) 50 MG tablet Take 1 tablet (50 mg) by mouth nightly as needed for sleep Unknown Yes Mirza Julien MD Yes    Vitamin D3 (CHOLECALCIFEROL) 25 mcg (1000 units) tablet Take 25 mcg by mouth daily Unknown Yes Unknown, Entered By History

## 2023-06-14 NOTE — PROGRESS NOTES
Care Management Follow Up    Length of Stay (days): 1    Expected Discharge Date: 06/17/2023     Concerns to be Addressed: discharge planning     Patient plan of care discussed at interdisciplinary rounds: Yes    Anticipated Discharge Disposition: Home     Anticipated Discharge Services:    Anticipated Discharge DME: Walker (owns already)    Patient/family educated on Medicare website which has current facility and service quality ratings: no  Education Provided on the Discharge Plan: Yes  Patient/Family in Agreement with the Plan: yes    Referrals Placed by CM/SW:    Private pay costs discussed: Not applicable     Writer received a VM from Marissa's doctor team. They stated patient was evicted from his home. Their call back number is [phone]. Patient caled them back. He was planning to go home after being in the hospital. They stated he is weak and lonely.    He needs to move his stuff out. He got informed recently he will be evicted. He will be at the hospital at least another 24 hours.    LTACH or acute care facility is recommended by PT/ OT.     Augustina Carballo, MATTHEW, LGSW  6 Med Surg   [facility]  Phone: [phone]  Pager: [phone]
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
## 2023-06-14 NOTE — PLAN OF CARE
/69   Pulse 78   Temp 98.3  F (36.8  C) (Oral)   Resp 16   Wt 54.4 kg (119 lb 14.9 oz)   SpO2 100%   BMI 18.24 kg/m      A&Ox3, disoriented to time (but became oriented this afternoon). Calm and cooperative. Room air, denies SOB or CP. CIWA scoring, managed with valium. Mild tremors in bilateral hands and endorses headache. Headache additionally managed with prn tylenol and prn Excedrin. Denies N/T. A1 with walker and gait belt. Soft and bite sized diet. RN managed potassium, phos, and mag protocols -- each replaced this morning. Nicotine patch on left shoulder. Continue POC.

## 2023-06-14 NOTE — PLAN OF CARE
Goal Outcome Evaluation:      Plan of Care Reviewed With: patient    Overall Patient Progress: improvingOverall Patient Progress: improving    Outcome Evaluation: Pt eating well. Will hold off on adding supplement drinks until refeeding syndrome resolves and if not meeting needs without it.

## 2023-06-14 NOTE — CONSULTS
"SPIRITUAL HEALTH SERVICES Progress Note  [facility] ([facility]) 6B EAST    Saw pt Familia Watson per routine consult by provider.      Patient/Family Understanding of Illness and Goals of Care - Pt informed me that he was admitted to the hospital because he had fallen in his home while he was drunk. Pt said, \"I am a drunk and I need to stop drinking, but I don't know how to do that.\" Pt said his main concern is to get better and get out of the hospital, but he's not sure where he will go once he is discharged.       Distress and Loss - Pt said \"I am very worried and afraid. I just found out that I am being evicted and I don't have enough money in my bank to find a new place to live.\" Pt seemed extremely distressed about his housing situation and the unknown future. Pt also expressed feeling alone as he has no family. \"Everyone I am close to keeps dying\" he said.\" Pt informed me that his wife  back in December from cancer and he misses her a lot.       Strengths, Coping, and Resources  - Pt finds comfort in Native spirituality, specifically in nature. Pt collects Eagle feathers and finds connection in spirituality when drumming while singing songs and chanting.       Meaning, Beliefs, and Spirituality - Pt said, \"I don't connect much with my spirituality anymore, maybe once a week. I don't get outside much anymore.\" Pt said it is very hard to connect with his spirituality while he is in the hospital because it stems from his love of nature.       Plan of Care - Pt expressed that he wanted to speak to a  about housing situations. I talked to pt's nurse and they have contacted the . Followed up with unit  on following up with pt.     Augustina Vincent   Intern  Pager [phone]  "

## 2023-06-14 NOTE — PROGRESS NOTES
Occupational Therapy Evaluation 06/14/23 1600   Appointment Info   Signing Clinician's Name / Credentials (OT) Pari Sims, OTR/L   Living Environment   People in Home alone   Current Living Arrangements apartment   Home Accessibility stairs to enter home   Number of Stairs, Main Entrance 6   Stair Railings, Main Entrance railings on both sides of stairs   Transportation Anticipated health plan transportation   Living Environment Comments Pt lives alone and reports new eviction and pt does not know where he will d/c to   Self-Care   Usual Activity Tolerance moderate   Current Activity Tolerance fair   Regular Exercise No   Equipment Currently Used at Home walker, rolling;shower chair   Fall history within last six months yes   Number of times patient has fallen within last six months 4   Activity/Exercise/Self-Care Comment Pt reports IND with ADLs although endoreses some challenges standing from the toilet or getting dressed   Instrumental Activities of Daily Living (IADL)   IADL Comments Pt reports that louisa longer does his laundry or cleans his home. He could not explain why. Pt reports buying new clothing verses doing the laudnry   General Information   Onset of Illness/Injury or Date of Surgery 06/13/23   Referring Physician Govind Eckert MD   Additional Occupational Profile Info/Pertinent History of Current Problem Familia Watson is a 72-year-old male PMH severe AUD with history of withdrawal seizure (4/2023; complicated by PEA arrest), COPD, HTN, HLD, and depression/anxiety admitted 6/13/23 for acute alcohol withdrawal and generalized weakness suspected secondary to malnutrition vs. pulmonary lesion. Found to have electrolyte abnormalities consistent with refeeding syndrome.   Existing Precautions/Restrictions fall   Limitations/Impairments safety/cognitive   Sensory   Sensory Quick Adds sensation intact   Pain Assessment   Patient Currently in Pain No   Posture   Posture forward head  position;protracted shoulders;kyphosis   Range of Motion Comprehensive   General Range of Motion bilateral upper extremity ROM WFL   Strength Comprehensive (MMT)   Comment, General Manual Muscle Testing (MMT) Assessment BUE grossly 3/5   Coordination   Coordination Comments Pt noted difficulty w/ FM coordination. WIll need to further assess   Bed Mobility   Bed Mobility sit-supine   Sit-Supine [place] (Bed Mobility) modified independence   Assistive Device (Bed Mobility) bed rails   Transfers   Transfers toilet transfer   Transfer Skill: Bed to Chair/Chair to Bed   Bed-Chair [place] (Transfers) supervision   Sit-Stand Transfer   Sit-Stand [place] (Transfers) supervision   Toilet Transfer   Type (Toilet Transfer) stand-sit   [place] Level (Toilet Transfer) contact guard   Balance   Balance Comments Good seated balacne   Activities of Daily Living   BADL Assessment/Intervention bathing;lower body dressing;toileting;grooming   Bathing Assessment/Intervention   Comment, (Bathing) Per clinical judgement, anticipate supervision w/ shower chair   Lower Body Dressing Assessment/Training   Comment, (Lower Body Dressing) Per clinical judgement, anticipate supervision   Grooming Assessment/Training   Comment, (Grooming) Per clinical judgement, anticipate supervision w/ set up A   Toileting   Comment, (Toileting) Per clinical judgement, anticipate supervision   Clinical Impression   Criteria for Skilled Therapeutic Interventions Met (OT) Yes, treatment indicated   OT Diagnosis decreased I/ADL IND, cognition, and functional mobility   OT Problem List-Impairments impacting ADL problems related to;activity tolerance impaired;balance;cognition;mobility;strength   Assessment of Occupational Performance 5 or more Performance Deficits   Identified Performance Deficits dressing, bathing, G/H, toileting, trsfs, cognition, amb, home mgmt   Planned Therapy Interventions (OT) ADL retraining;IADL retraining;balance  training;bed mobility training;cognition;fine motor coordination training;strengthening;transfer training;home program guidelines;risk factor education   Clinical Decision Making Complexity (OT) low complexity   Anticipated Equipment Needs Upon Discharge (OT)   (tbd)   Risk & Benefits of therapy have been explained patient   OT Total Evaluation Time   OT Eval, Low Complexity Minutes (74442) 7   OT Goals   Therapy Frequency (OT) 3 times/wk   OT Predicted Duration/Target Date for Goal Attainment 06/30/23   OT Goals Hygiene/Grooming;Lower Body Dressing;Lower Body Bathing;Transfers;Toilet Transfer/Toileting   OT: Hygiene/Grooming supervision/stand-by assist;while standing   OT: Lower Body Dressing Supervision/stand-by assist;including set-up/clothing retrieval   OT: Lower Body Bathing Supervision/stand-by assist   OT: Transfer Supervision/stand-by assist  (tub/shower transfer)   OT: Toilet Transfer/Toileting Supervision/stand-by assist;cleaning and garment management;toilet transfer   OT Discharge Planning   OT Discharge Recommendation (DC Rec) Long term care facility;Transitional Care Facility   OT Rationale for DC Rec Pt presents w/ generalized weakness, deconditioning, and is lacking the ability to perform I/ADLs at home. Pt w/ long rehab and IP hx and anticipate pt may benefit most from d/c to LTC/MCC. Pt also recieving news today that he is being evicted. If LTC is not an option, rec TCU to furhter progress I/ADL safety and IND while finding a safe d/c location   Total Session Time   Total Session Time (sum of timed and untimed services) 7

## 2023-06-14 NOTE — PROGRESS NOTES
[id number]    No acute events on this shift     AxO x3 disoriented to time; denies SOB, chest pain, and N/T     Not OOB on this shift, sits on edge of bed to use urinal; last BM 6/11    L PIV patent, CDI, and saline locked     K+, mag, and phos were replaced during shift and labs scheduled for morning     Continue assessing pt using CIWA and treating symptoms; replace electrolytes per protocol

## 2023-06-15 ENCOUNTER — APPOINTMENT (OUTPATIENT)
Dept: OCCUPATIONAL THERAPY | Facility: CLINIC | Age: 73
DRG: 896 | End: 2023-06-15
Attending: INTERNAL MEDICINE
Payer: MEDICARE

## 2023-06-15 ENCOUNTER — APPOINTMENT (OUTPATIENT)
Dept: PHYSICAL THERAPY | Facility: CLINIC | Age: 73
DRG: 896 | End: 2023-06-15
Attending: INTERNAL MEDICINE
Payer: MEDICARE

## 2023-06-15 LAB
ALBUMIN SERPL BCG-MCNC: 3.1 G/DL (ref 3.5–5.2)
ALBUMIN SERPL BCG-MCNC: 3.2 G/DL (ref 3.5–5.2)
ALBUMIN UR-MCNC: NEGATIVE MG/DL
ALP SERPL-CCNC: 87 U/L (ref 40–129)
ALP SERPL-CCNC: 94 U/L (ref 40–129)
ALT SERPL W P-5'-P-CCNC: 26 U/L (ref 0–70)
ALT SERPL W P-5'-P-CCNC: 30 U/L (ref 0–70)
ANION GAP SERPL CALCULATED.3IONS-SCNC: 10 MMOL/L (ref 7–15)
ANION GAP SERPL CALCULATED.3IONS-SCNC: 10 MMOL/L (ref 7–15)
APPEARANCE UR: CLEAR
AST SERPL W P-5'-P-CCNC: 36 U/L (ref 0–45)
AST SERPL W P-5'-P-CCNC: 46 U/L (ref 0–45)
BASOPHILS # BLD AUTO: 0.1 10E3/UL (ref 0–0.2)
BASOPHILS NFR BLD AUTO: 1 %
BILIRUB SERPL-MCNC: 0.2 MG/DL
BILIRUB SERPL-MCNC: 0.2 MG/DL
BILIRUB UR QL STRIP: NEGATIVE
BUN SERPL-MCNC: 7.2 MG/DL (ref 8–23)
BUN SERPL-MCNC: 7.9 MG/DL (ref 8–23)
CALCIUM SERPL-MCNC: 8.5 MG/DL (ref 8.8–10.2)
CALCIUM SERPL-MCNC: 8.6 MG/DL (ref 8.8–10.2)
CHLORIDE SERPL-SCNC: 101 MMOL/L (ref 98–107)
CHLORIDE SERPL-SCNC: 103 MMOL/L (ref 98–107)
COLOR UR AUTO: ABNORMAL
CREAT SERPL-MCNC: 0.57 MG/DL (ref 0.67–1.17)
CREAT SERPL-MCNC: 0.6 MG/DL (ref 0.67–1.17)
DEPRECATED HCO3 PLAS-SCNC: 23 MMOL/L (ref 22–29)
DEPRECATED HCO3 PLAS-SCNC: 25 MMOL/L (ref 22–29)
EOSINOPHIL # BLD AUTO: 0.4 10E3/UL (ref 0–0.7)
EOSINOPHIL NFR BLD AUTO: 8 %
ERYTHROCYTE [DISTWIDTH] IN BLOOD BY AUTOMATED COUNT: 14.9 % (ref 10–15)
GFR SERPL CREATININE-BSD FRML MDRD: >90 ML/MIN/1.73M2
GFR SERPL CREATININE-BSD FRML MDRD: >90 ML/MIN/1.73M2
GLUCOSE SERPL-MCNC: 131 MG/DL (ref 70–99)
GLUCOSE SERPL-MCNC: 140 MG/DL (ref 70–99)
GLUCOSE UR STRIP-MCNC: 50 MG/DL
HCT VFR BLD AUTO: 27.9 % (ref 40–53)
HGB BLD-MCNC: 9.2 G/DL (ref 13.3–17.7)
HGB UR QL STRIP: NEGATIVE
IMM GRANULOCYTES # BLD: 0 10E3/UL
IMM GRANULOCYTES NFR BLD: 0 %
KETONES UR STRIP-MCNC: NEGATIVE MG/DL
LEUKOCYTE ESTERASE UR QL STRIP: NEGATIVE
LYMPHOCYTES # BLD AUTO: 1.5 10E3/UL (ref 0.8–5.3)
LYMPHOCYTES NFR BLD AUTO: 31 %
MAGNESIUM SERPL-MCNC: 1.6 MG/DL (ref 1.7–2.3)
MAGNESIUM SERPL-MCNC: 1.6 MG/DL (ref 1.7–2.3)
MCH RBC QN AUTO: 32.4 PG (ref 26.5–33)
MCHC RBC AUTO-ENTMCNC: 33 G/DL (ref 31.5–36.5)
MCV RBC AUTO: 98 FL (ref 78–100)
MONOCYTES # BLD AUTO: 0.2 10E3/UL (ref 0–1.3)
MONOCYTES NFR BLD AUTO: 4 %
NEUTROPHILS # BLD AUTO: 2.6 10E3/UL (ref 1.6–8.3)
NEUTROPHILS NFR BLD AUTO: 56 %
NITRATE UR QL: NEGATIVE
NRBC # BLD AUTO: 0 10E3/UL
NRBC BLD AUTO-RTO: 0 /100
PH UR STRIP: 6.5 [PH] (ref 5–7)
PHOSPHATE SERPL-MCNC: 2.9 MG/DL (ref 2.5–4.5)
PHOSPHATE SERPL-MCNC: 2.9 MG/DL (ref 2.5–4.5)
PLATELET # BLD AUTO: 207 10E3/UL (ref 150–450)
POTASSIUM BLD-SCNC: 3.6 MMOL/L (ref 3.4–5.3)
POTASSIUM SERPL-SCNC: 3.7 MMOL/L (ref 3.4–5.3)
POTASSIUM SERPL-SCNC: 4.4 MMOL/L (ref 3.4–5.3)
PROT SERPL-MCNC: 5.3 G/DL (ref 6.4–8.3)
PROT SERPL-MCNC: 5.8 G/DL (ref 6.4–8.3)
RBC # BLD AUTO: 2.84 10E6/UL (ref 4.4–5.9)
RBC URINE: <1 /HPF
SODIUM SERPL-SCNC: 134 MMOL/L (ref 136–145)
SODIUM SERPL-SCNC: 138 MMOL/L (ref 136–145)
SP GR UR STRIP: 1.01 (ref 1–1.03)
UROBILINOGEN UR STRIP-MCNC: NORMAL MG/DL
WBC # BLD AUTO: 4.7 10E3/UL (ref 4–11)
WBC URINE: 1 /HPF

## 2023-06-15 PROCEDURE — 999N000040 HC STATISTIC CONSULT NO CHARGE VASC ACCESS

## 2023-06-15 PROCEDURE — 97530 THERAPEUTIC ACTIVITIES: CPT | Mod: GP

## 2023-06-15 PROCEDURE — 83735 ASSAY OF MAGNESIUM: CPT | Performed by: STUDENT IN AN ORGANIZED HEALTH CARE EDUCATION/TRAINING PROGRAM

## 2023-06-15 PROCEDURE — 36415 COLL VENOUS BLD VENIPUNCTURE: CPT | Performed by: INTERNAL MEDICINE

## 2023-06-15 PROCEDURE — 258N000003 HC RX IP 258 OP 636

## 2023-06-15 PROCEDURE — 97110 THERAPEUTIC EXERCISES: CPT | Mod: GP

## 2023-06-15 PROCEDURE — 250N000011 HC RX IP 250 OP 636

## 2023-06-15 PROCEDURE — 250N000011 HC RX IP 250 OP 636: Performed by: STUDENT IN AN ORGANIZED HEALTH CARE EDUCATION/TRAINING PROGRAM

## 2023-06-15 PROCEDURE — 84132 ASSAY OF SERUM POTASSIUM: CPT | Performed by: STUDENT IN AN ORGANIZED HEALTH CARE EDUCATION/TRAINING PROGRAM

## 2023-06-15 PROCEDURE — 999N000127 HC STATISTIC PERIPHERAL IV START W US GUIDANCE

## 2023-06-15 PROCEDURE — 120N000002 HC R&B MED SURG/OB UMMC

## 2023-06-15 PROCEDURE — 85025 COMPLETE CBC W/AUTO DIFF WBC: CPT

## 2023-06-15 PROCEDURE — 250N000013 HC RX MED GY IP 250 OP 250 PS 637: Performed by: STUDENT IN AN ORGANIZED HEALTH CARE EDUCATION/TRAINING PROGRAM

## 2023-06-15 PROCEDURE — 84100 ASSAY OF PHOSPHORUS: CPT | Performed by: STUDENT IN AN ORGANIZED HEALTH CARE EDUCATION/TRAINING PROGRAM

## 2023-06-15 PROCEDURE — 250N000013 HC RX MED GY IP 250 OP 250 PS 637

## 2023-06-15 PROCEDURE — 84132 ASSAY OF SERUM POTASSIUM: CPT | Performed by: INTERNAL MEDICINE

## 2023-06-15 PROCEDURE — 36415 COLL VENOUS BLD VENIPUNCTURE: CPT

## 2023-06-15 PROCEDURE — 258N000003 HC RX IP 258 OP 636: Performed by: STUDENT IN AN ORGANIZED HEALTH CARE EDUCATION/TRAINING PROGRAM

## 2023-06-15 PROCEDURE — 97535 SELF CARE MNGMENT TRAINING: CPT | Mod: GO

## 2023-06-15 PROCEDURE — 84450 TRANSFERASE (AST) (SGOT): CPT | Performed by: STUDENT IN AN ORGANIZED HEALTH CARE EDUCATION/TRAINING PROGRAM

## 2023-06-15 PROCEDURE — 250N000013 HC RX MED GY IP 250 OP 250 PS 637: Performed by: FAMILY MEDICINE

## 2023-06-15 PROCEDURE — 36415 COLL VENOUS BLD VENIPUNCTURE: CPT | Performed by: STUDENT IN AN ORGANIZED HEALTH CARE EDUCATION/TRAINING PROGRAM

## 2023-06-15 PROCEDURE — 250N000013 HC RX MED GY IP 250 OP 250 PS 637: Performed by: INTERNAL MEDICINE

## 2023-06-15 PROCEDURE — 81001 URINALYSIS AUTO W/SCOPE: CPT | Performed by: STUDENT IN AN ORGANIZED HEALTH CARE EDUCATION/TRAINING PROGRAM

## 2023-06-15 PROCEDURE — 99232 SBSQ HOSP IP/OBS MODERATE 35: CPT | Mod: GC | Performed by: FAMILY MEDICINE

## 2023-06-15 RX ORDER — MAGNESIUM OXIDE 400 MG/1
400 TABLET ORAL EVERY 4 HOURS
Status: ACTIVE | OUTPATIENT
Start: 2023-06-15 | End: 2023-06-16

## 2023-06-15 RX ORDER — LORAZEPAM 0.5 MG/1
1-2 TABLET ORAL EVERY 30 MIN PRN
Status: DISCONTINUED | OUTPATIENT
Start: 2023-06-15 | End: 2023-06-17

## 2023-06-15 RX ORDER — MAGNESIUM OXIDE 400 MG/1
400 TABLET ORAL EVERY 4 HOURS
Status: COMPLETED | OUTPATIENT
Start: 2023-06-15 | End: 2023-06-15

## 2023-06-15 RX ORDER — DIPHENHYDRAMINE HYDROCHLORIDE 50 MG/ML
25 INJECTION INTRAMUSCULAR; INTRAVENOUS ONCE
Status: COMPLETED | OUTPATIENT
Start: 2023-06-15 | End: 2023-06-15

## 2023-06-15 RX ORDER — POTASSIUM CHLORIDE 1500 MG/1
20 TABLET, EXTENDED RELEASE ORAL ONCE
Status: COMPLETED | OUTPATIENT
Start: 2023-06-15 | End: 2023-06-15

## 2023-06-15 RX ORDER — KETOROLAC TROMETHAMINE 15 MG/ML
15 INJECTION, SOLUTION INTRAMUSCULAR; INTRAVENOUS ONCE
Status: COMPLETED | OUTPATIENT
Start: 2023-06-15 | End: 2023-06-15

## 2023-06-15 RX ORDER — METOCLOPRAMIDE HYDROCHLORIDE 5 MG/ML
5 INJECTION INTRAMUSCULAR; INTRAVENOUS ONCE
Status: COMPLETED | OUTPATIENT
Start: 2023-06-15 | End: 2023-06-15

## 2023-06-15 RX ORDER — LORAZEPAM 2 MG/ML
1-2 INJECTION INTRAMUSCULAR EVERY 30 MIN PRN
Status: DISCONTINUED | OUTPATIENT
Start: 2023-06-15 | End: 2023-06-17

## 2023-06-15 RX ADMIN — THIAMINE HYDROCHLORIDE 250 MG: 100 INJECTION, SOLUTION INTRAMUSCULAR; INTRAVENOUS at 09:31

## 2023-06-15 RX ADMIN — DIAZEPAM 10 MG: 10 TABLET ORAL at 01:34

## 2023-06-15 RX ADMIN — DIAZEPAM 10 MG: 10 TABLET ORAL at 02:11

## 2023-06-15 RX ADMIN — DIPHENHYDRAMINE HYDROCHLORIDE 25 MG: 50 INJECTION, SOLUTION INTRAMUSCULAR; INTRAVENOUS at 22:49

## 2023-06-15 RX ADMIN — NICOTINE 1 PATCH: 14 PATCH, EXTENDED RELEASE TRANSDERMAL at 09:31

## 2023-06-15 RX ADMIN — LORAZEPAM 1 MG: 0.5 TABLET ORAL at 17:20

## 2023-06-15 RX ADMIN — LORAZEPAM 2 MG: 0.5 TABLET ORAL at 22:07

## 2023-06-15 RX ADMIN — MAGNESIUM OXIDE TAB 400 MG (241.3 MG ELEMENTAL MG) 400 MG: 400 (241.3 MG) TAB at 12:22

## 2023-06-15 RX ADMIN — PANTOPRAZOLE SODIUM 40 MG: 40 TABLET, DELAYED RELEASE ORAL at 09:32

## 2023-06-15 RX ADMIN — CLONIDINE HYDROCHLORIDE 0.1 MG: 0.1 TABLET ORAL at 02:09

## 2023-06-15 RX ADMIN — POTASSIUM, SODIUM PHOSPHATES 280 MG-160 MG-250 MG ORAL POWDER PACKET 1 PACKET: POWDER IN PACKET at 00:40

## 2023-06-15 RX ADMIN — DIAZEPAM 10 MG: 10 TABLET ORAL at 09:44

## 2023-06-15 RX ADMIN — POTASSIUM CHLORIDE 20 MEQ: 1500 TABLET, EXTENDED RELEASE ORAL at 09:32

## 2023-06-15 RX ADMIN — CLONIDINE HYDROCHLORIDE 0.1 MG: 0.1 TABLET ORAL at 20:56

## 2023-06-15 RX ADMIN — MULTIPLE VITAMINS W/ MINERALS TAB 1 TABLET: TAB at 09:32

## 2023-06-15 RX ADMIN — CLONIDINE HYDROCHLORIDE 0.1 MG: 0.1 TABLET ORAL at 09:32

## 2023-06-15 RX ADMIN — KETOROLAC TROMETHAMINE 15 MG: 15 INJECTION, SOLUTION INTRAMUSCULAR; INTRAVENOUS at 22:49

## 2023-06-15 RX ADMIN — ONDANSETRON 4 MG: 4 TABLET, ORALLY DISINTEGRATING ORAL at 16:18

## 2023-06-15 RX ADMIN — GABAPENTIN 900 MG: 300 CAPSULE ORAL at 20:55

## 2023-06-15 RX ADMIN — GABAPENTIN 900 MG: 300 CAPSULE ORAL at 02:10

## 2023-06-15 RX ADMIN — GABAPENTIN 900 MG: 300 CAPSULE ORAL at 12:01

## 2023-06-15 RX ADMIN — Medication 400 MG: at 00:40

## 2023-06-15 RX ADMIN — SODIUM CHLORIDE, POTASSIUM CHLORIDE, SODIUM LACTATE AND CALCIUM CHLORIDE 1000 ML: 600; 310; 30; 20 INJECTION, SOLUTION INTRAVENOUS at 22:40

## 2023-06-15 RX ADMIN — SODIUM CHLORIDE, POTASSIUM CHLORIDE, SODIUM LACTATE AND CALCIUM CHLORIDE 1000 ML: 600; 310; 30; 20 INJECTION, SOLUTION INTRAVENOUS at 14:21

## 2023-06-15 RX ADMIN — Medication 25 MCG: at 09:32

## 2023-06-15 RX ADMIN — DIAZEPAM 10 MG: 10 TABLET ORAL at 00:40

## 2023-06-15 RX ADMIN — FOLIC ACID 1 MG: 1 TABLET ORAL at 09:32

## 2023-06-15 RX ADMIN — DIAZEPAM 10 MG: 10 TABLET ORAL at 02:52

## 2023-06-15 RX ADMIN — SERTRALINE HYDROCHLORIDE 50 MG: 50 TABLET ORAL at 09:32

## 2023-06-15 RX ADMIN — MAGNESIUM OXIDE TAB 400 MG (241.3 MG ELEMENTAL MG) 400 MG: 400 (241.3 MG) TAB at 09:32

## 2023-06-15 RX ADMIN — ACETAMINOPHEN 650 MG: 325 TABLET, FILM COATED ORAL at 20:56

## 2023-06-15 RX ADMIN — METOCLOPRAMIDE HYDROCHLORIDE 5 MG: 5 INJECTION INTRAMUSCULAR; INTRAVENOUS at 23:45

## 2023-06-15 RX ADMIN — LORAZEPAM 2 MG: 0.5 TABLET ORAL at 16:17

## 2023-06-15 ASSESSMENT — ACTIVITIES OF DAILY LIVING (ADL)
ADLS_ACUITY_SCORE: 35
ADLS_ACUITY_SCORE: 35
ADLS_ACUITY_SCORE: 37
ADLS_ACUITY_SCORE: 35
ADLS_ACUITY_SCORE: 37
ADLS_ACUITY_SCORE: 35
ADLS_ACUITY_SCORE: 35
ADLS_ACUITY_SCORE: 37
ADLS_ACUITY_SCORE: 35

## 2023-06-15 NOTE — PROGRESS NOTES
Care Management Follow Up    Length of Stay (days): 2    Expected Discharge Date: 06/17/2023     Concerns to be Addressed: discharge planning     Patient plan of care discussed at interdisciplinary rounds: Yes    Anticipated Discharge Disposition:LTACH or acute rehab     Anticipated Discharge Services:    Anticipated Discharge DME: [facility] (owns already)    Patient/family educated on Medicare website which has current facility and service quality ratings: no  Education Provided on the Discharge Plan: Yes  Patient/Family in Agreement with the Plan: yes    Referrals Placed by CM/SW:    Private pay costs discussed: Not applicable    Additional Information:    Per PT/OT Recommend LTACH or Acute Care Rehab.  Call to Yaniv at [facility] and left voice mail requesting a return call.  8:10 AM  Return call from Yaniv, he will look into Familia to see if he qualifies for [facility].    Yaniv Hampton-RRT  Clinical Liaison  [address] [address]  [email or web address]  Cell # [phone]    Received voice mail that Familia is open to [facility] for SN, PT, OT, SW, HHA.  They will need to be updated on discharge plans.    Chrissy Street RN, BA  Care Coordinator  6 Med Surg  [phone], pager [phone]      For weekend social work needs, contact information below and available on [facility]:      Weekend [place] Pager: [phone]   Weekend 6MS, 8A, 10ICU- Pager: [phone]     For weekend RN care coordinator needs (home discharge with needs including home care, assisted living facility returns, durable medical equip, IV antibiotics) - page [phone]

## 2023-06-15 NOTE — PROGRESS NOTES
"[id number]    Patient admitted for alcohol withdrawal. He remained alert and oriented x 4 throughout the shift. Had tremors, visual hallucinations, and was moderately anxious. Complained of headache and nausea. CIWA monitoring done as per order, and valium administered. Seizure precautions in place. No seizure activity during the shift.     Continuous pulse oximetry and cardiac monitoring in place, showing normal sinus rhythm. SPO2 100% in room air. Respiration even and unlabored at rest. Denies chest pain or difficulty breathing.     He is on RN managed HIGH replacement for Magnesium, Phosphorus and Potassium. Magnesium and Phosphorus replaced this shift. Next lab draw orders are in place.     PIV intact and patent at left antecubital, saline locked.     He needs A1 for transfers and toileting. Wears incontinent brief. Reports last BM \"four days ago\". PRN senna administered. Bowel sounds active in all four quadrants.     Regular soft diet. Appetite fair, patient ate 100%.    Bed alarm on. Call light and essentials within reach.      on board for placement. Continue with plan of care.   "

## 2023-06-15 NOTE — PROGRESS NOTES
VS: /78 (BP Location: Right arm, Patient Position: Semi-Butler's)   Pulse 70   Temp 98.2  F (36.8  C) (Oral)   Resp 16   Wt 55.5 kg (122 lb 4.8 oz)   SpO2 100%   BMI 18.60 kg/m       O2: Stable on room air, denies chest pain/SOB   Output: Voids spontaneously in bedside urinal, UA collected on shift   Last BM: Patient has not had bowel movement on admission, BS active x4   Activity: Up with assist x1 using gait belt and walker in room   Up for meals? No   Skin: Visible skin intact  Noted redness to coccyx/perineum  Shoulders brusied   Pain: Patient endorses headache which was addressed through PRN PO Valium and Ativan  Ativan to be used going forward   CMS: Alert and oriented to person and place, d/o to time/situation  Denies N/T  Patient has been in and out of lethargy today, increased somnolence due to withdrawal medications, arousable to voice/touch   Dressing: None   Diet: Soft and Bite sized diet, Thin liquids  PRN PO Zofran given once for nausea   LDA: PIV to L forearm SL  LR 1000 mL bolus run on shift    Equipment: IV pump/pole, call light w/in reach, urinal, gait belt, walker, personal belongings remain w/patient   Plan: Needing evaluation to determine chem dep vs. TCU   Additional Info: Patient has getting Valium for treatment of withdrawal s/s since arrival, providers reassessed today as patient had become quite sedated due to the amount of Valium given/called for based on his CIWA scores. See proved note regarding medication change. Ativan was given first based on CIWA at 1617 and 1mg Ativan at 1720 due to continued withdrawal symptoms. Nurse called patient at this pont due to patient increased confusion and struggle to perform basic ADL's due to increased sedation. Provider and nurse spoke and agreed to hold Ativan for a while, keep reassessing patient, and the night medicine team would come by to reassess and determine course of action going forward.    Bed and chair alarms activated for  patient safety due to hallucinations     Jenny Guzmán RN on 6/15/2023 at 7:10 PM

## 2023-06-15 NOTE — PLAN OF CARE
Writer was RN for patient from [id number]    Shift Summary:    Patient alert and eating breakfast during AM med pass. Scored 11 CIWA and 10mg valium given. Within 45 minutes of giving valium patient became very lethargic but arousable to voice. Providers notified and ordered to hold PRN Valium. Patient remained drowsy through end of shift.     Up to chair with heavy Ax1    No nausea    Mg & K replaced per potocol      Plan:    Chem dep vs TCU     Needs UA to be collected     Mg, K & Ph re-check in AM          For vital signs and complete assessments, please see documentation flowsheets.

## 2023-06-15 NOTE — PROGRESS NOTES
VS: /74   Pulse 68   Temp 98.4  F (36.9  C) (Oral)   Resp 18   Wt 55.5 kg (122 lb 4.8 oz)   SpO2 93%   BMI 18.60 kg/m     O2: Stable on RA. A&Ox4   Output: Voids spontaneously without difficulty.    Last BM: Pt states last BM was 4 days ago.  Bowel sounds active in all quadrants.    Activity: Assist x1 with walker and gait belt.    Skin: WDL except blanchable redness in the coccyx. Left and right heel racked and R & L shoulder bruised.    Pain: Pain rates 6. Reports it as a headache.    CMS: WDL. Denies SOB, chest pain, nausea/vomitting, and numbness/tingling.    Dressing: N/A   Diet: Regular soft diet.    LDA: L PIV SL.    Equipment: Call light and pt belongings within reach. Cont pulse ox.    Plan: Follow POC.    Additional Info: Seizure precautions in place. No seizure activity during the shift. During shift was moderately anxious and complained of visual hallucinations. CIWA monitoring done as per order, and valium x4 administered.

## 2023-06-15 NOTE — PROGRESS NOTES
[facility]    Progress Note - [facility]       Date of Admission:  6/13/2023    Major plans for today:  - Continue CIWA but HOLD diazepam  - 1 L of LR over 3 hours   - UA with reflex culture  - Refeeding syndrome: RN-managed HIGH K/Mg/Ph protocol, BID lytes checks   - Cardiac telemetry 2/2 electrolyte abnormalities     Assessment & Plan   Familia Watson is a 72-year-old male PMH severe AUD with history of withdrawal seizure (4/2023; complicated by PEA arrest), COPD, HTN, HLD, and depression/anxiety admitted 6/13/23 for acute alcohol withdrawal and generalized weakness suspected secondary to malnutrition vs. pulmonary lesion. Found to have electrolyte abnormalities consistent with refeeding syndrome.      # Alcohol use disorder, severe   # Acute alcohol withdrawal   # History of alcohol withdrawal seizure, witnessed (4/2023) with PEA arrest  # Possible acute pancreatitis on admission  # Epigastric and lower abdominal pain  Patient with long history of alcohol use. Reports drinking one pint vodka daily. Last drink reported 6/12 @ 0200. Positive MATT (238) on admission. Notable history of witnessed alcohol withdrawal seizure complicated by PEA arrest secondary to hypoxia. Currently denies withdrawal symptoms. Scoring 0-15 on CIWA and received a total of 40 mg of diazepam overnight. Somnolent during exam. Lipase drawn on 6/14 was 173, suggesting that he may have had acute pancreatitis on admission, but the lipase may have trended down since then because they were not drawn when he first arrived. Associated with epigastric abdominal pain. Familia was somnolent on exam, which is likely secondary to diazepam administration per CIWA protocol. CT on admission was negative for acute intracranial pathology.   - Consults: Chemical Dependency, Nutrition; PT/OT; appreciate recommendations   - 1 L of LR over 3 hours  - UA with reflex culture  - CIWA  Protocol:   - HOLD diazepam in setting of somnolence   - Gabapentin taper to PTA dose of 300 mg TID   - Clonidine 0.1 mg 3 times daily   - [place] level thiamine replacement   - Daily multivitamin    # Concern for refeeding syndrome   # Poor PO intake   # Generalized weakness   # Falls at home   Patient reports limited to no oral intake for several days in the setting of severe alcohol use disorder and depression. Admitted after several falls at home secondary to generalized weakness. Normal B12/folate; negative CK; recent TSH WNL; negative troponin, reassuring ECG; CT Head without acute changes. Suspect weakness related to malnutrition vs. pulmonary lesion as below. On admission, patient was given 1L D5NS for lactic acidosis. Subsequent electrolyte abnormalities consistent with refeeding syndrome.  Will need continued close monitoring with increased oral intake.   - Consults: Nutrition; PT/OT; appreciate recommendations   - Electrolytes: BID CMP, Mg, Phos; RN-managed Mg/Ph/K HIGH replacement protocol.   - Cardiac monitoring: Telemetry.   - Precautions: Fall precautions, up with assist.   - Soft and bite sized diet     # Cavitary lung lesion, BUSTER  # Tobacco use disorder  # COPD   Smokes 1ppd. CT PE (5/25/23) with BUSTER infiltrate. Recommended repeat imaging to differentiate infectious sequelae vs malignancy. Currently on RA. No wheezing on exam.   - Nicotine patch daily   - PTA DuoNeb PRN   - Repeat CT Chest in 4-6 weeks     # Depression  # History of SI, SIB (cutting)  # Social isolation  # Recent eviction  Patient reports several life stressors including recent death of spouse (12/2022). Recent hospitalization (5/24-5/31) following suicide attempt. Initiated on SSI at that time. Intermittent adherence. On admission, reported slightly improved mood and denied SI. No indication for bedside attendant currently. Familia reports today that he was evicted from his home and needs to go clear out his belongings. He has no  relatives in the area and has no place to go. SW aware.  - Continue PTA Sertraline 50 mg daily  - Spiritual health consult  - Consider addition of Remeron for appetite stimulation as well as depression treatment    Chronic/resolved/stable:  # Alcohol ketoacidosis  # Lactic acidosis, resolved   # Anion gap metabolic acidosis, resolved   Admitted with AGMA and lactate elevation (2.6). Resolved with fluids. Suspect alcohol ketoacidosis vs. Starvation ketoacidosis given history. Low concern for infection/DKA.     # HLD   - HOLD PTA Atorvastatin      Diet: Regular diet   DVT Prophylaxis: Pneumatic Compression Devices  Alberto Catheter: Not present  Fluids: PO  Lines: None     Cardiac Monitoring: ACTIVE order. Indication: Electrolyte Imbalance (24 hours)- Magnesium <1.3 mg/ml; Potassium < =2.8 or > 5.5 mg/ml  Code Status: Full Code      Clinically Significant Risk Factors        # Hypokalemia: Lowest K = 3.2 mmol/L in last 2 days, will replace as needed     # Hypomagnesemia: Lowest Mg = 1.6 mg/dL in last 2 days, will replace as needed   # Hypoalbuminemia: Lowest albumin = 3 g/dL at 6/14/2023  6:32 AM, will monitor as appropriate             # Severe Malnutrition: based on nutrition assessment, PRESENT ON ADMISSION        Disposition Plan      Expected Discharge Date: 06/19/2023      Destination: home  Discharge Comments: w/d management  needs pt/ot eval for discharge planning  chem dep vs tcu        The patient's care was discussed with the Attending Physician, Dr. Tafoya.    Katie Ryan DO, MPH  [facility]  [facility]  Securely message with [employer] (more info)  Text page via [employer] Paging/Directory   See signed in provider for up to date coverage information  ______________________________________________________________________    Interval History     Overnight: Received a total of 40 mg of diazepam overnight for withdrawal symptoms.     Subjective:  "Very somnolent on exam. Notes that his \"ass\" hurts from sitting in the chair too long. States that he often has a headache, but that this is not typical of his withdrawal symptoms.     Physical Exam   Vital Signs: Temp: 98.2  F (36.8  C) Temp src: Oral BP: 104/64 Pulse: 96   Resp: 18 SpO2: 98 % O2 Device: None (Room air)    Weight: 122 lbs 4.8 oz    Constitutional: Laying in bed, no apparent distress. Somnolent. Arousable to voice and painful stimuli. Poor dentition. PERRL.  Respiratory: No increased work of breathing, good air exchange, clear to auscultation bilaterally, no crackles or wheezing  Cardiovascular: Normal apical impulse, regular rate and rhythm, normal S1 and S2, no S3 or S4, and no murmur noted  GI: Tenderness to palpation of the lower abdominal quadrants. No scars, normal bowel sounds, soft, non-distended, no masses palpated, no hepatosplenomegally  Skin: No bruising or bleeding  Musculoskeletal: No lower extremity pitting edema present. 5/5  strength, 5/5 muscle strength of bilateral upper and lower extremities.    Medical Decision Making   Please see A&P for additional details of medical decision making.      Data   ------------------------- PAST 24 HR DATA REVIEWED -----------------------------------------------    I have personally reviewed the following data over the past 24 hrs:    4.7  \   9.2 (L)   / 207     138 103 7.2 (L) /  140 (H)   3.6 25 0.60 (L) \       ALT: 26 AST: 36 AP: 87 TBILI: 0.2   ALB: 3.1 (L) TOT PROTEIN: 5.3 (L) LIPASE: N/A       Imaging results reviewed over the past 24 hrs:   No results found for this or any previous visit (from the past 24 hour(s)).  "

## 2023-06-16 ENCOUNTER — ANCILLARY PROCEDURE (OUTPATIENT)
Dept: NEUROLOGY | Facility: CLINIC | Age: 73
DRG: 896 | End: 2023-06-16
Payer: MEDICARE

## 2023-06-16 ENCOUNTER — APPOINTMENT (OUTPATIENT)
Dept: CT IMAGING | Facility: CLINIC | Age: 73
DRG: 896 | End: 2023-06-16
Payer: MEDICARE

## 2023-06-16 LAB
ALBUMIN SERPL BCG-MCNC: 3.1 G/DL (ref 3.5–5.2)
ALBUMIN SERPL BCG-MCNC: 3.1 G/DL (ref 3.5–5.2)
ALBUMIN SERPL BCG-MCNC: 3.2 G/DL (ref 3.5–5.2)
ALP SERPL-CCNC: 93 U/L (ref 40–129)
ALP SERPL-CCNC: 96 U/L (ref 40–129)
ALP SERPL-CCNC: 98 U/L (ref 40–129)
ALT SERPL W P-5'-P-CCNC: 29 U/L (ref 0–70)
ALT SERPL W P-5'-P-CCNC: 30 U/L (ref 0–70)
ALT SERPL W P-5'-P-CCNC: 35 U/L (ref 0–70)
AMMONIA PLAS-SCNC: 35 UMOL/L (ref 16–60)
ANION GAP SERPL CALCULATED.3IONS-SCNC: 10 MMOL/L (ref 7–15)
ANION GAP SERPL CALCULATED.3IONS-SCNC: 9 MMOL/L (ref 7–15)
ANION GAP SERPL CALCULATED.3IONS-SCNC: 9 MMOL/L (ref 7–15)
AST SERPL W P-5'-P-CCNC: 34 U/L (ref 0–45)
AST SERPL W P-5'-P-CCNC: 34 U/L (ref 0–45)
AST SERPL W P-5'-P-CCNC: 43 U/L (ref 0–45)
BASE EXCESS BLDV CALC-SCNC: 6.9 MMOL/L (ref -7.7–1.9)
BASOPHILS # BLD AUTO: 0.1 10E3/UL (ref 0–0.2)
BASOPHILS NFR BLD AUTO: 1 %
BILIRUB SERPL-MCNC: 0.5 MG/DL
BILIRUB SERPL-MCNC: <0.2 MG/DL
BILIRUB SERPL-MCNC: <0.2 MG/DL
BUN SERPL-MCNC: 11.2 MG/DL (ref 8–23)
BUN SERPL-MCNC: 7.4 MG/DL (ref 8–23)
BUN SERPL-MCNC: 7.7 MG/DL (ref 8–23)
CALCIUM SERPL-MCNC: 8.8 MG/DL (ref 8.8–10.2)
CHLORIDE SERPL-SCNC: 100 MMOL/L (ref 98–107)
CHLORIDE SERPL-SCNC: 101 MMOL/L (ref 98–107)
CHLORIDE SERPL-SCNC: 98 MMOL/L (ref 98–107)
CK SERPL-CCNC: 25 U/L (ref 39–308)
CREAT SERPL-MCNC: 0.53 MG/DL (ref 0.67–1.17)
CREAT SERPL-MCNC: 0.54 MG/DL (ref 0.67–1.17)
CREAT SERPL-MCNC: 0.75 MG/DL (ref 0.67–1.17)
DEPRECATED HCO3 PLAS-SCNC: 28 MMOL/L (ref 22–29)
DEPRECATED HCO3 PLAS-SCNC: 28 MMOL/L (ref 22–29)
DEPRECATED HCO3 PLAS-SCNC: 29 MMOL/L (ref 22–29)
EOSINOPHIL # BLD AUTO: 0.6 10E3/UL (ref 0–0.7)
EOSINOPHIL NFR BLD AUTO: 12 %
ERYTHROCYTE [DISTWIDTH] IN BLOOD BY AUTOMATED COUNT: 14.9 % (ref 10–15)
ERYTHROCYTE [DISTWIDTH] IN BLOOD BY AUTOMATED COUNT: 14.9 % (ref 10–15)
GFR SERPL CREATININE-BSD FRML MDRD: >90 ML/MIN/1.73M2
GLUCOSE SERPL-MCNC: 106 MG/DL (ref 70–99)
GLUCOSE SERPL-MCNC: 107 MG/DL (ref 70–99)
GLUCOSE SERPL-MCNC: 108 MG/DL (ref 70–99)
HCO3 BLDV-SCNC: 32 MMOL/L (ref 21–28)
HCT VFR BLD AUTO: 30.7 % (ref 40–53)
HCT VFR BLD AUTO: 33.2 % (ref 40–53)
HGB BLD-MCNC: 10 G/DL (ref 13.3–17.7)
HGB BLD-MCNC: 10.7 G/DL (ref 13.3–17.7)
HOLD SPECIMEN: NORMAL
IMM GRANULOCYTES # BLD: 0 10E3/UL
IMM GRANULOCYTES NFR BLD: 1 %
LACTATE SERPL-SCNC: 1.6 MMOL/L (ref 0.7–2)
LYMPHOCYTES # BLD AUTO: 1.5 10E3/UL (ref 0.8–5.3)
LYMPHOCYTES NFR BLD AUTO: 32 %
MAGNESIUM SERPL-MCNC: 1.5 MG/DL (ref 1.7–2.3)
MAGNESIUM SERPL-MCNC: 2 MG/DL (ref 1.7–2.3)
MAGNESIUM SERPL-MCNC: 2.5 MG/DL (ref 1.7–2.3)
MCH RBC QN AUTO: 32.1 PG (ref 26.5–33)
MCH RBC QN AUTO: 32.4 PG (ref 26.5–33)
MCHC RBC AUTO-ENTMCNC: 32.2 G/DL (ref 31.5–36.5)
MCHC RBC AUTO-ENTMCNC: 32.6 G/DL (ref 31.5–36.5)
MCV RBC AUTO: 101 FL (ref 78–100)
MCV RBC AUTO: 98 FL (ref 78–100)
MONOCYTES # BLD AUTO: 0.2 10E3/UL (ref 0–1.3)
MONOCYTES NFR BLD AUTO: 4 %
NEUTROPHILS # BLD AUTO: 2.3 10E3/UL (ref 1.6–8.3)
NEUTROPHILS NFR BLD AUTO: 50 %
NRBC # BLD AUTO: 0 10E3/UL
NRBC BLD AUTO-RTO: 0 /100
O2/TOTAL GAS SETTING VFR VENT: 100 %
PCO2 BLDV: 48 MM HG (ref 40–50)
PH BLDV: 7.43 [PH] (ref 7.32–7.43)
PHOSPHATE SERPL-MCNC: 3.4 MG/DL (ref 2.5–4.5)
PHOSPHATE SERPL-MCNC: 3.4 MG/DL (ref 2.5–4.5)
PLATELET # BLD AUTO: 210 10E3/UL (ref 150–450)
PLATELET # BLD AUTO: 214 10E3/UL (ref 150–450)
PO2 BLDV: 38 MM HG (ref 25–47)
POTASSIUM SERPL-SCNC: 4.1 MMOL/L (ref 3.4–5.3)
POTASSIUM SERPL-SCNC: 4.2 MMOL/L (ref 3.4–5.3)
POTASSIUM SERPL-SCNC: 4.3 MMOL/L (ref 3.4–5.3)
PROT SERPL-MCNC: 5.7 G/DL (ref 6.4–8.3)
PROT SERPL-MCNC: 5.8 G/DL (ref 6.4–8.3)
PROT SERPL-MCNC: 6.2 G/DL (ref 6.4–8.3)
RBC # BLD AUTO: 3.12 10E6/UL (ref 4.4–5.9)
RBC # BLD AUTO: 3.3 10E6/UL (ref 4.4–5.9)
SODIUM SERPL-SCNC: 136 MMOL/L (ref 136–145)
SODIUM SERPL-SCNC: 138 MMOL/L (ref 136–145)
SODIUM SERPL-SCNC: 138 MMOL/L (ref 136–145)
TROPONIN T SERPL HS-MCNC: 14 NG/L
WBC # BLD AUTO: 4.7 10E3/UL (ref 4–11)
WBC # BLD AUTO: 4.8 10E3/UL (ref 4–11)

## 2023-06-16 PROCEDURE — 95718 EEG PHYS/QHP 2-12 HR W/VEEG: CPT | Performed by: PSYCHIATRY & NEUROLOGY

## 2023-06-16 PROCEDURE — 93005 ELECTROCARDIOGRAM TRACING: CPT

## 2023-06-16 PROCEDURE — 36415 COLL VENOUS BLD VENIPUNCTURE: CPT | Performed by: STUDENT IN AN ORGANIZED HEALTH CARE EDUCATION/TRAINING PROGRAM

## 2023-06-16 PROCEDURE — 85025 COMPLETE CBC W/AUTO DIFF WBC: CPT

## 2023-06-16 PROCEDURE — 84450 TRANSFERASE (AST) (SGOT): CPT | Performed by: STUDENT IN AN ORGANIZED HEALTH CARE EDUCATION/TRAINING PROGRAM

## 2023-06-16 PROCEDURE — 95711 VEEG 2-12 HR UNMONITORED: CPT

## 2023-06-16 PROCEDURE — 250N000013 HC RX MED GY IP 250 OP 250 PS 637

## 2023-06-16 PROCEDURE — G1010 CDSM STANSON: HCPCS | Mod: GC | Performed by: RADIOLOGY

## 2023-06-16 PROCEDURE — 250N000011 HC RX IP 250 OP 636: Performed by: FAMILY MEDICINE

## 2023-06-16 PROCEDURE — 83735 ASSAY OF MAGNESIUM: CPT | Performed by: FAMILY MEDICINE

## 2023-06-16 PROCEDURE — 70450 CT HEAD/BRAIN W/O DYE: CPT | Mod: 26 | Performed by: RADIOLOGY

## 2023-06-16 PROCEDURE — G1010 CDSM STANSON: HCPCS

## 2023-06-16 PROCEDURE — 120N000002 HC R&B MED SURG/OB UMMC

## 2023-06-16 PROCEDURE — 258N000003 HC RX IP 258 OP 636

## 2023-06-16 PROCEDURE — 83605 ASSAY OF LACTIC ACID: CPT | Performed by: STUDENT IN AN ORGANIZED HEALTH CARE EDUCATION/TRAINING PROGRAM

## 2023-06-16 PROCEDURE — 84155 ASSAY OF PROTEIN SERUM: CPT | Performed by: STUDENT IN AN ORGANIZED HEALTH CARE EDUCATION/TRAINING PROGRAM

## 2023-06-16 PROCEDURE — 93010 ELECTROCARDIOGRAM REPORT: CPT | Performed by: INTERNAL MEDICINE

## 2023-06-16 PROCEDURE — 84484 ASSAY OF TROPONIN QUANT: CPT | Performed by: STUDENT IN AN ORGANIZED HEALTH CARE EDUCATION/TRAINING PROGRAM

## 2023-06-16 PROCEDURE — 83735 ASSAY OF MAGNESIUM: CPT | Performed by: STUDENT IN AN ORGANIZED HEALTH CARE EDUCATION/TRAINING PROGRAM

## 2023-06-16 PROCEDURE — 85027 COMPLETE CBC AUTOMATED: CPT | Performed by: STUDENT IN AN ORGANIZED HEALTH CARE EDUCATION/TRAINING PROGRAM

## 2023-06-16 PROCEDURE — 82550 ASSAY OF CK (CPK): CPT | Performed by: STUDENT IN AN ORGANIZED HEALTH CARE EDUCATION/TRAINING PROGRAM

## 2023-06-16 PROCEDURE — 99232 SBSQ HOSP IP/OBS MODERATE 35: CPT | Mod: GC | Performed by: FAMILY MEDICINE

## 2023-06-16 PROCEDURE — 84100 ASSAY OF PHOSPHORUS: CPT | Performed by: STUDENT IN AN ORGANIZED HEALTH CARE EDUCATION/TRAINING PROGRAM

## 2023-06-16 PROCEDURE — 82803 BLOOD GASES ANY COMBINATION: CPT | Performed by: STUDENT IN AN ORGANIZED HEALTH CARE EDUCATION/TRAINING PROGRAM

## 2023-06-16 PROCEDURE — 82140 ASSAY OF AMMONIA: CPT | Performed by: STUDENT IN AN ORGANIZED HEALTH CARE EDUCATION/TRAINING PROGRAM

## 2023-06-16 RX ORDER — SUMATRIPTAN 50 MG/1
50 TABLET, FILM COATED ORAL
Status: COMPLETED | OUTPATIENT
Start: 2023-06-16 | End: 2023-06-16

## 2023-06-16 RX ORDER — VALPROIC ACID 250 MG/1
250 CAPSULE, LIQUID FILLED ORAL AT BEDTIME
Status: DISCONTINUED | OUTPATIENT
Start: 2023-06-16 | End: 2023-06-17

## 2023-06-16 RX ORDER — MAGNESIUM SULFATE HEPTAHYDRATE 40 MG/ML
4 INJECTION, SOLUTION INTRAVENOUS ONCE
Status: COMPLETED | OUTPATIENT
Start: 2023-06-16 | End: 2023-06-16

## 2023-06-16 RX ADMIN — CLONIDINE HYDROCHLORIDE 0.1 MG: 0.1 TABLET ORAL at 19:23

## 2023-06-16 RX ADMIN — CLONIDINE HYDROCHLORIDE 0.1 MG: 0.1 TABLET ORAL at 03:14

## 2023-06-16 RX ADMIN — MAGNESIUM SULFATE HEPTAHYDRATE 4 G: 40 INJECTION, SOLUTION INTRAVENOUS at 06:59

## 2023-06-16 RX ADMIN — SUMATRIPTAN SUCCINATE 50 MG: 50 TABLET, FILM COATED ORAL at 21:51

## 2023-06-16 RX ADMIN — SODIUM CHLORIDE, POTASSIUM CHLORIDE, SODIUM LACTATE AND CALCIUM CHLORIDE 500 ML: 600; 310; 30; 20 INJECTION, SOLUTION INTRAVENOUS at 19:21

## 2023-06-16 RX ADMIN — VALPROIC ACID 250 MG: 250 CAPSULE ORAL at 21:51

## 2023-06-16 RX ADMIN — GABAPENTIN 900 MG: 300 CAPSULE ORAL at 03:14

## 2023-06-16 RX ADMIN — ACETAMINOPHEN, ASPIRIN, CAFFEINE 1 TABLET: 250; 65; 250 TABLET, FILM COATED ORAL at 17:41

## 2023-06-16 RX ADMIN — SUMATRIPTAN SUCCINATE 50 MG: 50 TABLET, FILM COATED ORAL at 00:51

## 2023-06-16 ASSESSMENT — ACTIVITIES OF DAILY LIVING (ADL)
ADLS_ACUITY_SCORE: 39
ADLS_ACUITY_SCORE: 41
ADLS_ACUITY_SCORE: 37
ADLS_ACUITY_SCORE: 41
ADLS_ACUITY_SCORE: 37
ADLS_ACUITY_SCORE: 41
ADLS_ACUITY_SCORE: 39
ADLS_ACUITY_SCORE: 41

## 2023-06-16 NOTE — PROGRESS NOTES
"[facility]   BRIEF PROGRESS NOTE    SUBJECTIVE  Assessed patient for baseline and patient stated feeling overall well but feels that \"migraine is coming on\". Patient stating feels like his typical migraine and that having throbbing pains predominately left side of the head and into the face. Patient denies aura, nausea/vomiting, dizziness, hallcunations, visual changes, or worsening withdrawal symptoms. Patient endorses light headedness. Patient states feels otherwise well.    OBJECTIVE:  /57 (BP Location: Right arm)   Pulse 70   Temp 97.8  F (36.6  C) (Oral)   Resp 24   Wt 55.5 kg (122 lb 4.8 oz)   SpO2 100%   BMI 18.60 kg/m      Exam:   General: Alert and oriented, in no acute distress.  Skin: Warm and dry, no abnormalities noted.  Eyes: Extra-ocular muscles intact.  ENT: Speech intact, nasal passages open, no hearing impairment noted.  CV: No cyanosis or pallor, warm and well perfused.  Respiratory: No respiratory distress, no accessory muscle use.  Neuro: CN2-12 grossly intact. Sensation intact to light touch in four extremities. 5/5 bilateral hand grasp. 5/5 dorsiflexion/plantarflexion.   Psychiatric: Mood and affect appear normal.   Extremities: Warm, able to move all four extremities at will.      ASSESSMENT/PLAN:  Familia Watson is a 72-year-old male PMH severe AUD with history of withdrawal seizure (4/2023; complicated by PEA arrest), COPD, HTN, HLD, and depression/anxiety admitted 6/13/23 for acute alcohol withdrawal and generalized weakness suspected secondary to malnutrition vs. pulmonary lesion. Found to have electrolyte abnormalities consistent with refeeding syndrome.       # Headache c/f possible migraine  Patient with new onset headache c/f possible migraine. Emergent cause of headache unlikely given normal neurologic exam, patient history of identical symptoms with previous migraines, and CT imaging from today without acute intracranial pathology.    1. 500 ml bolus " LR  2. 50 mg sumatriptan  3. CTM    # Alcohol Withdrawal  1.    Continue CIWA  2.    Continue lorazepam  3.    CTM    Please see daily rounding note for full A/P.  Aggie Bundy MD  6:59 PM

## 2023-06-16 NOTE — PLAN OF CARE
VS: /57 (done on R arm, patient semi mullen's)   Pulse 70   Temp 97.8  F (Oral)   Resp 24  SpO2 100%        O2: Stable on room air, denies chest pain/SOB. Reports migraines. Aspirin given.    Output: Voids spontaneously in bedside urinal.   Last BM: No BM since admission. Active bowel sounds x4 quadrants.   Activity: Up with assist x1 using gait belt and walker in room   Up for meals? No   Skin: Visible skin intact  Noted redness to coccyx/perineum  Shoulders brusied   Pain: Patient reports migraine headaches. PRN Aspirin given.   CMS: Alert and oriented to person and place, d/o to time/situation  Patient denies numbness/tingling. CT done this AM.    Dressing: None   Diet: Soft and Bite sized diet, Thin liquids  PRN PO Zofran given once for nausea   LDA:  L PIV to forearm SL.    Equipment: IV pump/pole, call light w/in reach, urinal, gait belt, walker, personal belongings remain w/patient   Plan: Patient very weak and slept most of the shift. CT done this AM to rule out possible seizures. Unable to update CIWA score due to patient sleeping.    Additional Info: Patient on seizure precautions.

## 2023-06-16 NOTE — PROGRESS NOTES
"[facility]   BRIEF PROGRESS NOTE    SUBJECTIVE  Patient assessed for baseline.  Patient feeling overall well, but does state mild headache since he woke up. Patient stating feeling otherwise well and no acute concerns. States that he did have a brief episode of seeing \"boxes everywhere\", but this went away and he has not had any recurrent visual hallucinations since then. Patient denies tremors, auditory hallucinations, or GI upset.    OBJECTIVE:  /78 (BP Location: Right arm, Patient Position: Semi-Butler's)   Pulse 70   Temp 98.2  F (36.8  C) (Oral)   Resp 16   Wt 55.5 kg (122 lb 4.8 oz)   SpO2 100%   BMI 18.60 kg/m      Exam:   General: Alert and oriented x4, in no acute distress.  Skin: Warm and dry, no abnormalities noted.  Eyes: pupils equal and reactive.  ENT: Speech intact, nasal passages open, no hearing impairment noted.  CV: No cyanosis or pallor, warm and well perfused.  Respiratory: No respiratory distress, no accessory muscle use.  Neuro: minimal tremor. Sensation intact to light touch in all four extremities. 5/5 bilateral dorsiflexion/plantarflexion/hangrasp.  Psychiatric: Mood and affect appear normal.   Extremities: Warm, able to move all four extremities at will.      ASSESSMENT/PLAN:  Familia Watson is a 72-year-old male PMH severe AUD with history of withdrawal seizure (4/2023; complicated by PEA arrest), COPD, HTN, HLD, and depression/anxiety admitted 6/13/23 for acute alcohol withdrawal and generalized weakness suspected secondary to malnutrition vs. pulmonary lesion. Found to have electrolyte abnormalities consistent with refeeding syndrome.     #Recent Hallucination  #Alchol Withdrawal  #Headache  #Hypomagnesemia (Mag 1.6)    1. Continue CIWA scoring  2. Recommend administer tylenol PRN for headache  3. Recommend increase oral water intake (headache potentially related to hydration status)  4. Replete Mag orally / RN managed Mag protocol  5. CTM    Please see daily " "rounding note for full A/P.  Aggie Bundy MD  8:29 PM       UPDATE  Paged by bedside nursing that patient was reporting blurred vision. Assessed at bedside. Patient reports that this feels like a migraine, that he has had migraines since 1968 and the blurred vision and characteristics of his headache are classic of his migraine. Reports L sided head pain, blurred vision, soreness in his neck (ROM INTACT), nausea, sound sensitivity. Requesting narcotics for management.     Exam notable for  Speech clear and coherent, AOx4, smooth movements. Minimal tremor in hands fades with distraction no fasciculations. Does not appear distracted by internal stimuli. PERRLA, EOMI. Neck FROM. Dentition poor no facial pain with palp.     A&P  At this time most consistent with migraine headache given patient history, sx.  CIWA score 14, suspect compromised by migraine.  Patient also referenced \"these headaches since [place]\" and faces hallucinations intermittently reported may be more consistent with delirium hallucinations than EtOH hallucination. While migraine cocktail may worsen delirium in long term, immediate management of migraine pain to help with pain disorientation without use of narcotics current priority. Will discuss with dayteam about future planning.     - Ordered migraine cocktail (toradol 15mg, benadryl 25mg, reglan 5mg IV w/1L LR)  - 2mg PO lorazepam per CIWA, delay haloperidol  - If sx change from usual migraine-type, patient will notify and we will order STAT head imaging    Lizzy Puga MD  10:27 PM            "

## 2023-06-16 NOTE — PLAN OF CARE
VS: BP (!) 153/63 (BP Location: Right arm, Patient Position: Supine)   Pulse 69   Temp 98.3  F (36.8  C) (Oral)   Resp 16   Wt 55.5 kg (122 lb 4.8 oz)   SpO2 100%   BMI 18.60 kg/m     O2:  Alert and oriented to person and place, d/o to time/situation  Stable on room air, denies hest pain/SOB   Output: Voids spontaneously in bedside urinal, UA collected on shift   Last BM: Patient has not had bowel movement on admission, BS active x4.   Activity: Pt assisted to sit as edge of bed x 2 to use urinal   Skin: Noted redness to coccyx/perineum  Shoulders brusied   Pain: Pt reported that he was having migraine and provider order GI cocktail a the initial treatment. After cocktail was given pt stated to have had no improvement and one time administration of Sumatriptan 50 mg tablet. Pt    CMS: Alert and oriented to person and place, d/o to time/situation  Denies N/T   Dressing: None   Diet: Soft and Bite sized diet, Thin liquids  Mild nausea/ no vomiting   LDA: Left PIV Saline locked  LR 1000 mL bolus run for 4 hours   Equipment: IV pump/pole, call light w/in reach, urinal, gait belt, walker, personal belongings remain w/patient   Plan: Needing evaluation to determine chem dep vs. TCU   Additional Info:

## 2023-06-16 NOTE — PROGRESS NOTES
CLINICAL NUTRITION SERVICES - BRIEF NOTE     Nutrition Prescription    RECOMMENDATIONS FOR MDs/PROVIDERS TO ORDER:  - none at present.     Recommendations already ordered by Registered Dietitian (RD):  - start chocolate Ensure Enlive with breakfast and @ 8pm  - collaborate with other providers--pt care round for nursing to use Ensure w/ Rx pass to avoid filling up with water w/ Rx around breakfast. Also added to utilize prn bowel Rx as needed.    Future/Additional Recommendations:  - Follow po intake, ONS acceptance, labs, weights, stooling pattern.     EVALUATION OF THE PROGRESS TOWARD GOALS   Diet: Soft & Bite Sized (level 6) w/ thin liquids (level 0)  Intake: Meal orders still below ideal and now able to safely increasely increase his carb intake with resolving refeeding syndrome.   Noted pt also more somnolent yesterday so may have impacted po intake.      NEW FINDINGS   - Per care rounds, anticipated possible discharge to LTACH. However, LTACH declined and felt pt more appropriate for TCU.  Pt was recently evicted from his apartment making post TCU discharge plans unclear.  Now may discharge to IP CD treatment per RN CC note.    Labs 6/16: K+ 4.2, Mg 2.5 (unclear if rechecked too soon after Mg replacement earlier in AM).  Phos 3.4    Received 1L bolus of LR x 2 on 6/15.      Still no BM documented yet this admission despite 6 doses of oral Mag Oxide over 2 days (6/14 and 6/15) and 2 senna-docusate given 6/14.   Pt given 1 dose of reglan late 6/15. No scheduled bowel Rx.  Does have prn senna-docusate 1 -2 tablets/ BID.     INTERVENTIONS  Education- Provided pt with level 6 menu list to help with meal selection. Explained Ensure Enlive would start tonight.  Collaboration with other providers  Medical food supplement therapy      Monitoring/Evaluation  Progress toward goals will be monitored and evaluated per protocol.     Opal Mckeon) Flor RD, LD   6B and 8A Med/surg (M-F) Pager:  [phone]  Weekend/holiday pager: [phone]

## 2023-06-16 NOTE — PROGRESS NOTES
Care Management Follow Up    Length of Stay (days): 3    Expected Discharge Date: 06/19/2023     Concerns to be Addressed: discharge planning     Patient plan of care discussed at interdisciplinary rounds: Yes    Anticipated Discharge Disposition: Home     Anticipated Discharge Services:    Anticipated Discharge DME: [facility] (owns already)    Patient/family educated on Medicare website which has current facility and service quality ratings: no  Education Provided on the Discharge Plan: Yes  Patient/Family in Agreement with the Plan: yes    Referrals Placed by CM/SW:    Private pay costs discussed: Not applicable    Additional Information:    Per patient care rounds Familia will be med ready to discharge to LTACH tomorrow.  Call to Yaniv to follow up on referral made yesterday to [facility].  Left voice mail with request for return call.    Call to [facility] liaison  Sadie Prasad. He is not a candidate for LTACH.  Based on her chart review TCU is more appropriate  though he is also recently homeless while he has been at the hospital he was evicted.  Probably best option is for him to go to IP CD treatment.      Spoke with Marissa's team and plan is to see how he does over the weekend and whether he would be appropriate for inpatient CD program.  They will speak to patient about limited options.      Chrissy Street RN, BA  Care Coordinator  6 Med Surg  [phone], pager [phone]      For weekend social work needs, contact information below and available on [facility]:      Weekend [place] Pager: [phone]   Weekend 6MS, 8A, 10ICU- Pager: [phone]     For weekend RN care coordinator needs (home discharge with needs including home care, assisted living facility returns, durable medical equip, IV antibiotics) - page [phone]

## 2023-06-16 NOTE — PROGRESS NOTES
[facility]    Progress Note - [facility]       Date of Admission:  6/13/2023    Major plans for today:  - 3-hour EEG  - Depakote 250 mg at bedtime  - Discontinue gabapentin  - Continue CIWA with lorazepam  - STAT CBC, CMP, LA, CK, EKG, ammonia, troponin, VBG  - STAT CT Head w/o contrast  - Seizure precautions    Assessment & Plan   Familia Watson is a 72-year-old male PMH severe AUD with history of withdrawal seizure (4/2023; complicated by PEA arrest), COPD, HTN, HLD, and depression/anxiety admitted 6/13/23 for acute alcohol withdrawal and generalized weakness suspected secondary to malnutrition vs. pulmonary lesion. Found to have electrolyte abnormalities consistent with refeeding syndrome.      # Alcohol use disorder, severe   # Acute alcohol withdrawal   # History of alcohol withdrawal seizure, witnessed (4/2023) with PEA arrest  # Persistent somnolence, concern for delirium vs unwitnessed seizure/post-ictal state vs Wernicke's encephalopathy  # Visual hallucinations  # Possible acute pancreatitis on admission  # Epigastric and lower abdominal pain, resolved  Patient with long history of alcohol use. Reports drinking one pint vodka daily. Last drink reported 6/12 @ 0200. Positive MATT (238) on admission. Notable history of witnessed alcohol withdrawal seizure complicated by PEA arrest secondary to hypoxia. Currently denies withdrawal symptoms. Scoring 0-15 on CIWA and received a total of 40 mg of diazepam overnight. Somnolent during exam. Lipase drawn on 6/14 was 173, suggesting that he may have had acute pancreatitis on admission, but the lipase may have trended down since then because they were not drawn when he first arrived. Associated with epigastric abdominal pain. Familia was somnolent on exam, which is likely secondary to diazepam administration per CIWA protocol. CT on admission was negative for acute intracranial pathology. Familia  has been increasingly somnolent over the past two days. I saw him this morning before rounds, and he was more awake, then was difficult to arouse when the whole team arrived. Responsive to pain and stimuli. Able to follow commands. He only received one dose of lorazepam 2 mg and gabapentin overnight in addition to sumatriptan, benadryl, and Reglan for nausea and migraine. Repeat CT head and EKG this morning was unremarkable. All STAT labs were normal, making metabolic/hepatic encephalopathy, sepsis, and MI unlikely. Curbsided ICU and they recommended EEG and Depakote in the setting of his history of withdrawal seizures with cardiac arrest.  - Consults: Chemical Dependency, Nutrition; PT/OT; appreciate recommendations   - CIWA Protocol:   - Continue CIWA scoring with lorazepam (shorter half-life compared to diazepam)   - HOLD Gabapentin taper to PTA dose of 300 mg TID in the setting of somnolence   - Clonidine 0.1 mg 3 times daily   - [place]ernike level thiamine replacement   - Daily multivitamin   - Depakote 250 mg daily at bedtime  - 3 hour EEG  - Seizure precautions    # Concern for refeeding syndrome   # Poor PO intake   # Generalized weakness   # Falls at home   Patient reports limited to no oral intake for several days in the setting of severe alcohol use disorder and depression. Admitted after several falls at home secondary to generalized weakness. Normal B12/folate; negative CK; recent TSH WNL; negative troponin, reassuring ECG; CT Head without acute changes. Suspect weakness related to malnutrition vs. pulmonary lesion as below. On admission, patient was given 1L D5NS for lactic acidosis. Subsequent electrolyte abnormalities consistent with refeeding syndrome.  Will need continued close monitoring with increased oral intake.   - Consults: Nutrition; PT/OT; appreciate recommendations   - Electrolytes: BID CMP, Mg, Phos; RN-managed Mg/Ph/K HIGH replacement protocol.   - Cardiac monitoring: Telemetry.   -  Precautions: Fall precautions, up with assist.   - Soft and bite sized diet     # Cavitary lung lesion, BUSTER  # Tobacco use disorder  # COPD   Smokes 1ppd. CT PE (5/25/23) with BUSTER infiltrate. Recommended repeat imaging to differentiate infectious sequelae vs malignancy. Currently on RA. No wheezing on exam.   - Nicotine patch daily   - PTA DuoNeb PRN   - Repeat CT Chest in 4-6 weeks     # Depression  # History of SI, SIB (cutting)  # Social isolation  # Recent eviction  Patient reports several life stressors including recent death of spouse (12/2022). Recent hospitalization (5/24-5/31) following suicide attempt. Initiated on SSI at that time. Intermittent adherence. On admission, reported slightly improved mood and denied SI. No indication for bedside attendant currently. Familia reports today that he was evicted from his home and needs to go clear out his belongings. He has no relatives in the area and has no place to go. SW aware. TCU will not accept him. His only option is to go to Chem Dep. [facility] could [facility].  - Continue PTA Sertraline 50 mg daily  - Spiritual health consult  - Consider addition of Remeron for appetite stimulation as well as depression treatment    Chronic/resolved/stable:  # Alcohol ketoacidosis  # Lactic acidosis, resolved   # Anion gap metabolic acidosis, resolved   Admitted with AGMA and lactate elevation (2.6). Resolved with fluids. Suspect alcohol ketoacidosis vs. Starvation ketoacidosis given history. Low concern for infection/DKA.     # HLD   - HOLD PTA Atorvastatin      Diet: Soft and bite sized diet  DVT Prophylaxis: Pneumatic Compression Devices  Alberto Catheter: Not present  Fluids: PO  Lines: None     Cardiac Monitoring: ACTIVE order. Indication: Electrolyte Imbalance (24 hours)- Magnesium <1.3 mg/ml; Potassium < =2.8 or > 5.5 mg/ml  Code Status: Full Code      Clinically Significant Risk Factors            # Hypomagnesemia: Lowest Mg = 1.5 mg/dL  in last 2 days, will replace as needed   # Hypoalbuminemia: Lowest albumin = 3 g/dL at 6/14/2023  6:32 AM, will monitor as appropriate             # Severe Malnutrition: based on nutrition assessment, PRESENT ON ADMISSION        Disposition Plan      Expected Discharge Date: 06/19/2023      Destination: home  Discharge Comments: w/d management  needs pt/ot eval for discharge planning  chem dep vs tcu        The patient's care was discussed with the Attending Physician, Dr. Tafoya.    Katie Ryan DO, MPH  [facility]  [facility]  Securely message with Vocera (more info)  Text page via [facility] Paging/Directory   See signed in provider for up to date coverage information  ______________________________________________________________________    Interval History     Overnight: Had an intractable headache overnight. Please see brief progress note by Dr. Null. He was only given lorazepam 2 mg for withdrawal symptoms. For his headache, toradol 15mg, benadryl 25mg, reglan 5mg IV w/1L LR. Gabapentin was given at 0300, per Wernicke protocol.    Subjective: Very somnolent on exam. Responsive to pain and loud voice. Able to follow commands.      Physical Exam   Vital Signs: Temp: 97.5  F (36.4  C) Temp src: Oral BP: 106/64 Pulse: 67   Resp: 26 SpO2: 98 % O2 Device: None (Room air)    Weight: 122 lbs 4.8 oz    Constitutional: Laying in bed, no apparent distress. Somnolent. Arousable to voice and painful stimuli. Poor dentition. PERRL.  Neuro: AOx3, able to follow commands. No pronator drift.  Respiratory: No increased work of breathing, good air exchange, clear to auscultation bilaterally, no crackles or wheezing  Cardiovascular: Normal apical impulse, regular rate and rhythm, normal S1 and S2, no S3 or S4, and no murmur noted  GI: No tenderness to palpation. No scars, normal bowel sounds, soft, non-distended, no masses palpated, no  hepatosplenomegally  Skin: No bruising or bleeding  Musculoskeletal: Able to move all extremities.    Medical Decision Making   Please see A&P for additional details of medical decision making.      Data   ------------------------- PAST 24 HR DATA REVIEWED -----------------------------------------------    I have personally reviewed the following data over the past 24 hrs:    4.8  \   10.7 (L)   / 214     138 100 7.4 (L) /  107 (H)   4.2 28 0.54 (L) \       ALT: 30 AST: 34 AP: 96 TBILI: <0.2   ALB: 3.1 (L) TOT PROTEIN: 5.7 (L) LIPASE: N/A       Trop: 14 BNP: N/A       Procal: N/A CRP: N/A Lactic Acid: 1.6         Imaging results reviewed over the past 24 hrs:   Recent Results (from the past 24 hour(s))   CT Head w/o Contrast    Narrative    CT HEAD W/O CONTRAST 6/16/2023 10:39 AM    History: intractable migraine headache with photophobia and blurry  vision in the setting of alcohol withdrawal and multiple falls PTA     Comparison: Head CT 6/12/2023    Technique: Using multidetector thin collimation helical acquisition  technique, axial, coronal and sagittal CT images from the skull base  to the vertex were obtained without intravenous contrast.   (topogram) image(s) also obtained and reviewed.    Findings: There is no intracranial hemorrhage, mass effect, or midline  shift. Gray/white matter differentiation in both cerebral hemispheres  is preserved. Stable patchy periventricular hypoattenuation and  chronic lacunar infarct of the right thalamus. Stable diffuse  parenchymal atrophy. Atherosclerotic calcifications of the carotid  siphons and the intradural vertebral arteries, left greater than  right. Ventricles are proportionate to the cerebral sulci. The basal  cisterns are clear.    Old left frontal craniotomies. Paranasal sinuses and mastoid air cells  are clear.      Impression    Impression:  1. No acute intracranial pathology.   2. Unchanged chronic ischemic white matter disease and diffuse  parenchymal  atrophy.    I have personally reviewed the examination and initial interpretation  and I agree with the findings.    RENEE ENRIQUEZ MD         SYSTEM ID:  [id number]

## 2023-06-17 LAB
ALBUMIN SERPL BCG-MCNC: 3.1 G/DL (ref 3.5–5.2)
ALBUMIN SERPL BCG-MCNC: 3.4 G/DL (ref 3.5–5.2)
ALP SERPL-CCNC: 86 U/L (ref 40–129)
ALP SERPL-CCNC: 94 U/L (ref 40–129)
ALT SERPL W P-5'-P-CCNC: 31 U/L (ref 0–70)
ALT SERPL W P-5'-P-CCNC: 35 U/L (ref 0–70)
ANION GAP SERPL CALCULATED.3IONS-SCNC: 9 MMOL/L (ref 7–15)
ANION GAP SERPL CALCULATED.3IONS-SCNC: 9 MMOL/L (ref 7–15)
AST SERPL W P-5'-P-CCNC: 34 U/L (ref 0–45)
AST SERPL W P-5'-P-CCNC: 38 U/L (ref 0–45)
BASOPHILS # BLD AUTO: 0 10E3/UL (ref 0–0.2)
BASOPHILS NFR BLD AUTO: 1 %
BILIRUB SERPL-MCNC: <0.2 MG/DL
BILIRUB SERPL-MCNC: <0.2 MG/DL
BUN SERPL-MCNC: 12.4 MG/DL (ref 8–23)
BUN SERPL-MCNC: 13.8 MG/DL (ref 8–23)
CALCIUM SERPL-MCNC: 8.5 MG/DL (ref 8.8–10.2)
CALCIUM SERPL-MCNC: 8.8 MG/DL (ref 8.8–10.2)
CHLORIDE SERPL-SCNC: 97 MMOL/L (ref 98–107)
CHLORIDE SERPL-SCNC: 98 MMOL/L (ref 98–107)
CREAT SERPL-MCNC: 0.64 MG/DL (ref 0.67–1.17)
CREAT SERPL-MCNC: 0.78 MG/DL (ref 0.67–1.17)
DEPRECATED HCO3 PLAS-SCNC: 27 MMOL/L (ref 22–29)
DEPRECATED HCO3 PLAS-SCNC: 27 MMOL/L (ref 22–29)
EOSINOPHIL # BLD AUTO: 0.6 10E3/UL (ref 0–0.7)
EOSINOPHIL NFR BLD AUTO: 11 %
ERYTHROCYTE [DISTWIDTH] IN BLOOD BY AUTOMATED COUNT: 15.1 % (ref 10–15)
GFR SERPL CREATININE-BSD FRML MDRD: >90 ML/MIN/1.73M2
GFR SERPL CREATININE-BSD FRML MDRD: >90 ML/MIN/1.73M2
GLUCOSE SERPL-MCNC: 111 MG/DL (ref 70–99)
GLUCOSE SERPL-MCNC: 97 MG/DL (ref 70–99)
HCT VFR BLD AUTO: 28.4 % (ref 40–53)
HGB BLD-MCNC: 9.3 G/DL (ref 13.3–17.7)
IMM GRANULOCYTES # BLD: 0 10E3/UL
IMM GRANULOCYTES NFR BLD: 1 %
IRON BINDING CAPACITY (ROCHE): 216 UG/DL (ref 240–430)
IRON SATN MFR SERPL: 25 % (ref 15–46)
IRON SERPL-MCNC: 54 UG/DL (ref 61–157)
LYMPHOCYTES # BLD AUTO: 1.5 10E3/UL (ref 0.8–5.3)
LYMPHOCYTES NFR BLD AUTO: 26 %
MAGNESIUM SERPL-MCNC: 1.6 MG/DL (ref 1.7–2.3)
MAGNESIUM SERPL-MCNC: 1.7 MG/DL (ref 1.7–2.3)
MCH RBC QN AUTO: 32.6 PG (ref 26.5–33)
MCHC RBC AUTO-ENTMCNC: 32.7 G/DL (ref 31.5–36.5)
MCV RBC AUTO: 100 FL (ref 78–100)
MONOCYTES # BLD AUTO: 0.4 10E3/UL (ref 0–1.3)
MONOCYTES NFR BLD AUTO: 7 %
NEUTROPHILS # BLD AUTO: 3.3 10E3/UL (ref 1.6–8.3)
NEUTROPHILS NFR BLD AUTO: 54 %
NRBC # BLD AUTO: 0 10E3/UL
NRBC BLD AUTO-RTO: 0 /100
PHOSPHATE SERPL-MCNC: 2.7 MG/DL (ref 2.5–4.5)
PHOSPHATE SERPL-MCNC: 3 MG/DL (ref 2.5–4.5)
PLATELET # BLD AUTO: 227 10E3/UL (ref 150–450)
POTASSIUM SERPL-SCNC: 4.4 MMOL/L (ref 3.4–5.3)
POTASSIUM SERPL-SCNC: 4.4 MMOL/L (ref 3.4–5.3)
PROT SERPL-MCNC: 5.6 G/DL (ref 6.4–8.3)
PROT SERPL-MCNC: 6.2 G/DL (ref 6.4–8.3)
RBC # BLD AUTO: 2.85 10E6/UL (ref 4.4–5.9)
SODIUM SERPL-SCNC: 133 MMOL/L (ref 136–145)
SODIUM SERPL-SCNC: 134 MMOL/L (ref 136–145)
WBC # BLD AUTO: 5.9 10E3/UL (ref 4–11)

## 2023-06-17 PROCEDURE — 250N000013 HC RX MED GY IP 250 OP 250 PS 637

## 2023-06-17 PROCEDURE — 83735 ASSAY OF MAGNESIUM: CPT | Performed by: STUDENT IN AN ORGANIZED HEALTH CARE EDUCATION/TRAINING PROGRAM

## 2023-06-17 PROCEDURE — 84155 ASSAY OF PROTEIN SERUM: CPT | Performed by: STUDENT IN AN ORGANIZED HEALTH CARE EDUCATION/TRAINING PROGRAM

## 2023-06-17 PROCEDURE — 84100 ASSAY OF PHOSPHORUS: CPT | Performed by: STUDENT IN AN ORGANIZED HEALTH CARE EDUCATION/TRAINING PROGRAM

## 2023-06-17 PROCEDURE — 36415 COLL VENOUS BLD VENIPUNCTURE: CPT | Performed by: STUDENT IN AN ORGANIZED HEALTH CARE EDUCATION/TRAINING PROGRAM

## 2023-06-17 PROCEDURE — 83550 IRON BINDING TEST: CPT

## 2023-06-17 PROCEDURE — 85025 COMPLETE CBC W/AUTO DIFF WBC: CPT

## 2023-06-17 PROCEDURE — 84450 TRANSFERASE (AST) (SGOT): CPT | Performed by: STUDENT IN AN ORGANIZED HEALTH CARE EDUCATION/TRAINING PROGRAM

## 2023-06-17 PROCEDURE — 250N000013 HC RX MED GY IP 250 OP 250 PS 637: Performed by: FAMILY MEDICINE

## 2023-06-17 PROCEDURE — 250N000011 HC RX IP 250 OP 636

## 2023-06-17 PROCEDURE — 120N000002 HC R&B MED SURG/OB UMMC

## 2023-06-17 PROCEDURE — 258N000003 HC RX IP 258 OP 636

## 2023-06-17 RX ORDER — MAGNESIUM OXIDE 400 MG/1
400 TABLET ORAL EVERY 4 HOURS
Status: COMPLETED | OUTPATIENT
Start: 2023-06-17 | End: 2023-06-17

## 2023-06-17 RX ORDER — KETOROLAC TROMETHAMINE 15 MG/ML
15 INJECTION, SOLUTION INTRAMUSCULAR; INTRAVENOUS EVERY 6 HOURS PRN
Status: DISCONTINUED | OUTPATIENT
Start: 2023-06-17 | End: 2023-06-17

## 2023-06-17 RX ORDER — CYCLOBENZAPRINE HCL 5 MG
5 TABLET ORAL ONCE
Status: COMPLETED | OUTPATIENT
Start: 2023-06-17 | End: 2023-06-17

## 2023-06-17 RX ORDER — SUMATRIPTAN 25 MG/1
25 TABLET, FILM COATED ORAL ONCE
Status: COMPLETED | OUTPATIENT
Start: 2023-06-17 | End: 2023-06-17

## 2023-06-17 RX ORDER — MIRTAZAPINE 7.5 MG/1
7.5 TABLET, FILM COATED ORAL AT BEDTIME
Status: DISCONTINUED | OUTPATIENT
Start: 2023-06-17 | End: 2023-06-18 | Stop reason: HOSPADM

## 2023-06-17 RX ORDER — KETOROLAC TROMETHAMINE 15 MG/ML
15 INJECTION, SOLUTION INTRAMUSCULAR; INTRAVENOUS ONCE
Status: COMPLETED | OUTPATIENT
Start: 2023-06-17 | End: 2023-06-17

## 2023-06-17 RX ADMIN — FOLIC ACID 1 MG: 1 TABLET ORAL at 08:31

## 2023-06-17 RX ADMIN — CLONIDINE HYDROCHLORIDE 0.1 MG: 0.1 TABLET ORAL at 02:25

## 2023-06-17 RX ADMIN — MAGNESIUM OXIDE TAB 400 MG (241.3 MG ELEMENTAL MG) 400 MG: 400 (241.3 MG) TAB at 15:45

## 2023-06-17 RX ADMIN — SUMATRIPTAN SUCCINATE 25 MG: 25 TABLET ORAL at 15:44

## 2023-06-17 RX ADMIN — Medication 25 MCG: at 08:31

## 2023-06-17 RX ADMIN — PANTOPRAZOLE SODIUM 40 MG: 40 TABLET, DELAYED RELEASE ORAL at 08:31

## 2023-06-17 RX ADMIN — MIRTAZAPINE 7.5 MG: 7.5 TABLET, FILM COATED ORAL at 22:32

## 2023-06-17 RX ADMIN — ACETAMINOPHEN, ASPIRIN, CAFFEINE 1 TABLET: 250; 65; 250 TABLET, FILM COATED ORAL at 11:24

## 2023-06-17 RX ADMIN — CLONIDINE HYDROCHLORIDE 0.1 MG: 0.1 TABLET ORAL at 18:03

## 2023-06-17 RX ADMIN — ONDANSETRON 4 MG: 4 TABLET, ORALLY DISINTEGRATING ORAL at 13:55

## 2023-06-17 RX ADMIN — NICOTINE 1 PATCH: 14 PATCH, EXTENDED RELEASE TRANSDERMAL at 08:33

## 2023-06-17 RX ADMIN — MAGNESIUM OXIDE TAB 400 MG (241.3 MG ELEMENTAL MG) 400 MG: 400 (241.3 MG) TAB at 12:46

## 2023-06-17 RX ADMIN — MULTIPLE VITAMINS W/ MINERALS TAB 1 TABLET: TAB at 08:31

## 2023-06-17 RX ADMIN — CYCLOBENZAPRINE HYDROCHLORIDE 5 MG: 5 TABLET, FILM COATED ORAL at 15:45

## 2023-06-17 RX ADMIN — SERTRALINE HYDROCHLORIDE 50 MG: 50 TABLET ORAL at 08:31

## 2023-06-17 RX ADMIN — KETOROLAC TROMETHAMINE 15 MG: 15 INJECTION, SOLUTION INTRAMUSCULAR; INTRAVENOUS at 12:46

## 2023-06-17 RX ADMIN — THIAMINE HYDROCHLORIDE 250 MG: 100 INJECTION, SOLUTION INTRAMUSCULAR; INTRAVENOUS at 08:35

## 2023-06-17 RX ADMIN — CLONIDINE HYDROCHLORIDE 0.1 MG: 0.1 TABLET ORAL at 11:23

## 2023-06-17 ASSESSMENT — ACTIVITIES OF DAILY LIVING (ADL)
ADLS_ACUITY_SCORE: 42
ADLS_ACUITY_SCORE: 41
ADLS_ACUITY_SCORE: 42
ADLS_ACUITY_SCORE: 42
ADLS_ACUITY_SCORE: 41
ADLS_ACUITY_SCORE: 42
ADLS_ACUITY_SCORE: 41

## 2023-06-17 NOTE — PROGRESS NOTES
[id number]    Pt A&Ox4. Able to make needs known. L PIV SL. Assist of 1 with walker and GB. Continent bowel and bladder -- last BM per pt 6/17. Denies SOB, chest pain, nausea/vomiting, and numbness/tingling. Pt c/o migraine rated pain 9/10; offered prn medication see MAR; pt declined. Educated pt on scheduled migraine medication states it does not help, but still willing to take. Seizure precautions. No acute events. Call light in reach.

## 2023-06-17 NOTE — PROGRESS NOTES
[id number]    Plan of Care Reviewed With: Patient    Overall Patient Progress: No Change    Outcome Evaluation: Pt is A & O x3 (not time) on RA. Pt c/o generalized pain & headache - administered PRN Excedrin & 1x dose of Toradol. Pt also c/o nausea - administered PRN Zofran. Denies chest pain & SOB. Pt has L PIV intermittently infusing thiamine infusion otherwise SL. Upon skin assessment, redness to perineum & bilateral bruising on shoulders noted. Pt is assist x1 w/ walker & gait belt, voids spontaneously & uses call light appropriately.    Shift Updates    CIWA score at 0842 was 0. CIWA was discontinued residential throughout shift.    Pt is on tele - strip analyzed, printed & placed in pt chart.    Delirium precautions initiated by MD during shift; however, pt refused for blinds to be open & get out of bed throughout shift even after writer educated pt on importance & asked various times throughout shift.    Per orders, replaced magnesium via PO route.    Pt continued to c/o pain after interventions - MD notified & aware.    Vitals: BP (!) 140/84 (BP Location: Right arm)   Pulse 68   Temp 97.6  F (36.4  C) (Oral)   Resp 20   Wt 55.5 kg (122 lb 4.8 oz)   SpO2 100%   BMI 18.60 kg/m      Plan: TBD, possible discharge 6/19 to chem dep vs TCU. Needs pt/ot evaluation for discharge planning.

## 2023-06-17 NOTE — PLAN OF CARE
VS: BP (!) 147/75 (BP Location: Right arm)   Pulse 64   Temp 98.8  F (37.1  C) (Oral)   Resp 20   Wt 55.5 kg (122 lb 4.8 oz)   SpO2 98%   BMI 18.60 kg/m      O2:  Alert and oriented to person and place, d/o to time   Stable on room air, denies hest pain/SOB   Output: Voids spontaneously in urinal    Last BM: Patient has not had bowel movement on admission, BS active x4.   Activity: Assist of 1 with gait and walker   Skin: Noted redness to coccyx/perineum  Shoulders brusied   Pain:  PRN administration of Sumatriptan 50 mg tablet.     CMS: Alert and oriented to person and place and situation, d/o to time  Denies N/T   Dressing: None   Diet: Soft and Bite sized diet, Thin liquids   nausea/ no vomiting   LDA: Left PIV Saline locked   Equipment: IV pump/pole, call light w/in reach, urinal, gait belt, walker, personal belongings remain w/patient   Plan: Needing evaluation to determine chem dep vs. TCU   Additional Info:

## 2023-06-17 NOTE — PROGRESS NOTES
"[facility]    Progress Note - [facility]       Date of Admission:  6/13/2023    Major plans for today:  - Iron supplementation every other day  - Chem dep consult  - PT to reassess patient's ability to walk    Assessment & Plan   Familia Watson is a 72-year-old male PMH severe AUD with history of withdrawal seizure (4/2023; complicated by PEA arrest), COPD, HTN, HLD, and depression/anxiety admitted 6/13/23 for acute alcohol withdrawal and generalized weakness suspected secondary to malnutrition vs. pulmonary lesion. Found to have electrolyte abnormalities consistent with refeeding syndrome.      # Alcohol use disorder, severe   # History of alcohol withdrawal seizure, witnessed (4/2023) with PEA arrest  # Persistent somnolence 2/2 delirium   # Possible acute pancreatitis on admission  # Acute alcohol withdrawal, resolved   # Epigastric and lower abdominal pain, resolved  # Visual hallucinations, resolved  Patient with long history of alcohol use. Reports drinking one pint vodka daily. Last drink reported 6/12 @ 0200. Positive MATT (238) on admission. Notable history of witnessed alcohol withdrawal seizure complicated by PEA arrest secondary to hypoxia. Currently denies withdrawal symptoms. Scoring 4-6 on CIWA. No lorazepam was given.   - Consults: Chemical Dependency, Nutrition; PT/OT; appreciate recommendations   - Discontinue CIWA Protocol   - Clonidine 0.1 mg 3 times daily   - Wernike level thiamine replacement   - Daily multivitamin   - Discontinue Depakote 250 mg daily at bedtime  - 3 hour EEG, pending  - Seizure precautions  - Delirium precautions    #Left-sided migraines, persistent  Familia reports that he has been having migraines since \"[place],\" per night team. He has had daily headaches that have persistent despite resolution of withdrawal symptoms. Two CT Head's were performed during his stay and were unremarkable for acute " intracranial pathology. No neurologic deficits on physical exam, but his bilateral trapezii are particularly hypertonic, especially his left trapezius. It is possible that the etiology of his headache is muscular. Although the migraines are persistent, he states that this feels no different than his typical migraine. Sumatriptan and toradol only mildly helpful. Headache now resolved after a full meal and full night's sleep. Will continue to monitor.  - Continue to monitor  - If headache returns, consider additional dose of Toradol, sumatriptan, flexeril, versus neuro curbside    # Poor PO intake   # Anemia, chronic disease vs iron deficiency  # Generalized weakness   # Falls at home   # Refeeding syndrome, improving  Patient reports limited to no oral intake for several days in the setting of severe alcohol use disorder and depression. Admitted after several falls at home secondary to generalized weakness. Normal B12/folate; negative CK; recent TSH WNL; negative troponin, reassuring ECG; CT Head without acute changes. Suspect weakness related to malnutrition vs. pulmonary lesion as below. Subsequent electrolyte abnormalities consistent with refeeding syndrome.  Will need continued close monitoring with increased oral intake as electrolytes have not yet normalized. Unclear how stable patient is on feet as this is the first day he is more awake and alert, will have PT assess today especially considering patient is anxious to discharge home.   - Consults: Nutrition; PT/OT; appreciate recommendations   - Electrolytes: BID CMP, Mg, Phos; RN-managed Mg/Ph/K HIGH replacement protocol.   - Cardiac monitoring: Telemetry.   - Precautions: Fall precautions, up with assist. Delirium precautions.  - Soft and bite sized diet  - Iron supplementation every other day     # Cavitary lung lesion, BUSTER  # Tobacco use disorder  # COPD   Smokes 1ppd. CT PE (5/25/23) with BUSTER infiltrate. Recommended repeat imaging to differentiate  infectious sequelae vs malignancy. Currently on RA. No wheezing on exam.   - Nicotine patch daily   - PTA DuoNeb PRN   - Repeat CT Chest in 4-6 weeks     # Depression  # History of SI, SIB (cutting)  # Social isolation  # Recent eviction  Patient reports several life stressors including recent death of spouse (12/2022). Recent hospitalization (5/24-5/31) following suicide attempt. Initiated on SSI at that time. Intermittent adherence. On admission, reported slightly improved mood and denied SI. No indication for bedside attendant currently. Familia reports that he is going to be evicted from his home and needs to go clear out his belongings. On 6/17, he reported that he has 30 days to do so. He has no relatives in the area and has no place to go. SW aware. TCU will not accept him. His only option is to go to Chem [facility]. He could potentially be [facility]. He is at high risk of relapse as is, and homelessness raises the risk of relapse an dpossible rehospitalization.  - Continue PTA Sertraline 50 mg daily  - Mirtazapine 7.5 mg at bedtime  - Hold Trazodone to prevent increased somnolence  - Spiritual health consult  - SW consult; appreciate recs    Chronic/resolved/stable:  # Alcohol ketoacidosis, resolved  # Lactic acidosis, resolved   # Anion gap metabolic acidosis, resolved   Admitted with AGMA and lactate elevation (2.6). Resolved with fluids. Suspect alcohol ketoacidosis vs. Starvation ketoacidosis given history. Low concern for infection/DKA.     # HLD   - HOLD PTA Atorvastatin      Diet: Soft and bite sized diet  DVT Prophylaxis: Pneumatic Compression Devices  Alberto Catheter: Not present  Fluids: PO  Lines: None     Cardiac Monitoring: None  Code Status: Full Code      Clinically Significant Risk Factors            # Hypomagnesemia: Lowest Mg = 1.6 mg/dL in last 2 days, will replace as needed   # Hypoalbuminemia: Lowest albumin = 3 g/dL at 6/14/2023  6:32 AM, will monitor as appropriate              # Severe Malnutrition: based on nutrition assessment         Disposition Plan      Expected Discharge Date: 06/19/2023      Destination: home  Discharge Comments: w/d management  needs pt/ot eval for discharge planning  chem dep vs home (eviction in less than 30 days)        The patient's care was discussed with the Attending Physician, Dr. Tafoya.    Lakisha Ballard MD, MPH  [facility]  [facility]  Securely message with Vocera (more info)  Text page via [employer] Paging/Directory   See signed in provider for up to date coverage information  ______________________________________________________________________    Interval History     Overnight: NAEON. Reports headache resolved with a full meal and a full night of sleep. Also reports he is very concerned about needing to move out of his apartment and he would like to leave ASAP, so that he can collect his belongings.      Physical Exam   Vital Signs: Temp: 97.5  F (36.4  C) Temp src: Oral BP: 123/69 Pulse: 70   Resp: 16 SpO2: 100 % O2 Device: None (Room air) Oxygen Delivery: 1 LPM  Weight: 122 lbs 4.8 oz    Constitutional: Laying in bed, no apparent distress. Alert. Poor dentition. PERRL. AOx3.  Respiratory: No increased work of breathing, good air exchange, clear to auscultation bilaterally, no crackles or wheezing  Cardiovascular: Normal apical impulse, regular rate and rhythm, normal S1 and S2, no S3 or S4, and no murmur noted  GI: No tenderness to palpation. No scars, normal bowel sounds, soft, non-distended, no masses palpated, no hepatosplenomegally  Skin: No bruising or bleeding  Musculoskeletal: Able to move all extremities.    Medical Decision Making   Please see A&P for additional details of medical decision making.      Data   ------------------------- PAST 24 HR DATA REVIEWED -----------------------------------------------    I have personally reviewed the following data over the past  24 hrs:    5.9  \   9.3 (L)   / 227     133 (L) 97 (L) 13.8 /  111 (H)   4.4 27 0.78 \       ALT: 35 AST: 38 AP: 94 TBILI: <0.2   ALB: 3.4 (L) TOT PROTEIN: 6.2 (L) LIPASE: N/A       Imaging results reviewed over the past 24 hrs:   No results found for this or any previous visit (from the past 24 hour(s)).

## 2023-06-18 ENCOUNTER — APPOINTMENT (OUTPATIENT)
Dept: RADIOLOGY | Facility: CLINIC | Age: 73
DRG: 897 | End: 2023-06-18
Attending: EMERGENCY MEDICINE
Payer: MEDICARE

## 2023-06-18 ENCOUNTER — HOSPITAL ENCOUNTER (INPATIENT)
Facility: CLINIC | Age: 73
LOS: 4 days | Discharge: HOME | DRG: 897 | End: 2023-06-22
Attending: EMERGENCY MEDICINE | Admitting: INTERNAL MEDICINE
Payer: MEDICARE

## 2023-06-18 ENCOUNTER — APPOINTMENT (OUTPATIENT)
Dept: CT IMAGING | Facility: CLINIC | Age: 73
DRG: 897 | End: 2023-06-18
Attending: EMERGENCY MEDICINE
Payer: MEDICARE

## 2023-06-18 VITALS
RESPIRATION RATE: 16 BRPM | OXYGEN SATURATION: 100 % | HEART RATE: 70 BPM | BODY MASS INDEX: 18.6 KG/M2 | DIASTOLIC BLOOD PRESSURE: 69 MMHG | TEMPERATURE: 97.5 F | WEIGHT: 122.3 LBS | SYSTOLIC BLOOD PRESSURE: 123 MMHG

## 2023-06-18 DIAGNOSIS — R51.9 ACUTE NONINTRACTABLE HEADACHE, UNSPECIFIED HEADACHE TYPE: ICD-10-CM

## 2023-06-18 DIAGNOSIS — J98.4 CAVITATING MASS OF UPPER LOBE OF LEFT LUNG: ICD-10-CM

## 2023-06-18 DIAGNOSIS — F41.9 ANXIETY: Primary | ICD-10-CM

## 2023-06-18 DIAGNOSIS — F33.42 RECURRENT MAJOR DEPRESSION IN FULL REMISSION (H): ICD-10-CM

## 2023-06-18 DIAGNOSIS — R41.82 ALTERED MENTAL STATUS, UNSPECIFIED ALTERED MENTAL STATUS TYPE: ICD-10-CM

## 2023-06-18 DIAGNOSIS — E87.21 ACUTE LACTIC ACIDOSIS: ICD-10-CM

## 2023-06-18 DIAGNOSIS — Z72.0 TOBACCO USE: ICD-10-CM

## 2023-06-18 DIAGNOSIS — D64.9 ANEMIA, UNSPECIFIED TYPE: ICD-10-CM

## 2023-06-18 DIAGNOSIS — R62.7 FAILURE TO THRIVE IN ADULT: ICD-10-CM

## 2023-06-18 DIAGNOSIS — G89.29 CHRONIC NONINTRACTABLE HEADACHE, UNSPECIFIED HEADACHE TYPE: ICD-10-CM

## 2023-06-18 DIAGNOSIS — F10.932 ALCOHOL WITHDRAWAL SYNDROME WITH PERCEPTUAL DISTURBANCE (H): ICD-10-CM

## 2023-06-18 DIAGNOSIS — F10.930 ALCOHOL WITHDRAWAL SYNDROME WITHOUT COMPLICATION (H): Chronic | ICD-10-CM

## 2023-06-18 DIAGNOSIS — F10.90 ALCOHOL USE DISORDER: ICD-10-CM

## 2023-06-18 DIAGNOSIS — F32.A DEPRESSION, UNSPECIFIED DEPRESSION TYPE: ICD-10-CM

## 2023-06-18 DIAGNOSIS — R51.9 CHRONIC NONINTRACTABLE HEADACHE, UNSPECIFIED HEADACHE TYPE: ICD-10-CM

## 2023-06-18 DIAGNOSIS — F10.920 ALCOHOLIC INTOXICATION WITHOUT COMPLICATION (H): ICD-10-CM

## 2023-06-18 LAB
ALBUMIN SERPL BCG-MCNC: 3.1 G/DL (ref 3.5–5.2)
ALBUMIN SERPL BCG-MCNC: 3.5 G/DL (ref 3.5–5.2)
ALBUMIN SERPL-MCNC: 3.1 G/DL (ref 3.5–5)
ALBUMIN UR-MCNC: NEGATIVE MG/DL
ALP SERPL-CCNC: 76 U/L (ref 45–120)
ALP SERPL-CCNC: 83 U/L (ref 40–129)
ALP SERPL-CCNC: 95 U/L (ref 40–129)
ALT SERPL W P-5'-P-CCNC: 29 U/L (ref 0–70)
ALT SERPL W P-5'-P-CCNC: 31 U/L (ref 0–45)
ALT SERPL W P-5'-P-CCNC: 35 U/L (ref 0–70)
AMMONIA PLAS-SCNC: 20 UMOL/L (ref 11–35)
AMPHETAMINES UR QL SCN: ABNORMAL
ANION GAP SERPL CALCULATED.3IONS-SCNC: 11 MMOL/L (ref 5–18)
ANION GAP SERPL CALCULATED.3IONS-SCNC: 11 MMOL/L (ref 7–15)
ANION GAP SERPL CALCULATED.3IONS-SCNC: 9 MMOL/L (ref 7–15)
APPEARANCE UR: CLEAR
AST SERPL W P-5'-P-CCNC: 30 U/L (ref 0–45)
AST SERPL W P-5'-P-CCNC: 35 U/L (ref 0–40)
AST SERPL W P-5'-P-CCNC: 35 U/L (ref 0–45)
BARBITURATES UR QL: ABNORMAL
BASE EXCESS BLDV CALC-SCNC: 1.1 MMOL/L
BASOPHILS # BLD AUTO: 0 10E3/UL (ref 0–0.2)
BASOPHILS # BLD AUTO: 0.1 10E3/UL (ref 0–0.2)
BASOPHILS NFR BLD AUTO: 1 %
BASOPHILS NFR BLD AUTO: 1 %
BENZODIAZ UR QL: ABNORMAL
BILIRUB SERPL-MCNC: 0.2 MG/DL (ref 0–1)
BILIRUB SERPL-MCNC: <0.2 MG/DL
BILIRUB SERPL-MCNC: <0.2 MG/DL
BILIRUB UR QL STRIP: NEGATIVE
BUN SERPL-MCNC: 13 MG/DL (ref 8–28)
BUN SERPL-MCNC: 13.9 MG/DL (ref 8–23)
BUN SERPL-MCNC: 14.5 MG/DL (ref 8–23)
CALCIUM SERPL-MCNC: 8.6 MG/DL (ref 8.5–10.5)
CALCIUM SERPL-MCNC: 8.8 MG/DL (ref 8.8–10.2)
CALCIUM SERPL-MCNC: 8.8 MG/DL (ref 8.8–10.2)
CANNABINOIDS UR QL SCN: ABNORMAL
CHLORIDE BLD-SCNC: 99 MMOL/L (ref 98–107)
CHLORIDE SERPL-SCNC: 98 MMOL/L (ref 98–107)
CHLORIDE SERPL-SCNC: 99 MMOL/L (ref 98–107)
CO2 SERPL-SCNC: 24 MMOL/L (ref 22–31)
COCAINE UR QL: ABNORMAL
COLOR UR AUTO: COLORLESS
CREAT SERPL-MCNC: 0.61 MG/DL (ref 0.67–1.17)
CREAT SERPL-MCNC: 0.73 MG/DL (ref 0.67–1.17)
CREAT SERPL-MCNC: 0.74 MG/DL (ref 0.7–1.3)
CREAT UR-MCNC: 15 MG/DL
DEPRECATED HCO3 PLAS-SCNC: 25 MMOL/L (ref 22–29)
DEPRECATED HCO3 PLAS-SCNC: 27 MMOL/L (ref 22–29)
EOSINOPHIL # BLD AUTO: 0.3 10E3/UL (ref 0–0.7)
EOSINOPHIL # BLD AUTO: 0.5 10E3/UL (ref 0–0.7)
EOSINOPHIL NFR BLD AUTO: 10 %
EOSINOPHIL NFR BLD AUTO: 4 %
ERYTHROCYTE [DISTWIDTH] IN BLOOD BY AUTOMATED COUNT: 15.5 % (ref 10–15)
ERYTHROCYTE [DISTWIDTH] IN BLOOD BY AUTOMATED COUNT: 15.9 % (ref 10–15)
ETHANOL SERPL-MCNC: 200 MG/DL
GFR SERPL CREATININE-BSD FRML MDRD: >90 ML/MIN/1.73M2
GLUCOSE BLD-MCNC: 94 MG/DL (ref 70–125)
GLUCOSE SERPL-MCNC: 143 MG/DL (ref 70–99)
GLUCOSE SERPL-MCNC: 198 MG/DL (ref 70–99)
GLUCOSE UR STRIP-MCNC: NEGATIVE MG/DL
HCO3 BLDV-SCNC: 25 MMOL/L (ref 24–30)
HCT VFR BLD AUTO: 26.3 % (ref 40–53)
HCT VFR BLD AUTO: 29 % (ref 40–53)
HGB BLD-MCNC: 8.7 G/DL (ref 13.3–17.7)
HGB BLD-MCNC: 9.2 G/DL (ref 13.3–17.7)
HGB UR QL STRIP: NEGATIVE
HOLD SPECIMEN: NORMAL
IMM GRANULOCYTES # BLD: 0 10E3/UL
IMM GRANULOCYTES # BLD: 0.1 10E3/UL
IMM GRANULOCYTES NFR BLD: 1 %
IMM GRANULOCYTES NFR BLD: 1 %
INR PPP: 0.96 (ref 0.85–1.15)
KETONES UR STRIP-MCNC: NEGATIVE MG/DL
LACTATE SERPL-SCNC: 2.9 MMOL/L (ref 0.7–2)
LACTATE SERPL-SCNC: 3.5 MMOL/L (ref 0.7–2)
LEUKOCYTE ESTERASE UR QL STRIP: NEGATIVE
LIPASE SERPL-CCNC: 231 U/L (ref 0–52)
LYMPHOCYTES # BLD AUTO: 1.5 10E3/UL (ref 0.8–5.3)
LYMPHOCYTES # BLD AUTO: 1.6 10E3/UL (ref 0.8–5.3)
LYMPHOCYTES NFR BLD AUTO: 20 %
LYMPHOCYTES NFR BLD AUTO: 30 %
MAGNESIUM SERPL-MCNC: 1.5 MG/DL (ref 1.7–2.3)
MAGNESIUM SERPL-MCNC: 2.3 MG/DL (ref 1.8–2.6)
MAGNESIUM SERPL-MCNC: 2.5 MG/DL (ref 1.7–2.3)
MCH RBC QN AUTO: 31.9 PG (ref 26.5–33)
MCH RBC QN AUTO: 32.2 PG (ref 26.5–33)
MCHC RBC AUTO-ENTMCNC: 31.7 G/DL (ref 31.5–36.5)
MCHC RBC AUTO-ENTMCNC: 33.1 G/DL (ref 31.5–36.5)
MCV RBC AUTO: 101 FL (ref 78–100)
MCV RBC AUTO: 97 FL (ref 78–100)
MONOCYTES # BLD AUTO: 0.3 10E3/UL (ref 0–1.3)
MONOCYTES # BLD AUTO: 0.5 10E3/UL (ref 0–1.3)
MONOCYTES NFR BLD AUTO: 5 %
MONOCYTES NFR BLD AUTO: 6 %
NEUTROPHILS # BLD AUTO: 2.8 10E3/UL (ref 1.6–8.3)
NEUTROPHILS # BLD AUTO: 5.8 10E3/UL (ref 1.6–8.3)
NEUTROPHILS NFR BLD AUTO: 53 %
NEUTROPHILS NFR BLD AUTO: 68 %
NITRATE UR QL: NEGATIVE
NRBC # BLD AUTO: 0 10E3/UL
NRBC # BLD AUTO: 0 10E3/UL
NRBC BLD AUTO-RTO: 0 /100
NRBC BLD AUTO-RTO: 0 /100
OPIATES UR QL SCN: ABNORMAL
OXYCODONE UR QL: ABNORMAL
OXYHGB MFR BLDV: 64.7 % (ref 70–75)
PCO2 BLDV: 38 MM HG (ref 35–50)
PCP UR QL SCN: ABNORMAL
PH BLDV: 7.43 [PH] (ref 7.35–7.45)
PH UR STRIP: 6 [PH] (ref 5–7)
PHOSPHATE SERPL-MCNC: 2.9 MG/DL (ref 2.5–4.5)
PHOSPHATE SERPL-MCNC: 3.2 MG/DL (ref 2.5–4.5)
PLATELET # BLD AUTO: 247 10E3/UL (ref 150–450)
PLATELET # BLD AUTO: 278 10E3/UL (ref 150–450)
PO2 BLDV: 37 MM HG (ref 25–47)
POTASSIUM BLD-SCNC: 3.9 MMOL/L (ref 3.5–5)
POTASSIUM SERPL-SCNC: 3.9 MMOL/L (ref 3.4–5.3)
POTASSIUM SERPL-SCNC: 4.2 MMOL/L (ref 3.4–5.3)
PROT SERPL-MCNC: 5.7 G/DL (ref 6.4–8.3)
PROT SERPL-MCNC: 6.1 G/DL (ref 6–8)
PROT SERPL-MCNC: 6.4 G/DL (ref 6.4–8.3)
RBC # BLD AUTO: 2.7 10E6/UL (ref 4.4–5.9)
RBC # BLD AUTO: 2.88 10E6/UL (ref 4.4–5.9)
RBC URINE: 0 /HPF
SAO2 % BLDV: 67.1 % (ref 70–75)
SARS-COV-2 RNA RESP QL NAA+PROBE: NEGATIVE
SODIUM SERPL-SCNC: 134 MMOL/L (ref 136–145)
SODIUM SERPL-SCNC: 134 MMOL/L (ref 136–145)
SODIUM SERPL-SCNC: 135 MMOL/L (ref 136–145)
SP GR UR STRIP: 1 (ref 1–1.03)
TROPONIN I SERPL-MCNC: <0.01 NG/ML (ref 0–0.29)
UROBILINOGEN UR STRIP-MCNC: <2 MG/DL
WBC # BLD AUTO: 5.1 10E3/UL (ref 4–11)
WBC # BLD AUTO: 8.4 10E3/UL (ref 4–11)
WBC URINE: 0 /HPF

## 2023-06-18 PROCEDURE — 85025 COMPLETE CBC W/AUTO DIFF WBC: CPT

## 2023-06-18 PROCEDURE — 96361 HYDRATE IV INFUSION ADD-ON: CPT

## 2023-06-18 PROCEDURE — 82805 BLOOD GASES W/O2 SATURATION: CPT | Performed by: EMERGENCY MEDICINE

## 2023-06-18 PROCEDURE — 36415 COLL VENOUS BLD VENIPUNCTURE: CPT | Performed by: EMERGENCY MEDICINE

## 2023-06-18 PROCEDURE — 258N000003 HC RX IP 258 OP 636: Performed by: EMERGENCY MEDICINE

## 2023-06-18 PROCEDURE — 96374 THER/PROPH/DIAG INJ IV PUSH: CPT

## 2023-06-18 PROCEDURE — 87635 SARS-COV-2 COVID-19 AMP PRB: CPT | Performed by: EMERGENCY MEDICINE

## 2023-06-18 PROCEDURE — 84100 ASSAY OF PHOSPHORUS: CPT

## 2023-06-18 PROCEDURE — 36415 COLL VENOUS BLD VENIPUNCTURE: CPT | Performed by: STUDENT IN AN ORGANIZED HEALTH CARE EDUCATION/TRAINING PROGRAM

## 2023-06-18 PROCEDURE — 82140 ASSAY OF AMMONIA: CPT | Performed by: EMERGENCY MEDICINE

## 2023-06-18 PROCEDURE — 72125 CT NECK SPINE W/O DYE: CPT | Mod: MA

## 2023-06-18 PROCEDURE — 93005 ELECTROCARDIOGRAM TRACING: CPT | Performed by: EMERGENCY MEDICINE

## 2023-06-18 PROCEDURE — 83690 ASSAY OF LIPASE: CPT | Performed by: EMERGENCY MEDICINE

## 2023-06-18 PROCEDURE — 250N000011 HC RX IP 250 OP 636: Performed by: EMERGENCY MEDICINE

## 2023-06-18 PROCEDURE — 83735 ASSAY OF MAGNESIUM: CPT | Performed by: STUDENT IN AN ORGANIZED HEALTH CARE EDUCATION/TRAINING PROGRAM

## 2023-06-18 PROCEDURE — 83735 ASSAY OF MAGNESIUM: CPT

## 2023-06-18 PROCEDURE — 84484 ASSAY OF TROPONIN QUANT: CPT | Performed by: EMERGENCY MEDICINE

## 2023-06-18 PROCEDURE — 250N000013 HC RX MED GY IP 250 OP 250 PS 637

## 2023-06-18 PROCEDURE — 99223 1ST HOSP IP/OBS HIGH 75: CPT | Mod: AI | Performed by: INTERNAL MEDICINE

## 2023-06-18 PROCEDURE — 80307 DRUG TEST PRSMV CHEM ANLYZR: CPT | Performed by: EMERGENCY MEDICINE

## 2023-06-18 PROCEDURE — 99285 EMERGENCY DEPT VISIT HI MDM: CPT | Mod: 25

## 2023-06-18 PROCEDURE — 85025 COMPLETE CBC W/AUTO DIFF WBC: CPT | Performed by: EMERGENCY MEDICINE

## 2023-06-18 PROCEDURE — 250N000011 HC RX IP 250 OP 636

## 2023-06-18 PROCEDURE — 84450 TRANSFERASE (AST) (SGOT): CPT | Performed by: EMERGENCY MEDICINE

## 2023-06-18 PROCEDURE — 250N000013 HC RX MED GY IP 250 OP 250 PS 637: Performed by: EMERGENCY MEDICINE

## 2023-06-18 PROCEDURE — 85610 PROTHROMBIN TIME: CPT | Performed by: EMERGENCY MEDICINE

## 2023-06-18 PROCEDURE — 71046 X-RAY EXAM CHEST 2 VIEWS: CPT

## 2023-06-18 PROCEDURE — 84100 ASSAY OF PHOSPHORUS: CPT | Performed by: STUDENT IN AN ORGANIZED HEALTH CARE EDUCATION/TRAINING PROGRAM

## 2023-06-18 PROCEDURE — 70450 CT HEAD/BRAIN W/O DYE: CPT | Mod: MA

## 2023-06-18 PROCEDURE — 81001 URINALYSIS AUTO W/SCOPE: CPT | Performed by: EMERGENCY MEDICINE

## 2023-06-18 PROCEDURE — 99239 HOSP IP/OBS DSCHRG MGMT >30: CPT | Mod: GC | Performed by: FAMILY MEDICINE

## 2023-06-18 PROCEDURE — 84450 TRANSFERASE (AST) (SGOT): CPT | Performed by: STUDENT IN AN ORGANIZED HEALTH CARE EDUCATION/TRAINING PROGRAM

## 2023-06-18 PROCEDURE — 120N000001 HC R&B MED SURG/OB

## 2023-06-18 PROCEDURE — 83735 ASSAY OF MAGNESIUM: CPT | Performed by: EMERGENCY MEDICINE

## 2023-06-18 PROCEDURE — 84155 ASSAY OF PROTEIN SERUM: CPT | Performed by: STUDENT IN AN ORGANIZED HEALTH CARE EDUCATION/TRAINING PROGRAM

## 2023-06-18 PROCEDURE — 258N000003 HC RX IP 258 OP 636: Performed by: INTERNAL MEDICINE

## 2023-06-18 PROCEDURE — C9803 HOPD COVID-19 SPEC COLLECT: HCPCS

## 2023-06-18 PROCEDURE — 258N000003 HC RX IP 258 OP 636

## 2023-06-18 PROCEDURE — 82077 ASSAY SPEC XCP UR&BREATH IA: CPT | Performed by: EMERGENCY MEDICINE

## 2023-06-18 PROCEDURE — 83605 ASSAY OF LACTIC ACID: CPT | Performed by: EMERGENCY MEDICINE

## 2023-06-18 RX ORDER — FOLIC ACID 1 MG/1
1 TABLET ORAL DAILY
Qty: 90 TABLET | Refills: 0 | Status: SHIPPED | OUTPATIENT
Start: 2023-06-19 | End: 2023-06-19

## 2023-06-18 RX ORDER — MAGNESIUM SULFATE HEPTAHYDRATE 40 MG/ML
4 INJECTION, SOLUTION INTRAVENOUS ONCE
Status: COMPLETED | OUTPATIENT
Start: 2023-06-18 | End: 2023-06-18

## 2023-06-18 RX ORDER — FENTANYL CITRATE 50 UG/ML
50 INJECTION, SOLUTION INTRAMUSCULAR; INTRAVENOUS ONCE
Status: COMPLETED | OUTPATIENT
Start: 2023-06-18 | End: 2023-06-18

## 2023-06-18 RX ORDER — ACETAMINOPHEN 325 MG/1
650 TABLET ORAL EVERY 4 HOURS PRN
Status: DISCONTINUED | OUTPATIENT
Start: 2023-06-18 | End: 2023-06-22 | Stop reason: HOSPADM

## 2023-06-18 RX ORDER — MAGNESIUM 30 MG
30 TABLET ORAL DAILY
Qty: 7 TABLET | Refills: 0 | Status: SHIPPED | OUTPATIENT
Start: 2023-06-18 | End: 2023-06-19

## 2023-06-18 RX ORDER — HYDROMORPHONE HCL IN WATER/PF 6 MG/30 ML
0.2 PATIENT CONTROLLED ANALGESIA SYRINGE INTRAVENOUS EVERY 4 HOURS PRN
Status: DISCONTINUED | OUTPATIENT
Start: 2023-06-18 | End: 2023-06-21

## 2023-06-18 RX ORDER — MULTIPLE VITAMINS W/ MINERALS TAB 9MG-400MCG
1 TAB ORAL DAILY
Qty: 90 TABLET | Refills: 0 | Status: SHIPPED | OUTPATIENT
Start: 2023-06-19 | End: 2023-06-18

## 2023-06-18 RX ORDER — LORAZEPAM 2 MG/ML
0.5 INJECTION INTRAMUSCULAR EVERY 6 HOURS PRN
Status: DISCONTINUED | OUTPATIENT
Start: 2023-06-18 | End: 2023-06-22 | Stop reason: HOSPADM

## 2023-06-18 RX ORDER — LIDOCAINE 40 MG/G
CREAM TOPICAL
Status: DISCONTINUED | OUTPATIENT
Start: 2023-06-18 | End: 2023-06-22 | Stop reason: HOSPADM

## 2023-06-18 RX ORDER — HYDROCODONE BITARTRATE AND ACETAMINOPHEN 5; 325 MG/1; MG/1
1 TABLET ORAL EVERY 4 HOURS PRN
Status: DISCONTINUED | OUTPATIENT
Start: 2023-06-18 | End: 2023-06-21

## 2023-06-18 RX ORDER — ACETAMINOPHEN 650 MG/1
650 SUPPOSITORY RECTAL EVERY 6 HOURS PRN
Status: DISCONTINUED | OUTPATIENT
Start: 2023-06-18 | End: 2023-06-22 | Stop reason: HOSPADM

## 2023-06-18 RX ORDER — FOLIC ACID 1 MG/1
1 TABLET ORAL DAILY
Qty: 90 TABLET | Refills: 0 | Status: SHIPPED | OUTPATIENT
Start: 2023-06-19 | End: 2023-06-18

## 2023-06-18 RX ORDER — MULTIPLE VITAMINS W/ MINERALS TAB 9MG-400MCG
1 TAB ORAL DAILY
Qty: 90 TABLET | Refills: 0 | Status: SHIPPED | OUTPATIENT
Start: 2023-06-19 | End: 2023-06-19

## 2023-06-18 RX ORDER — SODIUM CHLORIDE, SODIUM LACTATE, POTASSIUM CHLORIDE, CALCIUM CHLORIDE 600; 310; 30; 20 MG/100ML; MG/100ML; MG/100ML; MG/100ML
INJECTION, SOLUTION INTRAVENOUS CONTINUOUS
Status: DISCONTINUED | OUTPATIENT
Start: 2023-06-19 | End: 2023-06-19

## 2023-06-18 RX ORDER — ACETAMINOPHEN 325 MG/1
975 TABLET ORAL ONCE
Status: COMPLETED | OUTPATIENT
Start: 2023-06-18 | End: 2023-06-18

## 2023-06-18 RX ADMIN — Medication 1 TABLET: at 10:24

## 2023-06-18 RX ADMIN — Medication 25 MCG: at 08:35

## 2023-06-18 RX ADMIN — ACETAMINOPHEN 975 MG: 325 TABLET ORAL at 21:52

## 2023-06-18 RX ADMIN — FENTANYL CITRATE 50 MCG: 50 INJECTION, SOLUTION INTRAMUSCULAR; INTRAVENOUS at 23:01

## 2023-06-18 RX ADMIN — MAGNESIUM SULFATE HEPTAHYDRATE 4 G: 40 INJECTION, SOLUTION INTRAVENOUS at 10:24

## 2023-06-18 RX ADMIN — NICOTINE 1 PATCH: 14 PATCH, EXTENDED RELEASE TRANSDERMAL at 08:38

## 2023-06-18 RX ADMIN — PANTOPRAZOLE SODIUM 40 MG: 40 TABLET, DELAYED RELEASE ORAL at 08:35

## 2023-06-18 RX ADMIN — SERTRALINE HYDROCHLORIDE 50 MG: 50 TABLET ORAL at 08:35

## 2023-06-18 RX ADMIN — MULTIPLE VITAMINS W/ MINERALS TAB 1 TABLET: TAB at 08:35

## 2023-06-18 RX ADMIN — CLONIDINE HYDROCHLORIDE 0.1 MG: 0.1 TABLET ORAL at 03:06

## 2023-06-18 RX ADMIN — THIAMINE HYDROCHLORIDE 250 MG: 100 INJECTION, SOLUTION INTRAMUSCULAR; INTRAVENOUS at 08:38

## 2023-06-18 RX ADMIN — SODIUM CHLORIDE 500 ML: 9 INJECTION, SOLUTION INTRAVENOUS at 23:54

## 2023-06-18 RX ADMIN — CLONIDINE HYDROCHLORIDE 0.1 MG: 0.1 TABLET ORAL at 10:24

## 2023-06-18 RX ADMIN — SODIUM CHLORIDE, POTASSIUM CHLORIDE, SODIUM LACTATE AND CALCIUM CHLORIDE: 600; 310; 30; 20 INJECTION, SOLUTION INTRAVENOUS at 23:54

## 2023-06-18 RX ADMIN — SODIUM CHLORIDE 1000 ML: 9 INJECTION, SOLUTION INTRAVENOUS at 21:06

## 2023-06-18 RX ADMIN — FOLIC ACID 1 MG: 1 TABLET ORAL at 08:35

## 2023-06-18 ASSESSMENT — ACTIVITIES OF DAILY LIVING (ADL)
ADLS_ACUITY_SCORE: 41
ADLS_ACUITY_SCORE: 41
ADLS_ACUITY_SCORE: 42
ADLS_ACUITY_SCORE: 41
ADLS_ACUITY_SCORE: 37
ADLS_ACUITY_SCORE: 41
ADLS_ACUITY_SCORE: 35

## 2023-06-18 NOTE — PROGRESS NOTES
Writer was informed that patient is discharging home. Per notes, patient is open to [facility] for SN, PT, OT, SW, HHA. Resumption of care orders are in patient's discharge orders. Called [facility] and spoke to on call staff member, Veena. Per Veena, intake will review orders in the morning due to closing today at 1600. Sent orders through [employer].     See SAMARIA Zapien note for discharge details.    Leia Guerrier RNCC  RN Care Coordinator   Office: [phone]   Pager: [phone]

## 2023-06-18 NOTE — PROGRESS NOTES
Care Management Discharge Note    Discharge Date: 06/19/2023     Discharge Disposition: Home    Discharge Services: [facility] SN/PT/OT/SW/HHA    Discharge DME: Walker (owns already)    Discharge Transportation: [employer] at 4:45 pm    Private pay costs discussed: Not applicable    Does the patient's insurance plan have a 3 day qualifying hospital stay waiver?  No    PAS Confirmation Code: N/A     Patient/family educated on Medicare website which has current facility and service quality ratings: N/A    Education Provided on the Discharge Plan: Yes    Persons Notified of Discharge Plans: Charge Nurse, Bedside Nurse, MD, Patient    Patient/Family in Agreement with the Plan: Yes    Handoff Referral Completed: Yes    Additional Information:    IMM completed, documented in Epic, faxed to HIM and original in chart. Patient declining staff recommendation for Transitional Care post discharge. Patient also declined LADC assessment and Chemical Dependency treatment. Patient is open to [facility] for PT/OT/SN/HHA/SW. Resumption of care orders in and Home Health Care updated by RNCC. Writer set up taxi through [employer] for patient at 4:45 pm. Hand off to patient's primary care physician completed.     JO Meeks, MSW  Adult Acute Care [facility]   Pager [phone]

## 2023-06-18 NOTE — PROGRESS NOTES
[id number]    Plan of Care Reviewed With: Patient    Overall Patient Progress: No Change    Outcome Evaluation: Pt is A & O x4 on RA. Denies pain, nausea, chest pain & SOB. Pt has L PIV intermittently infusing thiamine infusion otherwise SL. Upon skin assessment, redness to perineum & bilateral bruising on shoulders noted. Pt is assist x1 w/ walker & gait belt, voids spontaneously & uses call light appropriately.    Shift Updates    Pt refusing to implement delirium precautions even after multiple attempts & providing pt with education on importance.    Vitals: /69 (BP Location: Left arm, Patient Position: Semi-Butler's, Cuff Size: Adult Regular)   Pulse 70   Temp 97.5  F (36.4  C) (Oral)   Resp 16   Wt 55.5 kg (122 lb 4.8 oz)   SpO2 100%   BMI 18.60 kg/m      Plan: TBD, possible discharge 6/19 to chem dep vs TCU. Needs pt/ot evaluation for discharge planning. Continue w/ plan of care.

## 2023-06-18 NOTE — PLAN OF CARE
VS: /70 (BP Location: Left arm)   Pulse 72   Temp 98.7  F (37.1  C) (Oral)   Resp 18   Wt 55.5 kg (122 lb 4.8 oz)   SpO2 99%   BMI 18.60 kg/m     O2:  Alert and oriented x 4   Stable on room air, denies hest pain/SOB   Output: Voids spontaneously in urinal    Last BM:  6/17/23 per pt   Activity: Assist of 1 with gait and walker   Skin: Noted redness to coccyx/perineum  Shoulders brusied   Pain:  Patient denied having pain/ migraine   CMS: Alert and oriented to person and place and situation, d/o to time  Denies N/T   Dressing: None   Diet: Soft and Bite sized diet, Thin liquids   LDA: Left PIV Saline locked   Equipment: IV pump/pole, call light w/in reach, urinal, gait belt, walker, personal belongings remain w/patient   Plan: 6/19/23 discharge to home   Additional Info:

## 2023-06-18 NOTE — PROGRESS NOTES
VS: /69 (BP Location: Left arm, Patient Position: Semi-Butler's, Cuff Size: Adult Regular)   Pulse 70   Temp 97.5  F (36.4  C) (Oral)   Resp 16   Wt 55.5 kg (122 lb 4.8 oz)   SpO2 100%   BMI 18.60 kg/m       O2: Stable on room air, occasional dyspnea w/exertion, dnies chest pain   Output: Voids spontaneously w/o difficulty   Last BM: 6/17   Activity: Up with SBA in room, gait slightly unsteady, patient chose to leave before being reevaluated by PT   Up for meals? No   Skin: Visible skin intact  Bruising to shoulders  Perineal/sacral redness   Pain: Patient denies pain   CMS: AO x4, Denies N/T   Dressing: None   Diet: Soft and bite sized, thin liquids    LDA: PIV removed   Equipment: Patient left with all personal belongings   Plan: discharge   Additional Info:      Jenny Guzmán RN on 6/18/2023 at 5:10 PM

## 2023-06-18 NOTE — PROGRESS NOTES
6MS DISCHARGE    D: Patient discharged to home at 1645. Patient accompanied by self.    I: Discharge papers given to patient. All discharge medications and instructions reviewed with patient. Patient aware  Of pharmacy location where his meds will be available. Patient instructed to seek care if experiencing worsening symptoms, such as alcohol intoxication, seizures, delerium. Other phone numbers to call with questions or concerns after discharge reviewed. PIV removed. Education completed.    A: Patient verbalized understanding of discharge medications and instructions. Prescribed home medications to be picked up by patient at designated pharmacy.  Belongings returned to patient.    P: Patient to follow-up in seven days with primary care provider.     Jenny Guzmán RN on 6/18/2023 at 5:17 PM

## 2023-06-19 ENCOUNTER — APPOINTMENT (OUTPATIENT)
Dept: OCCUPATIONAL THERAPY | Facility: CLINIC | Age: 73
DRG: 897 | End: 2023-06-19
Attending: FAMILY MEDICINE
Payer: MEDICARE

## 2023-06-19 LAB
ALBUMIN SERPL-MCNC: 2.9 G/DL (ref 3.5–5)
ALBUMIN SERPL-MCNC: 3.1 G/DL (ref 3.5–5)
ALP SERPL-CCNC: 72 U/L (ref 45–120)
ALP SERPL-CCNC: 76 U/L (ref 45–120)
ALT SERPL W P-5'-P-CCNC: 27 U/L (ref 0–45)
ALT SERPL W P-5'-P-CCNC: 30 U/L (ref 0–45)
ANION GAP SERPL CALCULATED.3IONS-SCNC: 11 MMOL/L (ref 5–18)
ANION GAP SERPL CALCULATED.3IONS-SCNC: 7 MMOL/L (ref 5–18)
AST SERPL W P-5'-P-CCNC: 30 U/L (ref 0–40)
AST SERPL W P-5'-P-CCNC: 38 U/L (ref 0–40)
ATRIAL RATE - MUSE: 61 BPM
BASOPHILS # BLD AUTO: 0 10E3/UL (ref 0–0.2)
BASOPHILS NFR BLD AUTO: 1 %
BILIRUB SERPL-MCNC: 0.1 MG/DL (ref 0–1)
BILIRUB SERPL-MCNC: 0.1 MG/DL (ref 0–1)
BUN SERPL-MCNC: 11 MG/DL (ref 8–28)
BUN SERPL-MCNC: 11 MG/DL (ref 8–28)
CALCIUM SERPL-MCNC: 8.5 MG/DL (ref 8.5–10.5)
CALCIUM SERPL-MCNC: 8.6 MG/DL (ref 8.5–10.5)
CHLORIDE BLD-SCNC: 105 MMOL/L (ref 98–107)
CHLORIDE BLD-SCNC: 106 MMOL/L (ref 98–107)
CO2 SERPL-SCNC: 22 MMOL/L (ref 22–31)
CO2 SERPL-SCNC: 25 MMOL/L (ref 22–31)
CREAT SERPL-MCNC: 0.62 MG/DL (ref 0.7–1.3)
CREAT SERPL-MCNC: 0.67 MG/DL (ref 0.7–1.3)
CREAT SERPL-MCNC: 0.69 MG/DL (ref 0.7–1.3)
DIASTOLIC BLOOD PRESSURE - MUSE: NORMAL MMHG
EOSINOPHIL # BLD AUTO: 0.6 10E3/UL (ref 0–0.7)
EOSINOPHIL NFR BLD AUTO: 10 %
ERYTHROCYTE [DISTWIDTH] IN BLOOD BY AUTOMATED COUNT: 15.9 % (ref 10–15)
GFR SERPL CREATININE-BSD FRML MDRD: >90 ML/MIN/1.73M2
GLUCOSE BLD-MCNC: 92 MG/DL (ref 70–125)
GLUCOSE BLD-MCNC: 95 MG/DL (ref 70–125)
HCT VFR BLD AUTO: 27.3 % (ref 40–53)
HGB BLD-MCNC: 9 G/DL (ref 13.3–17.7)
IMM GRANULOCYTES # BLD: 0 10E3/UL
IMM GRANULOCYTES NFR BLD: 1 %
INTERPRETATION ECG - MUSE: NORMAL
LACTATE SERPL-SCNC: 1.8 MMOL/L (ref 0.7–2)
LIPASE SERPL-CCNC: 245 U/L (ref 0–52)
LYMPHOCYTES # BLD AUTO: 1.9 10E3/UL (ref 0.8–5.3)
LYMPHOCYTES NFR BLD AUTO: 33 %
MAGNESIUM SERPL-MCNC: 1.9 MG/DL (ref 1.8–2.6)
MAGNESIUM SERPL-MCNC: 1.9 MG/DL (ref 1.8–2.6)
MAGNESIUM SERPL-MCNC: 2.4 MG/DL (ref 1.8–2.6)
MCH RBC QN AUTO: 33 PG (ref 26.5–33)
MCHC RBC AUTO-ENTMCNC: 33 G/DL (ref 31.5–36.5)
MCV RBC AUTO: 100 FL (ref 78–100)
MONOCYTES # BLD AUTO: 0.4 10E3/UL (ref 0–1.3)
MONOCYTES NFR BLD AUTO: 8 %
NEUTROPHILS # BLD AUTO: 2.7 10E3/UL (ref 1.6–8.3)
NEUTROPHILS NFR BLD AUTO: 47 %
NRBC # BLD AUTO: 0 10E3/UL
NRBC BLD AUTO-RTO: 0 /100
P AXIS - MUSE: 61 DEGREES
PHOSPHATE SERPL-MCNC: 2.5 MG/DL (ref 2.5–4.5)
PHOSPHATE SERPL-MCNC: 4 MG/DL (ref 2.5–4.5)
PHOSPHATE SERPL-MCNC: 4.2 MG/DL (ref 2.5–4.5)
PLATELET # BLD AUTO: 262 10E3/UL (ref 150–450)
POTASSIUM BLD-SCNC: 4.2 MMOL/L (ref 3.5–5)
PR INTERVAL - MUSE: 182 MS
PROT SERPL-MCNC: 5.8 G/DL (ref 6–8)
PROT SERPL-MCNC: 6.4 G/DL (ref 6–8)
QRS DURATION - MUSE: 78 MS
QT - MUSE: 422 MS
QTC - MUSE: 424 MS
R AXIS - MUSE: 63 DEGREES
RBC # BLD AUTO: 2.73 10E6/UL (ref 4.4–5.9)
SODIUM SERPL-SCNC: 138 MMOL/L (ref 136–145)
SODIUM SERPL-SCNC: 138 MMOL/L (ref 136–145)
SYSTOLIC BLOOD PRESSURE - MUSE: NORMAL MMHG
T AXIS - MUSE: 72 DEGREES
VENTRICULAR RATE- MUSE: 61 BPM
WBC # BLD AUTO: 5.6 10E3/UL (ref 4–11)

## 2023-06-19 PROCEDURE — 85025 COMPLETE CBC W/AUTO DIFF WBC: CPT | Performed by: INTERNAL MEDICINE

## 2023-06-19 PROCEDURE — 84132 ASSAY OF SERUM POTASSIUM: CPT | Performed by: INTERNAL MEDICINE

## 2023-06-19 PROCEDURE — 250N000011 HC RX IP 250 OP 636: Performed by: FAMILY MEDICINE

## 2023-06-19 PROCEDURE — 250N000011 HC RX IP 250 OP 636: Performed by: INTERNAL MEDICINE

## 2023-06-19 PROCEDURE — 83690 ASSAY OF LIPASE: CPT | Performed by: INTERNAL MEDICINE

## 2023-06-19 PROCEDURE — 83605 ASSAY OF LACTIC ACID: CPT | Performed by: FAMILY MEDICINE

## 2023-06-19 PROCEDURE — 83735 ASSAY OF MAGNESIUM: CPT | Performed by: INTERNAL MEDICINE

## 2023-06-19 PROCEDURE — 99222 1ST HOSP IP/OBS MODERATE 55: CPT | Performed by: REGISTERED NURSE

## 2023-06-19 PROCEDURE — 36415 COLL VENOUS BLD VENIPUNCTURE: CPT | Performed by: FAMILY MEDICINE

## 2023-06-19 PROCEDURE — 250N000013 HC RX MED GY IP 250 OP 250 PS 637: Performed by: FAMILY MEDICINE

## 2023-06-19 PROCEDURE — 97166 OT EVAL MOD COMPLEX 45 MIN: CPT | Mod: GO

## 2023-06-19 PROCEDURE — 250N000013 HC RX MED GY IP 250 OP 250 PS 637: Performed by: INTERNAL MEDICINE

## 2023-06-19 PROCEDURE — 84155 ASSAY OF PROTEIN SERUM: CPT | Performed by: INTERNAL MEDICINE

## 2023-06-19 PROCEDURE — 250N000009 HC RX 250: Performed by: FAMILY MEDICINE

## 2023-06-19 PROCEDURE — 83735 ASSAY OF MAGNESIUM: CPT | Performed by: FAMILY MEDICINE

## 2023-06-19 PROCEDURE — 84100 ASSAY OF PHOSPHORUS: CPT | Performed by: INTERNAL MEDICINE

## 2023-06-19 PROCEDURE — 36415 COLL VENOUS BLD VENIPUNCTURE: CPT | Performed by: INTERNAL MEDICINE

## 2023-06-19 PROCEDURE — 99232 SBSQ HOSP IP/OBS MODERATE 35: CPT | Performed by: FAMILY MEDICINE

## 2023-06-19 PROCEDURE — 82565 ASSAY OF CREATININE: CPT | Performed by: FAMILY MEDICINE

## 2023-06-19 PROCEDURE — 258N000003 HC RX IP 258 OP 636: Performed by: FAMILY MEDICINE

## 2023-06-19 PROCEDURE — 120N000001 HC R&B MED SURG/OB

## 2023-06-19 PROCEDURE — 84100 ASSAY OF PHOSPHORUS: CPT | Performed by: FAMILY MEDICINE

## 2023-06-19 PROCEDURE — 84450 TRANSFERASE (AST) (SGOT): CPT | Performed by: INTERNAL MEDICINE

## 2023-06-19 PROCEDURE — 97535 SELF CARE MNGMENT TRAINING: CPT | Mod: GO

## 2023-06-19 RX ORDER — SODIUM CHLORIDE, SODIUM LACTATE, POTASSIUM CHLORIDE, CALCIUM CHLORIDE 600; 310; 30; 20 MG/100ML; MG/100ML; MG/100ML; MG/100ML
INJECTION, SOLUTION INTRAVENOUS CONTINUOUS
Status: DISCONTINUED | OUTPATIENT
Start: 2023-06-19 | End: 2023-06-20

## 2023-06-19 RX ORDER — ATORVASTATIN CALCIUM 40 MG/1
40 TABLET, FILM COATED ORAL AT BEDTIME
Status: DISCONTINUED | OUTPATIENT
Start: 2023-06-19 | End: 2023-06-22 | Stop reason: HOSPADM

## 2023-06-19 RX ORDER — MAGNESIUM OXIDE 400 MG/1
400 TABLET ORAL EVERY 4 HOURS
Status: COMPLETED | OUTPATIENT
Start: 2023-06-19 | End: 2023-06-19

## 2023-06-19 RX ORDER — GABAPENTIN 300 MG/1
900 CAPSULE ORAL EVERY 8 HOURS
Status: DISCONTINUED | OUTPATIENT
Start: 2023-06-19 | End: 2023-06-21

## 2023-06-19 RX ORDER — FLUMAZENIL 0.1 MG/ML
0.2 INJECTION, SOLUTION INTRAVENOUS
Status: DISCONTINUED | OUTPATIENT
Start: 2023-06-19 | End: 2023-06-22 | Stop reason: HOSPADM

## 2023-06-19 RX ORDER — PROCHLORPERAZINE MALEATE 10 MG
10 TABLET ORAL 2 TIMES DAILY PRN
COMMUNITY

## 2023-06-19 RX ORDER — TRAZODONE HYDROCHLORIDE 50 MG/1
50 TABLET, FILM COATED ORAL
Status: DISCONTINUED | OUTPATIENT
Start: 2023-06-19 | End: 2023-06-22

## 2023-06-19 RX ORDER — GABAPENTIN 300 MG/1
300 CAPSULE ORAL EVERY 8 HOURS
Status: DISCONTINUED | OUTPATIENT
Start: 2023-06-24 | End: 2023-06-21

## 2023-06-19 RX ORDER — TRAZODONE HYDROCHLORIDE 50 MG/1
50 TABLET, FILM COATED ORAL
Status: ON HOLD | COMMUNITY
End: 2023-06-22

## 2023-06-19 RX ORDER — GABAPENTIN 300 MG/1
600 CAPSULE ORAL EVERY 8 HOURS
Status: DISCONTINUED | OUTPATIENT
Start: 2023-06-22 | End: 2023-06-21

## 2023-06-19 RX ORDER — HYDROXYZINE HYDROCHLORIDE 50 MG/1
50 TABLET, FILM COATED ORAL EVERY 6 HOURS PRN
Status: ON HOLD | COMMUNITY
End: 2023-06-22

## 2023-06-19 RX ORDER — LORAZEPAM 1 MG/1
1-2 TABLET ORAL EVERY 30 MIN PRN
Status: DISCONTINUED | OUTPATIENT
Start: 2023-06-19 | End: 2023-06-21

## 2023-06-19 RX ORDER — ENOXAPARIN SODIUM 100 MG/ML
40 INJECTION SUBCUTANEOUS EVERY 24 HOURS
Status: DISCONTINUED | OUTPATIENT
Start: 2023-06-19 | End: 2023-06-20

## 2023-06-19 RX ORDER — HALOPERIDOL 5 MG/ML
2.5-5 INJECTION INTRAMUSCULAR EVERY 6 HOURS PRN
Status: DISCONTINUED | OUTPATIENT
Start: 2023-06-19 | End: 2023-06-22 | Stop reason: HOSPADM

## 2023-06-19 RX ORDER — MULTIPLE VITAMINS W/ MINERALS TAB 9MG-400MCG
1 TAB ORAL DAILY
Status: DISCONTINUED | OUTPATIENT
Start: 2023-06-20 | End: 2023-06-22 | Stop reason: HOSPADM

## 2023-06-19 RX ORDER — GABAPENTIN 100 MG/1
100 CAPSULE ORAL EVERY 8 HOURS
Status: DISCONTINUED | OUTPATIENT
Start: 2023-06-26 | End: 2023-06-21

## 2023-06-19 RX ORDER — VITAMIN B COMPLEX
25 TABLET ORAL DAILY
Status: DISCONTINUED | OUTPATIENT
Start: 2023-06-20 | End: 2023-06-22 | Stop reason: HOSPADM

## 2023-06-19 RX ORDER — FOLIC ACID 1 MG/1
1 TABLET ORAL DAILY
Status: DISCONTINUED | OUTPATIENT
Start: 2023-06-20 | End: 2023-06-22 | Stop reason: HOSPADM

## 2023-06-19 RX ORDER — GABAPENTIN 600 MG/1
1200 TABLET ORAL ONCE
Status: COMPLETED | OUTPATIENT
Start: 2023-06-19 | End: 2023-06-19

## 2023-06-19 RX ORDER — LANOLIN ALCOHOL/MO/W.PET/CERES
1000 CREAM (GRAM) TOPICAL DAILY
Status: DISCONTINUED | OUTPATIENT
Start: 2023-06-20 | End: 2023-06-22 | Stop reason: HOSPADM

## 2023-06-19 RX ORDER — NICOTINE 21 MG/24HR
1 PATCH, TRANSDERMAL 24 HOURS TRANSDERMAL DAILY
Status: DISCONTINUED | OUTPATIENT
Start: 2023-06-19 | End: 2023-06-22 | Stop reason: HOSPADM

## 2023-06-19 RX ORDER — CALCIUM CARBONATE/VITAMIN D3 500-10/5ML
400 LIQUID (ML) ORAL DAILY
COMMUNITY

## 2023-06-19 RX ORDER — GABAPENTIN 300 MG/1
300 CAPSULE ORAL 3 TIMES DAILY
Status: ON HOLD | COMMUNITY
End: 2023-06-22

## 2023-06-19 RX ORDER — MAGNESIUM OXIDE 400 MG/1
400 TABLET ORAL DAILY
Status: DISCONTINUED | OUTPATIENT
Start: 2023-06-20 | End: 2023-06-22 | Stop reason: HOSPADM

## 2023-06-19 RX ORDER — CLONIDINE HYDROCHLORIDE 0.1 MG/1
0.1 TABLET ORAL EVERY 8 HOURS
Status: DISCONTINUED | OUTPATIENT
Start: 2023-06-19 | End: 2023-06-21

## 2023-06-19 RX ORDER — ASPIRIN 325 MG
325 TABLET, DELAYED RELEASE (ENTERIC COATED) ORAL DAILY
Status: DISCONTINUED | OUTPATIENT
Start: 2023-06-19 | End: 2023-06-22 | Stop reason: HOSPADM

## 2023-06-19 RX ORDER — LORAZEPAM 2 MG/ML
1-2 INJECTION INTRAMUSCULAR EVERY 30 MIN PRN
Status: DISCONTINUED | OUTPATIENT
Start: 2023-06-19 | End: 2023-06-21

## 2023-06-19 RX ORDER — IBUPROFEN 600 MG/1
600 TABLET, FILM COATED ORAL EVERY 6 HOURS PRN
Status: DISCONTINUED | OUTPATIENT
Start: 2023-06-19 | End: 2023-06-21

## 2023-06-19 RX ADMIN — HYDROMORPHONE HYDROCHLORIDE 0.2 MG: 0.2 INJECTION, SOLUTION INTRAMUSCULAR; INTRAVENOUS; SUBCUTANEOUS at 05:12

## 2023-06-19 RX ADMIN — FOLIC ACID: 5 INJECTION, SOLUTION INTRAMUSCULAR; INTRAVENOUS; SUBCUTANEOUS at 10:29

## 2023-06-19 RX ADMIN — LORAZEPAM 1 MG: 1 TABLET ORAL at 13:24

## 2023-06-19 RX ADMIN — POTASSIUM & SODIUM PHOSPHATES POWDER PACK 280-160-250 MG 1 PACKET: 280-160-250 PACK at 06:06

## 2023-06-19 RX ADMIN — CLONIDINE HYDROCHLORIDE 0.1 MG: 0.1 TABLET ORAL at 09:46

## 2023-06-19 RX ADMIN — POTASSIUM & SODIUM PHOSPHATES POWDER PACK 280-160-250 MG 1 PACKET: 280-160-250 PACK at 01:56

## 2023-06-19 RX ADMIN — SODIUM CHLORIDE, POTASSIUM CHLORIDE, SODIUM LACTATE AND CALCIUM CHLORIDE: 600; 310; 30; 20 INJECTION, SOLUTION INTRAVENOUS at 21:39

## 2023-06-19 RX ADMIN — FOLIC ACID: 5 INJECTION, SOLUTION INTRAMUSCULAR; INTRAVENOUS; SUBCUTANEOUS at 12:30

## 2023-06-19 RX ADMIN — NICOTINE 1 PATCH: 14 PATCH, EXTENDED RELEASE TRANSDERMAL at 09:46

## 2023-06-19 RX ADMIN — LORAZEPAM 2 MG: 1 TABLET ORAL at 17:51

## 2023-06-19 RX ADMIN — CLONIDINE HYDROCHLORIDE 0.1 MG: 0.1 TABLET ORAL at 17:35

## 2023-06-19 RX ADMIN — GABAPENTIN 900 MG: 300 CAPSULE ORAL at 17:35

## 2023-06-19 RX ADMIN — Medication 400 MG: at 12:30

## 2023-06-19 RX ADMIN — LORAZEPAM 1 MG: 1 TABLET ORAL at 20:58

## 2023-06-19 RX ADMIN — HYDROMORPHONE HYDROCHLORIDE 0.2 MG: 0.2 INJECTION, SOLUTION INTRAMUSCULAR; INTRAVENOUS; SUBCUTANEOUS at 01:11

## 2023-06-19 RX ADMIN — HYDROCODONE BITARTRATE AND ACETAMINOPHEN 1 TABLET: 5; 325 TABLET ORAL at 03:15

## 2023-06-19 RX ADMIN — LORAZEPAM 1 MG: 1 TABLET ORAL at 19:22

## 2023-06-19 RX ADMIN — GABAPENTIN 900 MG: 300 CAPSULE ORAL at 23:37

## 2023-06-19 RX ADMIN — ATORVASTATIN CALCIUM 40 MG: 40 TABLET, FILM COATED ORAL at 21:00

## 2023-06-19 RX ADMIN — GABAPENTIN 1200 MG: 600 TABLET, FILM COATED ORAL at 09:46

## 2023-06-19 RX ADMIN — CLONIDINE HYDROCHLORIDE 0.1 MG: 0.1 TABLET ORAL at 23:37

## 2023-06-19 RX ADMIN — ACETAMINOPHEN, ASPIRIN, CAFFEINE 2 TABLET: 250; 250; 65 TABLET, FILM COATED ORAL at 15:33

## 2023-06-19 RX ADMIN — ENOXAPARIN SODIUM 40 MG: 100 INJECTION SUBCUTANEOUS at 17:36

## 2023-06-19 RX ADMIN — ACETAMINOPHEN 650 MG: 325 TABLET ORAL at 23:37

## 2023-06-19 RX ADMIN — Medication 400 MG: at 17:35

## 2023-06-19 RX ADMIN — LORAZEPAM 2 MG: 1 TABLET ORAL at 12:30

## 2023-06-19 ASSESSMENT — ACTIVITIES OF DAILY LIVING (ADL)
ADLS_ACUITY_SCORE: 39
ADLS_ACUITY_SCORE: 37
ADLS_ACUITY_SCORE: 39
ADLS_ACUITY_SCORE: 39
ADLS_ACUITY_SCORE: 37
ADLS_ACUITY_SCORE: 37
DEPENDENT_IADLS:: TRANSPORTATION
ADLS_ACUITY_SCORE: 37
ADLS_ACUITY_SCORE: 39
ADLS_ACUITY_SCORE: 39
ADLS_ACUITY_SCORE: 37
ADLS_ACUITY_SCORE: 37
ADLS_ACUITY_SCORE: 39

## 2023-06-19 NOTE — PLAN OF CARE
Physical Therapy Discharge Summary    Reason for therapy discharge:    Discharged to home with home therapy.    Progress towards therapy goal(s). See goals on Care Plan in [facility] electronic health record for goal details.  Goals partially met.  Barriers to achieving goals:   discharge from facility.    Therapy recommendation(s):    Continued therapy is recommended.  Rationale/Recommendations:  Patient left prior to PT appointment. Prior recommendations for rehab to progress safety and independence with functional mobility prior to returning home
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
## 2023-06-19 NOTE — ED TRIAGE NOTES
Presents via EMS from home. EMS states patient was on the couch and c/o being too weak to get up. Pt states he was recently discharged from the hospital. Pt drank 1/2 pint of vodka today, but says he is not a daily drinker. C/o HA. Pt slow to answer questions. Coughing frequently. Pt is a smoker. Denies chest pain, shortness of breath. States he fell but unsure of when. Denies tenderness on c-spine palpation.

## 2023-06-19 NOTE — PROGRESS NOTES
Patient Ciwa highest score 16, lowest 5. Latest ciwa score 10, medication given, recheck 1 hour.     Patient ciwa score varying with tremors, severe headache, nausea.     Patient ambulates with stand by.     Thiamine gtt running still per Dr. Hill's verbal order to give full back and flush with normal saline gtt after.      Report given 1935    Xederin given for HA prn.     Social work seeing patient 1937.

## 2023-06-19 NOTE — UTILIZATION REVIEW
Admission Status; Secondary Review Determination   Under the authority of the Utilization Management Committee, the utilization review process indicated a secondary review on Familia Watson. The review outcome is based on review of the medical records, discussions with staff, and applying clinical experience noted on the date of the review.   (x) Inpatient Status Appropriate - This patient's medical care is consistent with medical management for inpatient care and reasonable inpatient medical practice.     RATIONALE FOR DETERMINATION   Familia Watson is a 72 yr old male who just discharged from [facility] on 6/18/23 only to return to [facility] for weakness.  House uninhabitable.  Was drinking again already when at home.  Acute intoxication on arrival.  Hx of withdrawal.  CIWA scores now 16 today and needing benzodiazepine.  Has migraine as well in discussion with Dr. Bustos.  Not stable for discharge.     At the time of admission with the information available to the attending physician more than 2 nights Hospital complex care was anticipated, based on patient risk of adverse outcome if treated as outpatient and complex care required. Inpatient admission is appropriate based on the Medicare guidelines.   The information on this document is developed by the utilization review team in order for the business office to ensure compliance. This only denotes the appropriateness of proper admission status and does not reflect the quality of care rendered.   The definitions of Inpatient Status and Observation Status used in making the determination above are those provided in the CMS Coverage Manual, Chapter 1 and Chapter 6, section 70.4.   Sincerely,   Jo-Ann Castaneda MD  Utilization Review  Physician Advisor  [facility]

## 2023-06-19 NOTE — PHARMACY-ADMISSION MEDICATION HISTORY
Pharmacist Admission Medication History    Admission medication history is complete. The information provided in this note is only as accurate as the sources available at the time of the update.    Medication reconciliation/reorder completed by provider prior to medication history? No    Information Source(s): Patient and [facility] CareEverywhere document,  in-person    Pertinent Information:     Changes made to PTA medication list:    Added: trazodone, hydroxyzine, Compazine, gabapentin    Deleted: iron, mvi, sertraline (pt denies taking with certainty)    Changed: none    Medication Affordability:       Allergies reviewed with patient and updates made in EHR: yes    Medications available for use during hospital stay: NONE.     Medication History Completed By: Olivier Taylor RPH 6/19/2023 11:30 AM    PTA Med List   Medication Sig Last Dose     aspirin (ASA) 325 MG EC tablet Take 325 mg by mouth daily 6/17/2023     atorvastatin (LIPITOR) 40 MG tablet Take 40 mg by mouth At Bedtime 6/17/2023     cyanocobalamin (VITAMIN B-12) 1000 MCG tablet Take 1,000 mcg by mouth daily 6/17/2023     gabapentin (NEURONTIN) 300 MG capsule Take 300 mg by mouth 3 times daily 6/18/2023     hydrOXYzine (ATARAX) 50 MG tablet Take 50 mg by mouth every 6 hours as needed for other (nausea)      ibuprofen (ADVIL/MOTRIN) 600 MG tablet Take 600 mg by mouth every 6 hours as needed for moderate pain Maximum of 3200 mg in 24 hours 6/12/2023     magnesium oxide 400 MG CAPS Take 400 mg by mouth daily 6/17/2023     prochlorperazine (COMPAZINE) 10 MG tablet Take 10 mg by mouth 2 times daily as needed for nausea or vomiting 6/12/2023     traZODone (DESYREL) 50 MG tablet Take 50 mg by mouth nightly as needed for sleep 6/12/2023     Vitamin D3 (CHOLECALCIFEROL) 25 mcg (1000 units) tablet Take 25 mcg by mouth daily 6/17/2023     [DISCONTINUED] magnesium oxide (MAG-OX) 400 MG tablet Take 400 mg by mouth daily 6/17/2023
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
## 2023-06-19 NOTE — PROGRESS NOTES
06/19/23 1300   Appointment Info   Signing Clinician's Name / Credentials (OT) Moon Verdin, OTR/L   Living Environment   People in Home alone   Current Living Arrangements apartment   Living Environment Comments has been in and out of hospital for past few months spending minimal time at home before being readmitted. reports typically his nephew helps him out but currently they're not speaking.   Self-Care   Activity/Exercise/Self-Care Comment ind for BADLs but limited participation in self-cares   Instrumental Activities of Daily Living (IADL)   IADL Comments no longer has family support for groceries and household cleaning - was getting assist from nephew   General Information   Onset of Illness/Injury or Date of Surgery 06/18/23   Referring Physician Chip Bustos MD   Patient/Family Therapy Goal Statement (OT) go to TCU then higher level of care such as [facility]   Additional Occupational Profile Info/Pertinent History of Current Problem d/c from [facility] then admit to  same day, admitted with alcoholic intoxication, AMS, and failure to thrive.   Existing Precautions/Restrictions fall   Limitations/Impairments safety/cognitive   Cognitive Status Examination   Orientation Status orientation to person, place and time   Affect/Mental Status (Cognitive) WFL   Follows Commands follows one-step commands   Cognitive Status Comments 2/3 short-term recall with increased time, 3rd word with indirect cue. would benefit from additional assessment.   Visual Perception   Visual Impairment/Limitations blurry vision  (unable to read badge from ~3 ft away)   Pain Assessment   Patient Currently in Pain No   Range of Motion Comprehensive   General Range of Motion bilateral upper extremity ROM WFL   Strength Comprehensive (MMT)   Comment, General Manual Muscle Testing (MMT) Assessment BUE weakness noted   Bed Mobility   Bed Mobility supine-sit;sit-supine   Supine-Sit [place] (Bed Mobility) modified independence   Sit-Supine  [place] (Bed Mobility) modified independence   Assistive Device (Bed Mobility) bed rails   Transfers   Transfers sit-stand transfer;toilet transfer   Sit-Stand Transfer   Sit-Stand [place] (Transfers) contact guard   Toilet Transfer   Type (Toilet Transfer) sit-stand;stand-sit   [place] Level (Toilet Transfer) contact guard   Balance   Balance Comments CGA to min A for balance during mobility in room   Activities of Daily Living   BADL Assessment/Intervention lower body dressing;toileting   Lower Body Dressing Assessment/Training   [place] Level (Lower Body Dressing) contact guard assist   Toileting   [place] Level (Toileting) contact guard assist   Clinical Impression   Criteria for Skilled Therapeutic Interventions Met (OT) Yes, treatment indicated   OT Diagnosis dec BADL indep   OT Problem List-Impairments impacting ADL problems related to;activity tolerance impaired;balance;cognition;strength;mobility   Assessment of Occupational Performance 3-5 Performance Deficits   Identified Performance Deficits dressing, toileting, bathing, household mobility, functional transfers, all IADLs   Planned Therapy Interventions (OT) ADL retraining;cognition;IADL retraining;strengthening;transfer training;home program guidelines;progressive activity/exercise   Clinical Decision Making Complexity (OT) moderate complexity   Risk & Benefits of therapy have been explained evaluation/treatment results reviewed;participants included;patient   OT Total Evaluation Time   OT Eval, Moderate Complexity Minutes (03662) 15   OT Goals   Therapy Frequency (OT) 5 times/wk   OT Predicted Duration/Target Date for Goal Attainment 06/26/23   OT Goals Hygiene/Grooming;Lower Body Dressing;Toilet Transfer/Toileting;Cognition   OT: Hygiene/Grooming independent;while standing   OT: Lower Body Dressing Independent   OT: Lower Body Bathing Completed   OT: Transfer Completed   OT: Toilet Transfer/Toileting Independent;toilet  transfer;cleaning and garment management   OT: Cognitive Patient/caregiver will verbalize understanding of cognitive assessment results/recommendations as needed for safe discharge planning   Self-Care/Home Management   Self-Care/Home Mgmt/ADL, Compensatory, Meal Prep Minutes (17546) 10   Symptoms Noted During/After Treatment (Meal Preparation/Planning Training) fatigue   Treatment Detail/Skilled Intervention VC for pacing as pt has LOB when increasing speed during mobility in room - CGA without AD for 15 ft mobility 3x with 1 LOB requiring min A for correction - no LOB when cued for slow pacing.   OT Discharge Planning   OT Plan 5/26: ACLS - priority, full ADL routines, UE ex   OT Discharge Recommendation (DC Rec) Transitional Care Facility   OT Rationale for DC Rec currently pt requires Ax1 for ADLs and transfers. also recommend pt to have, at minimum, daily check-ins to ensure participation in ADLs and assist with problem solving as pt has had minimal success with recent discharged without support. Overall, pt would benefit from a higher level of care following TCU.   OT Brief overview of current status CGA ADLs/mobility with intermittent min assist for balance   Total Session Time   Timed Code Treatment Minutes 10   Total Session Time (sum of timed and untimed services) 25
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
## 2023-06-20 ENCOUNTER — APPOINTMENT (OUTPATIENT)
Dept: OCCUPATIONAL THERAPY | Facility: CLINIC | Age: 73
DRG: 897 | End: 2023-06-20
Payer: MEDICARE

## 2023-06-20 ENCOUNTER — APPOINTMENT (OUTPATIENT)
Dept: PHYSICAL THERAPY | Facility: CLINIC | Age: 73
DRG: 897 | End: 2023-06-20
Attending: FAMILY MEDICINE
Payer: MEDICARE

## 2023-06-20 LAB
MAGNESIUM SERPL-MCNC: 1.8 MG/DL (ref 1.8–2.6)
PHOSPHATE SERPL-MCNC: 3.8 MG/DL (ref 2.5–4.5)
POTASSIUM BLD-SCNC: 4 MMOL/L (ref 3.5–5)

## 2023-06-20 PROCEDURE — 250N000013 HC RX MED GY IP 250 OP 250 PS 637: Performed by: INTERNAL MEDICINE

## 2023-06-20 PROCEDURE — 97129 THER IVNTJ 1ST 15 MIN: CPT | Mod: GO

## 2023-06-20 PROCEDURE — 36415 COLL VENOUS BLD VENIPUNCTURE: CPT | Performed by: FAMILY MEDICINE

## 2023-06-20 PROCEDURE — 258N000003 HC RX IP 258 OP 636: Performed by: FAMILY MEDICINE

## 2023-06-20 PROCEDURE — 99232 SBSQ HOSP IP/OBS MODERATE 35: CPT | Performed by: FAMILY MEDICINE

## 2023-06-20 PROCEDURE — 97116 GAIT TRAINING THERAPY: CPT | Mod: GP

## 2023-06-20 PROCEDURE — 97130 THER IVNTJ EA ADDL 15 MIN: CPT | Mod: GO

## 2023-06-20 PROCEDURE — 250N000013 HC RX MED GY IP 250 OP 250 PS 637: Performed by: FAMILY MEDICINE

## 2023-06-20 PROCEDURE — 120N000001 HC R&B MED SURG/OB

## 2023-06-20 PROCEDURE — 84100 ASSAY OF PHOSPHORUS: CPT | Performed by: FAMILY MEDICINE

## 2023-06-20 PROCEDURE — 84132 ASSAY OF SERUM POTASSIUM: CPT | Performed by: FAMILY MEDICINE

## 2023-06-20 PROCEDURE — 83735 ASSAY OF MAGNESIUM: CPT | Performed by: FAMILY MEDICINE

## 2023-06-20 PROCEDURE — 250N000011 HC RX IP 250 OP 636: Performed by: FAMILY MEDICINE

## 2023-06-20 PROCEDURE — 97161 PT EVAL LOW COMPLEX 20 MIN: CPT | Mod: GP

## 2023-06-20 RX ORDER — ENOXAPARIN SODIUM 100 MG/ML
30 INJECTION SUBCUTANEOUS EVERY 24 HOURS
Status: DISCONTINUED | OUTPATIENT
Start: 2023-06-20 | End: 2023-06-22 | Stop reason: HOSPADM

## 2023-06-20 RX ORDER — MAGNESIUM OXIDE 400 MG/1
400 TABLET ORAL EVERY 4 HOURS
Status: COMPLETED | OUTPATIENT
Start: 2023-06-20 | End: 2023-06-20

## 2023-06-20 RX ADMIN — CYANOCOBALAMIN TAB 1000 MCG 1000 MCG: 1000 TAB at 09:11

## 2023-06-20 RX ADMIN — CLONIDINE HYDROCHLORIDE 0.1 MG: 0.1 TABLET ORAL at 09:11

## 2023-06-20 RX ADMIN — GABAPENTIN 900 MG: 300 CAPSULE ORAL at 09:11

## 2023-06-20 RX ADMIN — CLONIDINE HYDROCHLORIDE 0.1 MG: 0.1 TABLET ORAL at 17:14

## 2023-06-20 RX ADMIN — ACETAMINOPHEN 650 MG: 325 TABLET ORAL at 21:38

## 2023-06-20 RX ADMIN — Medication 400 MG: at 13:44

## 2023-06-20 RX ADMIN — Medication 100 MG: at 09:11

## 2023-06-20 RX ADMIN — ATORVASTATIN CALCIUM 40 MG: 40 TABLET, FILM COATED ORAL at 21:38

## 2023-06-20 RX ADMIN — HYDROCODONE BITARTRATE AND ACETAMINOPHEN 1 TABLET: 5; 325 TABLET ORAL at 18:57

## 2023-06-20 RX ADMIN — GABAPENTIN 900 MG: 300 CAPSULE ORAL at 17:13

## 2023-06-20 RX ADMIN — MULTIPLE VITAMINS W/ MINERALS TAB 1 TABLET: TAB at 09:11

## 2023-06-20 RX ADMIN — HYDROCODONE BITARTRATE AND ACETAMINOPHEN 1 TABLET: 5; 325 TABLET ORAL at 00:45

## 2023-06-20 RX ADMIN — HYDROCODONE BITARTRATE AND ACETAMINOPHEN 1 TABLET: 5; 325 TABLET ORAL at 09:11

## 2023-06-20 RX ADMIN — FOLIC ACID 1 MG: 1 TABLET ORAL at 09:11

## 2023-06-20 RX ADMIN — LORAZEPAM 1 MG: 1 TABLET ORAL at 21:59

## 2023-06-20 RX ADMIN — Medication 25 MCG: at 09:11

## 2023-06-20 RX ADMIN — Medication 400 MG: at 09:11

## 2023-06-20 RX ADMIN — ENOXAPARIN SODIUM 30 MG: 30 INJECTION SUBCUTANEOUS at 17:14

## 2023-06-20 RX ADMIN — SODIUM CHLORIDE, POTASSIUM CHLORIDE, SODIUM LACTATE AND CALCIUM CHLORIDE: 600; 310; 30; 20 INJECTION, SOLUTION INTRAVENOUS at 10:52

## 2023-06-20 RX ADMIN — Medication 400 MG: at 10:55

## 2023-06-20 RX ADMIN — LORAZEPAM 1 MG: 1 TABLET ORAL at 17:17

## 2023-06-20 RX ADMIN — HYDROCODONE BITARTRATE AND ACETAMINOPHEN 1 TABLET: 5; 325 TABLET ORAL at 13:43

## 2023-06-20 RX ADMIN — NICOTINE 1 PATCH: 14 PATCH, EXTENDED RELEASE TRANSDERMAL at 09:10

## 2023-06-20 RX ADMIN — ACETAMINOPHEN 650 MG: 325 TABLET ORAL at 17:17

## 2023-06-20 ASSESSMENT — ACTIVITIES OF DAILY LIVING (ADL)
ADLS_ACUITY_SCORE: 37
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
## 2023-06-20 NOTE — PLAN OF CARE
"  Problem: Plan of Care - These are the overarching goals to be used throughout the patient stay.    Goal: Plan of Care Review  Description: The Plan of Care Review/Shift note should be completed every shift.  The Outcome Evaluation is a brief statement about your assessment that the patient is improving, declining, or no change.  This information will be displayed automatically on your shift note.  Outcome: Progressing   Goal Outcome Evaluation:       Pt alert and oriented. VSS on RA. CIWA scored 9, 7, 6, 5 over shift. Pt states that they \"are not having withdrawals\" and \"wouldn't be afraid to ask for help\" if they were. Denies nausea or heightened anxiety overnight. Complaints of a headache, treated with PRN norco with relief, per pt. LR running at 75/hr. NSR on tele throughout shift.                  "
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
## 2023-06-20 NOTE — CONSULTS
6/20/2023  FABY Consult acknowledged and closed  Pt is not appropriate for inpatient or outpatient FABY treatment at this time. To effectively participate in FABY treatment, pt s need to be able to recall how their substance use has negatively impacted their life and identify new ways of coping with cravings and triggers. Patient s cognitive state may be a factor in any type of FABY treatment, residential or outpatient.  Patient would need to be able to participate in group therapy 6-8 hours a day at residential facility and 2-4 hours a day in outpatient treatment.  Patient must be independent with ADLs for consideration for residential CD treatment.     OT is recommending TCU. OT met with pt yesterday.  OT Discharge Planning   OT Plan 5/26: ACLS - priority, full ADL routines, UE ex   OT Discharge Recommendation (DC Rec) Transitional Care Facility   OT Rationale for DC Rec currently pt requires Ax1 for ADLs and transfers. also recommend pt to have, at minimum, daily check-ins to ensure participation in ADLs and assist with problem solving as pt has had minimal success with recent discharged without support. Overall, pt would benefit from a higher level of care following TCU.   OT Brief overview of current status CGA ADLs/mobility with intermittent min assist for balance     Here are some options for TCUs/SNF that have a FABY program attached to it.    [facility]- Outpatient treatment center  [address] [place] [id number]   Charanjit Epstein  PH: [phone]  Fax: [phone]    [facility]:  Phone:  [phone]  Fax: [phone]   They will need H&P, med list, PT/OT notes and last 7 days of MATT Nguyen MA [facility]  [phone]
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
## 2023-06-20 NOTE — PROGRESS NOTES
CIWA scoring has been at 3- 5.     Patient states he feels much better.     Gait appears to be slightly wobbly. For patient safety, stand by assist.

## 2023-06-21 ENCOUNTER — APPOINTMENT (OUTPATIENT)
Dept: OCCUPATIONAL THERAPY | Facility: CLINIC | Age: 73
DRG: 897 | End: 2023-06-21
Payer: MEDICARE

## 2023-06-21 PROCEDURE — 250N000013 HC RX MED GY IP 250 OP 250 PS 637: Performed by: NURSE PRACTITIONER

## 2023-06-21 PROCEDURE — 250N000013 HC RX MED GY IP 250 OP 250 PS 637: Performed by: FAMILY MEDICINE

## 2023-06-21 PROCEDURE — 99222 1ST HOSP IP/OBS MODERATE 55: CPT | Performed by: NURSE PRACTITIONER

## 2023-06-21 PROCEDURE — 99232 SBSQ HOSP IP/OBS MODERATE 35: CPT | Performed by: FAMILY MEDICINE

## 2023-06-21 PROCEDURE — 250N000013 HC RX MED GY IP 250 OP 250 PS 637: Performed by: INTERNAL MEDICINE

## 2023-06-21 PROCEDURE — 120N000001 HC R&B MED SURG/OB

## 2023-06-21 PROCEDURE — 250N000011 HC RX IP 250 OP 636: Mod: JZ | Performed by: FAMILY MEDICINE

## 2023-06-21 PROCEDURE — 97110 THERAPEUTIC EXERCISES: CPT | Mod: GO

## 2023-06-21 RX ORDER — DULOXETIN HYDROCHLORIDE 20 MG/1
20 CAPSULE, DELAYED RELEASE ORAL DAILY
Status: DISCONTINUED | OUTPATIENT
Start: 2023-06-21 | End: 2023-06-22

## 2023-06-21 RX ORDER — GABAPENTIN 400 MG/1
400 CAPSULE ORAL 3 TIMES DAILY
Status: DISCONTINUED | OUTPATIENT
Start: 2023-06-21 | End: 2023-06-22 | Stop reason: HOSPADM

## 2023-06-21 RX ORDER — BUTALBITAL, ACETAMINOPHEN AND CAFFEINE 50; 325; 40 MG/1; MG/1; MG/1
1 TABLET ORAL EVERY 4 HOURS PRN
Status: DISCONTINUED | OUTPATIENT
Start: 2023-06-21 | End: 2023-06-22 | Stop reason: HOSPADM

## 2023-06-21 RX ORDER — NAPROXEN 250 MG/1
500 TABLET ORAL 2 TIMES DAILY WITH MEALS
Status: DISCONTINUED | OUTPATIENT
Start: 2023-06-21 | End: 2023-06-22

## 2023-06-21 RX ORDER — CLONIDINE HYDROCHLORIDE 0.1 MG/1
0.1 TABLET ORAL EVERY 6 HOURS PRN
Status: DISCONTINUED | OUTPATIENT
Start: 2023-06-21 | End: 2023-06-22 | Stop reason: HOSPADM

## 2023-06-21 RX ADMIN — ATORVASTATIN CALCIUM 40 MG: 40 TABLET, FILM COATED ORAL at 21:09

## 2023-06-21 RX ADMIN — GABAPENTIN 400 MG: 400 CAPSULE ORAL at 13:24

## 2023-06-21 RX ADMIN — DULOXETINE HYDROCHLORIDE 20 MG: 20 CAPSULE, DELAYED RELEASE ORAL at 16:13

## 2023-06-21 RX ADMIN — ENOXAPARIN SODIUM 30 MG: 30 INJECTION SUBCUTANEOUS at 16:14

## 2023-06-21 RX ADMIN — HYDROCODONE BITARTRATE AND ACETAMINOPHEN 1 TABLET: 5; 325 TABLET ORAL at 00:29

## 2023-06-21 RX ADMIN — Medication 100 MG: at 08:24

## 2023-06-21 RX ADMIN — IBUPROFEN 600 MG: 600 TABLET ORAL at 08:23

## 2023-06-21 RX ADMIN — TRAZODONE HYDROCHLORIDE 50 MG: 50 TABLET ORAL at 21:09

## 2023-06-21 RX ADMIN — GABAPENTIN 900 MG: 300 CAPSULE ORAL at 00:29

## 2023-06-21 RX ADMIN — ASPIRIN 325 MG: 325 TABLET, DELAYED RELEASE ORAL at 08:23

## 2023-06-21 RX ADMIN — MULTIPLE VITAMINS W/ MINERALS TAB 1 TABLET: TAB at 08:24

## 2023-06-21 RX ADMIN — NAPROXEN 500 MG: 250 TABLET ORAL at 16:22

## 2023-06-21 RX ADMIN — Medication 400 MG: at 08:24

## 2023-06-21 RX ADMIN — GABAPENTIN 400 MG: 400 CAPSULE ORAL at 21:08

## 2023-06-21 RX ADMIN — CLONIDINE HYDROCHLORIDE 0.1 MG: 0.1 TABLET ORAL at 00:29

## 2023-06-21 RX ADMIN — CYANOCOBALAMIN TAB 1000 MCG 1000 MCG: 1000 TAB at 08:24

## 2023-06-21 RX ADMIN — NICOTINE 1 PATCH: 14 PATCH, EXTENDED RELEASE TRANSDERMAL at 08:31

## 2023-06-21 RX ADMIN — ACETAMINOPHEN 650 MG: 325 TABLET ORAL at 19:21

## 2023-06-21 RX ADMIN — FOLIC ACID 1 MG: 1 TABLET ORAL at 08:24

## 2023-06-21 RX ADMIN — GABAPENTIN 400 MG: 400 CAPSULE ORAL at 08:24

## 2023-06-21 RX ADMIN — ACETAMINOPHEN, ASPIRIN, CAFFEINE 2 TABLET: 250; 250; 65 TABLET, FILM COATED ORAL at 11:23

## 2023-06-21 RX ADMIN — LORAZEPAM 1 MG: 1 TABLET ORAL at 04:55

## 2023-06-21 RX ADMIN — HYDROCODONE BITARTRATE AND ACETAMINOPHEN 1 TABLET: 5; 325 TABLET ORAL at 09:24

## 2023-06-21 RX ADMIN — HYDROCODONE BITARTRATE AND ACETAMINOPHEN 1 TABLET: 5; 325 TABLET ORAL at 04:55

## 2023-06-21 RX ADMIN — Medication 25 MCG: at 08:24

## 2023-06-21 RX ADMIN — BUTALBITAL, ACETAMINOPHEN AND CAFFEINE 1 TABLET: 50; 325; 40 TABLET ORAL at 21:09

## 2023-06-21 ASSESSMENT — ACTIVITIES OF DAILY LIVING (ADL)
ADLS_ACUITY_SCORE: 37

## 2023-06-21 NOTE — PLAN OF CARE
"  Goal Outcome Evaluation:  Pt AO x4. Verbalized depression. No suicide ideation. Last CIWA, 6 . Required/requested PRN anxiety and pain medication. Headache 8/10. Room air. Seizure pads applied to bed. Stand by assist. VSS.     Problem: Plan of Care - These are the overarching goals to be used throughout the patient stay.    Goal: Plan of Care Review  Description: The Plan of Care Review/Shift note should be completed every shift.  The Outcome Evaluation is a brief statement about your assessment that the patient is improving, declining, or no change.  This information will be displayed automatically on your shift note.  6/21/2023 0438 by Sybil James RN  Outcome: Progressing  6/21/2023 0437 by Sybil James RN  Outcome: Progressing  Goal: Patient-Specific Goal (Individualized)  Description: You can add care plan individualizations to a care plan. Examples of Individualization might be:  \"Parent requests to be called daily at 9am for status\", \"I have a hard time hearing out of my right ear\", or \"Do not touch me to wake me up as it startles me\".  6/21/2023 0438 by Sybil James RN  Outcome: Progressing  6/21/2023 0437 by Sybil James RN  Outcome: Progressing  Goal: Absence of Hospital-Acquired Illness or Injury  6/21/2023 0438 by Sybil James RN  Outcome: Progressing  6/21/2023 0437 by Sybil James RN  Outcome: Progressing  Goal: Optimal Comfort and Wellbeing  6/21/2023 0438 by Sybil James RN  Outcome: Progressing  6/21/2023 0437 by Sybil James RN  Outcome: Progressing  Goal: Readiness for Transition of Care  6/21/2023 0438 by Sybil James RN  Outcome: Progressing  6/21/2023 0437 by Sybil James RN  Outcome: Progressing     Problem: Risk for Delirium  Goal: Optimal Coping  6/21/2023 0438 by Sybil James RN  Outcome: Progressing  6/21/2023 0437 by Sybil James RN  Outcome: Progressing  Goal: Improved Behavioral Control  6/21/2023 0438 by Sybil James" RN  Outcome: Progressing  6/21/2023 0437 by Sybil James RN  Outcome: Progressing  Goal: Improved Attention and Thought Clarity  6/21/2023 0438 by Sybil James RN  Outcome: Progressing  6/21/2023 0437 by Sybil James RN  Outcome: Progressing  Goal: Improved Sleep  6/21/2023 0438 by Sybil James RN  Outcome: Progressing  6/21/2023 0437 by Sybil James RN  Outcome: Progressing
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
## 2023-06-21 NOTE — PROGRESS NOTES
SPIRITUAL HEALTH SERVICES (SHS)  SPIRITUAL ASSESSMENT Progress Note  [facility]. [address]     REFERRAL SOURCE: LOS      Mr Watson practises Native Spirituality. I introduced him to the Spiritual Care department. I invited him to contact [facility]  if he would like any support with his spirituality and/or emotional support.       PLAN: SHS are available for spiritual and emotional support.       Lluvia Cruz, Ph.D., [facility]       SHS available 24/7 for emergency requests/referrals, either by having the on-call  paged or by entering an ASAP/STAT consult in Epic (this will also page the on-call ).
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
## 2023-06-21 NOTE — PROGRESS NOTES
Care Management Follow Up    Length of Stay (days): 3    Expected Discharge Date: 06/22/2023     Concerns to be Addressed:       Patient plan of care discussed at interdisciplinary rounds: Yes    Anticipated Discharge Disposition:       Anticipated Discharge Services:    Anticipated Discharge DME:      Patient/family educated on Medicare website which has current facility and service quality ratings:    Education Provided on the Discharge Plan:    Patient/Family in Agreement with the Plan:      Referrals Placed by CM/SW:    Private pay costs discussed: Not applicable    Additional Information:  Message left for [facility] to inquire about availability to accept pt.  Additional TCU referrals made.    After last hospitalization a referral was made to [facility], [facility], they were unable to open pt to services before being hospitalized again.      DRAKE Young
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
## 2023-06-21 NOTE — PROGRESS NOTES
Pain recent pain med given for headache. Education given to patient about weaning off this medication and using non pharm interventions.   Reassess 1957    Ciwa last score 7, resassess 2212/prn. Headache and tremors today, tremors less than yesterday.     Independent in room.     IV saline locked.     Off tele.     SAY x4      Mag, K, phos protocol ran to check in am.

## 2023-06-22 VITALS
BODY MASS INDEX: 19.28 KG/M2 | OXYGEN SATURATION: 100 % | DIASTOLIC BLOOD PRESSURE: 76 MMHG | RESPIRATION RATE: 16 BRPM | SYSTOLIC BLOOD PRESSURE: 135 MMHG | WEIGHT: 126.8 LBS | HEART RATE: 60 BPM | TEMPERATURE: 97.5 F

## 2023-06-22 PROBLEM — R51.9 CHRONIC HEADACHE: Status: ACTIVE | Noted: 2023-06-22

## 2023-06-22 PROBLEM — G89.29 CHRONIC HEADACHE: Status: ACTIVE | Noted: 2023-06-22

## 2023-06-22 PROBLEM — F10.90 ALCOHOL USE DISORDER: Status: ACTIVE | Noted: 2023-06-22

## 2023-06-22 PROBLEM — Z72.0 TOBACCO USE: Status: ACTIVE | Noted: 2023-06-22

## 2023-06-22 PROCEDURE — 250N000013 HC RX MED GY IP 250 OP 250 PS 637: Performed by: NURSE PRACTITIONER

## 2023-06-22 PROCEDURE — 250N000013 HC RX MED GY IP 250 OP 250 PS 637: Performed by: FAMILY MEDICINE

## 2023-06-22 PROCEDURE — 99239 HOSP IP/OBS DSCHRG MGMT >30: CPT | Performed by: FAMILY MEDICINE

## 2023-06-22 PROCEDURE — 99233 SBSQ HOSP IP/OBS HIGH 50: CPT | Performed by: REGISTERED NURSE

## 2023-06-22 RX ORDER — HYDROXYZINE HYDROCHLORIDE 50 MG/1
50 TABLET, FILM COATED ORAL EVERY 6 HOURS PRN
Qty: 30 TABLET | Refills: 0 | Status: SHIPPED | OUTPATIENT
Start: 2023-06-22

## 2023-06-22 RX ORDER — MIRTAZAPINE 7.5 MG/1
7.5 TABLET, FILM COATED ORAL AT BEDTIME
Status: DISCONTINUED | OUTPATIENT
Start: 2023-06-22 | End: 2023-06-22 | Stop reason: HOSPADM

## 2023-06-22 RX ORDER — GABAPENTIN 400 MG/1
400 CAPSULE ORAL 3 TIMES DAILY
Qty: 90 CAPSULE | Refills: 0 | Status: SHIPPED | OUTPATIENT
Start: 2023-06-22

## 2023-06-22 RX ORDER — NAPROXEN 250 MG/1
500 TABLET ORAL 2 TIMES DAILY WITH MEALS
Status: DISCONTINUED | OUTPATIENT
Start: 2023-06-22 | End: 2023-06-22

## 2023-06-22 RX ORDER — ACETAMINOPHEN 325 MG/1
650 TABLET ORAL EVERY 4 HOURS PRN
COMMUNITY
Start: 2023-06-22

## 2023-06-22 RX ORDER — NAPROXEN 500 MG/1
500 TABLET ORAL 2 TIMES DAILY WITH MEALS
Qty: 30 TABLET | Refills: 0 | Status: SHIPPED | OUTPATIENT
Start: 2023-06-22

## 2023-06-22 RX ORDER — LANOLIN ALCOHOL/MO/W.PET/CERES
100 CREAM (GRAM) TOPICAL DAILY
Qty: 30 TABLET | Refills: 0 | Status: SHIPPED | OUTPATIENT
Start: 2023-06-23

## 2023-06-22 RX ORDER — MULTIPLE VITAMINS W/ MINERALS TAB 9MG-400MCG
1 TAB ORAL DAILY
COMMUNITY
Start: 2023-06-23

## 2023-06-22 RX ORDER — MIRTAZAPINE 7.5 MG/1
7.5 TABLET, FILM COATED ORAL AT BEDTIME
Qty: 30 TABLET | Refills: 0 | Status: SHIPPED | OUTPATIENT
Start: 2023-06-22

## 2023-06-22 RX ORDER — NICOTINE 21 MG/24HR
1 PATCH, TRANSDERMAL 24 HOURS TRANSDERMAL DAILY
Qty: 28 PATCH | Refills: 0 | Status: SHIPPED | OUTPATIENT
Start: 2023-06-23

## 2023-06-22 RX ORDER — NAPROXEN 250 MG/1
500 TABLET ORAL 2 TIMES DAILY WITH MEALS
Status: DISCONTINUED | OUTPATIENT
Start: 2023-06-22 | End: 2023-06-22 | Stop reason: HOSPADM

## 2023-06-22 RX ORDER — FOLIC ACID 1 MG/1
1 TABLET ORAL DAILY
Qty: 30 TABLET | Refills: 0 | Status: SHIPPED | OUTPATIENT
Start: 2023-06-23

## 2023-06-22 RX ADMIN — NAPROXEN 500 MG: 250 TABLET ORAL at 10:06

## 2023-06-22 RX ADMIN — Medication 400 MG: at 10:06

## 2023-06-22 RX ADMIN — ASPIRIN 325 MG: 325 TABLET, DELAYED RELEASE ORAL at 11:34

## 2023-06-22 RX ADMIN — Medication 5 MG: at 00:30

## 2023-06-22 RX ADMIN — GABAPENTIN 400 MG: 400 CAPSULE ORAL at 10:06

## 2023-06-22 RX ADMIN — CYANOCOBALAMIN TAB 1000 MCG 1000 MCG: 1000 TAB at 10:06

## 2023-06-22 RX ADMIN — DULOXETINE HYDROCHLORIDE 20 MG: 20 CAPSULE, DELAYED RELEASE ORAL at 10:16

## 2023-06-22 RX ADMIN — ACETAMINOPHEN 650 MG: 325 TABLET ORAL at 12:17

## 2023-06-22 RX ADMIN — FOLIC ACID 1 MG: 1 TABLET ORAL at 10:06

## 2023-06-22 RX ADMIN — MULTIPLE VITAMINS W/ MINERALS TAB 1 TABLET: TAB at 10:06

## 2023-06-22 RX ADMIN — NICOTINE 1 PATCH: 14 PATCH, EXTENDED RELEASE TRANSDERMAL at 10:10

## 2023-06-22 RX ADMIN — ACETAMINOPHEN 650 MG: 325 TABLET ORAL at 00:33

## 2023-06-22 RX ADMIN — Medication 25 MCG: at 10:05

## 2023-06-22 RX ADMIN — Medication 100 MG: at 10:06

## 2023-06-22 ASSESSMENT — ACTIVITIES OF DAILY LIVING (ADL)
ADLS_ACUITY_SCORE: 37

## 2023-06-22 NOTE — PROGRESS NOTES
Headache still 8/10, naproxen given. Aspirin due 1100 because naproxen and aspirin need to be one hour apart.     Patient ambulates SBA.     2 IV SL.    ALOx4     Patient requests to shower and see the sun today.     Pt still sad and depressed today interventions above he stated would help.

## 2023-06-22 NOTE — PROGRESS NOTES
Occupational Therapy Discharge Summary    Reason for therapy discharge:    Discharged to home with home therapy.    Progress towards therapy goal(s). See goals on Care Plan in [facility] electronic health record for goal details.  Goals partially met.  Barriers to achieving goals:   discharge from facility.    Therapy recommendation(s):    Continued therapy is recommended.  Rationale/Recommendations:  Recommended TCU - pt declined. Recommend home OT for pt to progress ADL ind.

## 2023-06-22 NOTE — PLAN OF CARE
Goal Outcome Evaluation:      Problem: Plan of Care - These are the overarching goals to be used throughout the patient stay.    Goal: Plan of Care Review  Description: The Plan of Care Review/Shift note should be completed every shift.  The Outcome Evaluation is a brief statement about your assessment that the patient is improving, declining, or no change.  This information will be displayed automatically on your shift note.  Outcome: Progressing     Problem: Pain Chronic (Persistent)  Goal: Optimal Pain Control and Function  Outcome: Progressing    Patient reports headache 8/10 at beginning of shift and received Tylenol.  Patient appears to be sleeping in between cares and no other requests for pain medication.  Pt alert and oriented.  VSS.
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
## 2023-06-22 NOTE — PROGRESS NOTES
Care Management Discharge Note    Discharge Date: 06/22/2023       Discharge Disposition:  Home with [facility] (PT/OT/RN/SW)    Discharge Services:  Home care    Discharge DME:  none    Discharge Transportation:  Cab voucher    Private pay costs discussed: Not applicable    Education Provided on the Discharge Plan:  yes  Persons Notified of Discharge Plans: pt, home care  Patient/Family in Agreement with the Plan:  yes    Handoff Referral Completed: Yes    Additional Information:  1:38 PM  SAMARIA informed pt is declining TCU.  Pt will discharge home with [facility] (PT/OT/RN/SW).  SW faxed HC orders.  Cab voucher home to be provided by charge nurse.  Clinic care coordination referral sent per protocol to assist with complex needs such as: medical, social, or financial troubles.  The Care Coordinator works with the patient and their Primary Care Provider to determine health goals, obtain resources, achieve outcomes, and develop care plans that help coordinate the patient's care.   No further discharge needs.        JO Warner

## 2023-06-22 NOTE — DISCHARGE SUMMARY
[facility] MEDICINE  DISCHARGE SUMMARY     Primary Care Physician: Daniel Martinez  Admission Date: 6/18/2023   Discharge Provider: Chip Bustos MD Discharge Date: 6/22/2023    Diet:   Active Diet and Nourishment Order   Procedures     Regular Diet Adult     Diet   Complete abstinence from alcohol Code Status: Full Code   Activity: DCACTIVITY: Activity as tolerated  Fall precautions.  Ambulate with walker.      Condition at Discharge: Fair     REASON FOR PRESENTATION(See Admission Note for Details)   Alcohol withdrawal and weakness    PRINCIPAL & ACTIVE DISCHARGE DIAGNOSES     Principal Problem:    Acute lactic acidosis  Active Problems:    Anemia, unspecified type    Alcoholic intoxication without complication (H)    Altered mental status, unspecified altered mental status type    Acute nonintractable headache, unspecified headache type    Alcohol use disorder    Tobacco use    Chronic headache  History of alcohol withdrawal related seizure and PEA arrest April 2023  Generalized weakness  Failure to thrive  Vulnerable adult  History of craniotomy 1989  Depression  Hyperlipidemia  Elevated lipase  COPD  Cavitary lung lesion left upper lobe  Hyponatremia    Clinically Significant Risk Factors              # Hypoalbuminemia: Lowest albumin = 2.9 g/dL at 6/19/2023  6:41 AM, will monitor as appropriate                     PENDING LABS     Unresulted Labs Ordered in the Past 30 Days of this Admission     No orders found from 5/19/2023 to 6/19/2023.        PROCEDURES ( this hospitalization only)      None    RECOMMENDATIONS TO OUTPATIENT PROVIDER FOR F/U VISIT   Follow-up Appointments     Follow-up and recommended labs and tests       1.  Follow with primary care provider in 3 to 5 days.  2.  Follow-up with mental health services per psychiatry recommendations.  3.  Follow-up with chemical dependency support services.  4.  Recommend follow-up with a neurologist regarding chronic  headaches.    Give office phone number for [facility] to patient.  5.  Recommend outpatient CT scan in 1 month to evaluate left upper lobe   cavitary lesion.              DISPOSITION     Home with home care    SUMMARY OF HOSPITAL COURSE:      Familia Watson is a 72 year old male with a PMH of alcohol abuse, depression, migraine headaches, craniotomy 1989, emphysema, HTN, cavitary lesion.  At [facility] 6/13 - 6/18 for alcohol withdrawal.  Left before CD evaluation.  6/18/2023 brought in by EMS to  ED with blood alcohol level of 200.  This is his fifth hospital admission since December.  Admits to drinking heavily since the death of his wife in December.  Elevated lipase and lactic acid noted.  Admitted for alcohol withdrawal.    1.  Alcohol abuse.  Utilizing alcohol withdrawal protocol.  Did well and ultimately was scoring low on CIWA.  Evaluated by chemical dependency counselor.  TCU with [facility] program recommended.  Patient declined TCU recommendation.  Provided resources for outpatient management.  Critical importance of alcohol abstinence discussed at length with patient.     2.  Psychiatric.  Patient reports feeling depressed.  Is going to be evicted from his apartment.  Psychiatry consult appreciated.   Patient will be started on Remeron at discharge.  Outpatient counseling services recommended.      3.  Neurologic.  Persistent left frontal headache despite Excedrin/Norco.  CT head negative for acute findings just shows his old craniotomy scars.   Pain team consulted to help with headache.  Consistently rating headache 7-8 out of 10 but able to function and does not require intravenous pain medication.  Cleared for discharge.  Recommend follow-up with neurology for his chronic headaches.  Likely related to his craniotomy.  Can take Tylenol, Naprosyn, hydroxyzine and scheduled gabapentin.      4.  Generalized weakness.  PT OT evaluated patient and recommended TCU.  Vulnerable adult report filed at time of  admission.  Omid cognitive testing scored a 5 out of 5.8.  Patient is competent to make his own decisions.  Declining TCU.  Will be set up with home care services.    5.  Pulmonary.  At previous hospitalization CT scan of the chest showed a left upper lobe infiltrate/cavitary lesion.  Recommended repeat chest CT in 4 to 6 weeks.    Medically otherwise stable.    Discharge Medications with Med changes:     Current Discharge Medication List      START taking these medications    Details   acetaminophen (TYLENOL) 325 MG tablet Take 2 tablets (650 mg) by mouth every 4 hours as needed for mild pain, other, headaches or fever (and adjunct with moderate or severe pain or per patient request)    Associated Diagnoses: Acute nonintractable headache, unspecified headache type      folic acid (FOLVITE) 1 MG tablet Take 1 tablet (1 mg) by mouth daily  Qty: 30 tablet, Refills: 0    Associated Diagnoses: Alcohol use disorder      melatonin 1 MG TABS tablet Take 1 tablet (1 mg) by mouth nightly as needed for sleep  Qty: 30 tablet, Refills: 0    Associated Diagnoses: Anxiety      mirtazapine (REMERON) 7.5 MG tablet Take 1 tablet (7.5 mg) by mouth At Bedtime  Qty: 30 tablet, Refills: 0    Associated Diagnoses: Anxiety; Depression, unspecified depression type      multivitamin w/minerals (THERA-VIT-M) tablet Take 1 tablet by mouth daily    Associated Diagnoses: Alcohol use disorder      naproxen (NAPROSYN) 500 MG tablet Take 1 tablet (500 mg) by mouth 2 times daily (with meals)  Qty: 30 tablet, Refills: 0    Associated Diagnoses: Chronic nonintractable headache, unspecified headache type      nicotine (NICODERM CQ) 14 MG/24HR 24 hr patch Place 1 patch onto the skin daily  Qty: 28 patch, Refills: 0    Associated Diagnoses: Tobacco use      thiamine (B-1) 100 MG tablet Take 1 tablet (100 mg) by mouth daily  Qty: 30 tablet, Refills: 0    Associated Diagnoses: Alcohol use disorder         CONTINUE these medications which have CHANGED     Details   gabapentin (NEURONTIN) 400 MG capsule Take 1 capsule (400 mg) by mouth 3 times daily  Qty: 90 capsule, Refills: 0    Associated Diagnoses: Anxiety; Chronic nonintractable headache, unspecified headache type; Alcohol withdrawal syndrome without complication (H)      hydrOXYzine (ATARAX) 50 MG tablet Take 1 tablet (50 mg) by mouth every 6 hours as needed for other or anxiety (nausea)  Qty: 30 tablet, Refills: 0    Associated Diagnoses: Anxiety; Chronic nonintractable headache, unspecified headache type         CONTINUE these medications which have NOT CHANGED    Details   aspirin (ASA) 325 MG EC tablet Take 325 mg by mouth daily      atorvastatin (LIPITOR) 40 MG tablet Take 40 mg by mouth At Bedtime      cyanocobalamin (VITAMIN B-12) 1000 MCG tablet Take 1,000 mcg by mouth daily      magnesium oxide 400 MG CAPS Take 400 mg by mouth daily      prochlorperazine (COMPAZINE) 10 MG tablet Take 10 mg by mouth 2 times daily as needed for nausea or vomiting      Vitamin D3 (CHOLECALCIFEROL) 25 mcg (1000 units) tablet Take 25 mcg by mouth daily         STOP taking these medications       ibuprofen (ADVIL/MOTRIN) 600 MG tablet Comments:   Reason for Stopping:         traZODone (DESYREL) 50 MG tablet Comments:   Reason for Stopping:                 Rationale for medication changes:      Remeron for depression.  Vitamins for alcohol abuse.  Pain medication for chronic headache.      Consults     CARE MANAGEMENT / SOCIAL WORK IP CONSULT  PHYSICAL THERAPY ADULT IP CONSULT  OCCUPATIONAL THERAPY ADULT IP CONSULT  PSYCHIATRY IP CONSULT  CHEMICAL DEPENDENCY IP CONSULT  PAIN MANAGEMENT ADULT IP CONSULT  PSYCHIATRY IP CONSULT      Immunizations given this encounter     Most Recent Immunizations   Administered Date(s) Administered     COVID-19 Bivalent 18+ (Moderna) 12/09/2022     COVID-19 Monovalent 18+ (Moderna) 05/22/2021     Influenza (intradermal) 10/11/2017     Pneumo Conj 13-V (2010&after) 10/11/2017     Pneumococcal 23  valent 07/03/2019     TDAP Vaccine (Boostrix) 12/18/2009           Anticoagulation Information      Recent INR results:   Recent Labs   Lab 06/18/23 2058   INR 0.96     Warfarin doses (if applicable) or name of other anticoagulant: N/A      SIGNIFICANT IMAGING FINDINGS     Results for orders placed or performed during the hospital encounter of 06/18/23   Head CT w/o contrast    Impression    IMPRESSION:  HEAD CT:  1.  No CT finding of a mass, hemorrhage or focal area suggestive of acute infarct.  2.  Mild diffuse age related changes.    CERVICAL SPINE CT:  1.  No CT evidence for acute fracture or post traumatic subluxation.  2.  Degenerative disc disease at the C5-C6 and the C6-C7 disc space level as described above.   Cervical spine CT w/o contrast    Impression    IMPRESSION:  HEAD CT:  1.  No CT finding of a mass, hemorrhage or focal area suggestive of acute infarct.  2.  Mild diffuse age related changes.    CERVICAL SPINE CT:  1.  No CT evidence for acute fracture or post traumatic subluxation.  2.  Degenerative disc disease at the C5-C6 and the C6-C7 disc space level as described above.   Chest XR,  PA & LAT    Impression    IMPRESSION: Chronic fibrosis in the left upper lobe and at the lung bases similar to previous. Along for the chronic changes, no acute consolidation visualized. No pleural fluid or pneumothorax. Normal heart size and pulmonary vascularity. Mild calcified   plaque of the aortic arch.       SIGNIFICANT LABORATORY FINDINGS     Most Recent 3 CBC's:Recent Labs   Lab Test 06/19/23  0641 06/18/23 2058 06/18/23  0914   WBC 5.6 8.4 5.1   HGB 9.0* 8.7* 9.2*    97 101*    278 247     Most Recent 3 BMP's:Recent Labs   Lab Test 06/20/23  0721 06/19/23  1104 06/19/23  0641 06/19/23  0003 06/18/23 2058   NA  --   --  138 138 134*   POTASSIUM 4.0  --  4.2 4.2  4.2 3.9   CHLORIDE  --   --  106 105 99   CO2  --   --  25 22 24   BUN  --   --  11 11 13   CR  --  0.62* 0.67* 0.69* 0.74    ANIONGAP  --   --  7 11 11   MELLISA  --   --  8.5 8.6 8.6   GLC  --   --  95 92 94     Most Recent 2 LFT's:Recent Labs   Lab Test 06/19/23 0641 06/19/23  0003   AST 30 38   ALT 27 30   ALKPHOS 72 76   BILITOTAL 0.1 0.1     Most Recent 3 INR's:Recent Labs   Lab Test 06/18/23 2058 04/30/23  1514 01/25/22  1429   INR 0.96 1.04 1.16*     Most Recent 3 BNP's:No lab results found.  Most Recent TSH and T4:Recent Labs   Lab Test 04/21/23  1149   TSH 0.91     Most Recent Hemoglobin A1c:No lab results found.  Most Recent 6 glucoses:Recent Labs   Lab Test 06/19/23 0641 06/19/23  0003 06/18/23 2058 06/18/23  1528 06/18/23  0914 06/17/23  1814   GLC 95 92 94 143* 198* 111*     Recent Results (from the past 1008 hour(s))   Troponin I    Collection Time: 06/18/23  8:58 PM   Result Value Ref Range    Troponin I <0.01 0.00 - 0.29 ng/mL   Troponin I    Collection Time: 06/12/23  7:45 PM   Result Value Ref Range    Troponin I 0.01 0.00 - 0.29 ng/mL   Troponin I    Collection Time: 05/13/23  2:22 PM   Result Value Ref Range    Troponin I <0.01 0.00 - 0.29 ng/mL     7-Day Micro Results     Collected Updated Procedure Result Status      06/18/2023 2119 06/18/2023 2331 Asymptomatic COVID-19 Virus (Coronavirus) by PCR Nasopharyngeal [[id number]]    Swab from Nasopharyngeal    Final result Component Value   SARS CoV2 PCR Negative   NEGATIVE: SARS-CoV-2 (COVID-19) RNA not detected, presumed negative.                    Discharge Orders        Home Care Referral      Primary Care - Care Coordination Referral      Reason for your hospital stay    Alcohol withdrawal and headache     Follow-up and recommended labs and tests     1.  Follow with primary care provider in 3 to 5 days.  2.  Follow-up with mental health services per psychiatry recommendations.  3.  Follow-up with chemical dependency support services.  4.  Recommend follow-up with a neurologist regarding chronic headaches.  Give office phone number for [facility] to  patient.  5.  Recommend outpatient CT scan in 1 month to evaluate left upper lobe cavitary lesion.     Activity    Your activity upon discharge: activity as tolerated.  Fall precautions.     When to contact your care team    Call your primary doctor if you have any of the following: temperature greater than 100.5,  increased shortness of breath, increased drainage, increased swelling, or increased pain.     Diet    Follow this diet upon discharge: Orders Placed This Encounter      Regular Diet Adult.  No alcohol consumption.       Examination   Physical Exam   Temp:  [97.5  F (36.4  C)-98.3  F (36.8  C)] 97.5  F (36.4  C)  Pulse:  [60-75] 60  Resp:  [16-18] 16  BP: (132-151)/(73-97) 135/76  SpO2:  [99 %-100 %] 100 %  Wt Readings from Last 4 Encounters:   06/21/23 57.5 kg (126 lb 12.8 oz)   06/14/23 55.5 kg (122 lb 4.8 oz)   06/12/23 72.6 kg (160 lb)   05/20/23 65.8 kg (145 lb)       Constitutional: awake, alert, cooperative, no apparent distress, appears older than stated age and thin  Eyes: Lids and lashes normal, pupils equal, round and reactive to light, extra ocular muscles intact, sclera clear, conjunctiva normal  ENT: Normocephalic, without obvious abnormality, atraumatic, sinuses nontender on palpation, external ears without lesions, oral pharynx with moist mucous membranes, tonsils without erythema or exudates, gums normal and good dentition.  Respiratory: No increased work of breathing, good air exchange, clear to auscultation bilaterally, no crackles or wheezing  Cardiovascular: Normal apical impulse, regular rate and rhythm, normal S1 and S2, no S3 or S4, and no murmur noted  GI: No scars, normal bowel sounds, soft, non-distended, non-tender, no masses palpated, no hepatosplenomegally  Skin: normal skin color, texture, turgor, no redness, warmth, or swelling, and no rashes  Musculoskeletal: There is no redness, warmth, or swelling of the joints.  Full range of motion noted.  Motor strength is 5 out of 5  all extremities bilaterally.  Tone is normal. no lower extremity pitting edema present  Neurologic: Cranial nerves II-XII are grossly intact. Sensory:  Sensory intact  Neuropsychiatric: General: normal, calm and normal eye contact Level of consciousness: alert / normal Affect: normal Orientation: oriented to self, place, time and situation Memory and insight: normal, memory for past and recent events intact and thought process normal      Please see EMR for more detailed significant labs, imaging, consultant notes etc.    I, Chip Bustos MD, personally saw the patient today and spent greater than 30 minutes discharging this patient.    Chip Bustos MD  [facility]    CC:Daniel Martinez

## 2023-06-22 NOTE — PLAN OF CARE
Patient ok'd for discharged, given that TCU was refused by patient. IV Removed. Education given. Cab picking patient up. AVS printed and signed.

## 2023-06-22 NOTE — PLAN OF CARE
Physical Therapy Discharge Summary    Reason for therapy discharge:    Discharged to home with home therapy.    Progress towards therapy goal(s). See goals on Care Plan in [facility] electronic health record for goal details.  Goals not met.  Barriers to achieving goals:   discharge from facility.    Therapy recommendation(s):    Continued therapy is recommended.  Rationale/Recommendations:  continued PT with home PT to improve functional mobility.  Continue home exercise program.

## 2023-06-22 NOTE — DISCHARGE INSTRUCTIONS
Home care services have been arrange for you    Home Care Agency: [facility]    Home Care Phone: [phone]    Services: RN/PT/OT/SW    Instructions: Home care will visit within 48 hrs after discharge.  Provider will call you to schedule visit.

## 2023-06-22 NOTE — PROGRESS NOTES
"[facility] ACUTE PAIN SERVICE      Assessment/Plan:     Familia Watson is a 72 year old male who was admitted on 6/18/2023.  Pain team was asked to see the patient for headache. Admitted for blood alcohol level of 200.  This is his fifth hospital admission since December.  Admits to drinking heavily since the death of his wife in December. History including depression, migraine headaches, craniotomy 1989, emphysema, HTN, cavitary lesion, alcohol mis-use.   Recent admission at [facility] 6/13 to 6/18 for alcohol withdrawal.  Notes reveal re: headaches:  \"# Left-sided migraines, persistent  Familia reports that he has been having migraines since \"[place],\" per night team. He has had daily headaches that have persistent despite resolution of withdrawal symptoms. Two CT Head's were performed during his stay and were unremarkable for acute intracranial pathology. No neurologic deficits on physical exam, but his bilateral trapezii are particularly hypertonic, especially his left trapezius. It is possible that the etiology of his headache is muscular. Although the migraines are persistent, he states that this feels no different than his typical migraine. Sumatriptan and toradol only mildly helpful. Headache now resolved after a full meal and full night's sleep. Will continue to monitor.\"    Chem dep and Psychiatry consulted.  Opioid Induced Respiratory Depression Risk Assessment: High:     PLAN:   1) Pain is consistent with acute on chronic headache, Left-sided frontal headache with visual aura, history of craniotomy 1989. Reports history of migraines but has not has a migraine for the past 1.5 years. He reports Imitrex, acupuncture, NSAIDs, APAP not helpful for headaches. Reports Labs and imaging indicated: I have personally reviewed pertinent notes, labs, tests, and radiologic imaging in patient's chart. CT head and cervical spine shows no acute changes. Treatment plan includes multimodal pain approach, Hospital " Medicine Service for medical management. Patient educated regarding multimodal pain approach, medications as listed below. Patient is understanding of the plan. All questions and concerns addressed to patient's satisfaction.   2)Multimodal Medication Therapy  Topical: none  NSAID'S: CrCl 89 ml/min.naproxen bid with meals. He is taking daily ASA.   Steroids: none  Muscle Relaxants: none  Adjuvants:  Tylenol 650 mg q 4 h prn, gabapentin decreased to 400 mg tid(home dose of 300 mg tid   Fioricet 1 tabs q4h prn,  Magnesium oxide 400 mg daily  Antidepressants/anxiolytics: CIWA discontinued, last dose of lorazepam given 0455 this am.   Cymbalta started yesterday per [facility]. In discussion with psych NP, this would not be the best medication if patient will return to drinking. Will follow and discuss with Dr. Bustos.   Opioids: none, high risk for opioids in the setting of ETOH use.   IV Pain medication: discontinued  3)Non-medication interventions:  Ice, rest     -Opioid prescriber has been Dr. Vargas-Sister Brock  -[place]  pulled from system on 6/21/23. This indicates previous opioid use. Most recent [place]  entries:  05/31/23 tramadol 50 mg #30  05/08/23 gabapentin 300 mg #90  05/08/23 oxycodone 5 mg #8  Discharge Recommendations - We recommend prescribing the following at the time of discharge: no opioids, no Fioricet at discharge. Chemical dependency evaluation consulted.  TCU with [facility] program recommended.      Subjective:   Patient is shaking in lower body when I enter room; he tells me this is because he has to use the restroom, not due to anything with ETOH withdrawal. I did help him use his call light to get assistance since he has bed. He reports pain in head is 'same' at 7/10 (did rate it worse overnight). Esgic not helpful overnight. Wondering if Naproxen has caused a slight benefit 'it's so hard to tell with everything I'm getting'.       Did discuss with Dr. Bustos differential explorations such IV magnesium,  stopping naproxen and using short term ketorolac instead (watch hgb: 9.0), retrial of a triptan, IV benadryl, etc. Ultimately, pateint does seem medically and pain wise ready for discharge and he can explore these options for acute migraine relief (as well as prevention) if he chooses to [facility] headache clinic, which I did bring up with him today.     Nicole Sarmiento, Carmelo  Acute Care Pain Management Program  [facility] ([place], [place], [place])  Monday-Friday 8a-4p   Page via online paging system or Vocera Messaging

## 2023-06-22 NOTE — PLAN OF CARE
Problem: Pain Chronic (Persistent)  Goal: Optimal Pain Control and Function  Outcome: Progressing   Goal Outcome Evaluation:  A&Ox4. Up with SBA. Elevated blood pressure. Verbalized 8/10 headache throughout shift. Multiple PRNs and scheduled pain medications given with no improvement. Declined essential oil therapy and ice, stating they do not work. PRN trazodone given to promote sleep. Pt now resting in bed.

## 2023-06-23 ENCOUNTER — APPOINTMENT (OUTPATIENT)
Dept: CT IMAGING | Facility: CLINIC | Age: 73
End: 2023-06-23
Attending: EMERGENCY MEDICINE
Payer: MEDICARE

## 2023-06-23 ENCOUNTER — HOSPITAL ENCOUNTER (EMERGENCY)
Facility: CLINIC | Age: 73
Discharge: HOME | End: 2023-06-24
Attending: EMERGENCY MEDICINE | Admitting: EMERGENCY MEDICINE
Payer: MEDICARE

## 2023-06-23 DIAGNOSIS — F10.929 ALCOHOLIC INTOXICATION WITH COMPLICATION (H): ICD-10-CM
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
## 2023-06-23 LAB
ALBUMIN SERPL-MCNC: 3.4 G/DL (ref 3.5–5)
ALP SERPL-CCNC: 88 U/L (ref 45–120)
ALT SERPL W P-5'-P-CCNC: 32 U/L (ref 0–45)
ANION GAP SERPL CALCULATED.3IONS-SCNC: 19 MMOL/L (ref 5–18)
AST SERPL W P-5'-P-CCNC: 50 U/L (ref 0–40)
ATRIAL RATE - MUSE: 75 BPM
BASOPHILS # BLD AUTO: 0.1 10E3/UL (ref 0–0.2)
BASOPHILS NFR BLD AUTO: 1 %
BILIRUB SERPL-MCNC: 0.2 MG/DL (ref 0–1)
BUN SERPL-MCNC: 9 MG/DL (ref 8–28)
CALCIUM SERPL-MCNC: 8.4 MG/DL (ref 8.5–10.5)
CHLORIDE BLD-SCNC: 104 MMOL/L (ref 98–107)
CO2 SERPL-SCNC: 15 MMOL/L (ref 22–31)
CREAT SERPL-MCNC: 0.67 MG/DL (ref 0.7–1.3)
DIASTOLIC BLOOD PRESSURE - MUSE: 63 MMHG
EOSINOPHIL # BLD AUTO: 0.2 10E3/UL (ref 0–0.7)
EOSINOPHIL NFR BLD AUTO: 4 %
ERYTHROCYTE [DISTWIDTH] IN BLOOD BY AUTOMATED COUNT: 16.9 % (ref 10–15)
ETHANOL SERPL-MCNC: 294 MG/DL
GFR SERPL CREATININE-BSD FRML MDRD: >90 ML/MIN/1.73M2
GLUCOSE BLD-MCNC: 57 MG/DL (ref 70–125)
GLUCOSE BLDC GLUCOMTR-MCNC: 72 MG/DL (ref 70–99)
GLUCOSE BLDC GLUCOMTR-MCNC: 73 MG/DL (ref 70–99)
HCT VFR BLD AUTO: 32.5 % (ref 40–53)
HGB BLD-MCNC: 10.6 G/DL (ref 13.3–17.7)
IMM GRANULOCYTES # BLD: 0.1 10E3/UL
IMM GRANULOCYTES NFR BLD: 2 %
INTERPRETATION ECG - MUSE: NORMAL
LYMPHOCYTES # BLD AUTO: 1.8 10E3/UL (ref 0.8–5.3)
LYMPHOCYTES NFR BLD AUTO: 31 %
MCH RBC QN AUTO: 32.3 PG (ref 26.5–33)
MCHC RBC AUTO-ENTMCNC: 32.6 G/DL (ref 31.5–36.5)
MCV RBC AUTO: 99 FL (ref 78–100)
MONOCYTES # BLD AUTO: 0.4 10E3/UL (ref 0–1.3)
MONOCYTES NFR BLD AUTO: 7 %
NEUTROPHILS # BLD AUTO: 3.3 10E3/UL (ref 1.6–8.3)
NEUTROPHILS NFR BLD AUTO: 55 %
NRBC # BLD AUTO: 0 10E3/UL
NRBC BLD AUTO-RTO: 0 /100
P AXIS - MUSE: 66 DEGREES
PLATELET # BLD AUTO: 355 10E3/UL (ref 150–450)
POTASSIUM BLD-SCNC: 4.6 MMOL/L (ref 3.5–5)
PR INTERVAL - MUSE: 172 MS
PROT SERPL-MCNC: 6.8 G/DL (ref 6–8)
QRS DURATION - MUSE: 86 MS
QT - MUSE: 400 MS
QTC - MUSE: 446 MS
R AXIS - MUSE: 85 DEGREES
RBC # BLD AUTO: 3.28 10E6/UL (ref 4.4–5.9)
SODIUM SERPL-SCNC: 138 MMOL/L (ref 136–145)
SYSTOLIC BLOOD PRESSURE - MUSE: 114 MMHG
T AXIS - MUSE: 78 DEGREES
VENTRICULAR RATE- MUSE: 75 BPM
WBC # BLD AUTO: 6 10E3/UL (ref 4–11)

## 2023-06-23 PROCEDURE — 96360 HYDRATION IV INFUSION INIT: CPT

## 2023-06-23 PROCEDURE — 82077 ASSAY SPEC XCP UR&BREATH IA: CPT | Performed by: EMERGENCY MEDICINE

## 2023-06-23 PROCEDURE — 258N000003 HC RX IP 258 OP 636: Performed by: EMERGENCY MEDICINE

## 2023-06-23 PROCEDURE — 80053 COMPREHEN METABOLIC PANEL: CPT | Performed by: EMERGENCY MEDICINE

## 2023-06-23 PROCEDURE — 36415 COLL VENOUS BLD VENIPUNCTURE: CPT | Performed by: EMERGENCY MEDICINE

## 2023-06-23 PROCEDURE — 82962 GLUCOSE BLOOD TEST: CPT

## 2023-06-23 PROCEDURE — 72125 CT NECK SPINE W/O DYE: CPT | Mod: MA

## 2023-06-23 PROCEDURE — 99285 EMERGENCY DEPT VISIT HI MDM: CPT | Mod: 25

## 2023-06-23 PROCEDURE — 70450 CT HEAD/BRAIN W/O DYE: CPT | Mod: MA

## 2023-06-23 PROCEDURE — 93005 ELECTROCARDIOGRAM TRACING: CPT | Performed by: EMERGENCY MEDICINE

## 2023-06-23 PROCEDURE — 85025 COMPLETE CBC W/AUTO DIFF WBC: CPT | Performed by: EMERGENCY MEDICINE

## 2023-06-23 RX ADMIN — SODIUM CHLORIDE 1000 ML: 9 INJECTION, SOLUTION INTRAVENOUS at 20:11

## 2023-06-23 ASSESSMENT — ENCOUNTER SYMPTOMS
FEVER: 0
VOMITING: 0
BACK PAIN: 0
WEAKNESS: 0
ABDOMINAL PAIN: 0
CHILLS: 0
DYSURIA: 0
HEADACHES: 1
FATIGUE: 0
COUGH: 0
SHORTNESS OF BREATH: 0

## 2023-06-23 ASSESSMENT — ACTIVITIES OF DAILY LIVING (ADL)
ADLS_ACUITY_SCORE: 37
ADLS_ACUITY_SCORE: 37
ADLS_ACUITY_SCORE: 35

## 2023-06-24 VITALS
DIASTOLIC BLOOD PRESSURE: 56 MMHG | BODY MASS INDEX: 19.31 KG/M2 | WEIGHT: 127 LBS | OXYGEN SATURATION: 98 % | HEART RATE: 97 BPM | RESPIRATION RATE: 16 BRPM | TEMPERATURE: 98.3 F | SYSTOLIC BLOOD PRESSURE: 116 MMHG

## 2023-06-24 LAB — GLUCOSE BLDC GLUCOMTR-MCNC: 115 MG/DL (ref 70–99)

## 2023-06-24 PROCEDURE — 82962 GLUCOSE BLOOD TEST: CPT

## 2023-06-24 ASSESSMENT — ACTIVITIES OF DAILY LIVING (ADL)
ADLS_ACUITY_SCORE: 37

## 2023-06-24 NOTE — PHARMACY-ADMISSION MEDICATION HISTORY
Pharmacist Admission Medication History    Admission medication history is complete. The information provided in this note is only as accurate as the sources available at the time of the update.    Medication reconciliation/reorder completed by provider prior to medication history? No    Information Source(s): Patient and CareEverywhere/SureScripts via in-person    Pertinent Information:      Changes made to PTA medication list:    Added: None    Deleted: None    Changed: None    Medication Affordability:  Not including over the counter (OTC) medications, was there a time in the past 3 months when you did not take your medications as prescribed because of cost?: No    Allergies reviewed with patient and updates made in EHR: yes    Medication History Completed By: Feliberto Rodriguez RPH 6/23/2023 8:14 PM    Prior to Admission medications    Medication Sig Last Dose Taking? Auth Provider Long Term End Date   acetaminophen (TYLENOL) 325 MG tablet Take 2 tablets (650 mg) by mouth every 4 hours as needed for mild pain, other, headaches or fever (and adjunct with moderate or severe pain or per patient request) Past Week Yes Chip Bustos MD     aspirin (ASA) 325 MG EC tablet Take 325 mg by mouth daily 6/23/2023 at am Yes Unknown, Entered By History     atorvastatin (LIPITOR) 40 MG tablet Take 40 mg by mouth At Bedtime 6/22/2023 Yes Unknown, Entered By History     cyanocobalamin (VITAMIN B-12) 1000 MCG tablet Take 1,000 mcg by mouth daily 6/23/2023 at am Yes Unknown, Entered By History     folic acid (FOLVITE) 1 MG tablet Take 1 tablet (1 mg) by mouth daily 6/23/2023 at am Yes Chip Bustos MD     gabapentin (NEURONTIN) 400 MG capsule Take 1 capsule (400 mg) by mouth 3 times daily 6/23/2023 at am x 1 Yes Chip Bustos MD Yes    hydrOXYzine (ATARAX) 50 MG tablet Take 1 tablet (50 mg) by mouth every 6 hours as needed for other or anxiety (nausea) Past Week Yes Chip Bsutos MD     magnesium oxide 400 MG CAPS Take  400 mg by mouth daily 6/23/2023 at am Yes Unknown, Entered By History     melatonin 1 MG TABS tablet Take 1 tablet (1 mg) by mouth nightly as needed for sleep Past Week Yes Chip Bustos MD     mirtazapine (REMERON) 7.5 MG tablet Take 1 tablet (7.5 mg) by mouth At Bedtime 6/22/2023 Yes Chip Bustos MD Yes    multivitamin w/minerals (THERA-VIT-M) tablet Take 1 tablet by mouth daily 6/23/2023 at am Yes Chip Bustos MD     naproxen (NAPROSYN) 500 MG tablet Take 1 tablet (500 mg) by mouth 2 times daily (with meals) 6/23/2023 at am Yes Chip Bustos MD     nicotine (NICODERM CQ) 14 MG/24HR 24 hr patch Place 1 patch onto the skin daily 6/23/2023 at am Yes Chip Bustos MD     prochlorperazine (COMPAZINE) 10 MG tablet Take 10 mg by mouth 2 times daily as needed for nausea or vomiting Past Week Yes Unknown, Entered By History     thiamine (B-1) 100 MG tablet Take 1 tablet (100 mg) by mouth daily 6/23/2023 at am Yes Chip Bustos MD     Vitamin D3 (CHOLECALCIFEROL) 25 mcg (1000 units) tablet Take 25 mcg by mouth daily 6/23/2023 at am Yes Unknown, Entered By History

## 2023-06-24 NOTE — ED NOTES
BG 57. Provider notified. Patient given 16 oz orange juice and sandwich. A&Ox4. Ambulated to restroom with standby assist.
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
## 2023-06-24 NOTE — ED NOTES
EMERGENCY DEPARTMENT SIGN OUT NOTE        ED COURSE AND MEDICAL DECISION MAKING  12:30 AM Patient was signed out to me by Dr Carmen Traylor.     In brief, Familia Watson is a 72 year old male who initially presented for evaluation of fall a few hours prior to ED arrival. Patient reports waking up on the ground. Patient crawled to the chair and was able to get himself up before calling EMS. Patient has been compliant with his medications. Endorses headache. Denies back pain.      At time of sign out, disposition was pending clinical sobriety and safe ambulation.     6:04 AM patient reevaluated.  Having some mild tremors but otherwise sober.  Will discharge home.  Has resources coming on Monday.  Patient does not want admission.    FINAL IMPRESSION    1. Fall, initial encounter    2. Alcoholic intoxication with complication (H)        ED MEDS  Medications   0.9% sodium chloride BOLUS (0 mLs Intravenous Stopped 6/23/23 2139)       LAB  Labs Ordered and Resulted from Time of ED Arrival to Time of ED Departure   COMPREHENSIVE METABOLIC PANEL - Abnormal       Result Value    Sodium 138      Potassium 4.6      Chloride 104      Carbon Dioxide (CO2) 15 (*)     Anion Gap 19 (*)     Urea Nitrogen 9      Creatinine 0.67 (*)     Calcium 8.4 (*)     Glucose 57 (*)     Alkaline Phosphatase 88      AST 50 (*)     ALT 32      Protein Total 6.8      Albumin 3.4 (*)     Bilirubin Total 0.2      GFR Estimate >90     ETHYL ALCOHOL LEVEL - Abnormal    Alcohol, Blood 294 (*)    CBC WITH PLATELETS AND DIFFERENTIAL - Abnormal    WBC Count 6.0      RBC Count 3.28 (*)     Hemoglobin 10.6 (*)     Hematocrit 32.5 (*)     MCV 99      MCH 32.3      MCHC 32.6      RDW 16.9 (*)     Platelet Count 355      % Neutrophils 55      % Lymphocytes 31      % Monocytes 7      % Eosinophils 4      % Basophils 1      % Immature Granulocytes 2      NRBCs per 100 WBC 0      Absolute Neutrophils 3.3      Absolute Lymphocytes 1.8      Absolute Monocytes 0.4       Absolute Eosinophils 0.2      Absolute Basophils 0.1      Absolute Immature Granulocytes 0.1      Absolute NRBCs 0.0     GLUCOSE BY METER - Abnormal    GLUCOSE BY METER POCT 115 (*)    GLUCOSE BY METER - Normal    GLUCOSE BY METER POCT 73     GLUCOSE BY METER - Normal    GLUCOSE BY METER POCT 72         RADIOLOGY    Cervical spine CT w/o contrast   Final Result   IMPRESSION:   HEAD CT:   1.  No acute intracranial finding.      CERVICAL SPINE CT:   1.  No CT evidence for acute fracture or post traumatic subluxation.      Head CT w/o contrast   Final Result   IMPRESSION:   HEAD CT:   1.  No acute intracranial finding.      CERVICAL SPINE CT:   1.  No CT evidence for acute fracture or post traumatic subluxation.          DISCHARGE MEDS  New Prescriptions    No medications on file       Lon Partida MD  [facility] EMERGENCY ROOM  [address] [place] [id number] [phone]     Lon Partida MD  06/24/23 0604

## 2023-06-24 NOTE — DISCHARGE INSTRUCTIONS
Avoid alcohol  Rest, make sure you are eating at home  As discussed at your discharge from the hospital, home health care, physical therapy and social work should be contacting you to come to your home  Return if any worsening of symptoms

## 2023-06-24 NOTE — PROGRESS NOTES
Patient arrived at [facility] ED via EMS after suffering a fall at home. Patient has had several hospitalizations in past months with last one at [facility] on 06/18/23 to 06/22/23 at which time providers encouraged patient go to TCU. Patient once again declined TCU and did accept home services. Per discharge note patient was accepted by [facility] and they are to provide PT, OT, RN, and SW.     This CM met with patient to discuss ED visit and patient asked to be allowed to return home. Stated that homecare agency is supposed to call him on Monday to start services. Asked his thoughts about TCU and patient stated he prefers to go home since he doesn t want to be in hospital. CM discussed patient s wishes about discharge with ED Provider. Plan is to have patient sober up some more and then have a road test for safety and possible discharge since he has home care services in place. Provider to follow-up with patient.      PHILLIP MULTANI RN/CM

## 2023-06-24 NOTE — ED TRIAGE NOTES
Presents via EMS. Pt states he fell at home but does not know how or when, he just remembers waking up on the ground. Pt was admitted over the weekend for failure to thrive. Discharged yesterday. Reports last drink a few hours ago. C/o headache and generalized weakness. A&O x 4.

## 2023-09-16 RX ORDER — MAGNESIUM GLUCONATE 30 MG(550)
TABLET ORAL
Qty: 7 TABLET | Refills: 0 | OUTPATIENT
Start: 2023-09-16

## 2024-03-06 ENCOUNTER — LAB REQUISITION (OUTPATIENT)
Dept: LAB | Facility: CLINIC | Age: 74
End: 2024-03-06
Payer: MEDICARE

## 2024-03-06 DIAGNOSIS — I10 ESSENTIAL (PRIMARY) HYPERTENSION: ICD-10-CM

## 2024-03-06 DIAGNOSIS — E78.5 HYPERLIPIDEMIA, UNSPECIFIED: ICD-10-CM

## 2024-03-06 DIAGNOSIS — E55.9 VITAMIN D DEFICIENCY, UNSPECIFIED: ICD-10-CM

## 2024-03-06 DIAGNOSIS — D51.9 VITAMIN B12 DEFICIENCY ANEMIA, UNSPECIFIED: ICD-10-CM

## 2024-03-07 LAB
BASOPHILS # BLD AUTO: 0.1 10E3/UL (ref 0–0.2)
BASOPHILS NFR BLD AUTO: 1 %
EOSINOPHIL # BLD AUTO: 0.3 10E3/UL (ref 0–0.7)
EOSINOPHIL NFR BLD AUTO: 6 %
ERYTHROCYTE [DISTWIDTH] IN BLOOD BY AUTOMATED COUNT: 13.9 % (ref 10–15)
HCT VFR BLD AUTO: 43.2 % (ref 40–53)
HGB BLD-MCNC: 14.3 G/DL (ref 13.3–17.7)
IMM GRANULOCYTES # BLD: 0 10E3/UL
IMM GRANULOCYTES NFR BLD: 1 %
LYMPHOCYTES # BLD AUTO: 1.9 10E3/UL (ref 0.8–5.3)
LYMPHOCYTES NFR BLD AUTO: 32 %
MCH RBC QN AUTO: 31.6 PG (ref 26.5–33)
MCHC RBC AUTO-ENTMCNC: 33.1 G/DL (ref 31.5–36.5)
MCV RBC AUTO: 95 FL (ref 78–100)
MONOCYTES # BLD AUTO: 0.5 10E3/UL (ref 0–1.3)
MONOCYTES NFR BLD AUTO: 8 %
NEUTROPHILS # BLD AUTO: 3.1 10E3/UL (ref 1.6–8.3)
NEUTROPHILS NFR BLD AUTO: 52 %
NRBC # BLD AUTO: 0 10E3/UL
NRBC BLD AUTO-RTO: 0 /100
PLATELET # BLD AUTO: 270 10E3/UL (ref 150–450)
RBC # BLD AUTO: 4.53 10E6/UL (ref 4.4–5.9)
WBC # BLD AUTO: 5.9 10E3/UL (ref 4–11)

## 2024-03-07 PROCEDURE — P9603 ONE-WAY ALLOW PRORATED MILES: HCPCS | Mod: ORL | Performed by: NURSE PRACTITIONER

## 2024-03-07 PROCEDURE — 83735 ASSAY OF MAGNESIUM: CPT | Mod: ORL | Performed by: NURSE PRACTITIONER

## 2024-03-07 PROCEDURE — 82607 VITAMIN B-12: CPT | Mod: ORL | Performed by: NURSE PRACTITIONER

## 2024-03-07 PROCEDURE — 36415 COLL VENOUS BLD VENIPUNCTURE: CPT | Mod: ORL | Performed by: NURSE PRACTITIONER

## 2024-03-07 PROCEDURE — 82306 VITAMIN D 25 HYDROXY: CPT | Mod: ORL | Performed by: NURSE PRACTITIONER

## 2024-03-07 PROCEDURE — 80061 LIPID PANEL: CPT | Mod: ORL | Performed by: NURSE PRACTITIONER

## 2024-03-07 PROCEDURE — 85025 COMPLETE CBC W/AUTO DIFF WBC: CPT | Mod: ORL | Performed by: NURSE PRACTITIONER

## 2024-03-07 PROCEDURE — 80053 COMPREHEN METABOLIC PANEL: CPT | Mod: ORL | Performed by: NURSE PRACTITIONER

## 2024-03-08 LAB
ALBUMIN SERPL BCG-MCNC: 4.7 G/DL (ref 3.5–5.2)
ALP SERPL-CCNC: 80 U/L (ref 40–150)
ALT SERPL W P-5'-P-CCNC: 23 U/L (ref 0–70)
ANION GAP SERPL CALCULATED.3IONS-SCNC: 13 MMOL/L (ref 7–15)
AST SERPL W P-5'-P-CCNC: 26 U/L (ref 0–45)
BILIRUB SERPL-MCNC: 0.3 MG/DL
BUN SERPL-MCNC: 24.4 MG/DL (ref 8–23)
CALCIUM SERPL-MCNC: 9.8 MG/DL (ref 8.8–10.2)
CHLORIDE SERPL-SCNC: 102 MMOL/L (ref 98–107)
CHOLEST SERPL-MCNC: 150 MG/DL
CREAT SERPL-MCNC: 0.74 MG/DL (ref 0.67–1.17)
DEPRECATED HCO3 PLAS-SCNC: 25 MMOL/L (ref 22–29)
EGFRCR SERPLBLD CKD-EPI 2021: >90 ML/MIN/1.73M2
FASTING STATUS PATIENT QL REPORTED: NORMAL
GLUCOSE SERPL-MCNC: 145 MG/DL (ref 70–99)
HDLC SERPL-MCNC: 59 MG/DL
LDLC SERPL CALC-MCNC: 78 MG/DL
MAGNESIUM SERPL-MCNC: 2.7 MG/DL (ref 1.7–2.3)
NONHDLC SERPL-MCNC: 91 MG/DL
POTASSIUM SERPL-SCNC: 4.2 MMOL/L (ref 3.4–5.3)
PROT SERPL-MCNC: 7.8 G/DL (ref 6.4–8.3)
SODIUM SERPL-SCNC: 140 MMOL/L (ref 135–145)
TRIGL SERPL-MCNC: 63 MG/DL
VIT B12 SERPL-MCNC: 2655 PG/ML (ref 232–1245)
VIT D+METAB SERPL-MCNC: 62 NG/ML (ref 20–50)

## 2024-04-08 ENCOUNTER — LAB REQUISITION (OUTPATIENT)
Dept: LAB | Facility: CLINIC | Age: 74
End: 2024-04-08
Payer: MEDICARE

## 2024-04-08 DIAGNOSIS — I10 ESSENTIAL (PRIMARY) HYPERTENSION: ICD-10-CM

## 2024-04-09 LAB
ALBUMIN SERPL BCG-MCNC: 4.4 G/DL (ref 3.5–5.2)
ALP SERPL-CCNC: 88 U/L (ref 40–150)
ALT SERPL W P-5'-P-CCNC: 20 U/L (ref 0–70)
ANION GAP SERPL CALCULATED.3IONS-SCNC: 15 MMOL/L (ref 7–15)
AST SERPL W P-5'-P-CCNC: 34 U/L (ref 0–45)
BILIRUB SERPL-MCNC: 0.3 MG/DL
BUN SERPL-MCNC: 18.8 MG/DL (ref 8–23)
CALCIUM SERPL-MCNC: 9.4 MG/DL (ref 8.8–10.2)
CHLORIDE SERPL-SCNC: 95 MMOL/L (ref 98–107)
CREAT SERPL-MCNC: 0.91 MG/DL (ref 0.67–1.17)
DEPRECATED HCO3 PLAS-SCNC: 25 MMOL/L (ref 22–29)
EGFRCR SERPLBLD CKD-EPI 2021: 90 ML/MIN/1.73M2
GLUCOSE SERPL-MCNC: 96 MG/DL (ref 70–99)
MAGNESIUM SERPL-MCNC: 2 MG/DL (ref 1.7–2.3)
POTASSIUM SERPL-SCNC: 4 MMOL/L (ref 3.4–5.3)
PROT SERPL-MCNC: 7.4 G/DL (ref 6.4–8.3)
SODIUM SERPL-SCNC: 135 MMOL/L (ref 135–145)
VIT B12 SERPL-MCNC: 1632 PG/ML (ref 232–1245)

## 2024-04-09 PROCEDURE — 82607 VITAMIN B-12: CPT | Mod: ORL | Performed by: NURSE PRACTITIONER

## 2024-04-09 PROCEDURE — 80053 COMPREHEN METABOLIC PANEL: CPT | Mod: ORL | Performed by: NURSE PRACTITIONER

## 2024-04-09 PROCEDURE — 83735 ASSAY OF MAGNESIUM: CPT | Mod: ORL | Performed by: NURSE PRACTITIONER

## 2024-04-09 PROCEDURE — 36415 COLL VENOUS BLD VENIPUNCTURE: CPT | Mod: ORL | Performed by: NURSE PRACTITIONER

## 2024-04-09 PROCEDURE — P9603 ONE-WAY ALLOW PRORATED MILES: HCPCS | Mod: ORL | Performed by: NURSE PRACTITIONER

## 2024-04-15 ENCOUNTER — LAB REQUISITION (OUTPATIENT)
Dept: LAB | Facility: CLINIC | Age: 74
End: 2024-04-15
Payer: MEDICARE

## 2024-04-15 DIAGNOSIS — E55.9 VITAMIN D DEFICIENCY, UNSPECIFIED: ICD-10-CM

## 2024-04-16 PROCEDURE — 36415 COLL VENOUS BLD VENIPUNCTURE: CPT | Mod: ORL | Performed by: NURSE PRACTITIONER

## 2024-04-16 PROCEDURE — 82607 VITAMIN B-12: CPT | Mod: ORL | Performed by: NURSE PRACTITIONER

## 2024-04-16 PROCEDURE — P9603 ONE-WAY ALLOW PRORATED MILES: HCPCS | Mod: ORL | Performed by: NURSE PRACTITIONER

## 2024-04-16 PROCEDURE — 82306 VITAMIN D 25 HYDROXY: CPT | Mod: ORL | Performed by: NURSE PRACTITIONER

## 2024-04-17 LAB
VIT B12 SERPL-MCNC: 2290 PG/ML (ref 232–1245)
VIT D+METAB SERPL-MCNC: 59 NG/ML (ref 20–50)

## 2024-09-02 ENCOUNTER — LAB REQUISITION (OUTPATIENT)
Dept: LAB | Facility: CLINIC | Age: 74
End: 2024-09-02
Payer: MEDICARE

## 2024-09-02 DIAGNOSIS — F10.151: ICD-10-CM

## 2024-09-03 LAB
ALBUMIN SERPL BCG-MCNC: 4.3 G/DL (ref 3.5–5.2)
ALP SERPL-CCNC: 122 U/L (ref 40–150)
ALT SERPL W P-5'-P-CCNC: 18 U/L (ref 0–70)
AST SERPL W P-5'-P-CCNC: 24 U/L (ref 0–45)
BILIRUB DIRECT SERPL-MCNC: <0.2 MG/DL (ref 0–0.3)
BILIRUB SERPL-MCNC: 0.3 MG/DL
BUN SERPL-MCNC: 19.8 MG/DL (ref 8–23)
CREAT SERPL-MCNC: 1.59 MG/DL (ref 0.67–1.17)
EGFRCR SERPLBLD CKD-EPI 2021: 46 ML/MIN/1.73M2
HOLD SPECIMEN: NORMAL
PROT SERPL-MCNC: 7.5 G/DL (ref 6.4–8.3)

## 2024-09-03 PROCEDURE — 36415 COLL VENOUS BLD VENIPUNCTURE: CPT | Mod: ORL | Performed by: FAMILY MEDICINE

## 2024-09-03 PROCEDURE — 80076 HEPATIC FUNCTION PANEL: CPT | Mod: ORL | Performed by: FAMILY MEDICINE

## 2024-09-03 PROCEDURE — 82565 ASSAY OF CREATININE: CPT | Mod: ORL | Performed by: FAMILY MEDICINE

## 2024-09-03 PROCEDURE — 84520 ASSAY OF UREA NITROGEN: CPT | Mod: ORL | Performed by: FAMILY MEDICINE

## 2024-09-03 PROCEDURE — P9603 ONE-WAY ALLOW PRORATED MILES: HCPCS | Mod: ORL | Performed by: FAMILY MEDICINE

## 2025-01-27 NOTE — SEDATION DOCUMENTATION
20 ml wash BUSTER   Pre-procedure teaching reinforced.   We reviewed the association between sleep, mood, and coexisting psychiatric disorders. We discussed the importance of obtaining adequate sleep of at least 7 hours nightly. Patient was encouraged to continue current prescribed medications and to continue follow up with their PCP/psychiatrist for further management.     Orders:    Diagnostic Sleep Study; Future

## 2025-04-08 ENCOUNTER — LAB REQUISITION (OUTPATIENT)
Dept: LAB | Facility: CLINIC | Age: 75
End: 2025-04-08
Payer: MEDICARE

## 2025-04-08 DIAGNOSIS — Z11.1 ENCOUNTER FOR SCREENING FOR RESPIRATORY TUBERCULOSIS: ICD-10-CM

## 2025-04-09 PROCEDURE — P9604 ONE-WAY ALLOW PRORATED TRIP: HCPCS | Mod: ORL

## 2025-04-09 PROCEDURE — 86481 TB AG RESPONSE T-CELL SUSP: CPT | Mod: ORL

## 2025-04-09 PROCEDURE — 36415 COLL VENOUS BLD VENIPUNCTURE: CPT | Mod: ORL

## 2025-04-10 LAB
GAMMA INTERFERON BACKGROUND BLD IA-ACNC: 0.03 IU/ML
M TB IFN-G BLD-IMP: NEGATIVE
M TB IFN-G CD4+ BCKGRND COR BLD-ACNC: 2.79 IU/ML
MITOGEN IGNF BCKGRD COR BLD-ACNC: 0.01 IU/ML
MITOGEN IGNF BCKGRD COR BLD-ACNC: 0.02 IU/ML
QUANTIFERON MITOGEN: 2.82 IU/ML
QUANTIFERON NIL TUBE: 0.03 IU/ML
QUANTIFERON TB1 TUBE: 0.04 IU/ML
QUANTIFERON TB2 TUBE: 0.05